# Patient Record
Sex: MALE | Race: WHITE | NOT HISPANIC OR LATINO | Employment: FULL TIME | URBAN - METROPOLITAN AREA
[De-identification: names, ages, dates, MRNs, and addresses within clinical notes are randomized per-mention and may not be internally consistent; named-entity substitution may affect disease eponyms.]

---

## 2018-05-08 ENCOUNTER — TRANSCRIBE ORDERS (OUTPATIENT)
Dept: ADMINISTRATIVE | Facility: HOSPITAL | Age: 63
End: 2018-05-08

## 2018-05-08 DIAGNOSIS — H34.02: Primary | ICD-10-CM

## 2018-05-15 ENCOUNTER — APPOINTMENT (EMERGENCY)
Dept: RADIOLOGY | Facility: HOSPITAL | Age: 63
End: 2018-05-15
Payer: COMMERCIAL

## 2018-05-15 ENCOUNTER — HOSPITAL ENCOUNTER (EMERGENCY)
Facility: HOSPITAL | Age: 63
Discharge: HOME | End: 2018-05-15
Attending: EMERGENCY MEDICINE | Admitting: EMERGENCY MEDICINE
Payer: COMMERCIAL

## 2018-05-15 ENCOUNTER — HOSPITAL ENCOUNTER (OUTPATIENT)
Dept: RADIOLOGY | Facility: HOSPITAL | Age: 63
Discharge: HOME | End: 2018-05-15
Attending: OPHTHALMOLOGY
Payer: COMMERCIAL

## 2018-05-15 VITALS
RESPIRATION RATE: 18 BRPM | BODY MASS INDEX: 27.28 KG/M2 | TEMPERATURE: 98 F | HEART RATE: 77 BPM | SYSTOLIC BLOOD PRESSURE: 168 MMHG | HEIGHT: 68 IN | DIASTOLIC BLOOD PRESSURE: 70 MMHG | WEIGHT: 180 LBS | OXYGEN SATURATION: 96 %

## 2018-05-15 DIAGNOSIS — G45.3 AMAUROSIS FUGAX OF LEFT EYE: Primary | ICD-10-CM

## 2018-05-15 DIAGNOSIS — H34.02: ICD-10-CM

## 2018-05-15 DIAGNOSIS — I65.22 STENOSIS OF LEFT CAROTID ARTERY: ICD-10-CM

## 2018-05-15 LAB
ANION GAP SERPL CALCULATED.3IONS-SCNC: 6 MMOL/L (ref 4–13)
APTT PPP: 33 SECONDS (ref 24–33)
BASOPHILS # BLD AUTO: 0.12 THOUSANDS/ΜL (ref 0–0.1)
BASOPHILS NFR BLD AUTO: 1 % (ref 0–1)
BUN SERPL-MCNC: 18 MG/DL (ref 5–25)
CALCIUM SERPL-MCNC: 9.3 MG/DL (ref 8.3–10.1)
CHLORIDE SERPL-SCNC: 103 MMOL/L (ref 100–108)
CO2 SERPL-SCNC: 30 MMOL/L (ref 21–32)
CREAT SERPL-MCNC: 0.99 MG/DL (ref 0.6–1.3)
EOSINOPHIL # BLD AUTO: 0.35 THOUSAND/ΜL (ref 0–0.61)
EOSINOPHIL NFR BLD AUTO: 3 % (ref 0–6)
ERYTHROCYTE [DISTWIDTH] IN BLOOD BY AUTOMATED COUNT: 12.9 % (ref 11.6–15.1)
GFR SERPL CREATININE-BSD FRML MDRD: 81 ML/MIN/1.73SQ M
GLUCOSE SERPL-MCNC: 115 MG/DL (ref 65–140)
HCT VFR BLD AUTO: 43.3 % (ref 36.5–49.3)
HGB BLD-MCNC: 14.4 G/DL (ref 12–17)
IMM GRANULOCYTES # BLD AUTO: 0.03 THOUSAND/UL (ref 0–0.2)
IMM GRANULOCYTES NFR BLD AUTO: 0 % (ref 0–2)
INR PPP: 1.04 (ref 0.86–1.16)
LYMPHOCYTES # BLD AUTO: 3.82 THOUSANDS/ΜL (ref 0.6–4.47)
LYMPHOCYTES NFR BLD AUTO: 37 % (ref 14–44)
MCH RBC QN AUTO: 29.4 PG (ref 26.8–34.3)
MCHC RBC AUTO-ENTMCNC: 33.3 G/DL (ref 31.4–37.4)
MCV RBC AUTO: 88 FL (ref 82–98)
MONOCYTES # BLD AUTO: 0.81 THOUSAND/ΜL (ref 0.17–1.22)
MONOCYTES NFR BLD AUTO: 8 % (ref 4–12)
NEUTROPHILS # BLD AUTO: 5.09 THOUSANDS/ΜL (ref 1.85–7.62)
NEUTS SEG NFR BLD AUTO: 51 % (ref 43–75)
NRBC BLD AUTO-RTO: 0 /100 WBCS
PLATELET # BLD AUTO: 241 THOUSANDS/UL (ref 149–390)
PMV BLD AUTO: 9.5 FL (ref 8.9–12.7)
POTASSIUM SERPL-SCNC: 3.9 MMOL/L (ref 3.5–5.3)
PROTHROMBIN TIME: 10.9 SECONDS (ref 9.4–11.7)
RBC # BLD AUTO: 4.9 MILLION/UL (ref 3.88–5.62)
SODIUM SERPL-SCNC: 139 MMOL/L (ref 136–145)
TROPONIN I SERPL-MCNC: <0.02 NG/ML
WBC # BLD AUTO: 10.22 THOUSAND/UL (ref 4.31–10.16)

## 2018-05-15 PROCEDURE — 85025 COMPLETE CBC W/AUTO DIFF WBC: CPT | Performed by: PHYSICIAN ASSISTANT

## 2018-05-15 PROCEDURE — 93880 EXTRACRANIAL BILAT STUDY: CPT

## 2018-05-15 PROCEDURE — 70498 CT ANGIOGRAPHY NECK: CPT

## 2018-05-15 PROCEDURE — 85610 PROTHROMBIN TIME: CPT | Performed by: PHYSICIAN ASSISTANT

## 2018-05-15 PROCEDURE — 36415 COLL VENOUS BLD VENIPUNCTURE: CPT | Performed by: PHYSICIAN ASSISTANT

## 2018-05-15 PROCEDURE — 93005 ELECTROCARDIOGRAM TRACING: CPT

## 2018-05-15 PROCEDURE — 93880 EXTRACRANIAL BILAT STUDY: CPT | Performed by: SURGERY

## 2018-05-15 PROCEDURE — 96360 HYDRATION IV INFUSION INIT: CPT

## 2018-05-15 PROCEDURE — 84484 ASSAY OF TROPONIN QUANT: CPT | Performed by: PHYSICIAN ASSISTANT

## 2018-05-15 PROCEDURE — 80048 BASIC METABOLIC PNL TOTAL CA: CPT | Performed by: PHYSICIAN ASSISTANT

## 2018-05-15 PROCEDURE — 70496 CT ANGIOGRAPHY HEAD: CPT

## 2018-05-15 PROCEDURE — 99284 EMERGENCY DEPT VISIT MOD MDM: CPT

## 2018-05-15 PROCEDURE — 96361 HYDRATE IV INFUSION ADD-ON: CPT

## 2018-05-15 PROCEDURE — 85730 THROMBOPLASTIN TIME PARTIAL: CPT | Performed by: PHYSICIAN ASSISTANT

## 2018-05-15 RX ORDER — SIMVASTATIN 20 MG
20 TABLET ORAL
COMMUNITY
End: 2018-05-22 | Stop reason: ALTCHOICE

## 2018-05-15 RX ORDER — LOSARTAN POTASSIUM 50 MG/1
25 TABLET ORAL
COMMUNITY
End: 2018-05-22 | Stop reason: SDUPTHER

## 2018-05-15 RX ORDER — ASPIRIN 81 MG/1
325 TABLET ORAL DAILY
COMMUNITY
End: 2018-07-17

## 2018-05-15 RX ADMIN — IOHEXOL 85 ML: 350 INJECTION, SOLUTION INTRAVENOUS at 17:03

## 2018-05-15 RX ADMIN — SODIUM CHLORIDE 1000 ML: 0.9 INJECTION, SOLUTION INTRAVENOUS at 16:23

## 2018-05-15 NOTE — DISCHARGE INSTRUCTIONS
Ischemic Stroke   WHAT YOU NEED TO KNOW:   An ischemic stroke occurs when blood flow to a part of your brain is blocked  The blockage is usually caused by a blood clot that gets stuck in a narrow blood vessel  When oxygen cannot get to an area of the brain, tissue in that area may get damaged  The damage can cause loss of body functions controlled by that area of the brain  DISCHARGE INSTRUCTIONS:   Call 911 for any of the following:   ·            · You have any of the following signs of a stroke:      ¨ Numbness or drooping on one side of your face     ¨ Weakness in an arm or leg    ¨ Confusion or difficulty speaking    ¨ Dizziness, a severe headache, or vision loss    · You have a seizure  · You feel lightheaded, short of breath, and have chest pain  · You cough up blood  · You have a severe headache, or loss of balance or coordination  Seek care immediately if:   · Your arm or leg feels warm, tender, and painful  It may look swollen and red  · You have double vision or vision loss  · You have unusual or heavy bleeding  Contact your healthcare provider or neurologist if:   · Your blood pressure is higher or lower than you were told it should be  · You have questions or concerns about your condition or care  Warning signs of a stroke: The word F A S T  can help you remember and recognize signs of a stroke:  · F = Face:  One side of the face droops  · A = Arms:  One arm starts to drop when both arms are raised  · S = Speech:  Speech is slurred or sounds different than usual     · T = Time:  A person who is having a stroke needs to be seen immediately  A stroke is a medical emergency that needs immediate treatment  Some medicines and treatments work best if given within a few hours of a stroke  Early treatment can decrease the risk of long-term effects of a stroke  ·        Medicines:   You may  need any of the following:  · Thrombolytics  help break apart clots     · Antiplatelets , such as aspirin, help prevent blood clots  Take your antiplatelet medicine exactly as directed  These medicines make it more likely for you to bleed or bruise  If you are told to take aspirin, do not take acetaminophen or ibuprofen instead  · Blood thinners    help prevent blood clots  Examples of blood thinners include heparin and warfarin  Clots can cause strokes, heart attacks, and death  The following are general safety guidelines to follow while you are taking a blood thinner:    ¨ Watch for bleeding and bruising while you take blood thinners  Watch for bleeding from your gums or nose  Watch for blood in your urine and bowel movements  Use a soft washcloth on your skin, and a soft toothbrush to brush your teeth  This can keep your skin and gums from bleeding  If you shave, use an electric shaver  Do not play contact sports  ¨ Tell your dentist and other healthcare providers that you take anticoagulants  Wear a bracelet or necklace that says you take this medicine  ¨ Do not start or stop any medicines unless your healthcare provider tells you to  Many medicines cannot be used with blood thinners  ¨ Tell your healthcare provider right away if you forget to take the medicine, or if you take too much  ¨ Warfarin  is a blood thinner that you may need to take  The following are things you should be aware of if you take warfarin  § Foods and medicines can affect the amount of warfarin in your blood  Do not make major changes to your diet while you take warfarin  Warfarin works best when you eat about the same amount of vitamin K every day  Vitamin K is found in green leafy vegetables and certain other foods  Ask for more information about what to eat when you are taking warfarin  § You will need to see your healthcare provider for follow-up visits when you are on warfarin  You will need regular blood tests   These tests are used to decide how much medicine you need     · Other medicines  may be given to treat other health conditions such as high cholesterol, high blood pressure, or diabetes  · Take your medicine as directed  Contact your healthcare provider if you think your medicine is not helping or if you have side effects  Tell him or her if you are allergic to any medicine  Keep a list of the medicines, vitamins, and herbs you take  Include the amounts, and when and why you take them  Bring the list or the pill bottles to follow-up visits  Carry your medicine list with you in case of an emergency  Follow up with your healthcare provider or neurologist as directed: You may need to come in for regular tests of your brain function  You may also need regular blood tests  Write down your questions so you remember to ask them during your visits  Self-care:   · Go to rehabilitation (rehab) as directed  Rehab is an important part of treatment  A speech therapist helps you relearn or improve your ability to talk and swallow  You may start slowly and start doing more difficult tasks over time  Physical therapists can help you gain strength and build endurance  Occupational therapists teach you new ways to do daily activities, such as getting dressed  Therapy can help you improve your ability to walk or keep your balance  Your therapy may include tasks or movements you will need to do for everyday activities  An example is being able to raise or lower yourself from a chair  · Make your home safe  Remove anything you might trip over  Tape electrical cords down  Keep paths clear throughout your home  Make sure your home is well lit  Put nonslip materials on surfaces that might be slippery  An example is your bathtub or shower floor  · Use walking devices as directed  A cane or walker may help you keep your balance as you walk  What you need to know about depression:  Depression can happen after a stroke   Talk to your healthcare provider if you have depression that continues or is getting worse  Your provider may be able to help treat your depression  Your provider can also recommend support groups for you to join  A support group is a place to talk with others who have had a stroke  It may also help to talk to friends and family members about how you are feeling  Tell your family and friends that if they see these signs, to let your healthcare provider know  You may show any of the following signs of depression:  · Extreme sadness    · Avoiding social interaction with family or friends    · A lack of interest in things you once enjoyed    · Irritability    · Trouble sleeping    · Low energy levels    · A change in eating habits or sudden weight gain or loss  Decrease your risk for another stroke:   · Manage health conditions  Take your medicine as directed  Check your blood pressure and blood sugar levels as directed  Keep a record and bring it to your follow-up visits  · Eat a variety of healthy foods  Healthy foods include whole-grain breads, low-fat dairy products, beans, fish, and lean meats  Eat at least 5 servings of fruits and vegetables each day  Choose foods that are low in salt, unhealthy fats (saturated and trans fat), salt, and sugar  Eat foods that are high in potassium, such as potatoes and bananas  Ask your dietitian what other nutrition guidelines you should follow  He or she can help you choose foods that are right for you  · Maintain a healthy weight  Ask your healthcare provider how much you should weigh  Ask him or her to help you create a weight loss plan if you are overweight  Ask about the best exercise plan for you  · Do not drink alcohol  Alcohol can increase your risk for a stroke  Alcohol may also increase your blood pressure or thin your blood  Blood thinning can increase your risk for hemorrhagic stroke  · Do not smoke cigarettes or use illegal drugs  Smoking and drugs increase your risk for a stroke   Nicotine and other chemicals in cigarettes and cigars can also cause lung damage  Ask your healthcare provider for information if you currently smoke or use drugs and need help to quit  E-cigarettes or smokeless tobacco still contain nicotine  Talk to your healthcare provider before you use these products  For support and more information:   · National St[employer] Association  [address] , [place] [place] [address]  Phone: 2- 788 - 336-8970  Web Address: [address] [employer]  org  © 2017 [address] [employer] Information is for End User's use only and may not be sold, redistributed or otherwise used for commercial purposes  All illustrations and images included in CareNotes® are the copyrighted property of [employer] [facility]  or Steve Davis  The above information is an  only  It is not intended as medical advice for individual conditions or treatments  Talk to your doctor, nurse or pharmacist before following any medical regimen to see if it is safe and effective for you

## 2018-05-16 ENCOUNTER — OFFICE VISIT (OUTPATIENT)
Dept: VASCULAR SURGERY | Facility: CLINIC | Age: 63
End: 2018-05-16
Payer: COMMERCIAL

## 2018-05-16 VITALS
HEIGHT: 68 IN | WEIGHT: 189 LBS | DIASTOLIC BLOOD PRESSURE: 82 MMHG | BODY MASS INDEX: 28.64 KG/M2 | TEMPERATURE: 97.6 F | SYSTOLIC BLOOD PRESSURE: 170 MMHG

## 2018-05-16 DIAGNOSIS — I65.22 LEFT CAROTID ARTERY STENOSIS: Primary | Chronic | ICD-10-CM

## 2018-05-16 DIAGNOSIS — G45.3 AMAUROSIS FUGAX: Chronic | ICD-10-CM

## 2018-05-16 PROCEDURE — 99245 OFF/OP CONSLTJ NEW/EST HI 55: CPT | Performed by: SURGERY

## 2018-05-16 NOTE — PATIENT INSTRUCTIONS
Left carotid artery stenosis  Symptomatic left carotid artery stenosis  Patient has had 2 episodes of amaurosis fugax over the past month or so  We discussed the findings on duplex evaluation and CT angiogram along with the pathophysiology of carotid occlusive disease, the indications for treatment and the treatment options available and their associated risks and benefits  Will plan on proceeding with left carotid endarterectomy following cardiac clearance  He will continue his aspirin and statin therapy

## 2018-05-16 NOTE — ASSESSMENT & PLAN NOTE
Symptomatic left carotid artery stenosis  Patient has had 2 episodes of amaurosis fugax over the past month or so  We discussed the findings on duplex evaluation and CT angiogram along with the pathophysiology of carotid occlusive disease, the indications for treatment and the treatment options available and their associated risks and benefits  Will plan on proceeding with left carotid endarterectomy following cardiac clearance  He will continue his aspirin and statin therapy

## 2018-05-16 NOTE — LETTER
May 16, 2018     Celeste Salazar, [address] [id number]    Patient: Anders Smith  YOB: 1955   Date of Visit: 5/16/2018       Dear Dr Anuel Thompson:    Thank you for referring Don Richards to me for evaluation  Below are the relevant portions of my assessment and plan of care  Diagnoses and all orders for this visit:    Left carotid artery stenosis  Symptomatic left carotid artery stenosis  Patient has had 2 episodes of amaurosis fugax over the past month or so  We discussed the findings on duplex evaluation and CT angiogram along with the pathophysiology of carotid occlusive disease, the indications for treatment and the treatment options available and their associated risks and benefits  Will plan on proceeding with left carotid endarterectomy following cardiac clearance  He will continue his aspirin and statin therapy  If you have questions, please do not hesitate to call me  I look forward to following Jeffy Forde along with you           Sincerely,        Maged Bianchi MD        CC: MD Yaa Tsang MD

## 2018-05-16 NOTE — H&P
Assessment/Plan:    Left carotid artery stenosis  Symptomatic left carotid artery stenosis  Patient has had 2 episodes of amaurosis fugax over the past month or so  We discussed the findings on duplex evaluation and CT angiogram along with the pathophysiology of carotid occlusive disease, the indications for treatment and the treatment options available and their associated risks and benefits  Will plan on proceeding with left carotid endarterectomy following cardiac clearance  He will continue his aspirin and statin therapy  Diagnoses and all orders for this visit:    Left carotid artery stenosis    Amaurosis fugax               Patient ID: Sanjay Siddiqui  is a 59 y o  male  Chief Complaint: Pt is new to our office here to review CTA of head and neck and CV  Pt denies HX of stroke  Pt states he was having issues with his vision that started a month ago  A week ago he states he went blind to left eye  Pt states he was able to see "down but couldn't see up"  Pt denies issues with speak  Pt denies one-sided weakness  No facial droopiness  Pt quite smoking 5/14/18  Pt states he has " ticks' to right arm that have been ongoing for a couple of year   Pt is taking aspirin daily  59year-old has noticed 2 episodes of temporary blindness affecting his left eye over the past month or so  In 1 of these events he noticed loss of vision only in the upper half of his visual field  These episodes lasted approximately 10 minutes  He denies any peripheral symptoms such as numbness or weakness which would be consistent with TIA or CVA  Interestingly he was at his neurologist for evaluation of his Parkinson's disease when he mentioned his visual loss  At that time he was sent for further workup at which time this critical left carotid stenosis was identified  Of note the patient has been a long-term smoker and quit yesterday     He denies any cardiac or pulmonary symptoms such as chest pain or shortness of breath  Review of carotid duplex from 05/15 shows a critical left carotid stenosis with flow velocity of 467/191 centimeters/second  On the right there is a less than 50% stenosis  CT angiogram 05/15/2018 with critical stenosis of the proximal internal carotid artery with a residual luminal diameter of less than 1 mm  The internal carotid arteries then small distally at approximately 2 5 mm in comparison to the contralateral side at 4 5 mm  The following portions of the patient's history were reviewed and updated as appropriate: allergies, current medications, past family history, past medical history, past social history, past surgical history and problem list     Review of Systems   Constitutional: Positive for activity change, fatigue and unexpected weight change  HENT: Negative  Eyes: Positive for visual disturbance  Sudden vision loss  Respiratory: Positive for shortness of breath  Cardiovascular: Negative  Gastrointestinal: Negative  Endocrine: Negative  Genitourinary: Negative  Musculoskeletal: Positive for neck pain and neck stiffness  Skin: Positive for pallor  Allergic/Immunologic: Negative  Neurological: Positive for dizziness, tremors, light-headedness and headaches  Hematological: Negative  Psychiatric/Behavioral: Negative  Objective:      /82 (BP Location: Right arm, Patient Position: Sitting, Cuff Size: Adult)   Temp 97 6 °F (36 4 °C) (Tympanic)   Ht 5' 8" (1 727 m)   Wt 85 7 kg (189 lb)   BMI 28 74 kg/m²           Physical Exam   Constitutional: He is oriented to person, place, and time  He appears well-developed and well-nourished  HENT:   Head: Normocephalic and atraumatic  Eyes: Conjunctivae and EOM are normal    Neck: Normal range of motion  Neck supple  No JVD present  Carotid bruit is present (Left bruit)  Cardiovascular: Normal rate, regular rhythm, S1 normal, S2 normal and normal heart sounds      No murmur heard   Pulses:       Carotid pulses are 2+ on the right side, and 2+ on the left side  Radial pulses are 2+ on the right side, and 2+ on the left side  Femoral pulses are 2+ on the right side, and 2+ on the left side  Popliteal pulses are 2+ on the right side, and 2+ on the left side  Dorsalis pedis pulses are 2+ on the right side, and 2+ on the left side  Posterior tibial pulses are 2+ on the right side, and 2+ on the left side  Pulmonary/Chest: Effort normal and breath sounds normal    Abdominal: Soft  Normal aorta  There is no tenderness  No hernia  Musculoskeletal: Normal range of motion  He exhibits deformity (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

## 2018-05-16 NOTE — PROGRESS NOTES
Assessment/Plan:    Left carotid artery stenosis  Symptomatic left carotid artery stenosis  Patient has had 2 episodes of amaurosis fugax over the past month or so  We discussed the findings on duplex evaluation and CT angiogram along with the pathophysiology of carotid occlusive disease, the indications for treatment and the treatment options available and their associated risks and benefits  Will plan on proceeding with left carotid endarterectomy following cardiac clearance  He will continue his aspirin and statin therapy  Diagnoses and all orders for this visit:    Left carotid artery stenosis    Amaurosis fugax               Patient ID: Jennifer Shepherd  is a 59 y o  male  Chief Complaint: Pt is new to our office here to review CTA of head and neck and CV  Pt denies HX of stroke  Pt states he was having issues with his vision that started a month ago  A week ago he states he went blind to left eye  Pt states he was able to see "down but couldn't see up"  Pt denies issues with speak  Pt denies one-sided weakness  No facial droopiness  Pt quite smoking 5/14/18  Pt states he has " ticks' to right arm that have been ongoing for a couple of year   Pt is taking aspirin daily  59year-old has noticed 2 episodes of temporary blindness affecting his left eye over the past month or so  In 1 of these events he noticed loss of vision only in the upper half of his visual field  These episodes lasted approximately 10 minutes  He denies any peripheral symptoms such as numbness or weakness which would be consistent with TIA or CVA  Interestingly he was at his neurologist for evaluation of his Parkinson's disease when he mentioned his visual loss  At that time he was sent for further workup at which time this critical left carotid stenosis was identified  Of note the patient has been a long-term smoker and quit yesterday     He denies any cardiac or pulmonary symptoms such as chest pain or shortness of breath  Review of carotid duplex from 05/15 shows a critical left carotid stenosis with flow velocity of 467/191 centimeters/second  On the right there is a less than 50% stenosis  CT angiogram 05/15/2018 with critical stenosis of the proximal internal carotid artery with a residual luminal diameter of less than 1 mm  The internal carotid arteries then small distally at approximately 2 5 mm in comparison to the contralateral side at 4 5 mm  The following portions of the patient's history were reviewed and updated as appropriate: allergies, current medications, past family history, past medical history, past social history, past surgical history and problem list     Review of Systems   Constitutional: Positive for activity change, fatigue and unexpected weight change  HENT: Negative  Eyes: Positive for visual disturbance  Sudden vision loss  Respiratory: Positive for shortness of breath  Cardiovascular: Negative  Gastrointestinal: Negative  Endocrine: Negative  Genitourinary: Negative  Musculoskeletal: Positive for neck pain and neck stiffness  Skin: Positive for pallor  Allergic/Immunologic: Negative  Neurological: Positive for dizziness, tremors, light-headedness and headaches  Hematological: Negative  Psychiatric/Behavioral: Negative  Objective:      /82 (BP Location: Right arm, Patient Position: Sitting, Cuff Size: Adult)   Temp 97 6 °F (36 4 °C) (Tympanic)   Ht 5' 8" (1 727 m)   Wt 85 7 kg (189 lb)   BMI 28 74 kg/m²          Physical Exam   Constitutional: He is oriented to person, place, and time  He appears well-developed and well-nourished  HENT:   Head: Normocephalic and atraumatic  Eyes: Conjunctivae and EOM are normal    Neck: Normal range of motion  Neck supple  No JVD present  Carotid bruit is present (Left bruit)  Cardiovascular: Normal rate, regular rhythm, S1 normal, S2 normal and normal heart sounds      No murmur heard   Pulses:       Carotid pulses are 2+ on the right side, and 2+ on the left side  Radial pulses are 2+ on the right side, and 2+ on the left side  Femoral pulses are 2+ on the right side, and 2+ on the left side  Popliteal pulses are 2+ on the right side, and 2+ on the left side  Dorsalis pedis pulses are 2+ on the right side, and 2+ on the left side  Posterior tibial pulses are 2+ on the right side, and 2+ on the left side  Pulmonary/Chest: Effort normal and breath sounds normal    Abdominal: Soft  Normal aorta  There is no tenderness  No hernia  Musculoskeletal: Normal range of motion  He exhibits deformity (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

## 2018-05-17 ENCOUNTER — TELEPHONE (OUTPATIENT)
Dept: ADMINISTRATIVE | Facility: HOSPITAL | Age: 63
End: 2018-05-17

## 2018-05-17 NOTE — TELEPHONE ENCOUNTER
Spoke with Carolyn Caldwell @ Dr Olga Persaud office in [place]  Patient needs cardiac clearance for Left Carotid Endarterectomy   Appointment scheduled for 5/22/18 @ 4pm  Faxed clearance to [phone]

## 2018-05-18 LAB
ATRIAL RATE: 76 BPM
P AXIS: 27 DEGREES
PR INTERVAL: 132 MS
QRS AXIS: -9 DEGREES
QRSD INTERVAL: 94 MS
QT INTERVAL: 378 MS
QTC INTERVAL: 425 MS
T WAVE AXIS: 21 DEGREES
VENTRICULAR RATE: 76 BPM

## 2018-05-18 PROCEDURE — 93010 ELECTROCARDIOGRAM REPORT: CPT | Performed by: INTERNAL MEDICINE

## 2018-05-22 ENCOUNTER — OFFICE VISIT (OUTPATIENT)
Dept: CARDIOLOGY CLINIC | Facility: CLINIC | Age: 63
End: 2018-05-22
Payer: COMMERCIAL

## 2018-05-22 VITALS
OXYGEN SATURATION: 96 % | DIASTOLIC BLOOD PRESSURE: 70 MMHG | HEIGHT: 68 IN | BODY MASS INDEX: 28.34 KG/M2 | WEIGHT: 187 LBS | SYSTOLIC BLOOD PRESSURE: 160 MMHG | HEART RATE: 76 BPM

## 2018-05-22 DIAGNOSIS — I65.22 LEFT CAROTID ARTERY STENOSIS: Chronic | ICD-10-CM

## 2018-05-22 DIAGNOSIS — I10 ESSENTIAL HYPERTENSION: ICD-10-CM

## 2018-05-22 DIAGNOSIS — E78.5 DYSLIPIDEMIA: ICD-10-CM

## 2018-05-22 DIAGNOSIS — Z82.3 FAMILY HISTORY OF STROKE: ICD-10-CM

## 2018-05-22 DIAGNOSIS — G45.3 AMAUROSIS FUGAX: Chronic | ICD-10-CM

## 2018-05-22 DIAGNOSIS — Z01.818 PRE-OP EXAM: Primary | ICD-10-CM

## 2018-05-22 DIAGNOSIS — I77.9 CAROTID ARTERY DISORDER (HCC): ICD-10-CM

## 2018-05-22 PROCEDURE — 99244 OFF/OP CNSLTJ NEW/EST MOD 40: CPT | Performed by: INTERNAL MEDICINE

## 2018-05-22 RX ORDER — LOSARTAN POTASSIUM 50 MG/1
50 TABLET ORAL
Qty: 30 TABLET | Refills: 2 | Status: ON HOLD | OUTPATIENT
Start: 2018-05-22 | End: 2018-07-25 | Stop reason: SDUPTHER

## 2018-05-22 RX ORDER — HYDROCHLOROTHIAZIDE 25 MG/1
25 TABLET ORAL DAILY
Qty: 30 TABLET | Refills: 2 | Status: ON HOLD | OUTPATIENT
Start: 2018-05-22 | End: 2018-07-25 | Stop reason: SDUPTHER

## 2018-05-22 RX ORDER — ROSUVASTATIN CALCIUM 20 MG/1
20 TABLET, COATED ORAL DAILY
Qty: 30 TABLET | Refills: 2 | Status: ON HOLD | OUTPATIENT
Start: 2018-05-22 | End: 2018-07-25 | Stop reason: SDUPTHER

## 2018-05-22 NOTE — PROGRESS NOTES
Subjective:      Nga Dobbs  is a 59 y o  male who presents to the office today for a preoperative consultation at the request of surgeon Dr Sofia Graham who plans on performing left carotid endarterectomy  This consultation is requested for the specific conditions prompting preoperative evaluation (i e  because of potential affect on operative risk): PVD  Planned anesthesia is general  The patient has no known anesthesia issues  Patient has no significant bleeding risk  He has shortness of breath with exertion  Can walk up to 4 blocks and a flight of stairs without stopping    The following portions of the patient's history were reviewed and updated as appropriate:   He  has a past medical history of Hyperlipidemia; Hypertension; and Parkinson disease ([facility] [address] )  He   Patient Active Problem List    Diagnosis Date Noted    Essential hypertension 05/22/2018    Dyslipidemia 05/22/2018    Pre-op exam 05/22/2018    Carotid artery disorder ([facility] [address] ) 05/22/2018    Left carotid artery stenosis 05/16/2018    Amaurosis fugax 05/16/2018     He  has a past surgical history that includes Appendectomy; Amputation of replicated fingers; Orbital fracture repair (Right); and Mandible fracture surgery  His family history includes Cancer in his mother; Heart attack in his father; Stroke in his father  He  reports that he quit smoking 8 days ago  He smoked 1 00 pack per day  He quit smokeless tobacco use 8 days ago  He reports that he does not drink alcohol or use drugs  Current Outpatient Prescriptions   Medication Sig Dispense Refill    aspirin (ECOTRIN LOW STRENGTH) 81 mg EC tablet Take 81 mg by mouth daily      losartan (COZAAR) 50 mg tablet Take 25 mg by mouth daily at bedtime      simvastatin (ZOCOR) 20 mg tablet Take 20 mg by mouth daily at bedtime       No current facility-administered medications for this visit        Current Outpatient Prescriptions on File Prior to Visit   Medication Sig    aspirin (ECOTRIN LOW STRENGTH) 81 mg EC tablet Take 81 mg by mouth daily    losartan (COZAAR) 50 mg tablet Take 25 mg by mouth daily at bedtime    simvastatin (ZOCOR) 20 mg tablet Take 20 mg by mouth daily at bedtime     No current facility-administered medications on file prior to visit  He has No Known Allergies       Review of Systems  Pertinent items are noted in HPI  Objective:      Physical Exam  /70 (BP Location: Left arm, Patient Position: Sitting, Cuff Size: Standard)   Pulse 76 Comment: left radial  Ht 5' 8" (1 727 m)   Wt 84 8 kg (187 lb)   SpO2 96% Comment: RA  BMI 28 43 kg/m²     Predictors of intubation difficulty:   Morbid obesity? no   Anatomically abnormal facies?  no   Prominent incisors? no   Receding mandible? no   Short, thick neck? no   Neck range of motion: normal   Mallampati score: III (soft and hard palate and base of uvula visible)   Dentition: Chipped teeth present (front) and Missing teeth (multiple)    Cardiographics  ECG: normal sinus rhythm, no blocks or conduction defects, no ischemic changes  Echocardiogram: not done    Lab Review   Admission on 05/15/2018, Discharged on 05/15/2018   Component Date Value    WBC 05/15/2018 10 22*    RBC 05/15/2018 4 90     Hemoglobin 05/15/2018 14 4     Hematocrit 05/15/2018 43 3     MCV 05/15/2018 88     MCH 05/15/2018 29 4     MCHC 05/15/2018 33 3     RDW 05/15/2018 12 9     MPV 05/15/2018 9 5     Platelets 81/12/8970 241     nRBC 05/15/2018 0     Neutrophils Relative 05/15/2018 51     Immat GRANS % 05/15/2018 0     Lymphocytes Relative 05/15/2018 37     Monocytes Relative 05/15/2018 8     Eosinophils Relative 05/15/2018 3     Basophils Relative 05/15/2018 1     Neutrophils Absolute 05/15/2018 5 09     Immature Grans Absolute 05/15/2018 0 03     Lymphocytes Absolute 05/15/2018 3 82     Monocytes Absolute 05/15/2018 0 81     Eosinophils Absolute 05/15/2018 0 35     Basophils Absolute 05/15/2018 0 12*    Sodium 05/15/2018 139  Potassium 05/15/2018 3 9     Chloride 05/15/2018 103     CO2 05/15/2018 30     Anion Gap 05/15/2018 6     BUN 05/15/2018 18     Creatinine 05/15/2018 0 99     Glucose 05/15/2018 115     Calcium 05/15/2018 9 3     eGFR 05/15/2018 81     Protime 05/15/2018 10 9     INR 05/15/2018 1 04     PTT 05/15/2018 33     Troponin I 05/15/2018 <0 02     Ventricular Rate 05/15/2018 76     Atrial Rate 05/15/2018 76     PA Interval 05/15/2018 132     QRSD Interval 05/15/2018 94     QT Interval 05/15/2018 378     QTC Interval 05/15/2018 425     P Axis 05/15/2018 27     QRS Axis 05/15/2018 -9     T Wave Axis 05/15/2018 21           Assessment:      59 y o  male with planned surgery as above  No known risk factors for perioperative complications but will undergo further testing prior to proceeding  Difficulty with intubation is not anticipated  Cardiac Risk Estimation: per the Revised Cardiac Risk Index (Circ  100:1043, 1999), the patient's risk factors for cardiac complications include "high-risk" surgery (intraperitoneal, intrathoracic, or suprainguinal vascular), putting him in: RCI RISK CLASS II (1 risk factor, risk of major cardiac compl  appr  1 3%)        Plan:      1  Preoperative workup as follows cardiac stress testing to exclude undiagnosed coronary disease (he has dyspnea with exertion), echocardiography to exclude impaired ventricular function  2  Change in medication regimen before surgery: discontinue antihypertensives (diovan) to avoid hypotension from anesthesia  3  Prophylaxis for cardiac events with perioperative beta-blockers: not indicated  4  Invasive hemodynamic monitoring perioperatively: not indicated  5  Deep vein thrombosis prophylaxis postoperatively:regimen to be chosen by surgical team   6  Will switch statin to more potent rosuvastatin as he has PVD present  7  Improve BP control with target SBP < 130 mmHg

## 2018-05-22 NOTE — LETTER
May 23, 2018     [address], [address] [address]    Patient: Pastora Hernandez  YOB: 1955   Date of Visit: 5/22/2018       Dear Dr Teresa Castellanos:    Thank you for referring Anahi Weiss to me for evaluation  Below are my notes for this consultation  If you have questions, please do not hesitate to call me  I look forward to following your patient along with you  Sincerely,        Sunday Cooper DO        CC: No Recipients  Pato Byrne DO  5/23/2018  6:08 PM  Sign at close encounter   Subjective:      Pastora Hernandez  is a 59 y o  male who presents to the office today for a preoperative consultation at the request of surgeon Dr Susannah Gomes who plans on performing left carotid endarterectomy  This consultation is requested for the specific conditions prompting preoperative evaluation (i e  because of potential affect on operative risk): PVD  Planned anesthesia is general  The patient has no known anesthesia issues  Patient has no significant bleeding risk  He has shortness of breath with exertion  Can walk up to 4 blocks and a flight of stairs without stopping    The following portions of the patient's history were reviewed and updated as appropriate:   He  has a past medical history of Hyperlipidemia; Hypertension; and Parkinson disease ([facility] [address] )  He   Patient Active Problem List    Diagnosis Date Noted    Essential hypertension 05/22/2018    Dyslipidemia 05/22/2018    Pre-op exam 05/22/2018    Carotid artery disorder ([facility] [address] ) 05/22/2018    Left carotid artery stenosis 05/16/2018    Amaurosis fugax 05/16/2018     He  has a past surgical history that includes Appendectomy; Amputation of replicated fingers; Orbital fracture repair (Right); and Mandible fracture surgery  His family history includes Cancer in his mother; Heart attack in his father; Stroke in his father  He  reports that he quit smoking 8 days ago  He smoked 1 00 pack per day   He quit smokeless tobacco use 8 days ago  He reports that he does not drink alcohol or use drugs  Current Outpatient Prescriptions   Medication Sig Dispense Refill    aspirin (ECOTRIN LOW STRENGTH) 81 mg EC tablet Take 81 mg by mouth daily      losartan (COZAAR) 50 mg tablet Take 25 mg by mouth daily at bedtime      simvastatin (ZOCOR) 20 mg tablet Take 20 mg by mouth daily at bedtime       No current facility-administered medications for this visit  Current Outpatient Prescriptions on File Prior to Visit   Medication Sig    aspirin (ECOTRIN LOW STRENGTH) 81 mg EC tablet Take 81 mg by mouth daily    losartan (COZAAR) 50 mg tablet Take 25 mg by mouth daily at bedtime    simvastatin (ZOCOR) 20 mg tablet Take 20 mg by mouth daily at bedtime     No current facility-administered medications on file prior to visit  He has No Known Allergies       Review of Systems  Pertinent items are noted in HPI  Objective:      Physical Exam  /70 (BP Location: Left arm, Patient Position: Sitting, Cuff Size: Standard)   Pulse 76 Comment: left radial  Ht 5' 8" (1 727 m)   Wt 84 8 kg (187 lb)   SpO2 96% Comment: RA  BMI 28 43 kg/m²      Predictors of intubation difficulty:   Morbid obesity? no   Anatomically abnormal facies?  no   Prominent incisors? no   Receding mandible? no   Short, thick neck? no   Neck range of motion: normal   Mallampati score: III (soft and hard palate and base of uvula visible)   Dentition: Chipped teeth present (front) and Missing teeth (multiple)    Cardiographics  ECG: normal sinus rhythm, no blocks or conduction defects, no ischemic changes  Echocardiogram: not done    Lab Review   Admission on 05/15/2018, Discharged on 05/15/2018   Component Date Value    WBC 05/15/2018 10 22*    RBC 05/15/2018 4 90     Hemoglobin 05/15/2018 14 4     Hematocrit 05/15/2018 43 3     MCV 05/15/2018 88     MCH 05/15/2018 29 4     MCHC 05/15/2018 33 3     RDW 05/15/2018 12 9     MPV 05/15/2018 9 5     Platelets 05/15/2018 241     nRBC 05/15/2018 0     Neutrophils Relative 05/15/2018 51     Immat GRANS % 05/15/2018 0     Lymphocytes Relative 05/15/2018 37     Monocytes Relative 05/15/2018 8     Eosinophils Relative 05/15/2018 3     Basophils Relative 05/15/2018 1     Neutrophils Absolute 05/15/2018 5 09     Immature Grans Absolute 05/15/2018 0 03     Lymphocytes Absolute 05/15/2018 3 82     Monocytes Absolute 05/15/2018 0 81     Eosinophils Absolute 05/15/2018 0 35     Basophils Absolute 05/15/2018 0 12*    Sodium 05/15/2018 139     Potassium 05/15/2018 3 9     Chloride 05/15/2018 103     CO2 05/15/2018 30     Anion Gap 05/15/2018 6     BUN 05/15/2018 18     Creatinine 05/15/2018 0 99     Glucose 05/15/2018 115     Calcium 05/15/2018 9 3     eGFR 05/15/2018 81     Protime 05/15/2018 10 9     INR 05/15/2018 1 04     PTT 05/15/2018 33     Troponin I 05/15/2018 <0 02     Ventricular Rate 05/15/2018 76     Atrial Rate 05/15/2018 76     MS Interval 05/15/2018 132     QRSD Interval 05/15/2018 94     QT Interval 05/15/2018 378     QTC Interval 05/15/2018 425     P Axis 05/15/2018 27     QRS Axis 05/15/2018 -9     T Wave Axis 05/15/2018 21           Assessment:      59 y o  male with planned surgery as above  No known risk factors for perioperative complications but will undergo further testing prior to proceeding  Difficulty with intubation is not anticipated  Cardiac Risk Estimation: per the Revised Cardiac Risk Index (Circ  100:1043, 1999), the patient's risk factors for cardiac complications include "high-risk" surgery (intraperitoneal, intrathoracic, or suprainguinal vascular), putting him in: RCI RISK CLASS II (1 risk factor, risk of major cardiac compl  appr  1 3%)        Plan:      1  Preoperative workup as follows cardiac stress testing to exclude undiagnosed coronary disease (he has dyspnea with exertion), echocardiography to exclude impaired ventricular function    2  Change in medication regimen before surgery: discontinue antihypertensives (diovan) to avoid hypotension from anesthesia  3  Prophylaxis for cardiac events with perioperative beta-blockers: not indicated  4  Invasive hemodynamic monitoring perioperatively: not indicated  5  Deep vein thrombosis prophylaxis postoperatively:regimen to be chosen by surgical team   6  Will switch statin to more potent rosuvastatin as he has PVD present  7  Improve BP control with target SBP < 130 mmHg

## 2018-06-13 ENCOUNTER — HOSPITAL ENCOUNTER (OUTPATIENT)
Dept: RADIOLOGY | Facility: HOSPITAL | Age: 63
Discharge: HOME | End: 2018-06-13
Attending: INTERNAL MEDICINE
Payer: COMMERCIAL

## 2018-06-13 ENCOUNTER — TRANSCRIBE ORDERS (OUTPATIENT)
Dept: ADMINISTRATIVE | Facility: HOSPITAL | Age: 63
End: 2018-06-13

## 2018-06-13 ENCOUNTER — TELEPHONE (OUTPATIENT)
Dept: CARDIOLOGY CLINIC | Facility: CLINIC | Age: 63
End: 2018-06-13

## 2018-06-13 ENCOUNTER — HOSPITAL ENCOUNTER (OUTPATIENT)
Dept: NON INVASIVE DIAGNOSTICS | Facility: HOSPITAL | Age: 63
Discharge: HOME | End: 2018-06-13
Attending: INTERNAL MEDICINE
Payer: COMMERCIAL

## 2018-06-13 DIAGNOSIS — Z01.818 PRE-OP EXAM: ICD-10-CM

## 2018-06-13 LAB
CHEST PAIN STATEMENT: NORMAL
MAX DIASTOLIC BP: 80 MMHG
MAX HEART RATE: 150 BPM
MAX PREDICTED HEART RATE: 158 BPM
MAX. SYSTOLIC BP: 162 MMHG
PROTOCOL NAME: NORMAL
REASON FOR TERMINATION: NORMAL
TARGET HR FORMULA: NORMAL
TIME IN EXERCISE PHASE: NORMAL

## 2018-06-13 PROCEDURE — 93306 TTE W/DOPPLER COMPLETE: CPT

## 2018-06-13 PROCEDURE — 93017 CV STRESS TEST TRACING ONLY: CPT

## 2018-06-13 PROCEDURE — 78452 HT MUSCLE IMAGE SPECT MULT: CPT

## 2018-06-13 PROCEDURE — A9502 TC99M TETROFOSMIN: HCPCS

## 2018-06-14 DIAGNOSIS — R94.39 ABNORMAL STRESS TEST: Primary | ICD-10-CM

## 2018-06-14 PROCEDURE — 93016 CV STRESS TEST SUPVJ ONLY: CPT | Performed by: INTERNAL MEDICINE

## 2018-06-14 PROCEDURE — 78452 HT MUSCLE IMAGE SPECT MULT: CPT | Performed by: INTERNAL MEDICINE

## 2018-06-14 PROCEDURE — 93018 CV STRESS TEST I&R ONLY: CPT | Performed by: INTERNAL MEDICINE

## 2018-06-14 NOTE — TELEPHONE ENCOUNTER
Pt's wife called  Pt had abnormal stress test yesterday  Dr Miri Talavera said pt will need diagnostic cath, which is not scheduled yet  Pt's wife said she will call us to update us when they have a date scheduled for the cath

## 2018-06-28 ENCOUNTER — HOSPITAL ENCOUNTER (OUTPATIENT)
Dept: NON INVASIVE DIAGNOSTICS | Facility: HOSPITAL | Age: 63
Discharge: HOME | End: 2018-06-28
Attending: INTERNAL MEDICINE | Admitting: INTERNAL MEDICINE
Payer: COMMERCIAL

## 2018-06-28 VITALS
BODY MASS INDEX: 27.4 KG/M2 | HEIGHT: 69 IN | SYSTOLIC BLOOD PRESSURE: 149 MMHG | HEART RATE: 63 BPM | TEMPERATURE: 96.6 F | OXYGEN SATURATION: 99 % | WEIGHT: 185 LBS | RESPIRATION RATE: 20 BRPM | DIASTOLIC BLOOD PRESSURE: 76 MMHG

## 2018-06-28 DIAGNOSIS — I65.29 STENOSIS OF CAROTID ARTERY, UNSPECIFIED LATERALITY: ICD-10-CM

## 2018-06-28 DIAGNOSIS — R94.39 ABNORMAL STRESS TEST: ICD-10-CM

## 2018-06-28 DIAGNOSIS — I65.22 LEFT CAROTID STENOSIS: Primary | ICD-10-CM

## 2018-06-28 PROCEDURE — 93454 CORONARY ARTERY ANGIO S&I: CPT | Performed by: INTERNAL MEDICINE

## 2018-06-28 PROCEDURE — 99152 MOD SED SAME PHYS/QHP 5/>YRS: CPT | Performed by: INTERNAL MEDICINE

## 2018-06-28 PROCEDURE — 93458 L HRT ARTERY/VENTRICLE ANGIO: CPT

## 2018-06-28 PROCEDURE — C1894 INTRO/SHEATH, NON-LASER: HCPCS

## 2018-06-28 PROCEDURE — C1769 GUIDE WIRE: HCPCS

## 2018-06-28 RX ORDER — CLOPIDOGREL BISULFATE 75 MG/1
75 TABLET ORAL DAILY
Qty: 30 TABLET | Refills: 3 | Status: ON HOLD | OUTPATIENT
Start: 2018-06-28 | End: 2018-07-25 | Stop reason: SDUPTHER

## 2018-06-28 RX ORDER — FENTANYL CITRATE 50 UG/ML
INJECTION, SOLUTION INTRAMUSCULAR; INTRAVENOUS CODE/TRAUMA/SEDATION MEDICATION
Status: COMPLETED | OUTPATIENT
Start: 2018-06-28 | End: 2018-06-28

## 2018-06-28 RX ORDER — ONDANSETRON 2 MG/ML
4 INJECTION INTRAMUSCULAR; INTRAVENOUS EVERY 6 HOURS PRN
Status: DISCONTINUED | OUTPATIENT
Start: 2018-06-28 | End: 2018-06-29 | Stop reason: HOSPADM

## 2018-06-28 RX ORDER — VERAPAMIL HYDROCHLORIDE 2.5 MG/ML
INJECTION, SOLUTION INTRAVENOUS CODE/TRAUMA/SEDATION MEDICATION
Status: COMPLETED | OUTPATIENT
Start: 2018-06-28 | End: 2018-06-28

## 2018-06-28 RX ORDER — NITROGLYCERIN 20 MG/100ML
INJECTION INTRAVENOUS CODE/TRAUMA/SEDATION MEDICATION
Status: COMPLETED | OUTPATIENT
Start: 2018-06-28 | End: 2018-06-28

## 2018-06-28 RX ORDER — ATORVASTATIN CALCIUM 40 MG/1
20 TABLET, FILM COATED ORAL DAILY
Qty: 30 TABLET | Refills: 3 | Status: ON HOLD | OUTPATIENT
Start: 2018-06-28 | End: 2018-07-25 | Stop reason: SDUPTHER

## 2018-06-28 RX ORDER — MIDAZOLAM HYDROCHLORIDE 1 MG/ML
INJECTION INTRAMUSCULAR; INTRAVENOUS CODE/TRAUMA/SEDATION MEDICATION
Status: COMPLETED | OUTPATIENT
Start: 2018-06-28 | End: 2018-06-28

## 2018-06-28 RX ORDER — HEPARIN SODIUM 1000 [USP'U]/ML
INJECTION, SOLUTION INTRAVENOUS; SUBCUTANEOUS CODE/TRAUMA/SEDATION MEDICATION
Status: COMPLETED | OUTPATIENT
Start: 2018-06-28 | End: 2018-06-28

## 2018-06-28 RX ORDER — SODIUM CHLORIDE 9 MG/ML
100 INJECTION, SOLUTION INTRAVENOUS CONTINUOUS
Status: DISCONTINUED | OUTPATIENT
Start: 2018-06-28 | End: 2018-06-29 | Stop reason: HOSPADM

## 2018-06-28 RX ORDER — LIDOCAINE HYDROCHLORIDE 10 MG/ML
INJECTION, SOLUTION INFILTRATION; PERINEURAL CODE/TRAUMA/SEDATION MEDICATION
Status: COMPLETED | OUTPATIENT
Start: 2018-06-28 | End: 2018-06-28

## 2018-06-28 RX ORDER — VERAPAMIL HCL 2.5 MG/ML
AMPUL (ML) INTRAVENOUS CODE/TRAUMA/SEDATION MEDICATION
Status: COMPLETED | OUTPATIENT
Start: 2018-06-28 | End: 2018-06-28

## 2018-06-28 RX ORDER — ACETAMINOPHEN 325 MG/1
650 TABLET ORAL ONCE
Status: CANCELLED | OUTPATIENT
Start: 2018-06-28

## 2018-06-28 RX ORDER — ASPIRIN 81 MG/1
81 TABLET, CHEWABLE ORAL DAILY
Status: DISCONTINUED | OUTPATIENT
Start: 2018-06-28 | End: 2018-06-29 | Stop reason: HOSPADM

## 2018-06-28 RX ORDER — SODIUM CHLORIDE 9 MG/ML
INJECTION, SOLUTION INTRAVENOUS
Status: COMPLETED | OUTPATIENT
Start: 2018-06-28 | End: 2018-06-28

## 2018-06-28 RX ADMIN — MIDAZOLAM HYDROCHLORIDE 1 MG: 1 INJECTION, SOLUTION INTRAMUSCULAR; INTRAVENOUS at 08:52

## 2018-06-28 RX ADMIN — IOHEXOL 100 ML: 350 INJECTION, SOLUTION INTRAVENOUS at 14:30

## 2018-06-28 RX ADMIN — MIDAZOLAM HYDROCHLORIDE 0.5 MG: 1 INJECTION, SOLUTION INTRAMUSCULAR; INTRAVENOUS at 08:56

## 2018-06-28 RX ADMIN — ASPIRIN 81 MG CHEWABLE TABLET 81 MG: 81 TABLET CHEWABLE at 11:17

## 2018-06-28 RX ADMIN — FENTANYL CITRATE 25 MCG: 50 INJECTION, SOLUTION INTRAMUSCULAR; INTRAVENOUS at 08:55

## 2018-06-28 RX ADMIN — LIDOCAINE HYDROCHLORIDE 3 ML: 10 INJECTION, SOLUTION INFILTRATION; PERINEURAL at 08:54

## 2018-06-28 RX ADMIN — VERAPAMIL HYDROCHLORIDE 2.5 MG: 2.5 INJECTION, SOLUTION INTRAVENOUS at 09:01

## 2018-06-28 RX ADMIN — SODIUM CHLORIDE 100 ML/HR: 0.9 INJECTION, SOLUTION INTRAVENOUS at 10:35

## 2018-06-28 RX ADMIN — NITROGLYCERIN 100 MCG: 20 INJECTION INTRAVENOUS at 09:02

## 2018-06-28 RX ADMIN — HEPARIN SODIUM 4000 UNITS: 1000 INJECTION INTRAVENOUS; SUBCUTANEOUS at 09:00

## 2018-06-28 RX ADMIN — SODIUM CHLORIDE 500 ML/HR: 9 INJECTION, SOLUTION INTRAVENOUS at 08:57

## 2018-06-28 RX ADMIN — FENTANYL CITRATE 25 MCG: 50 INJECTION, SOLUTION INTRAMUSCULAR; INTRAVENOUS at 08:53

## 2018-06-28 RX ADMIN — LIDOCAINE HYDROCHLORIDE 5 ML: 10 INJECTION, SOLUTION INFILTRATION; PERINEURAL at 09:09

## 2018-06-28 NOTE — DISCHARGE INSTRUCTIONS
Safe Use of Antiplatelet Medication   WHAT YOU NEED TO KNOW:   What do I need to know about antiplatelets? Antiplatelets are medicines that help prevent or treat conditions caused by blood clots  These conditions include stroke, transient ischemic attack (TIA), heart attack, heart vessel disease, or peripheral artery disease  Platelets help your blood to clot  Antiplatelets keep your platelets from sticking together in the areas of your blood vessels that have plaque  You may need to take more than one antiplatelet medicine at the same time  How do I safely take antiplatelets? · Always  take your medicine exactly as prescribed by your healthcare provider  Take your medicine at the same time every day  This will help you remember to take your medicine and keep a steady level of medicine in your body  · Take 1 dose of medicine at a time  If you forget to take a dose, do not  take 2 doses  Too much antiplatelet medicine in your body may increase your risk of bleeding and change medicine levels in your blood  · Do not stop taking your medicine  Follow your healthcare provider's directions  · Do not take NSAIDs  Many over-the-counter medicines contain NSAIDs or aspirin  Medicines, such as ibuprofen, can increase your risk of bleeding  · Do not start or stop taking any other medicines or supplements  Ask your healthcare provider before changing any medicine or supplement  Certain medicines and supplements can cause bleeding or cause your antiplatelets to not work properly  What are the risks of taking antiplatelets? You may have bleeding in your brain or gastrointestinal tract  Your risk for infection may increase  If you skip doses or do not take your medicine, you increase your risk for heart attack, stroke, or TIA  What safety measures do I need to follow? Tell all of your healthcare providers that you are taking antiplatelets   Keep a current list of medicines and their dosages with you at all times  · Wear a bracelet or necklace that says you take this medicine  · Use a soft washcloth and a soft toothbrush  If you shave, use an electric razor  Avoid activities that can cause bruising or bleeding  · You may need regular blood tests so your healthcare provider can decide how much medicine you need  The dose may need to be changed because of your test results  Write down changes to your dose of medicine  · Avoid drinking alcohol while taking antiplatelets  Alcohol can increase your risk of bleeding  When should I seek immediate care? · You have a nose bleed that lasts longer than 10 minutes  · You have bleeding that will not stop  · You cough up or vomit blood  · You have a sudden, severe headache or cannot move one side of your body  · You are dizzy and cannot keep your balance  · You are confused or have trouble speaking  · You have difficulty breathing or chest pain  When should I contact my healthcare provider? · You forget to take your medicine or you take too much  · You have nausea, and you are vomiting  · You bleed when you brush your teeth or blow your nose  · Your urine or bowel movements are dark in color or have blood in them  · You have excessive bruising or your legs have reddish-purple spots that look like a rash  · You have a sore throat, fever, and chills  · You become pregnant  · You have questions or concerns about your condition or care  CARE AGREEMENT:   You have the right to help plan your care  Learn about your health condition and how it may be treated  Discuss treatment options with your caregivers to decide what care you want to receive  You always have the right to refuse treatment  The above information is an  only  It is not intended as medical advice for individual conditions or treatments   Talk to your doctor, nurse or pharmacist before following any medical regimen to see if it is safe and effective for you  © 2017 [employer]  Information is for End User's use only and may not be sold, redistributed or otherwise used for commercial purposes  All illustrations and images included in CareNotes® are the copyrighted property of A D A M , Inc  or [employer]  Aspirin (By mouth)   Aspirin (AS-pir-in)  Treats pain, fever, and inflammation  May lower risk of heart attack and stroke  Brand Name(s): Ascriptin Regular Strength, Aspergum, Aspir Low, Aspirin Adult Low Dose, Aspirin Low Dose, Ashok Aspirin Children's, Ashok Aspirin Regimen, Ashok Extra Strength, Ashok Genuine Aspirin, Bufferin, Bufferin Low Dose, Durlaza, Ecotrin, Ecpirin, Enteric Aspirin   There may be other brand names for this medicine  When This Medicine Should Not Be Used: This medicine is not right for everyone  Do not use it if you had an allergic reaction to aspirin or other NSAIDs, or if you have a history of asthma with nasal polyps and rhinitis  How to Use This Medicine:   Delayed Release Capsule, Long Acting Capsule, Gum, Tablet, Chewable Tablet, Fizzy Tablet, Coated Tablet, Long Acting Tablet, 24 Hour Capsule  · Your doctor will tell you how much medicine to use  Do not use more than directed  · It is best to take this medicine with food or milk  · Capsule, tablet, or coated tablet: Swallow whole  Do not crush, break, or chew it  · Chewable tablet: You may chew it completely or swallow it whole  · Gum: Chew completely to make sure you get as much medicine as possible  Drink a full glass (8 ounces) of water after chewing the gum  · Swallow the extended-release capsule whole  Do not crush, break, or chew it  Take the capsule with a full glass of water at the same time each day  · Follow the instructions on the medicine label if you are using this medicine without a prescription  · Missed dose: If you miss a dose of Durlaza, skip the missed dose and go back to your regular dosing schedule  Do not take extra medicine to make up for a missed dose  · Store the medicine in a closed container at room temperature, away from heat, moisture, and direct light  Drugs and Foods to Avoid:   Ask your doctor or pharmacist before using any other medicine, including over-the-counter medicines, vitamins, and herbal products  · Some foods and medicines can affect how aspirin works  Tell your doctor if you are using any of the following:  ¨ Dipyridamole, methotrexate, probenecid, sulfinpyrazone, ticlopidine  ¨ Blood thinner (including clopidogrel, prasugrel, ticagrelor, warfarin)  ¨ Blood pressure medicine  ¨ Medicine to treat seizures (including phenytoin, valproic acid)  ¨ NSAID pain or arthritis medicine (including celecoxib, diclofenac, ibuprofen, naproxen)  ¨ Steroid medicine (including dexamethasone, hydrocortisone, methylprednisolone, prednisolone, prednisone)  · Do not take Durlaza 2 hours before or 1 hour after you drink alcohol or take medicines that contain alcohol  Warnings While Using This Medicine:   · Tell your doctor if you are pregnant or breastfeeding  Do not use this medicine during the later part of a pregnancy unless your doctor tells you to  · Tell your doctor if you have kidney disease, liver disease, high blood pressure, heart disease, or a history of stomach bleeding or ulcers  · This medicine may increase your risk for bleeding, including stomach ulcers  · Do not give aspirin to a child or teenager who has chickenpox or flu symptoms, unless the doctor says it is okay  Aspirin can cause a life-threatening reaction called Reye syndrome  · Tell any doctor or dentist who treats you that you are using this medicine  This medicine may affect certain medical test results  · Keep all medicine out of the reach of children  Never share your medicine with anyone    Possible Side Effects While Using This Medicine:   Call your doctor right away if you notice any of these side effects:  · Allergic reaction: Itching or hives, swelling in your face or hands, swelling or tingling in your mouth or throat, chest tightness, trouble breathing  · Bloody or black stools, bloody vomit or vomit that looks like coffee grounds  · Chest tightness, wheezing  · Ringing in the ears  · Severe stomach pain  · Unusual bleeding, bruising, or weakness  If you notice other side effects that you think are caused by this medicine, tell your doctor  Call your doctor for medical advice about side effects  You may report side effects to FDA at 3-704-FDA-5943  © 2017 [employer] Information is for End User's use only and may not be sold, redistributed or otherwise used for commercial purposes  The above information is an  only  It is not intended as medical advice for individual conditions or treatments  Talk to your doctor, nurse or pharmacist before following any medical regimen to see if it is safe and effective for you

## 2018-06-28 NOTE — NURSING NOTE
See [facility] documentation for frequent vital signs,  LOC, right radial frequent neurovavscular assessment, right groin frequent neurovascular  Assessment   LOC, pain assessment

## 2018-06-28 NOTE — TELEPHONE ENCOUNTER
Called in Plavix and statin in prep for TCAR on 7/9/18  Pt's wife instructed that he needs to take this daily starting today

## 2018-06-28 NOTE — PROCEDURES
Cardiac Catheterization Operative Report    Helen Leigh  [id number]  6/28/2018  Carmela Cervantes MD    Right coronary angiography  Left coronary angiography  LV gram  Fluoroscopy    Cardiologist: Dr Jason Wagner MD [facility] [place]    Brief history: Patient is    Procedure Details: The risks, benefits, complications, including risk of stroke, heart attack, bleeding and even death but not limited to it, along with alternative  treatment options, and expected outcomes were discussed with the patient in detail  The patient and/or family concurred with the proposed plan, giving informed consent  Patient was brought to the cath lab after IV hydration was begun and oral premedication was given  He was further sedated with midazolam and fentanyl  He was prepped and draped in the usual manner  Using the modified Seldinger access technique, a 5 Croatian sheath was placed in the right common femoral artery without any complications    A left heart catheterization was done  Right and left coronary angiograms were performed  End of the procedure patient was transferred to [facility] area in stable condition without any complications  He tolerated the procedure well  After the procedure was completed, sedation was stopped and the sheaths and catheters were all removed  Hemostasis was achieved with manual pressure and Femstop was applied  Initially we had access in the right radial artery  We were able to cannulate the right coronary artery but however there was lot of spasm noted hence procedure was switched to right common femoral artery  No complication  Patient has good radial and distal pulses on the leg at the end of the procedure  Equipment used:   1  JR4 for right coronary angiography  2  JL 4 0 for left coronary angiography of left coronary angiography  3  LV gram with JR4    Findings:    1  Dominance: Right dominant coronary system    2  Left main Coronary artery: Normal size vessels   It bifurcates into large LAD and a nondominant circumflex system  Distal left main has around 50-60% stenosis  3  Left anterior descending artery: LAD is a large-size vessel and it has proximally around 45% stenosis  Mild luminal irregularities causing 25 30% stenosis seen in mid and distal branches  4  Circumflex Coronary artery: Circumflex is a large size vessel  It has proximally around 75% stenosis  Mid about 80% stenosis  Obtuse marginal and other branches have mild luminal irregularities  5  Right coronary artery: RCA is normal size vessel and it is 100% occluded at the mid with collaterals from left-to-right circulations  Grade 2-3 collaterals  6   Small ramus intermedius is 100% occluded  It fills up with collaterals      7 Left ventriculogram: LV gram done in VARELA view shows low-normal LV systolic function EF around 50% with mild inferior wall hypokinesis    Estimated Blood Loss: Minimal    Complications:  None,  patient tolerated the procedure well  Impression: Cardiac catheterization shows three-vessel coronary artery disease with significant distal left main, proximal and mid circumflex, mid RCA and moderate nonobstructive proximal LAD stenosis  Recommendation: Continue medical therapy  Continue aggressive risk factor modification  Patient will benefit from coronary artery bypass surgery  Since he scheduled for carotid endarterectomy surgery case will be discussed with vascular surgery  Disposition: Patient transferred to holding area/ monitoring room in stable condition  Condition: Stable    "This note has been constructed using a voice recognition system  Therefore there may be syntax, spelling, and/or grammatical errors   Please call if you have any questions  "    Dr Carmela Modi MD [facility] [place]

## 2018-06-28 NOTE — TELEPHONE ENCOUNTER
Received call from Dr Susannah Gomes  Pt has triple vessel disease and needs CABG but pt has symptomatic carotid disease which needs to be taken care of first with stent  Dr Susannah Gomes stated that we can discuss this with pt over phone or he can come back for OV, which he did not recommend because there would be a delay  Loni Lacks spoke to pt's after this and discussed plan of care

## 2018-06-28 NOTE — TELEPHONE ENCOUNTER
S/w wife and explained plan and TCAR  They opted not to have the OV just to be set up with sx  I notified Walter Louie in sx scheduling and she was able to give them a date of 7/9/18  The wife is aware of the sx date, the procedure and the medication he needs to be on prior which is the Asa 81mg, Plavix, Statin

## 2018-06-28 NOTE — PERIOPERATIVE NURSING NOTE
Rec'd from Cath Lab-awake-alert-denies pain-Right radial band secure-extremity warm-right groin stop secure-pedal and post-tibial pulses palpable-foot warm-no signs of bleeding or hematoma at either site

## 2018-06-28 NOTE — DISCHARGE INSTR - ACTIVITY
Cardiac Catheterization     WHAT YOU NEED TO KNOW:   A cardiac catheterization is a procedure to look at your heart and its blood vessels  Healthcare providers can measure oxygen levels and pressures in your heart  They can also fix problems with your valves, blood vessels, or the walls of your heart  You may need this procedure if you have chest pain, heart disease, or your heart is not working properly  DISCHARGE INSTRUCTIONS:     No driving for 24 hours, because you were sedated  Sedation: Avoid making any legal/major decisions today, as the sedation may inhibit your decision making  Also avoid alcohol today, as the sedation may heighten the effects of the alcohol  Apply firm, steady pressure directly over the wound if bleeding occurs  A small amount of bleeding from your wound is possible  Apply pressure with a clean gauze or towel for 5 to 10 minutes  Call 911 if bleeding becomes heavy or does not stop  Do not attempt to drive yourself to the hospital     Bathing: You will be able to shower the day after your procedure  Remove your bandage before you shower  Carefully wash the wound with soap and water  Pat the area dry and apply a clean band-aid  Do not take baths or go in hot tubs or pools for about one week  Care for your wound as directed: Keep your dressing clean and dry  Monitor your wound everyday for signs of infection such as redness, swelling, or pus  Mild bruising is normal and expected  Do not put powders, lotions, or creams on your wound for about one week  Do not lift anything heavier than 5 pounds for about 5 days  Heavy lifting can put stress on your wound and cause bleeding  If an artery in your wrist was used, do not push or pull with the arm that was used for the procedure - pretend like your wrist is broken  Do not do vigorous activity for at least 48 hours  Vigorous activity may cause bleeding from your wound  Rest and do quiet activities   Short walks to the bathroom and around the house are okay  Ask your healthcare provider when you can return to your normal activities  Drink liquids to flush the contrast liquid from your body and prevent blood clots  Ask how much liquid to drink each day and which liquids are best for you  Call 911 for any of the following:   · You have any of the following signs of a heart attack:    ? Squeezing, pressure, or pain in your chest that lasts longer than 5 minutes or returns   ? Discomfort or pain in your back, neck, jaw, stomach, or arm    ? Trouble breathing   ? Nausea or vomiting   ? Lightheadedness or a sudden cold sweat, especially with chest pain or trouble breathing     · You have any of the following signs of a stroke:    ? Numbness or drooping on one side of your face    ? Weakness in an arm or leg   ? Confusion or difficulty speaking   ? Dizziness, a severe headache, or vision loss     · You feel lightheaded, short of breath, and have chest pain  · You cough up blood  · You have trouble breathing  · You cannot stop the bleeding from your wound even after you hold firm pressure for 10 minutes  ·   Seek care immediately if:   · Blood soaks through your bandage  · Your stitches come apart  · Your arm or leg feels numb, cool, or looks pale  · Your wound gets swollen quickly  Contact your healthcare provider if:   · You have a fever or chills  · Your wound is red, swollen, or draining pus  · Your wound looks more bruised or there is new bruising on the side of your leg or arm  · You have nausea or are vomiting  · Your skin is itchy, swollen, or you have a rash  · You have questions or concerns about your condition or care

## 2018-06-29 ENCOUNTER — PREP FOR PROCEDURE (OUTPATIENT)
Dept: VASCULAR SURGERY | Facility: CLINIC | Age: 63
End: 2018-06-29

## 2018-07-02 ENCOUNTER — TELEPHONE (OUTPATIENT)
Dept: VASCULAR SURGERY | Facility: CLINIC | Age: 63
End: 2018-07-02

## 2018-07-02 ENCOUNTER — PREP FOR PROCEDURE (OUTPATIENT)
Dept: VASCULAR SURGERY | Facility: CLINIC | Age: 63
End: 2018-07-02

## 2018-07-02 DIAGNOSIS — I65.22 STENOSIS OF LEFT CAROTID ARTERY: Primary | ICD-10-CM

## 2018-07-02 NOTE — TELEPHONE ENCOUNTER
I spoke to pts wife and confirmed date of TCAR surgery for 7/9/18 with Dr Joby Londono at [facility]  Pt will go for his pre admission testing tomorrow  Do not stop Plavix  NPO ater MN  Instructions reviewed and understood

## 2018-07-03 ENCOUNTER — APPOINTMENT (OUTPATIENT)
Dept: LAB | Facility: HOSPITAL | Age: 63
End: 2018-07-03
Attending: SURGERY
Payer: COMMERCIAL

## 2018-07-03 ENCOUNTER — HOSPITAL ENCOUNTER (OUTPATIENT)
Dept: RADIOLOGY | Facility: HOSPITAL | Age: 63
Discharge: HOME | End: 2018-07-03
Payer: COMMERCIAL

## 2018-07-03 ENCOUNTER — DOCUMENTATION (OUTPATIENT)
Dept: VASCULAR SURGERY | Facility: CLINIC | Age: 63
End: 2018-07-03

## 2018-07-03 DIAGNOSIS — G45.3 AMAUROSIS FUGAX: Chronic | ICD-10-CM

## 2018-07-03 DIAGNOSIS — I65.22 STENOSIS OF LEFT CAROTID ARTERY: ICD-10-CM

## 2018-07-03 DIAGNOSIS — I65.22 LEFT CAROTID ARTERY STENOSIS: Chronic | ICD-10-CM

## 2018-07-03 LAB
ABO GROUP BLD: NORMAL
ANION GAP SERPL CALCULATED.3IONS-SCNC: 6 MMOL/L (ref 4–13)
ATRIAL RATE: 71 BPM
BLD GP AB SCN SERPL QL: NEGATIVE
BUN SERPL-MCNC: 25 MG/DL (ref 5–25)
CALCIUM SERPL-MCNC: 9.1 MG/DL (ref 8.3–10.1)
CHLORIDE SERPL-SCNC: 102 MMOL/L (ref 100–108)
CO2 SERPL-SCNC: 26 MMOL/L (ref 21–32)
CREAT SERPL-MCNC: 1.01 MG/DL (ref 0.6–1.3)
ERYTHROCYTE [DISTWIDTH] IN BLOOD BY AUTOMATED COUNT: 12.4 % (ref 11.6–15.1)
EST. AVERAGE GLUCOSE BLD GHB EST-MCNC: 140 MG/DL
GFR SERPL CREATININE-BSD FRML MDRD: 79 ML/MIN/1.73SQ M
GLUCOSE P FAST SERPL-MCNC: 98 MG/DL (ref 65–99)
HBA1C MFR BLD: 6.5 % (ref 4.2–6.3)
HCT VFR BLD AUTO: 47.7 % (ref 36.5–49.3)
HGB BLD-MCNC: 15.9 G/DL (ref 12–17)
INR PPP: 1.04 (ref 0.86–1.17)
MCH RBC QN AUTO: 29.1 PG (ref 26.8–34.3)
MCHC RBC AUTO-ENTMCNC: 33.3 G/DL (ref 31.4–37.4)
MCV RBC AUTO: 87 FL (ref 82–98)
P AXIS: 25 DEGREES
PLATELET # BLD AUTO: 271 THOUSANDS/UL (ref 149–390)
PMV BLD AUTO: 9.4 FL (ref 8.9–12.7)
POTASSIUM SERPL-SCNC: 3.9 MMOL/L (ref 3.5–5.3)
PR INTERVAL: 130 MS
PROTHROMBIN TIME: 13.7 SECONDS (ref 11.8–14.2)
QRS AXIS: -29 DEGREES
QRSD INTERVAL: 92 MS
QT INTERVAL: 388 MS
QTC INTERVAL: 421 MS
RBC # BLD AUTO: 5.47 MILLION/UL (ref 3.88–5.62)
RH BLD: POSITIVE
SODIUM SERPL-SCNC: 134 MMOL/L (ref 136–145)
SPECIMEN EXPIRATION DATE: NORMAL
T WAVE AXIS: 28 DEGREES
VENTRICULAR RATE: 71 BPM
WBC # BLD AUTO: 10.65 THOUSAND/UL (ref 4.31–10.16)

## 2018-07-03 PROCEDURE — 83036 HEMOGLOBIN GLYCOSYLATED A1C: CPT

## 2018-07-03 PROCEDURE — 86901 BLOOD TYPING SEROLOGIC RH(D): CPT

## 2018-07-03 PROCEDURE — 93005 ELECTROCARDIOGRAM TRACING: CPT

## 2018-07-03 PROCEDURE — 36415 COLL VENOUS BLD VENIPUNCTURE: CPT

## 2018-07-03 PROCEDURE — 85610 PROTHROMBIN TIME: CPT

## 2018-07-03 PROCEDURE — 86900 BLOOD TYPING SEROLOGIC ABO: CPT

## 2018-07-03 PROCEDURE — 71046 X-RAY EXAM CHEST 2 VIEWS: CPT

## 2018-07-03 PROCEDURE — 86850 RBC ANTIBODY SCREEN: CPT

## 2018-07-03 PROCEDURE — 85027 COMPLETE CBC AUTOMATED: CPT

## 2018-07-03 PROCEDURE — 80048 BASIC METABOLIC PNL TOTAL CA: CPT

## 2018-07-03 PROCEDURE — 93010 ELECTROCARDIOGRAM REPORT: CPT | Performed by: INTERNAL MEDICINE

## 2018-07-05 ENCOUNTER — OFFICE VISIT (OUTPATIENT)
Dept: CARDIAC SURGERY | Facility: CLINIC | Age: 63
End: 2018-07-05
Payer: COMMERCIAL

## 2018-07-05 ENCOUNTER — TELEPHONE (OUTPATIENT)
Dept: CARDIOLOGY CLINIC | Facility: CLINIC | Age: 63
End: 2018-07-05

## 2018-07-05 VITALS
WEIGHT: 184 LBS | HEIGHT: 69 IN | BODY MASS INDEX: 27.25 KG/M2 | DIASTOLIC BLOOD PRESSURE: 82 MMHG | SYSTOLIC BLOOD PRESSURE: 142 MMHG | RESPIRATION RATE: 14 BRPM | TEMPERATURE: 98.2 F | HEART RATE: 92 BPM | OXYGEN SATURATION: 96 %

## 2018-07-05 DIAGNOSIS — I25.118 CORONARY ARTERY DISEASE OF NATIVE ARTERY OF NATIVE HEART WITH STABLE ANGINA PECTORIS (HCC): Primary | ICD-10-CM

## 2018-07-05 PROCEDURE — 99244 OFF/OP CNSLTJ NEW/EST MOD 40: CPT | Performed by: THORACIC SURGERY (CARDIOTHORACIC VASCULAR SURGERY)

## 2018-07-05 RX ORDER — HYDROCHLOROTHIAZIDE 12.5 MG/1
12.5 CAPSULE, GELATIN COATED ORAL DAILY
COMMUNITY
End: 2018-07-05

## 2018-07-05 NOTE — LETTER
July 5, 2018     Yaakov Verduzco MD  [address] [address] [address] [facility] [address] [address]    Patient: Luis Adams  YOB: 1955   Date of Visit: 7/5/2018       Dear Dr Cara Harper: Thank you for referring Elijah Faustin to me for evaluation  Below are my notes for this consultation  If you have questions, please do not hesitate to call me  I look forward to following your patient along with you  Sincerely,        Micaela Mojica, DO        CC: MD Dean Agarwal MD Deborha Farrow, CRNP  7/5/2018  1:57 PM  Attested  Consultation - Cardiothoracic Surgery   Luis Adams  59 y o  male MRN: [id number]    Physician Requesting Consult: Dr Shawna Harper    Reason for Consult / Principal Problem: Coronary artery disease    History of Present Illness: Luis Adams  is a 59y o  year old male with newly diagnoses carotid disease and coronary artery disease  He had recently experienced 2 episodes of Amaurosis Fugax and was evaluated with a carotid duplex  He was found to have 70-99% stenosis in the L ICA  CTA head & neck revealed critical stenosis of the left ICA origin  He was seen by Dr Gaitan for surgical intervention  He subsequently was referred for cardiac clearance prior to proceeding with CEA  Nuclear myocardial perfusion stress test was positive for ischemia on ECG and perfusion defect of echo portion  Cardiac catheterization on 6/28/18 demonstrated multivessel CAD  He presents to our office today for initial out patient consultation for coronary artery bypass graft surgery  Upon interview, Lori Bailey admits to progressive fatigue at least over the past year  He also experiences exertional chest tightness, SOB and lightheadedness  He, at times has chest tightness at rest  He denies palpitations, edema, PND or orthopnea  He still works full time as a heavy equipment operated   His job is sedentary and 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 to sit in the equipment cab, operating the machinery all day  He was recently evaluated by neurology for balance issues and diagnoses with Parkinson's disease  He experiences intermittent upper extremity tremors  He states he has not started taking the medication prescribed by the neurologist     He is a former smoker, 1 ppd x 25 years, quit in May 2018  He does not drink alcohol  FH is positive for CAD  Father  of a fatal MI at age 71, mother had CAD and underwent PCI with stents and  at 76 of pancreatic cancer  His siblings are alive ad well  Past Medical History:  Past Medical History:   Diagnosis Date    Coronary artery disease     Hyperlipidemia     Hypertension     Parkinson disease ([address] )     Shortness of breath          Past Surgical History:   Past Surgical History:   Procedure Laterality Date    AMPUTATION OF REPLICATED FINGERS       rt 2nd and 3rd fingers traumatically amputated at work    APPENDECTOMY      MANDIBLE FRACTURE SURGERY      upper and lower    ORBITAL FRACTURE SURGERY Right          Family History:  Family History   Problem Relation Age of Onset    Cancer Mother     Heart attack Father     Stroke Father          Social History:    History   Alcohol Use No     History   Drug Use No     History   Smoking Status    Former Smoker    Packs/day: 1 00    Quit date: 2018   Smokeless Tobacco    Former User    Quit date: 5/3/2018       Home Medications:   Prior to Admission medications    Medication Sig Start Date End Date Taking?  Authorizing Provider   aspirin (ECOTRIN LOW STRENGTH) 81 mg EC tablet Take 325 mg by mouth daily     Yes Historical Provider, MD   atorvastatin (LIPITOR) 40 mg tablet Take 0 5 tablets (20 mg total) by mouth daily 18  Yes Maged Bianchi MD   clopidogrel (PLAVIX) 75 mg tablet Take 1 tablet (75 mg total) by mouth daily 18  Yes Maged Bianchi MD   hydrochlorothiazide (HYDRODIURIL) 25 mg tablet Take 1 tablet (25 mg total) by mouth daily 18  Yes Danyelle Moe,  losartan (COZAAR) 50 mg tablet Take 1 tablet (50 mg total) by mouth daily at bedtime 5/22/18  Yes Pato Byrne DO   rosuvastatin (CRESTOR) 20 MG tablet Take 1 tablet (20 mg total) by mouth daily 5/22/18  Yes Pato Byrne DO   hydrochlorothiazide (MICROZIDE) 12 5 mg capsule Take 12 5 mg by mouth daily  7/5/18  Historical Provider, MD       Allergies:  No Known Allergies    Review of Systems:  Review of Systems - History obtained from chart review and the patient  General ROS: positive for  - fatigue  negative for - chills, fever or sleep disturbance  Psychological ROS: negative  Ophthalmic ROS: see HPI, no current visual disturbances  Hematological and Lymphatic ROS: negative for - bleeding problems, blood clots, blood transfusions, bruising or jaundice  Endocrine ROS: denies h/o diabetes or thyroid disorders  Respiratory ROS: positive for - shortness of breath  negative for - cough, hemoptysis or orthopnea  Cardiovascular ROS: positive for - chest pain and dyspnea on exertion  negative for - edema, irregular heartbeat, loss of consciousness, murmur, orthopnea or paroxysmal nocturnal dyspnea  Gastrointestinal ROS: no abdominal pain, change in bowel habits, or black or bloody stools  Genito-Urinary ROS: no dysuria, trouble voiding, or hematuria  Musculoskeletal ROS: negative  Neurological ROS: positive for - tremors    Vital Signs:     Vitals:    07/05/18 1200 07/05/18 1258   BP: 138/82 142/82   BP Location: Left arm Right arm   Cuff Size: Adult    Pulse: 92    Resp: 14    Temp: 98 2 °F (36 8 °C)    TempSrc: Oral    SpO2: 96%    Weight: 83 5 kg (184 lb)    Height: 5' 8 5" (1 74 m)        Physical Exam:    General: well developed, no acute distress  HEENT/NECK:  PERRLA  No jugular venous distention  Cardiac:Regular rate and rhythm, No murmurs, rubs or gallops  Carotid arteries: 1+ pulses, soft bruit on Left  Pulmonary:  Breath sounds clear bilaterally  Abdomen:  Non-tender, Non-distended    Positive bowel sounds  Upper extremities: 2+ radial pulses; brisk capillary refill; right hand dominance; missing 1st & second digit right hand  Lower extremities: Extremities warm/dry  PT/DP pulses 2+ bilaterally  No edema B/L  Neuro: Alert and oriented X 3  Sensation is grossly intact  No focal deficits  Musculoskeletal: MAEE, stable gait  Skin: Warm/Dry, without rashes or lesions  Lab Results:       Results from last 7 days  Lab Units 07/03/18  0921   WBC Thousand/uL 10 65*   HEMOGLOBIN g/dL 15 9   HEMATOCRIT % 47 7   PLATELETS Thousands/uL 271       Results from last 7 days  Lab Units 07/03/18  0921   SODIUM mmol/L 134*   POTASSIUM mmol/L 3 9   CHLORIDE mmol/L 102   CO2 mmol/L 26   BUN mg/dL 25   CREATININE mg/dL 1 01   CALCIUM mg/dL 9 1       Results from last 7 days  Lab Units 07/03/18  0921   INR  1 04     Lab Results   Component Value Date    HGBA1C 6 5 (H) 07/03/2018     Lab Results   Component Value Date    TROPONINI <0 02 05/15/2018       Imaging Studies:     Cardiac Catheterization:   Findings:     1  Dominance: Right dominant coronary system     2  Left main Coronary artery: Normal size vessels  It bifurcates into large LAD and a nondominant circumflex system  Distal left main has around 50-60% stenosis      3  Left anterior descending artery: LAD is a large-size vessel and it has proximally around 45% stenosis  Mild luminal irregularities causing 25 30% stenosis seen in mid and distal branches      4  Circumflex Coronary artery: Circumflex is a large size vessel  It has proximally around 75% stenosis  Mid about 80% stenosis  Obtuse marginal and other branches have mild luminal irregularities        5  Right coronary artery: RCA is normal size vessel and it is 100% occluded at the mid with collaterals from left-to-right circulations  Grade 2-3 collaterals  6   Small ramus intermedius is 100% occluded    It fills up with collaterals       7 Left ventriculogram: LV gram done in VARELA view shows low-normal LV systolic function EF around 50% with mild inferior wall hypokinesis    Echocardiogram:   LEFT VENTRICLE: Size was normal  Systolic function was normal  Ejection fraction was estimated in the range of 60 % to 65 %  There were no regional wall motion abnormalities  Wall thickness was normal  No evidence of apical thrombus  DOPPLER: Left ventricular diastolic function parameters were normal      RIGHT VENTRICLE: The size was normal  Systolic function was normal  Wall thickness was normal      LEFT ATRIUM: Size was normal      RIGHT ATRIUM: Size was normal      MITRAL VALVE: Valve structure was normal  There was normal leaflet separation  DOPPLER: The transmitral velocity was within the normal range  There was no evidence for stenosis  There was no significant regurgitation      AORTIC VALVE: The valve was trileaflet  Leaflets exhibited mildly increased thickness, mild to moderate calcification, normal cuspal separation, and sclerosis  DOPPLER: Transaortic velocity was within the normal range  There was no  evidence for stenosis  There was trace regurgitation      TRICUSPID VALVE: The valve structure was normal  There was normal leaflet separation  DOPPLER: The transtricuspid velocity was within the normal range  There was no evidence for stenosis  There was no significant regurgitation  The  tricuspid jet envelope definition was inadequate for estimation of RV systolic pressure  There are no indirect findings (abnormal RV volume or geometry, altered pulmonary flow velocity profile, or leftward septal displacement) which would  suggest moderate or severe pulmonary hypertension      PULMONIC VALVE: Leaflets exhibited normal thickness, no calcification, and normal cuspal separation  DOPPLER: The transpulmonic velocity was within the normal range  There was no significant regurgitation      PERICARDIUM: There was no pericardial effusion   The pericardium was normal in appearance      AORTA: The root exhibited normal size      SYSTEMIC VEINS: IVC: The inferior vena cava was normal in size      Carotid duplex:   RIGHT:  There is <50% stenosis noted in the internal carotid artery  Plaque is  heterogenous and irregular  Vertebral artery flow is antegrade  There is no significant subclavian artery  disease  LEFT:  There is 70-99% stenosis noted in the internal carotid artery  Plaque is  homogenous and irregular/smooth  Vertebral artery flow is antegrade  There is no significant subclavian artery  disease  CTA head/neck:  1  Critical stenosis left ICA origin by mixed plaque  Distal left ICA diminished flow from the critical stenosis  Limited evaluation of the left ophthalmic artery origin which could be narrowed  2   Moderately narrowed right external carotid artery origin  3   Left maxillary sinusitis  4   5 mm left upper lobe anterior subpleural lung nodule  Based on current Fleischner Society 2017 Guidelines on incidental pulmonary nodule, because the patient is considered high risk for lung cancer, 12 month follow-up non-contrast chest CT is   recommended        I have personally reviewed pertinent reports  Assessment:  Patient Active Problem List    Diagnosis Date Noted    Coronary artery disease of native artery of native heart with stable angina pectoris ([address] ) 07/05/2018    Stenosis of left carotid artery 07/02/2018    Essential hypertension 05/22/2018    Dyslipidemia 05/22/2018    Pre-op exam 05/22/2018    Carotid artery disorder ([address] ) 05/22/2018    Left carotid artery stenosis 05/16/2018    Amaurosis fugax 05/16/2018     Severe coronary artery disease; Ongoing CABG workup    Plan:  Risks and benefits of coronary artery bypass grafting were discussed in detail today with the patient and wife  They understand and wish to proceed with further workup and ultimately surgical intervention  We have ordered routine preoperative laboratory and vascular studies    He is scheduled for right ICA stent on Monday  After completion of that procedure and his pre op testing, he will return to the office to be scheduled for surgery  with Dr Azalia Fields, Marquita Kirit  was comfortable with our recommendations, and their questions were answered to their satisfaction  Thank you for allowing us to participate in the care of this patient  WILLIS Kruger  DATE: July 5, 2018  TIME: 1:36 PM  Attestation signed by Azalia Fields DO at 7/5/2018  2:13 PM:  The patient was seen and examined, and I agree with the midlevel's history, physical exam, assessment and plan with the following additions:    I reviewed the cardiac catheterization with the patient  He demonstrates multivessel coronary artery disease, with a total occlusion of his right coronary artery and ramus artery  I recommended coronary artery bypass grafting surgery  The risks, benefits, and alternatives to CABG been discussed with the patient  He agrees and wishes to proceed  He will have his carotid stent performed next Monday  He will return to our office 1 week after this is completed, and if he has continued to recover well will have his surgery the following week  If it is necessary that we continue Plavix since his stenting we will do so  The patient is consented and agrees for CABG x3 or 4 with LIMA to LAD, SVG to the right coronary artery, SVG to the obtuse marginal artery, SVG to the ramus artery

## 2018-07-05 NOTE — PROGRESS NOTES
Consultation - Cardiothoracic Surgery   Jesus Dejesus  59 y o  male MRN: [id number]    Physician Requesting Consult: Dr Janice Whittaker Pulse    Reason for Consult / Principal Problem: Coronary artery disease    History of Present Illness: Jesus Dejesus  is a 59y o  year old male with newly diagnoses carotid disease and coronary artery disease  He had recently experienced 2 episodes of Amaurosis Fugax and was evaluated with a carotid duplex  He was found to have 70-99% stenosis in the L ICA  CTA head & neck revealed critical stenosis of the left ICA origin  He was seen by Dr Vicki Rosen for surgical intervention  He subsequently was referred for cardiac clearance prior to proceeding with CEA  Nuclear myocardial perfusion stress test was positive for ischemia on ECG and perfusion defect of echo portion  Cardiac catheterization on 18 demonstrated multivessel CAD  He presents to our office today for initial out patient consultation for coronary artery bypass graft surgery  Upon interview, iday admits to progressive fatigue at least over the past year  He also experiences exertional chest tightness, SOB and lightheadedness  He, at times has chest tightness at rest  He denies palpitations, edema, PND or orthopnea  He still works full time as a heavy equipment operated  His job is sedentary and 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 to sit in the equipment cab, operating the machinery all day  He was recently evaluated by neurology for balance issues and diagnoses with Parkinson's disease  He experiences intermittent upper extremity tremors  He states he has not started taking the medication prescribed by the neurologist     He is a former smoker, 1 ppd x 25 years, quit in May 2018  He does not drink alcohol  FH is positive for CAD  Father  of a fatal MI at age 71, mother had CAD and underwent PCI with stents and  at 76 of pancreatic cancer  His siblings are alive ad well       Past Medical History:  Past Medical History: Diagnosis Date    Coronary artery disease     Hyperlipidemia     Hypertension     Parkinson disease ([facility] [address] )     Shortness of breath          Past Surgical History:   Past Surgical History:   Procedure Laterality Date    AMPUTATION OF REPLICATED FINGERS       rt 2nd and 3rd fingers traumatically amputated at work    APPENDECTOMY      MANDIBLE FRACTURE SURGERY      upper and lower    ORBITAL FRACTURE SURGERY Right          Family History:  Family History   Problem Relation Age of Onset    Cancer Mother     Heart attack Father     Stroke Father          Social History:    History   Alcohol Use No     History   Drug Use No     History   Smoking Status    Former Smoker    Packs/day: 1 00    Quit date: 5/14/2018   Smokeless Tobacco    Former User    Quit date: 5/3/2018       Home Medications:   Prior to Admission medications    Medication Sig Start Date End Date Taking?  Authorizing Provider   aspirin (ECOTRIN LOW STRENGTH) 81 mg EC tablet Take 325 mg by mouth daily     Yes Historical Provider, MD   atorvastatin (LIPITOR) 40 mg tablet Take 0 5 tablets (20 mg total) by mouth daily 6/28/18  Yes Sapphire Andrea MD   clopidogrel (PLAVIX) 75 mg tablet Take 1 tablet (75 mg total) by mouth daily 6/28/18  Yes Sapphire Andrea MD   hydrochlorothiazide (HYDRODIURIL) 25 mg tablet Take 1 tablet (25 mg total) by mouth daily 5/22/18  Yes Pato Byrne DO   losartan (COZAAR) 50 mg tablet Take 1 tablet (50 mg total) by mouth daily at bedtime 5/22/18  Yes Pato Byrne DO   rosuvastatin (CRESTOR) 20 MG tablet Take 1 tablet (20 mg total) by mouth daily 5/22/18  Yes Pato Byrne DO   hydrochlorothiazide (MICROZIDE) 12 5 mg capsule Take 12 5 mg by mouth daily  7/5/18  Historical Provider, MD       Allergies:  No Known Allergies    Review of Systems:  Review of Systems - History obtained from chart review and the patient  General ROS: positive for  - fatigue  negative for - chills, fever or sleep disturbance  Psychological ROS: negative  Ophthalmic ROS: see HPI, no current visual disturbances  Hematological and Lymphatic ROS: negative for - bleeding problems, blood clots, blood transfusions, bruising or jaundice  Endocrine ROS: denies h/o diabetes or thyroid disorders  Respiratory ROS: positive for - shortness of breath  negative for - cough, hemoptysis or orthopnea  Cardiovascular ROS: positive for - chest pain and dyspnea on exertion  negative for - edema, irregular heartbeat, loss of consciousness, murmur, orthopnea or paroxysmal nocturnal dyspnea  Gastrointestinal ROS: no abdominal pain, change in bowel habits, or black or bloody stools  Genito-Urinary ROS: no dysuria, trouble voiding, or hematuria  Musculoskeletal ROS: negative  Neurological ROS: positive for - tremors    Vital Signs:     Vitals:    07/05/18 1200 07/05/18 1258   BP: 138/82 142/82   BP Location: Left arm Right arm   Cuff Size: Adult    Pulse: 92    Resp: 14    Temp: 98 2 °F (36 8 °C)    TempSrc: Oral    SpO2: 96%    Weight: 83 5 kg (184 lb)    Height: 5' 8 5" (1 74 m)        Physical Exam:    General: well developed, no acute distress  HEENT/NECK:  PERRLA  No jugular venous distention  Cardiac:Regular rate and rhythm, No murmurs, rubs or gallops  Carotid arteries: 1+ pulses, soft bruit on Left  Pulmonary:  Breath sounds clear bilaterally  Abdomen:  Non-tender, Non-distended  Positive bowel sounds  Upper extremities: 2+ radial pulses; brisk capillary refill; right hand dominance; missing 1st & second digit right hand  Lower extremities: Extremities warm/dry  PT/DP pulses 2+ bilaterally  No edema B/L  Neuro: Alert and oriented X 3  Sensation is grossly intact  No focal deficits  Musculoskeletal: MAEE, stable gait  Skin: Warm/Dry, without rashes or lesions      Lab Results:       Results from last 7 days  Lab Units 07/03/18  0921   WBC Thousand/uL 10 65*   HEMOGLOBIN g/dL 15 9   HEMATOCRIT % 47 7   PLATELETS Thousands/uL 271       Results from last 7 days  Lab Units 07/03/18  0921   SODIUM mmol/L 134*   POTASSIUM mmol/L 3 9   CHLORIDE mmol/L 102   CO2 mmol/L 26   BUN mg/dL 25   CREATININE mg/dL 1 01   CALCIUM mg/dL 9 1       Results from last 7 days  Lab Units 07/03/18  0921   INR  1 04     Lab Results   Component Value Date    HGBA1C 6 5 (H) 07/03/2018     Lab Results   Component Value Date    TROPONINI <0 02 05/15/2018       Imaging Studies:     Cardiac Catheterization:   Findings:     1  Dominance: Right dominant coronary system     2  Left main Coronary artery: Normal size vessels  It bifurcates into large LAD and a nondominant circumflex system  Distal left main has around 50-60% stenosis      3  Left anterior descending artery: LAD is a large-size vessel and it has proximally around 45% stenosis  Mild luminal irregularities causing 25 30% stenosis seen in mid and distal branches      4  Circumflex Coronary artery: Circumflex is a large size vessel  It has proximally around 75% stenosis  Mid about 80% stenosis  Obtuse marginal and other branches have mild luminal irregularities        5  Right coronary artery: RCA is normal size vessel and it is 100% occluded at the mid with collaterals from left-to-right circulations  Grade 2-3 collaterals  6   Small ramus intermedius is 100% occluded  It fills up with collaterals       7 Left ventriculogram: LV gram done in VARELA view shows low-normal LV systolic function EF around 50% with mild inferior wall hypokinesis    Echocardiogram:   LEFT VENTRICLE: Size was normal  Systolic function was normal  Ejection fraction was estimated in the range of 60 % to 65 %  There were no regional wall motion abnormalities  Wall thickness was normal  No evidence of apical thrombus    DOPPLER: Left ventricular diastolic function parameters were normal      RIGHT VENTRICLE: The size was normal  Systolic function was normal  Wall thickness was normal      LEFT ATRIUM: Size was normal      RIGHT ATRIUM: Size was normal      MITRAL VALVE: Valve structure was normal  There was normal leaflet separation  DOPPLER: The transmitral velocity was within the normal range  There was no evidence for stenosis  There was no significant regurgitation      AORTIC VALVE: The valve was trileaflet  Leaflets exhibited mildly increased thickness, mild to moderate calcification, normal cuspal separation, and sclerosis  DOPPLER: Transaortic velocity was within the normal range  There was no  evidence for stenosis  There was trace regurgitation      TRICUSPID VALVE: The valve structure was normal  There was normal leaflet separation  DOPPLER: The transtricuspid velocity was within the normal range  There was no evidence for stenosis  There was no significant regurgitation  The  tricuspid jet envelope definition was inadequate for estimation of RV systolic pressure  There are no indirect findings (abnormal RV volume or geometry, altered pulmonary flow velocity profile, or leftward septal displacement) which would  suggest moderate or severe pulmonary hypertension      PULMONIC VALVE: Leaflets exhibited normal thickness, no calcification, and normal cuspal separation  DOPPLER: The transpulmonic velocity was within the normal range  There was no significant regurgitation      PERICARDIUM: There was no pericardial effusion  The pericardium was normal in appearance      AORTA: The root exhibited normal size      SYSTEMIC VEINS: IVC: The inferior vena cava was normal in size      Carotid duplex:   RIGHT:  There is <50% stenosis noted in the internal carotid artery  Plaque is  heterogenous and irregular  Vertebral artery flow is antegrade  There is no significant subclavian artery  disease  LEFT:  There is 70-99% stenosis noted in the internal carotid artery  Plaque is  homogenous and irregular/smooth  Vertebral artery flow is antegrade  There is no significant subclavian artery  disease  CTA head/neck:  1  Critical stenosis left ICA origin by mixed plaque  Distal left ICA diminished flow from the critical stenosis  Limited evaluation of the left ophthalmic artery origin which could be narrowed  2   Moderately narrowed right external carotid artery origin  3   Left maxillary sinusitis  4   5 mm left upper lobe anterior subpleural lung nodule  Based on current Fleischner Society 2017 Guidelines on incidental pulmonary nodule, because the patient is considered high risk for lung cancer, 12 month follow-up non-contrast chest CT is   recommended        I have personally reviewed pertinent reports  Assessment:  Patient Active Problem List    Diagnosis Date Noted    Coronary artery disease of native artery of native heart with stable angina pectoris ([facility] [address] ) 07/05/2018    Stenosis of left carotid artery 07/02/2018    Essential hypertension 05/22/2018    Dyslipidemia 05/22/2018    Pre-op exam 05/22/2018    Carotid artery disorder ([facility] [address] ) 05/22/2018    Left carotid artery stenosis 05/16/2018    Amaurosis fugax 05/16/2018     Severe coronary artery disease; Ongoing CABG workup    Plan:  Risks and benefits of coronary artery bypass grafting were discussed in detail today with the patient and wife  They understand and wish to proceed with further workup and ultimately surgical intervention  We have ordered routine preoperative laboratory and vascular studies  He is scheduled for right ICA stent on Monday  After completion of that procedure and his pre op testing, he will return to the office to be scheduled for surgery  with Dr Maciel Galvan, Sammy Jones  was comfortable with our recommendations, and their questions were answered to their satisfaction  Thank you for allowing us to participate in the care of this patient       SIGNATURE: WILLIS Callahan  DATE: July 5, 2018  TIME: 1:36 PM

## 2018-07-05 NOTE — PRE-PROCEDURE INSTRUCTIONS
Pre-Surgery Instructions:   Medication Instructions    aspirin (ECOTRIN LOW STRENGTH) 81 mg EC tablet Instructed patient per Anesthesia Guidelines   atorvastatin (LIPITOR) 40 mg tablet Instructed patient per Anesthesia Guidelines   clopidogrel (PLAVIX) 75 mg tablet Instructed patient per Anesthesia Guidelines   hydrochlorothiazide (MICROZIDE) 12 5 mg capsule Instructed patient per Anesthesia Guidelines   losartan (COZAAR) 50 mg tablet Instructed patient per Anesthesia Guidelines   rosuvastatin (CRESTOR) 20 MG tablet Instructed patient per Anesthesia Guidelines  Diuretic Med Class     Continue this medication up to the evening before surgery/procedure, but do not take the morning of the day of surgery  Pre op instructions reviewed with wife Chucho Araujo on 7/5  Meds, bathing and hospital instructions reviewed with understanding    HOLDING LOSARTAN ON 7/8 AND 7/9  AND HOLDING MICROZIDE DOS  Continue ASA and PLAVIX

## 2018-07-05 NOTE — TELEPHONE ENCOUNTER
S/w Azeb Moya  and she said that a clearance note is not needed as it was addressed in other notes in Epic

## 2018-07-05 NOTE — TELEPHONE ENCOUNTER
PATIENT NEEDS CARDIAC CLEARANCE PATIENT OF DR Thuy Berry HAVING SURGERY BY DR Milton Key ON Monday July 9 ANY QUESTIONS PLEASE CALL [facility] AT [facility] [phone]

## 2018-07-05 NOTE — LETTER
Cardiology Pre Operative Clearance      PRE OPERATIVE CARDIAC RISK ASSESSMENT    07/05/18    Angelina Escalante   1955  [id number]    Date of Surgery: 7/9/2018    Type of Surgery: TRANSCAROTID ARTERY REVASCULARIZATION (Left Neck)    Surgeon: Liliana Solo MD    Patient's chart was reviewed for preop clearance  Patient was seen in our office on 5/22/2018  Patient has past medical history significant for ***  Anticoagulation: Plavix - can hold for 5 days and can hold  Aspirin for  5-7 days as required for surgery  Patient can hold Eliquis/Xarelto/Pradaxa for 3 days before the procedure  Please restart after the procedure when okay from surgical point of view and advise patient to contact our office  If you have any question please do not hesitate to call us at our office of [facility]  Phone # [phone]        Electronically Signed:

## 2018-07-05 NOTE — TELEPHONE ENCOUNTER
Pre admission testing called stating they need cardiac clearance prior to his TCAR  I called Dr Maggie Holloway office, s/w Shahriar Jones and requested the clearance from Dr Aditi Alexander since he and Dr Case Campos spoke about the plan  She will check with the Dr and get back to us

## 2018-07-05 NOTE — TELEPHONE ENCOUNTER
S/w Payal Early and she said the clearance needs to be address by Dr Miri Early notified Dr Stacey Chadwick office for us

## 2018-07-09 ENCOUNTER — ANESTHESIA (OUTPATIENT)
Dept: PERIOP | Facility: HOSPITAL | Age: 63
DRG: 036 | End: 2018-07-09
Payer: COMMERCIAL

## 2018-07-09 ENCOUNTER — HOSPITAL ENCOUNTER (INPATIENT)
Facility: HOSPITAL | Age: 63
LOS: 1 days | Discharge: HOME | DRG: 036 | End: 2018-07-10
Attending: SURGERY | Admitting: SURGERY
Payer: COMMERCIAL

## 2018-07-09 ENCOUNTER — ANESTHESIA EVENT (OUTPATIENT)
Dept: PERIOP | Facility: HOSPITAL | Age: 63
DRG: 036 | End: 2018-07-09
Payer: COMMERCIAL

## 2018-07-09 ENCOUNTER — APPOINTMENT (OUTPATIENT)
Dept: RADIOLOGY | Facility: HOSPITAL | Age: 63
DRG: 036 | End: 2018-07-09
Attending: SURGERY
Payer: COMMERCIAL

## 2018-07-09 DIAGNOSIS — I65.22 STENOSIS OF LEFT CAROTID ARTERY: ICD-10-CM

## 2018-07-09 DIAGNOSIS — I65.22 LEFT CAROTID STENOSIS: ICD-10-CM

## 2018-07-09 LAB
KCT BLD-ACNC: 329 SEC (ref 89–137)
SPECIMEN SOURCE: ABNORMAL

## 2018-07-09 PROCEDURE — C1894 INTRO/SHEATH, NON-LASER: HCPCS | Performed by: SURGERY

## 2018-07-09 PROCEDURE — B317YZZ FLUOROSCOPY OF LEFT INTERNAL CAROTID ARTERY USING OTHER CONTRAST: ICD-10-PCS | Performed by: SURGERY

## 2018-07-09 PROCEDURE — C1757 CATH, THROMBECTOMY/EMBOLECT: HCPCS | Performed by: SURGERY

## 2018-07-09 PROCEDURE — C1876 STENT, NON-COA/NON-COV W/DEL: HCPCS

## 2018-07-09 PROCEDURE — B31BYZZ FLUOROSCOPY OF LEFT EXTERNAL CAROTID ARTERY USING OTHER CONTRAST: ICD-10-PCS | Performed by: SURGERY

## 2018-07-09 PROCEDURE — B310YZZ FLUOROSCOPY OF THORACIC AORTA USING OTHER CONTRAST: ICD-10-PCS | Performed by: SURGERY

## 2018-07-09 PROCEDURE — 37215 TRANSCATH STENT CCA W/EPS: CPT | Performed by: RADIOLOGY

## 2018-07-09 PROCEDURE — C1884 EMBOLIZATION PROTECT SYST: HCPCS

## 2018-07-09 PROCEDURE — C1760 CLOSURE DEV, VASC: HCPCS | Performed by: SURGERY

## 2018-07-09 PROCEDURE — C1725 CATH, TRANSLUMIN NON-LASER: HCPCS | Performed by: SURGERY

## 2018-07-09 PROCEDURE — C1769 GUIDE WIRE: HCPCS | Performed by: SURGERY

## 2018-07-09 PROCEDURE — 85347 COAGULATION TIME ACTIVATED: CPT

## 2018-07-09 PROCEDURE — B314YZZ FLUOROSCOPY OF LEFT COMMON CAROTID ARTERY USING OTHER CONTRAST: ICD-10-PCS | Performed by: SURGERY

## 2018-07-09 PROCEDURE — 37215 TRANSCATH STENT CCA W/EPS: CPT | Performed by: SURGERY

## 2018-07-09 PROCEDURE — 76937 US GUIDE VASCULAR ACCESS: CPT

## 2018-07-09 PROCEDURE — 037L3DZ DILATION OF LEFT INTERNAL CAROTID ARTERY WITH INTRALUMINAL DEVICE, PERCUTANEOUS APPROACH: ICD-10-PCS | Performed by: SURGERY

## 2018-07-09 PROCEDURE — 86920 COMPATIBILITY TEST SPIN: CPT

## 2018-07-09 DEVICE — PERCLOSE PROGLIDE™ SUTURE-MEDIATED CLOSURE SYSTEM
Type: IMPLANTABLE DEVICE | Site: ARTERIAL | Status: FUNCTIONAL
Brand: PERCLOSE PROGLIDE™

## 2018-07-09 RX ORDER — HEPARIN SODIUM 1000 [USP'U]/ML
INJECTION, SOLUTION INTRAVENOUS; SUBCUTANEOUS AS NEEDED
Status: DISCONTINUED | OUTPATIENT
Start: 2018-07-09 | End: 2018-07-09 | Stop reason: SURG

## 2018-07-09 RX ORDER — HEPARIN SODIUM 5000 [USP'U]/ML
5000 INJECTION, SOLUTION INTRAVENOUS; SUBCUTANEOUS EVERY 8 HOURS SCHEDULED
Status: DISCONTINUED | OUTPATIENT
Start: 2018-07-09 | End: 2018-07-10 | Stop reason: HOSPADM

## 2018-07-09 RX ORDER — ASPIRIN 81 MG/1
325 TABLET ORAL DAILY
Status: DISCONTINUED | OUTPATIENT
Start: 2018-07-09 | End: 2018-07-10 | Stop reason: HOSPADM

## 2018-07-09 RX ORDER — HYDRALAZINE HYDROCHLORIDE 20 MG/ML
15 INJECTION INTRAMUSCULAR; INTRAVENOUS
Status: DISCONTINUED | OUTPATIENT
Start: 2018-07-09 | End: 2018-07-10 | Stop reason: HOSPADM

## 2018-07-09 RX ORDER — ALBUMIN, HUMAN INJ 5% 5 %
12.5 SOLUTION INTRAVENOUS ONCE
Status: COMPLETED | OUTPATIENT
Start: 2018-07-09 | End: 2018-07-09

## 2018-07-09 RX ORDER — HYDROCHLOROTHIAZIDE 25 MG/1
25 TABLET ORAL DAILY
Status: DISCONTINUED | OUTPATIENT
Start: 2018-07-10 | End: 2018-07-09

## 2018-07-09 RX ORDER — CLOPIDOGREL BISULFATE 75 MG/1
75 TABLET ORAL DAILY
Status: DISCONTINUED | OUTPATIENT
Start: 2018-07-09 | End: 2018-07-10 | Stop reason: HOSPADM

## 2018-07-09 RX ORDER — FENTANYL CITRATE/PF 50 MCG/ML
50 SYRINGE (ML) INJECTION
Status: DISCONTINUED | OUTPATIENT
Start: 2018-07-09 | End: 2018-07-09 | Stop reason: HOSPADM

## 2018-07-09 RX ORDER — HYDROCODONE BITARTRATE AND ACETAMINOPHEN 5; 325 MG/1; MG/1
1 TABLET ORAL EVERY 4 HOURS PRN
Status: DISCONTINUED | OUTPATIENT
Start: 2018-07-09 | End: 2018-07-10 | Stop reason: HOSPADM

## 2018-07-09 RX ORDER — EPHEDRINE SULFATE 50 MG/ML
INJECTION, SOLUTION INTRAVENOUS AS NEEDED
Status: DISCONTINUED | OUTPATIENT
Start: 2018-07-09 | End: 2018-07-09 | Stop reason: SURG

## 2018-07-09 RX ORDER — LIDOCAINE HYDROCHLORIDE 10 MG/ML
INJECTION, SOLUTION INFILTRATION; PERINEURAL AS NEEDED
Status: DISCONTINUED | OUTPATIENT
Start: 2018-07-09 | End: 2018-07-09 | Stop reason: HOSPADM

## 2018-07-09 RX ORDER — SODIUM CHLORIDE, SODIUM LACTATE, POTASSIUM CHLORIDE, CALCIUM CHLORIDE 600; 310; 30; 20 MG/100ML; MG/100ML; MG/100ML; MG/100ML
20 INJECTION, SOLUTION INTRAVENOUS CONTINUOUS
Status: DISCONTINUED | OUTPATIENT
Start: 2018-07-09 | End: 2018-07-10

## 2018-07-09 RX ORDER — VALSARTAN 40 MG/1
40 TABLET ORAL DAILY
Status: DISCONTINUED | OUTPATIENT
Start: 2018-07-10 | End: 2018-07-09

## 2018-07-09 RX ORDER — MIDAZOLAM HYDROCHLORIDE 1 MG/ML
INJECTION INTRAMUSCULAR; INTRAVENOUS AS NEEDED
Status: DISCONTINUED | OUTPATIENT
Start: 2018-07-09 | End: 2018-07-09 | Stop reason: SURG

## 2018-07-09 RX ORDER — LABETALOL HYDROCHLORIDE 5 MG/ML
5 INJECTION, SOLUTION INTRAVENOUS
Status: DISCONTINUED | OUTPATIENT
Start: 2018-07-09 | End: 2018-07-10 | Stop reason: HOSPADM

## 2018-07-09 RX ORDER — ATORVASTATIN CALCIUM 20 MG/1
20 TABLET, FILM COATED ORAL DAILY
Status: DISCONTINUED | OUTPATIENT
Start: 2018-07-10 | End: 2018-07-10 | Stop reason: HOSPADM

## 2018-07-09 RX ORDER — CHLORHEXIDINE GLUCONATE 0.12 MG/ML
15 RINSE ORAL EVERY 12 HOURS SCHEDULED
Status: DISCONTINUED | OUTPATIENT
Start: 2018-07-09 | End: 2018-07-09 | Stop reason: HOSPADM

## 2018-07-09 RX ORDER — CEFAZOLIN SODIUM 1 G/3ML
INJECTION, POWDER, FOR SOLUTION INTRAMUSCULAR; INTRAVENOUS AS NEEDED
Status: DISCONTINUED | OUTPATIENT
Start: 2018-07-09 | End: 2018-07-09 | Stop reason: SURG

## 2018-07-09 RX ORDER — FENTANYL CITRATE 50 UG/ML
INJECTION, SOLUTION INTRAMUSCULAR; INTRAVENOUS AS NEEDED
Status: DISCONTINUED | OUTPATIENT
Start: 2018-07-09 | End: 2018-07-09 | Stop reason: SURG

## 2018-07-09 RX ADMIN — MIDAZOLAM 1 MG: 1 INJECTION INTRAMUSCULAR; INTRAVENOUS at 12:30

## 2018-07-09 RX ADMIN — FENTANYL CITRATE 50 MCG: 50 INJECTION, SOLUTION INTRAMUSCULAR; INTRAVENOUS at 12:30

## 2018-07-09 RX ADMIN — MIDAZOLAM 1 MG: 1 INJECTION INTRAMUSCULAR; INTRAVENOUS at 12:13

## 2018-07-09 RX ADMIN — ALBUMIN HUMAN 12.5 G: 0.05 INJECTION, SOLUTION INTRAVENOUS at 14:45

## 2018-07-09 RX ADMIN — CEFAZOLIN 2000 MG: 1 INJECTION, POWDER, FOR SOLUTION INTRAVENOUS at 12:35

## 2018-07-09 RX ADMIN — ALBUMIN HUMAN 12.5 G: 0.05 INJECTION, SOLUTION INTRAVENOUS at 15:14

## 2018-07-09 RX ADMIN — FENTANYL CITRATE 50 MCG: 50 INJECTION, SOLUTION INTRAMUSCULAR; INTRAVENOUS at 12:32

## 2018-07-09 RX ADMIN — HEPARIN SODIUM 8000 UNITS: 1000 INJECTION INTRAVENOUS; SUBCUTANEOUS at 12:50

## 2018-07-09 RX ADMIN — PHENYLEPHRINE HYDROCHLORIDE 10 MCG/MIN: 10 INJECTION INTRAVENOUS at 12:32

## 2018-07-09 RX ADMIN — EPHEDRINE SULFATE 10 MG: 50 INJECTION, SOLUTION INTRAMUSCULAR; INTRAVENOUS; SUBCUTANEOUS at 13:36

## 2018-07-09 RX ADMIN — CLOPIDOGREL BISULFATE 75 MG: 75 TABLET, FILM COATED ORAL at 15:19

## 2018-07-09 RX ADMIN — CHLORHEXIDINE GLUCONATE 15 ML: 1.2 RINSE ORAL at 09:42

## 2018-07-09 RX ADMIN — HEPARIN SODIUM 5000 UNITS: 5000 INJECTION, SOLUTION INTRAVENOUS; SUBCUTANEOUS at 18:08

## 2018-07-09 RX ADMIN — HEPARIN SODIUM 5000 UNITS: 5000 INJECTION, SOLUTION INTRAVENOUS; SUBCUTANEOUS at 21:29

## 2018-07-09 RX ADMIN — SODIUM CHLORIDE, SODIUM LACTATE, POTASSIUM CHLORIDE, AND CALCIUM CHLORIDE 20 ML/HR: .6; .31; .03; .02 INJECTION, SOLUTION INTRAVENOUS at 10:22

## 2018-07-09 RX ADMIN — SODIUM CHLORIDE 500 ML: 0.9 INJECTION, SOLUTION INTRAVENOUS at 15:16

## 2018-07-09 RX ADMIN — Medication 0.3 MCG/KG/HR: at 12:32

## 2018-07-09 RX ADMIN — SODIUM CHLORIDE 500 ML: 0.9 INJECTION, SOLUTION INTRAVENOUS at 17:33

## 2018-07-09 NOTE — H&P (VIEW-ONLY)
Consultation - Cardiothoracic Surgery   Sonia Arguello  59 y o  male MRN: [id number]    Physician Requesting Consult: Dr Luana Gosselin Chryl Senior    Reason for Consult / Principal Problem: Coronary artery disease    History of Present Illness: Sonia Arguello  is a 59y o  year old male with newly diagnoses carotid disease and coronary artery disease  He had recently experienced 2 episodes of Amaurosis Fugax and was evaluated with a carotid duplex  He was found to have 70-99% stenosis in the L ICA  CTA head & neck revealed critical stenosis of the left ICA origin  He was seen by Dr Deirdre Harris for surgical intervention  He subsequently was referred for cardiac clearance prior to proceeding with CEA  Nuclear myocardial perfusion stress test was positive for ischemia on ECG and perfusion defect of echo portion  Cardiac catheterization on 18 demonstrated multivessel CAD  He presents to our office today for initial out patient consultation for coronary artery bypass graft surgery  Upon interview, Sandee Gallagher admits to progressive fatigue at least over the past year  He also experiences exertional chest tightness, SOB and lightheadedness  He, at times has chest tightness at rest  He denies palpitations, edema, PND or orthopnea  He still works full time as a heavy equipment operated  His job is sedentary and 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 to sit in the equipment cab, operating the machinery all day  He was recently evaluated by neurology for balance issues and diagnoses with Parkinson's disease  He experiences intermittent upper extremity tremors  He states he has not started taking the medication prescribed by the neurologist     He is a former smoker, 1 ppd x 25 years, quit in May 2018  He does not drink alcohol  FH is positive for CAD  Father  of a fatal MI at age 71, mother had CAD and underwent PCI with stents and  at 76 of pancreatic cancer  His siblings are alive ad well       Past Medical History:  Past Medical History: Diagnosis Date    Coronary artery disease     Hyperlipidemia     Hypertension     Parkinson disease ([facility] [address]     Shortness of breath          Past Surgical History:   Past Surgical History:   Procedure Laterality Date    AMPUTATION OF REPLICATED FINGERS       rt 2nd and 3rd fingers traumatically amputated at work    APPENDECTOMY      MANDIBLE FRACTURE SURGERY      upper and lower    ORBITAL FRACTURE SURGERY Right          Family History:  Family History   Problem Relation Age of Onset    Cancer Mother     Heart attack Father     Stroke Father          Social History:    History   Alcohol Use No     History   Drug Use No     History   Smoking Status    Former Smoker    Packs/day: 1 00    Quit date: 5/14/2018   Smokeless Tobacco    Former User    Quit date: 5/3/2018       Home Medications:   Prior to Admission medications    Medication Sig Start Date End Date Taking?  Authorizing Provider   aspirin (ECOTRIN LOW STRENGTH) 81 mg EC tablet Take 325 mg by mouth daily     Yes Historical Provider, MD   atorvastatin (LIPITOR) 40 mg tablet Take 0 5 tablets (20 mg total) by mouth daily 6/28/18  Yes Liliana Solo MD   clopidogrel (PLAVIX) 75 mg tablet Take 1 tablet (75 mg total) by mouth daily 6/28/18  Yes Liliana Solo MD   hydrochlorothiazide (HYDRODIURIL) 25 mg tablet Take 1 tablet (25 mg total) by mouth daily 5/22/18  Yes Pato Byrne DO   losartan (COZAAR) 50 mg tablet Take 1 tablet (50 mg total) by mouth daily at bedtime 5/22/18  Yes Pato Byrne DO   rosuvastatin (CRESTOR) 20 MG tablet Take 1 tablet (20 mg total) by mouth daily 5/22/18  Yes Pato Byrne DO   hydrochlorothiazide (MICROZIDE) 12 5 mg capsule Take 12 5 mg by mouth daily  7/5/18  Historical Provider, MD       Allergies:  No Known Allergies    Review of Systems:  Review of Systems - History obtained from chart review and the patient  General ROS: positive for  - fatigue  negative for - chills, fever or sleep disturbance  Psychological ROS: negative  Ophthalmic ROS: see HPI, no current visual disturbances  Hematological and Lymphatic ROS: negative for - bleeding problems, blood clots, blood transfusions, bruising or jaundice  Endocrine ROS: denies h/o diabetes or thyroid disorders  Respiratory ROS: positive for - shortness of breath  negative for - cough, hemoptysis or orthopnea  Cardiovascular ROS: positive for - chest pain and dyspnea on exertion  negative for - edema, irregular heartbeat, loss of consciousness, murmur, orthopnea or paroxysmal nocturnal dyspnea  Gastrointestinal ROS: no abdominal pain, change in bowel habits, or black or bloody stools  Genito-Urinary ROS: no dysuria, trouble voiding, or hematuria  Musculoskeletal ROS: negative  Neurological ROS: positive for - tremors    Vital Signs:     Vitals:    07/05/18 1200 07/05/18 1258   BP: 138/82 142/82   BP Location: Left arm Right arm   Cuff Size: Adult    Pulse: 92    Resp: 14    Temp: 98 2 °F (36 8 °C)    TempSrc: Oral    SpO2: 96%    Weight: 83 5 kg (184 lb)    Height: 5' 8 5" (1 74 m)        Physical Exam:    General: well developed, no acute distress  HEENT/NECK:  PERRLA  No jugular venous distention  Cardiac:Regular rate and rhythm, No murmurs, rubs or gallops  Carotid arteries: 1+ pulses, soft bruit on Left  Pulmonary:  Breath sounds clear bilaterally  Abdomen:  Non-tender, Non-distended  Positive bowel sounds  Upper extremities: 2+ radial pulses; brisk capillary refill; right hand dominance; missing 1st & second digit right hand  Lower extremities: Extremities warm/dry  PT/DP pulses 2+ bilaterally  No edema B/L  Neuro: Alert and oriented X 3  Sensation is grossly intact  No focal deficits  Musculoskeletal: MAEE, stable gait  Skin: Warm/Dry, without rashes or lesions      Lab Results:       Results from last 7 days  Lab Units 07/03/18  0921   WBC Thousand/uL 10 65*   HEMOGLOBIN g/dL 15 9   HEMATOCRIT % 47 7   PLATELETS Thousands/uL 271       Results from last 7 days  Lab Units 07/03/18  0921   SODIUM mmol/L 134*   POTASSIUM mmol/L 3 9   CHLORIDE mmol/L 102   CO2 mmol/L 26   BUN mg/dL 25   CREATININE mg/dL 1 01   CALCIUM mg/dL 9 1       Results from last 7 days  Lab Units 07/03/18  0921   INR  1 04     Lab Results   Component Value Date    HGBA1C 6 5 (H) 07/03/2018     Lab Results   Component Value Date    TROPONINI <0 02 05/15/2018       Imaging Studies:     Cardiac Catheterization:   Findings:     1  Dominance: Right dominant coronary system     2  Left main Coronary artery: Normal size vessels  It bifurcates into large LAD and a nondominant circumflex system  Distal left main has around 50-60% stenosis      3  Left anterior descending artery: LAD is a large-size vessel and it has proximally around 45% stenosis  Mild luminal irregularities causing 25 30% stenosis seen in mid and distal branches      4  Circumflex Coronary artery: Circumflex is a large size vessel  It has proximally around 75% stenosis  Mid about 80% stenosis  Obtuse marginal and other branches have mild luminal irregularities        5  Right coronary artery: RCA is normal size vessel and it is 100% occluded at the mid with collaterals from left-to-right circulations  Grade 2-3 collaterals  6   Small ramus intermedius is 100% occluded  It fills up with collaterals       7 Left ventriculogram: LV gram done in VARELA view shows low-normal LV systolic function EF around 50% with mild inferior wall hypokinesis    Echocardiogram:   LEFT VENTRICLE: Size was normal  Systolic function was normal  Ejection fraction was estimated in the range of 60 % to 65 %  There were no regional wall motion abnormalities  Wall thickness was normal  No evidence of apical thrombus    DOPPLER: Left ventricular diastolic function parameters were normal      RIGHT VENTRICLE: The size was normal  Systolic function was normal  Wall thickness was normal      LEFT ATRIUM: Size was normal      RIGHT ATRIUM: Size was normal      MITRAL VALVE: Valve structure was normal  There was normal leaflet separation  DOPPLER: The transmitral velocity was within the normal range  There was no evidence for stenosis  There was no significant regurgitation      AORTIC VALVE: The valve was trileaflet  Leaflets exhibited mildly increased thickness, mild to moderate calcification, normal cuspal separation, and sclerosis  DOPPLER: Transaortic velocity was within the normal range  There was no  evidence for stenosis  There was trace regurgitation      TRICUSPID VALVE: The valve structure was normal  There was normal leaflet separation  DOPPLER: The transtricuspid velocity was within the normal range  There was no evidence for stenosis  There was no significant regurgitation  The  tricuspid jet envelope definition was inadequate for estimation of RV systolic pressure  There are no indirect findings (abnormal RV volume or geometry, altered pulmonary flow velocity profile, or leftward septal displacement) which would  suggest moderate or severe pulmonary hypertension      PULMONIC VALVE: Leaflets exhibited normal thickness, no calcification, and normal cuspal separation  DOPPLER: The transpulmonic velocity was within the normal range  There was no significant regurgitation      PERICARDIUM: There was no pericardial effusion  The pericardium was normal in appearance      AORTA: The root exhibited normal size      SYSTEMIC VEINS: IVC: The inferior vena cava was normal in size      Carotid duplex:   RIGHT:  There is <50% stenosis noted in the internal carotid artery  Plaque is  heterogenous and irregular  Vertebral artery flow is antegrade  There is no significant subclavian artery  disease  LEFT:  There is 70-99% stenosis noted in the internal carotid artery  Plaque is  homogenous and irregular/smooth  Vertebral artery flow is antegrade  There is no significant subclavian artery  disease  CTA head/neck:  1  Critical stenosis left ICA origin by mixed plaque  Distal left ICA diminished flow from the critical stenosis  Limited evaluation of the left ophthalmic artery origin which could be narrowed  2   Moderately narrowed right external carotid artery origin  3   Left maxillary sinusitis  4   5 mm left upper lobe anterior subpleural lung nodule  Based on current Fleischner Society 2017 Guidelines on incidental pulmonary nodule, because the patient is considered high risk for lung cancer, 12 month follow-up non-contrast chest CT is   recommended        I have personally reviewed pertinent reports  Assessment:  Patient Active Problem List    Diagnosis Date Noted    Coronary artery disease of native artery of native heart with stable angina pectoris ([facility] [address] ) 07/05/2018    Stenosis of left carotid artery 07/02/2018    Essential hypertension 05/22/2018    Dyslipidemia 05/22/2018    Pre-op exam 05/22/2018    Carotid artery disorder ([facility] [address] ) 05/22/2018    Left carotid artery stenosis 05/16/2018    Amaurosis fugax 05/16/2018     Severe coronary artery disease; Ongoing CABG workup    Plan:  Risks and benefits of coronary artery bypass grafting were discussed in detail today with the patient and wife  They understand and wish to proceed with further workup and ultimately surgical intervention  We have ordered routine preoperative laboratory and vascular studies  He is scheduled for right ICA stent on Monday  After completion of that procedure and his pre op testing, he will return to the office to be scheduled for surgery  with Dr Maciel Galvan, Sammy Jones  was comfortable with our recommendations, and their questions were answered to their satisfaction  Thank you for allowing us to participate in the care of this patient       SIGNATURE: WILLIS Callahan  DATE: July 5, 2018  TIME: 1:36 PM

## 2018-07-09 NOTE — INTERVAL H&P NOTE
H&P reviewed  After examining the patient I find no changes in the patients condition since the H&P had been written  Plan left ICA stenting via femoral approach

## 2018-07-09 NOTE — ANESTHESIA POSTPROCEDURE EVALUATION
Post-Op Assessment Note      CV Status:  Stable    Mental Status:  Alert and awake    Hydration Status:  Euvolemic    PONV Controlled:  Controlled    Airway Patency:  Patent    Post Op Vitals Reviewed: Yes          Staff: CRNA           BP   124/54   Temp  97 7   Pulse  70   Resp   18   SpO2   96%

## 2018-07-09 NOTE — ANESTHESIA PREPROCEDURE EVALUATION
Review of Systems/Medical History  Patient summary reviewed  Chart reviewed  No history of anesthetic complications     Cardiovascular  EKG reviewed, Exercise tolerance (METS): <4,  Hyperlipidemia, Hypertension controlled, CAD , CAD status: 3VD and obstructive, Angina at rest, No orthopnea, VALENTE,    Pulmonary  Shortness of breath,        GI/Hepatic            Endo/Other     GYN       Hematology   Musculoskeletal       Neurology    Neuromuscular disease , TIA,    Psychology           Physical Exam    Airway    Mallampati score: III  TM Distance: >3 FB  Neck ROM: full     Dental       Cardiovascular      Pulmonary      Other Findings  Multiple missing teeth, none loose      Anesthesia Plan  ASA Score- 4     Anesthesia Type- IV sedation with anesthesia with ASA Monitors  Additional Monitors: arterial line  Airway Plan:         Plan Factors-    Induction- intravenous  Postoperative Plan-     Informed Consent- Anesthetic plan and risks discussed with patient and spouse  I personally reviewed this patient with the CRNA  Discussed and agreed on the Anesthesia Plan with the CRNA  Theresa Payton

## 2018-07-09 NOTE — OP NOTE
Pt Location: [facility] OR ROOM 02    SURGERY DATE: 7/9/2018    Surgeon(s) and Role:     * Sharita Luna MD - Primary     * Kunal Gonzalez MD - Co-surgeon    Stenosis of left carotid artery [I65 22]    Post-Op Diagnosis Codes:     * Stenosis of left carotid artery [I65 22]     * Amaurosis fugax of left eye [G45 3]      Procedure(s):  TRANSCAROTID ARTERY REVASCULARIZATION    Specimen(s):  * No specimens in log *    Estimated Blood Loss:   Minimal    Drains:       Anesthesia Type:   Conscious Sedation     Operative Indications:  Stenosis of left carotid artery [I65 22] with multiple left high amaurosis fugax events  During workup for carotid endarterectomy the patient was found to have severe multivessel coronary artery disease requiring open cardiac revascularization  He now presents for carotid stenting for symptomatic disease prior to open heart surgery  Operative Findings:  Critical near occlusive lesion of the proximal left internal carotid artery successfully treated with placement of an 8 x 30 mm precise stent pre and post dilated with a 4 mm balloon  Mild residual stenosis and Italian view of less than 40%  The patient did have a brief event in which he had weakness of the right hand which was reversed upon removal of the filter  Postprocedural neurologic exam was at baseline and normal     Complications:   None    Procedure and Technique:  A qualified surgical resident was not available for this case  Both a vascular surgeon and interventional radiologists were present through the entire procedure due to the complexity of the procedure and their individual skill sets  After informed consent was obtained, the patient was brought to the hybrid OR and placed in the supine position  IV sedation was administered and an arterial line monitor was placed  The right femoral head was imaged and marked    Duplex ultrasound was then used to evaluate the common femoral artery which was noted to be compressible with no significant atherosclerotic disease  An area void of any significant calcium was then imaged and under direct ultrasound guidance a micropuncture needle was inserted into the artery and exchanged for an appropriate dilator and Bentson wire  A 5 Samoan sheath was placed  IV heparin was administered  An ACT level was obtained  Further heparin was administered to maintain a therapeutic level  A Bentson wire and pigtail catheter was then positioned in the aortic arch and an appropriate obliquity was obtained  An arch aortogram was then obtained  The left common carotid artery was then cannulated with a Nunn 1 catheter and Bentson wire  Images were obtained of the carotid bifurcation in multiple obliquities  These showed patency of the external carotid artery and a critical stenosis of greater than 90% in the internal carotid artery over a 1 5 cm segment  An angled wire was then used to cannulate the external carotid artery under roadmap imaging and the catheter was advanced into the internal carotid artery and exchanged for a stiff wire  A 6 Samoan shuttle sheath was then passed over this wire into the distal common carotid artery        A 5 mm AngioGuard distal protection device was then used to cross the internal carotid artery under roadmap imaging  The filter was then positioned appropriately in the distal internal carotid artery and deployed  The delivery catheter was removed  A 4 x 30 mm balloon was then positioned across the stenosis and inflated  The patient had a brief episode of bradycardia and decreased blood pressure which immediately responded to balloon deflation  Completion imaging showed significant improvement but residual stenosis in the internal carotid artery  A 8 x 30 mm self-expanding stent was then positioned across the stenosis and deployed  The stent appeared constrained at the area of high-grade stenosis   Postdilatation was performed with a 4 x 30 mm balloon again with a brief episode of bradycardia and hypotension which immediately responded to balloon deflation  Completion imaging showed less than 40% residual stenosis in the CHRISTINE and no residual stenosis in a true lateral       The AngioGuard distal protection device was then removed in the standard fashion  Completion images were unchanged  Intracranial images were obtained  showing normal filling of the middle cerebral artery  The anterior cerebral artery was filled with evidence of competitive flow via a dominant right   The shuttle sheath was then removed and the right femoral puncture site was closed with a ProGlide  Manual compression was held  No bleeding points were encountered  A Histacryl dressing was placed       The patient remained neurologically intact and awake during this entire procedure  I was present for the entire procedure    Patient Disposition:  PACU         Vascular Quality Initiative - Carotid Artery Stent      Anatomic High Risk Factors: none    Pre-op Imaging is CT/CTA: Yes      Lesion Calcification: <=25% circumference    Arch Atherosclerosis: Mild    Urgency: Elective      ASA: IV  Anesthesia:  IV Sedation with Anesthesia    Indication: Symptomatic Stenosis    Arch Type: Type I    Bovine Arch: yes     Approach: Femoral     Medication Loading: Both    Prophylactic Anti-bradyarrhythmic no    Anticoagulant: Heparin    Antiplatelet IIb/IIIa: yes    Bradyarrhythmia Requiring Tx: Yes, medication    Distinct Lesions Treated: 1      If Distinct Lesions Treated is 1, Second Stenosis (Not Treated): No    Lesion Type:  Atherosclerosis  Lesion Side: left    Lesion Location: Bifurcation     ICA Distal Tortuosity: None/Mild    Lesion Length: 15mm    Protection Device Used: Yes, successful      Protection Device Type: Distal embolic protection device     EPD Type: Angioguard    Pre-dilate Before Protection Device: no      Technical Failure: No       Number of Stents: 1 Neurologic Change: No    Intra-Cranial Completion Angiogram: Yes, without new occlusions      SIGNATURE: Humaira Bernal MD  DATE: July 9, 2018  TIME: 2:18 PM

## 2018-07-09 NOTE — PROGRESS NOTES
Pt's oozing stopped at groin site  BP continue to run 46-08 systolic  Per physician order, 500 IVF's bolus hanging    Will start Earle gtt if BP's do not improve

## 2018-07-09 NOTE — ANESTHESIA POSTPROCEDURE EVALUATION
Post-Op Assessment Note      CV Status:  Stable    Mental Status:  Alert and awake    Hydration Status:  Euvolemic    PONV Controlled:  Controlled    Airway Patency:  Patent    Post Op Vitals Reviewed: Yes          Staff: CRNA           BP   124/74   Temp  97 7   Pulse  78   Resp   18   SpO2   98%

## 2018-07-09 NOTE — ANESTHESIA PROCEDURE NOTES
Arterial Line Insertion  Date/Time: 7/9/2018 12:25 PM  Performed by: Zana Allen by: John Senior   Consent: Verbal consent obtained  Written consent obtained  Risks and benefits: risks, benefits and alternatives were discussed  Consent given by: patient  Patient identity confirmed: verbally with patient  Time out: Immediately prior to procedure a "time out" was called to verify the correct patient, procedure, equipment, support staff and site/side marked as required  Preparation: Patient was prepped and draped in the usual sterile fashion    Indications: hemodynamic monitoring  Orientation:  Right  Location: radial artery  Sedation:  Patient sedated: yes    Procedure Details:  Joni's test normal: yes  Needle gauge: 20  Number of attempts: 1    Post-procedure:  Post-procedure: dressing applied  Waveform: good waveform  Post-procedure CNS: normal  Patient tolerance: Patient tolerated the procedure well with no immediate complications

## 2018-07-10 VITALS
HEIGHT: 68 IN | BODY MASS INDEX: 28.04 KG/M2 | SYSTOLIC BLOOD PRESSURE: 110 MMHG | TEMPERATURE: 98.6 F | OXYGEN SATURATION: 98 % | HEART RATE: 63 BPM | DIASTOLIC BLOOD PRESSURE: 63 MMHG | WEIGHT: 185 LBS | RESPIRATION RATE: 20 BRPM

## 2018-07-10 PROBLEM — I25.10 DISEASE OF CARDIOVASCULAR SYSTEM: Status: ACTIVE | Noted: 2018-07-10

## 2018-07-10 LAB
ANION GAP SERPL CALCULATED.3IONS-SCNC: 6 MMOL/L (ref 4–13)
APTT PPP: 35 SECONDS (ref 24–36)
BUN SERPL-MCNC: 17 MG/DL (ref 5–25)
CALCIUM SERPL-MCNC: 8.3 MG/DL (ref 8.3–10.1)
CHLORIDE SERPL-SCNC: 108 MMOL/L (ref 100–108)
CO2 SERPL-SCNC: 25 MMOL/L (ref 21–32)
CREAT SERPL-MCNC: 0.86 MG/DL (ref 0.6–1.3)
ERYTHROCYTE [DISTWIDTH] IN BLOOD BY AUTOMATED COUNT: 12.6 % (ref 11.6–15.1)
GFR SERPL CREATININE-BSD FRML MDRD: 93 ML/MIN/1.73SQ M
GLUCOSE SERPL-MCNC: 104 MG/DL (ref 65–140)
HCT VFR BLD AUTO: 32.9 % (ref 36.5–49.3)
HGB BLD-MCNC: 10.7 G/DL (ref 12–17)
INR PPP: 1.09 (ref 0.86–1.17)
MCH RBC QN AUTO: 29.1 PG (ref 26.8–34.3)
MCHC RBC AUTO-ENTMCNC: 32.5 G/DL (ref 31.4–37.4)
MCV RBC AUTO: 89 FL (ref 82–98)
PLATELET # BLD AUTO: 171 THOUSANDS/UL (ref 149–390)
PMV BLD AUTO: 9.8 FL (ref 8.9–12.7)
POTASSIUM SERPL-SCNC: 3.7 MMOL/L (ref 3.5–5.3)
PROTHROMBIN TIME: 14.2 SECONDS (ref 11.8–14.2)
RBC # BLD AUTO: 3.68 MILLION/UL (ref 3.88–5.62)
SODIUM SERPL-SCNC: 139 MMOL/L (ref 136–145)
WBC # BLD AUTO: 8.63 THOUSAND/UL (ref 4.31–10.16)

## 2018-07-10 PROCEDURE — 80048 BASIC METABOLIC PNL TOTAL CA: CPT | Performed by: SURGERY

## 2018-07-10 PROCEDURE — 99024 POSTOP FOLLOW-UP VISIT: CPT | Performed by: SURGERY

## 2018-07-10 PROCEDURE — 85027 COMPLETE CBC AUTOMATED: CPT | Performed by: SURGERY

## 2018-07-10 PROCEDURE — 85730 THROMBOPLASTIN TIME PARTIAL: CPT | Performed by: SURGERY

## 2018-07-10 PROCEDURE — 85610 PROTHROMBIN TIME: CPT | Performed by: SURGERY

## 2018-07-10 RX ADMIN — HEPARIN SODIUM 5000 UNITS: 5000 INJECTION, SOLUTION INTRAVENOUS; SUBCUTANEOUS at 05:24

## 2018-07-10 RX ADMIN — ASPIRIN 325 MG: 81 TABLET, COATED ORAL at 08:06

## 2018-07-10 RX ADMIN — CLOPIDOGREL BISULFATE 75 MG: 75 TABLET, FILM COATED ORAL at 08:06

## 2018-07-10 RX ADMIN — ATORVASTATIN CALCIUM 20 MG: 20 TABLET, FILM COATED ORAL at 08:06

## 2018-07-10 RX ADMIN — HEPARIN SODIUM 5000 UNITS: 5000 INJECTION, SOLUTION INTRAVENOUS; SUBCUTANEOUS at 13:01

## 2018-07-10 NOTE — PROGRESS NOTES
S/w vascular about cont fluid note, orders regarding fluids are unclear    Per vascular, keep LR on hold

## 2018-07-10 NOTE — CASE MANAGEMENT
Thank you,  Artis Aqq  [facility] Utilization Review Department  Phone: [phone]; Fax [phone]  ATTENTION: The Network Utilization Review Department is now centralized for our 9 Facilities  Make a note that we have a new phone and fax numbers for our Department  Please call with any questions or concerns to [phone] and carefully follow the prompts so that you are directed to the right person  All voicemails are confidential  Fax any determinations, approvals, denials, and requests for initial or continue stay review clinical to [phone]  Due to HIGH CALL volume, it would be easier if you could please send faxed requests to expedite your requests and in part, help us provide discharge notifications faster  Initial Clinical Review  SCHEDULED INPATIENT PROCEDURE 7/9/18 / Kate Acuna [id number] / Fanny Kwon APPROVED AUTH# [id number]    Age/Sex: 59 y o  male    SURGERY DATE: 7/9/2018     Stenosis of left carotid artery [I65 22]     Post-Op Diagnosis Codes:     * Stenosis of left carotid artery [I65 22]     * Amaurosis fugax of left eye [G45 3]      Procedure(s):  TRANSCAROTID ARTERY REVASCULARIZATION      Anesthesia Type:  Conscious Sedation      Operative Indications:  Stenosis of left carotid artery [I65 22] with multiple left high amaurosis fugax events  During workup for carotid endarterectomy the patient was found to have severe multivessel coronary artery disease requiring open cardiac revascularization  He now presents for carotid stenting for symptomatic disease prior to open heart surgery         Operative Findings:  Critical near occlusive lesion of the proximal left internal carotid artery successfully treated with placement of an 8 x 30 mm precise stent pre and post dilated with a 4 mm balloon  Mild residual stenosis and CHRISTINE view of less than 40%  The patient did have a brief event in which he had weakness of the right hand which was reversed upon removal of the filter  Postprocedural neurologic exam was at baseline and normal      Complications: None          Admission Orders: Date/Time/Statement: 7/9/18 AT 1434 ADMIT TO LEVEL 1 STEPDOWN POST OP     Orders Placed This Encounter   Procedures    Inpatient Admission     Standing Status:   Standing     Number of Occurrences:   1     Order Specific Question:   Admitting Physician     Answer:   Abdullahi Siddiqui     Order Specific Question:   Level of Care     Answer:   Level 1 Stepdown [13]     Order Specific Question:   Estimated length of stay     Answer:   Inpatient Only Surgery       Vital Signs: /63   Pulse 63   Temp 98 6 °F (37 °C) (Oral)   Resp 20   Ht 5' 8" (1 727 m)   Wt 83 9 kg (185 lb)   SpO2 98%   BMI 28 13 kg/m²     Diet:        Diet Orders            Start     Ordered    07/09/18 1805  Diet Cardiac; Cardiac Step 1; Cardiac Step 1  Diet effective now     Question Answer Comment   Diet Type Cardiac    Cardiac Cardiac Step 1    Other Restriction(s): Cardiac Step 1    RD to adjust diet per protocol? No        07/09/18 1805          Mobility:   Post Procedure Restrictions    DVT Prophylaxis:   ASA;   PLAVIX:   HEPARIN SQ;   SCD    Scheduled Meds:  Current Facility-Administered Medications:  aspirin 325 mg Oral Daily Derrick Hi MD   atorvastatin 20 mg Oral Daily Derrick Hi MD   clopidogrel 75 mg Oral Daily Derrick Hi MD   heparin (porcine) 5,000 Units Subcutaneous [facility] Derrick Hi MD   labetalol 5 mg Intravenous Q15 Min PRN Derrick Hi MD   And       hydrALAZINE 15 mg Intravenous Q15 Min PRN Derrick Hi MD   And       niCARdipine 1-15 mg/hr Intravenous Continuous PRN Derrick Hi MD   HYDROcodone-acetaminophen 1 tablet Oral Q4H PRN Derrick Hi MD   phenylephine  mcg/min Intravenous Titrated Damien Arteaga PA-C     Continuous Infusions:  niCARdipine 1-15 mg/hr   phenylephine  mcg/min     PRN Meds:       labetalol **AND** hydrALAZINE **AND** niCARdipine   HYDROcodone-acetaminophen         Vascular Surgery  Progress Notes  POD # 1 Date of Service: 7/10/2018  5:08 AM        Assessment:  79-year-old male with symptomatic left carotid stenosis, now status post left TCAR with stent (DOS) 7/9/18)     Plan:  - c/w ASA 81mg  - c/w lactated ringers @ 20mL/hr  - close postoperative follow-up  - planned readmission for CABG in approximately 2 weeks     Subjective:  No acute events overnight  He denies any recent nausea, vomiting, fever, chills, shortness of breath, chest pains  He states that his blood pressure has been low, but has been stable overnight  Has flatulence, but no BM      Vitals:  BP 97/54 (BP Location: Left arm)   Pulse 57   Temp 98 9 °F (37 2 °C)   Resp (!) 24   Ht 5' 8" (1 727 m)   Wt 83 9 kg (185 lb)   SpO2 98%   BMI 28 13 kg/m²      I/Os:  I/O last 3 completed shifts: In: 2600 [I V :1100; IV Piggyback:1500]  Out: -   I/O this shift:   In: 5 [P O :420]  Out: 500 [Urine:500]     Lab Results and Cultures:   CBC with diff:         Lab Results   Component Value Date     WBC 10 65 (H) 07/03/2018     HGB 15 9 07/03/2018     HCT 47 7 07/03/2018     MCV 87 07/03/2018      07/03/2018     MCH 29 1 07/03/2018     MCHC 33 3 07/03/2018     RDW 12 4 07/03/2018     MPV 9 4 07/03/2018     NRBC 0 05/15/2018   ,   BMP/CMP:        Lab Results   Component Value Date      (L) 07/03/2018     K 3 9 07/03/2018      07/03/2018     CO2 26 07/03/2018     ANIONGAP 6 07/03/2018     BUN 25 07/03/2018     CREATININE 1 01 07/03/2018     GLUCOSE 115 05/15/2018     CALCIUM 9 1 07/03/2018     EGFR 79 07/03/2018   ,   Lipid Panel: No results found for: CHOL,   Coags:         Lab Results   Component Value Date     PTT 33 05/15/2018     INR 1 04 07/03/2018   ,       Medications:  Current Medications          Current Facility-Administered Medications   Medication Dose Route Frequency    aspirin (ECOTRIN LOW STRENGTH) EC tablet 325 mg  325 mg Oral Daily    atorvastatin (LIPITOR) tablet 20 mg  20 mg Oral Daily    clopidogrel (PLAVIX) tablet 75 mg  75 mg Oral Daily    heparin (porcine) subcutaneous injection 5,000 Units  5,000 Units Subcutaneous Q8H [facility]    labetalol (NORMODYNE) injection 5 mg  5 mg Intravenous Q15 Min PRN     And    hydrALAZINE (APRESOLINE) injection 15 mg  15 mg Intravenous Q15 Min PRN     And    niCARdipine (CARDENE) 25 mg (STANDARD CONCENTRATION) in sodium chloride 0 9% 250 mL  1-15 mg/hr Intravenous Continuous PRN    HYDROcodone-acetaminophen (NORCO) 5-325 mg per tablet 1 tablet  1 tablet Oral Q4H PRN    lactated ringers infusion  20 mL/hr Intravenous Continuous    phenylephrine (IRINA-SYNEPHRINE) 50 mg (STANDARD CONCENTRATION) in sodium chloride 0 9% 250 mL   mcg/min Intravenous Titrated            Imaging:  IR carotid Stent:   - 99% left internal carotid origin stenosis by Nascet criteria successfully treated with 4 mm angioplasty and an 8 x 40 Precise Rx stent   A 30-40% residual stenosis by NASCET criteria was present after post dilatation       Physical Exam:     General:  Alert and oriented, no acute acute distress  CV: RRR, no murmurs or clicks  Respiratory:  CTA bilaterally  Abdominal:  Soft, nontender, bowel sounds normal  Extremities:  No wounds noted  Skin of the lower extremity is within normal limits  Neurologic:  Normal strength, and tone    Normal coordination     Wound/Incision:  Groin incision stable     Pulse exam:  Radial: Right: Palpable; Left: Palpable  Ulnar: Right:  Nonpalpable, dopplerable; Left:  Nonpalpable, dopplerable  Femoral: Right:  Palpable; Left:  Palpable  Popliteal: Right:  Palpable; Left:  Palpable  DP: Right:  Palpable; Left:  Palpable  PT: Right:  Dopplerable; Left:  Dopplerable

## 2018-07-10 NOTE — DISCHARGE SUMMARY
Discharge Summary - Vascular Surgery   Ligia Sheth  59 y o  male MRN: [id number]  Unit/Bed#: [facility] 505-01 Encounter: [id number]    Admission Date:   Admission Orders     Ordered        07/09/18 1434  Inpatient Admission  Once              Discharge Date: 7/10/18    Admitting Diagnosis: Stenosis of left carotid artery [I65 22]    Discharge Diagnosis: Same    Attending: Dr Megan Guzmán Physician(s): None    Procedures Performed: Left [facility] Course: The patient is a 59year old male who was found to have a near occlusive lesion of the proximal L ICA  He previously had 2 episodes of left eye amaurosis fugax for which he underwent workup including CTA head leading to diagnosis  He was found to have severe multivessel carotid disease requiring revascularization  He was brought in and underwent TCAR on 7/9 with Dr Karen Raymond  He did well postoperatively  There were a few episodes of SBP in 80s but no pressors were required  He had episodes of asymptomatic bradycardia down to high 40s  On POD1 his BP was well controlled and he was not bradycardic  He was discharged home  Condition at Discharge: good     Discharge instructions/Information to patient and family:   See after visit summary for information provided to patient and family  Provisions for Follow-Up Care:  See after visit summary for information related to follow-up care and any pertinent home health orders  Disposition: Home    Planned Readmission: Yes - patient will be returning for CABG within 30 days    Discharge Statement   I spent 25 minutes discharging the patient  This time was spent on the day of discharge  I had direct contact with the patient on the day of discharge  Additional documentation is required if more than 30 minutes were spent on discharge  Discharge Medications:  See after visit summary for reconciled discharge medications provided to patient and family

## 2018-07-10 NOTE — DISCHARGE INSTRUCTIONS
Carotid Artery Disease   WHAT YOU NEED TO KNOW:   Carotid artery disease is a condition that causes narrow or blocked carotid arteries  Your carotid arteries are the blood vessels that supply your brain with most of the blood it needs to work  You have 2 carotid arteries, one on each side of your neck  DISCHARGE INSTRUCTIONS:   Call 911 for any of the following:   · You have any of the following signs of a stroke:      ¨ Numbness or drooping on one side of your face     ¨ Weakness in an arm or leg    ¨ Confusion or difficulty speaking    ¨ Dizziness, a severe headache, or vision loss    · You have any of the following signs of a heart attack:      ¨ Squeezing, pressure, or pain in your chest that lasts longer than 5 minutes or returns    ¨ Discomfort or pain in your back, neck, jaw, stomach, or arm     ¨ Trouble breathing    ¨ Nausea or vomiting    ¨ Lightheadedness or a sudden cold sweat, especially with chest pain or trouble breathing  Contact your healthcare provider if:   · You have questions or concerns about your condition or care  Medicines:   · Take aspirin if directed  Your healthcare provider may suggest that you take an aspirin a day to prevent blood clots from forming in the carotid arteries  If your healthcare provider wants you to take aspirin daily, do not take acetaminophen or ibuprofen instead  · Take your medicine as directed  Contact your healthcare provider if you think your medicine is not helping or if you have side effects  Tell him or her if you are allergic to any medicine  Keep a list of the medicines, vitamins, and herbs you take  Include the amounts, and when and why you take them  Bring the list or the pill bottles to follow-up visits  Carry your medicine list with you in case of an emergency  Warning signs of a stroke: The word F A S T  can help you remember and recognize warning signs of a stroke  · F = Face:  One side of the face droops      · A = Arms:  One arm starts to drop when both arms are raised  · S = Speech:  Speech is slurred or sounds different than usual     · T = Time:  A person who is having a stroke needs to be seen immediately  A stroke is a medical emergency that needs immediate treatment  Some medicines and treatments work best if given within a few hours of a stroke  Manage carotid artery disease:   · Eat a variety of healthy foods  Healthy foods include fruit, vegetables, whole-grain breads, low-fat dairy products, lean meat, and fish  Choose fish that are high in omega-3 fatty acids, such as salmon and fresh tuna  Ask your healthcare provider for more information on a heart healthy diet and the DASH eating plan  · Limit sodium (salt)  Sodium may increase your blood pressure  Add less table salt to your foods  Read food labels and choose foods that are low in sodium  Your healthcare provider may suggest you follow a low sodium diet  · Reach or maintain a healthy weight  Extra weight makes your heart work harder  Ask your healthcare provider how much you should weight  He can help you create a safe weight loss plan  Even a weight loss of 10% of your body weight can help your heart function better  · Exercise as directed  Exercise helps improve heart function and can help you manage your weight  Exercise can also help lower your cholesterol and blood sugar levels  Try to get at least 30 minutes of exercise at least 5 times each week  Try to be active every day  Your healthcare provider can help you create an exercise plan that works best for you  · Limit alcohol  Alcohol can increase your blood pressure and triglyceride levels  Men should limit alcohol to 2 drinks per day  Women should limit alcohol to 1 drink per day  A drink of alcohol is 12 ounces of beer, 5 ounces of wine, or 1½ ounces of liquor  · Do not smoke  Nicotine and other chemicals in cigarettes and cigars can cause heart and lung damage   Ask your healthcare provider for information if you currently smoke and need help to quit  E-cigarettes or smokeless tobacco still contain nicotine  Talk to your healthcare provider before you use these products  Follow up with your healthcare provider as directed:  Write down your questions so you remember to ask them during your visits  © 2017 [employer] Information is for End User's use only and may not be sold, redistributed or otherwise used for commercial purposes  All illustrations and images included in CareNotes® are the copyrighted property of [employer] , [employer]  or Steve Davis  The above information is an  only  It is not intended as medical advice for individual conditions or treatments  Talk to your doctor, nurse or pharmacist before following any medical regimen to see if it is safe and effective for you

## 2018-07-10 NOTE — SOCIAL WORK
Met with the patient to complete assessment and explain role of CM  Patient and his wife live at home  There are 3 MANJIT and patient has first floor set up  He is independent, employed and drives  Patient has no DME, no HHC and denies MH and D&A treatment  He has no Advance Directive and wife is his contact  PCP is Dr Chay Mo  Patient has prescription coverage and uses [facility], [address] [place] [address]  Wife is not employed and will provide assist at home  Patient is aware of plan to return for CABG in the future  CM reviewed d/c planning process including the following: identifying help at home, patient preference for d/c planning needs, Discharge Lounge, [facility] Meds to Bed program, availability of treatment team to discuss questions or concerns patient and/or family may have regarding understanding medications and recognizing signs and symptoms once discharged  CM also encouraged patient to follow up with all recommended appointments after discharge  Patient advised of importance for patient and family to participate in managing patients medical well being  Patient/caregiver received discharge checklist  Content reviewed  Patient/caregiver encouraged to participate in discharge plan of care prior to discharge home

## 2018-07-10 NOTE — RESTORATIVE TECHNICIAN NOTE
Restorative Specialist Mobility Note       Activity: Bedrest, Dangle, Stand at bedside, Turn, Chair     Assistive Device: None     Ambulation Response:  Tolerated well  Repositioned: Sitting, Up in chair

## 2018-07-10 NOTE — PROGRESS NOTES
Progress Note - Vascular Surgery   Karen Quinn  59 y o  male MRN: [id number]  Unit/Bed#: [facility] 505-01 Encounter: [id number]    Assessment:  76-year-old male with symptomatic left carotid stenosis, now status post left TCAR with stent (DOS) 7/9/18)    Plan:  - c/w ASA 81mg  - c/w lactated ringers @ 20mL/hr  - close postoperative follow-up  - planned readmission for CABG in approximately 2 weeks    Subjective:  No acute events overnight  He denies any recent nausea, vomiting, fever, chills, shortness of breath, chest pains  He states that his blood pressure has been low, but has been stable overnight  Has flatulence, but no BM  Vitals:  BP 97/54 (BP Location: Left arm)   Pulse 57   Temp 98 9 °F (37 2 °C)   Resp (!) 24   Ht 5' 8" (1 727 m)   Wt 83 9 kg (185 lb)   SpO2 98%   BMI 28 13 kg/m²     I/Os:  I/O last 3 completed shifts: In: 2600 [I V :1100; IV Piggyback:1500]  Out: -   I/O this shift:   In: 420 [P O :420]  Out: 500 [Urine:500]    Lab Results and Cultures:   CBC with diff:   Lab Results   Component Value Date    WBC 10 65 (H) 07/03/2018    HGB 15 9 07/03/2018    HCT 47 7 07/03/2018    MCV 87 07/03/2018     07/03/2018    MCH 29 1 07/03/2018    MCHC 33 3 07/03/2018    RDW 12 4 07/03/2018    MPV 9 4 07/03/2018    NRBC 0 05/15/2018   ,   BMP/CMP:  Lab Results   Component Value Date     (L) 07/03/2018    K 3 9 07/03/2018     07/03/2018    CO2 26 07/03/2018    ANIONGAP 6 07/03/2018    BUN 25 07/03/2018    CREATININE 1 01 07/03/2018    GLUCOSE 115 05/15/2018    CALCIUM 9 1 07/03/2018    EGFR 79 07/03/2018   ,   Lipid Panel: No results found for: CHOL,   Coags:   Lab Results   Component Value Date    PTT 33 05/15/2018    INR 1 04 07/03/2018   ,     Blood Culture: No results found for: BLOODCX,   Urinalysis: No results found for: COLORU, CLARITYU, SPECGRAV, PHUR, LEUKOCYTESUR, NITRITE, PROTEINUA, GLUCOSEU, KETONESU, BILIRUBINUR, BLOODU,   Urine Culture: No results found for: URINECX,   Wound Culure: No results found for: WOUNDCULT    Medications:  Current Facility-Administered Medications   Medication Dose Route Frequency    aspirin (ECOTRIN LOW STRENGTH) EC tablet 325 mg  325 mg Oral Daily    atorvastatin (LIPITOR) tablet 20 mg  20 mg Oral Daily    clopidogrel (PLAVIX) tablet 75 mg  75 mg Oral Daily    heparin (porcine) subcutaneous injection 5,000 Units  5,000 Units Subcutaneous Q8H [address]    labetalol (NORMODYNE) injection 5 mg  5 mg Intravenous Q15 Min PRN    And    hydrALAZINE (APRESOLINE) injection 15 mg  15 mg Intravenous Q15 Min PRN    And    niCARdipine (CARDENE) 25 mg (STANDARD CONCENTRATION) in sodium chloride 0 9% 250 mL  1-15 mg/hr Intravenous Continuous PRN    HYDROcodone-acetaminophen (NORCO) 5-325 mg per tablet 1 tablet  1 tablet Oral Q4H PRN    lactated ringers infusion  20 mL/hr Intravenous Continuous    phenylephrine (IRINA-SYNEPHRINE) 50 mg (STANDARD CONCENTRATION) in sodium chloride 0 9% 250 mL   mcg/min Intravenous Titrated       Imaging:  IR carotid Stent:   - 99% left internal carotid origin stenosis by Nascet criteria successfully treated with 4 mm angioplasty and an 8 x 40 Precise Rx stent  A 30-40% residual stenosis by NASCET criteria was present after post dilatation  Physical Exam:    General:  Alert and oriented, no acute acute distress  CV: RRR, no murmurs or clicks  Respiratory:  CTA bilaterally  Abdominal:  Soft, nontender, bowel sounds normal  Extremities:  No wounds noted  Skin of the lower extremity is within normal limits  Neurologic:  Normal strength, and tone    Normal coordination    Wound/Incision:  Groin incision stable    Pulse exam:  Radial: Right: Palpable; Left: Palpable  Ulnar: Right:  Nonpalpable, dopplerable; Left:  Nonpalpable, dopplerable  Femoral: Right:  Palpable; Left:  Palpable  Popliteal: Right:  Palpable; Left:  Palpable  DP: Right:  Palpable; Left:  Palpable  PT: Right:  Dopplerable; Left:  Brand Hoose Joshua bellamy DPM  7/10/2018

## 2018-07-11 LAB
ABO GROUP BLD BPU: NORMAL
ABO GROUP BLD BPU: NORMAL
BPU ID: NORMAL
BPU ID: NORMAL
CROSSMATCH: NORMAL
CROSSMATCH: NORMAL
UNIT DISPENSE STATUS: NORMAL
UNIT DISPENSE STATUS: NORMAL
UNIT PRODUCT CODE: NORMAL
UNIT PRODUCT CODE: NORMAL
UNIT RH: NORMAL
UNIT RH: NORMAL

## 2018-07-12 ENCOUNTER — TRANSCRIBE ORDERS (OUTPATIENT)
Dept: ADMINISTRATIVE | Facility: HOSPITAL | Age: 63
End: 2018-07-12

## 2018-07-12 ENCOUNTER — TELEPHONE (OUTPATIENT)
Dept: VASCULAR SURGERY | Facility: CLINIC | Age: 63
End: 2018-07-12

## 2018-07-12 ENCOUNTER — HOSPITAL ENCOUNTER (OUTPATIENT)
Dept: RADIOLOGY | Facility: HOSPITAL | Age: 63
Discharge: HOME | End: 2018-07-12
Payer: COMMERCIAL

## 2018-07-12 ENCOUNTER — HOSPITAL ENCOUNTER (OUTPATIENT)
Dept: PULMONOLOGY | Facility: HOSPITAL | Age: 63
Discharge: HOME | End: 2018-07-12
Payer: COMMERCIAL

## 2018-07-12 ENCOUNTER — TELEPHONE (OUTPATIENT)
Dept: CARDIOLOGY CLINIC | Facility: CLINIC | Age: 63
End: 2018-07-12

## 2018-07-12 DIAGNOSIS — I25.118 CORONARY ARTERY DISEASE OF NATIVE ARTERY OF NATIVE HEART WITH STABLE ANGINA PECTORIS (HCC): ICD-10-CM

## 2018-07-12 DIAGNOSIS — Z01.818 PREOP TESTING: ICD-10-CM

## 2018-07-12 DIAGNOSIS — I25.118 ATHSCL HEART DISEASE OF NATIVE COR ART W OTH ANG PCTRS (HCC): Primary | ICD-10-CM

## 2018-07-12 PROCEDURE — 94729 DIFFUSING CAPACITY: CPT | Performed by: INTERNAL MEDICINE

## 2018-07-12 PROCEDURE — 93971 EXTREMITY STUDY: CPT

## 2018-07-12 PROCEDURE — 94760 N-INVAS EAR/PLS OXIMETRY 1: CPT

## 2018-07-12 PROCEDURE — 94060 EVALUATION OF WHEEZING: CPT | Performed by: INTERNAL MEDICINE

## 2018-07-12 PROCEDURE — 94729 DIFFUSING CAPACITY: CPT

## 2018-07-12 PROCEDURE — 94726 PLETHYSMOGRAPHY LUNG VOLUMES: CPT | Performed by: INTERNAL MEDICINE

## 2018-07-12 PROCEDURE — 94060 EVALUATION OF WHEEZING: CPT

## 2018-07-12 PROCEDURE — 94726 PLETHYSMOGRAPHY LUNG VOLUMES: CPT

## 2018-07-12 PROCEDURE — 71250 CT THORAX DX C-: CPT

## 2018-07-12 RX ORDER — ALBUTEROL SULFATE 2.5 MG/3ML
2.5 SOLUTION RESPIRATORY (INHALATION) ONCE
Status: COMPLETED | OUTPATIENT
Start: 2018-07-12 | End: 2018-07-12

## 2018-07-12 RX ADMIN — ALBUTEROL SULFATE 2.5 MG: 2.5 SOLUTION RESPIRATORY (INHALATION) at 10:00

## 2018-07-12 NOTE — TELEPHONE ENCOUNTER
pts wife called stating that pt's wt was 187 lbs 7/7 and today is 193lb  He is very bloated and his face looks puffy since sx last week  He is not eating much, didn't eat all day yesterday until dinner - he ate corned beef and cabbage  Last week he ate general [place] chicken  ? If any sob, he has had a few "mild episodes" relieved w/ rest   He has had two bm's since d/c, last one was yesterday  She states he is taking meds as directed  Denies any swelling ble  Yesterday they noticed his R foot was warm and red, painful - he was dragging it while he walked  He has hx of gout, they called pcp and he was prescribed colchicine  This am foot is much improved, no longer red or painful  Explained to wife he should not be eating foods that are high in sodium, he needs too watch his salt intake  The corned beef and cabbage is very high in sodium  Discussed w/ S Melvina PEDRO - pt should be eating three small healthy meals per day, needs to watch sodium intake  Pt to f/u pcp or cardiologist re: sob and bloating/puffiness  She does not feel this is related to surgery  Wife informed of same  She is going to call both  She will call if any further ?/concerns

## 2018-07-13 PROCEDURE — 93971 EXTREMITY STUDY: CPT | Performed by: SURGERY

## 2018-07-17 ENCOUNTER — OFFICE VISIT (OUTPATIENT)
Dept: CARDIAC SURGERY | Facility: CLINIC | Age: 63
End: 2018-07-17
Payer: COMMERCIAL

## 2018-07-17 VITALS
TEMPERATURE: 97.4 F | RESPIRATION RATE: 14 BRPM | HEIGHT: 68 IN | HEART RATE: 104 BPM | OXYGEN SATURATION: 96 % | DIASTOLIC BLOOD PRESSURE: 64 MMHG | WEIGHT: 185 LBS | SYSTOLIC BLOOD PRESSURE: 102 MMHG | BODY MASS INDEX: 28.04 KG/M2

## 2018-07-17 DIAGNOSIS — I25.118 CORONARY ARTERY DISEASE OF NATIVE ARTERY OF NATIVE HEART WITH STABLE ANGINA PECTORIS (HCC): Primary | ICD-10-CM

## 2018-07-17 PROCEDURE — 99215 OFFICE O/P EST HI 40 MIN: CPT | Performed by: NURSE PRACTITIONER

## 2018-07-17 RX ORDER — CHLORHEXIDINE GLUCONATE 0.12 MG/ML
15 RINSE ORAL ONCE
Status: CANCELLED | OUTPATIENT
Start: 2018-07-17 | End: 2018-07-17

## 2018-07-17 RX ORDER — ASPIRIN 325 MG
325 TABLET ORAL DAILY
COMMUNITY
End: 2018-07-30 | Stop reason: HOSPADM

## 2018-07-17 NOTE — PATIENT INSTRUCTIONS
1  You will receive a phone call from the hospital between 2:00 PM and 8:00 PM the day prior to surgery to confirm arrival time and location  For surgery on Mondays, you will receive a call on Friday  2  Do not drink or eat anything after midnight the night before surgery  That includes no water, candy, gum, lozenges, Lifesavers, etc  We recommend you not eat any salty or fatty snack foods, consume alcohol or smoke the night before surgery  3  Continue taking aspirin but only 81 mg daily  4  If you take Coumadin and/or Plavix, discontinue it 5 days before surgery  5  If you are diabetic, do not take any of your diabetic pills the morning of surgery  If you take Lantus insulin, you may take it at your regularly scheduled time the day before surgery  Do not take any other insulins the morning of surgery  6  The 2 nights before surgery, take a shower each night using the special antiseptic soap or soap sponges you received from the office or hospital  Deboraha Colder your hair with regular shampoo and rinse completely before using the antiseptic sponges  Use the sponge to wash from your neck down, with special attention to the armpits and groin area  Do not use any other soap or cleanser on your skin  Do not use lotions, powder, deodorant or perfume of any kind on your skin after you shower  Use clean bed linens and wear clean pajamas after your shower  7  You will be prescribed Mupirocin nasal ointment  Apply to both nostrils twice a day for 5 days prior to surgery  8  Do not take a shower the morning of surgery; you'll be given a special" bath" at the hospital   9  Notify the CT surgery office if you develop a cold, sore throat, cough, fever or other health issues before your surgery  10  Other medication changes included the following:Stop Losartan 3 days before surgery  Remain on Plavix until appt with Vascular surgery to determine if this medication may be stopped before CABG surgery

## 2018-07-17 NOTE — PROGRESS NOTES
History & Physical - Cardiothoracic Surgery   Martell Davis  59 y o  male MRN: [id number]    Physician Requesting Consult: Dr Lissa Lee Fitting    Reason for Consult / Principal Problem: Coronary artery disease    History of Present Illness:   Martell Davis  is a 59y o  year old male with newly diagnosed carotid disease and coronary artery disease  He had recently experienced 2 episodes of Amaurosis Fugax and was evaluated with a carotid duplex  He was found to have 70-99% stenosis in the L ICA  CTA head & neck revealed critical stenosis of the left ICA origin  He was seen by Dr Viridiana Christianson for surgical intervention  He subsequently was referred for cardiac clearance prior to proceeding with CEA  Nuclear myocardial perfusion stress test was positive for ischemia on ECG and perfusion defect on echo portion  Cardiac catheterization on 18 demonstrated multivessel CAD  He presented to our office on 18 for initial out patient consultation for coronary artery bypass graft surgery       Juan José Ralph admits to progressive fatigue at least over the past year  He also experiences exertional chest tightness, SOB and lightheadedness  He states he has experienced chest tightness at rest  He denies palpitations, edema, PND or orthopnea  He still works full time as a heavy equipment operated  His job is sedentary and 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 to sit in the equipment cab, operating the machinery all day       He was recently evaluated by neurology for balance issues and diagnosed with Parkinson's disease  He experiences intermittent upper extremity tremors  He states he has not started taking the medication prescribed by the neurologist      He is a former smoker, 1 ppd x 25 years, quit in May 2018  He does not drink alcohol  FH is positive for CAD  Father  of a fatal MI at age 71, mother had CAD and underwent PCI with stents and  at 76 of pancreatic cancer  His siblings are alive ad well       His Left CEA was cancelled and he subsequently underwent carotid artery revascularization with stent placement on 7/9/18 of a critical to near occlusive proximal left internal carotid artery stenosis  He tolerated the procedure well  He was started on Plavix for his carotid stent  Today Jeffy Forde returns to the office for follow up since his underwent his carotid stenting in preparation to proceed with CABG surgery  He reports no change in is symptoms of fatigue and SOB  He denies any TIA or CVA symptoms  He experienced a 10 lb weight gain with generalized edema after his carotid stent procedure but has since lost the weight and the edema has resolved  Past Medical History:  Past Medical History:   Diagnosis Date    Coronary artery disease     Hyperlipidemia     Hypertension     Parkinson disease ([address] )     Shortness of breath          Past Surgical History:   Past Surgical History:   Procedure Laterality Date    AMPUTATION OF REPLICATED FINGERS       rt 2nd and 3rd fingers traumatically amputated at work    APPENDECTOMY      MANDIBLE FRACTURE SURGERY      upper and lower    ORBITAL FRACTURE SURGERY Right     MT TCAT IV STENT CRV CRTD ART EMBOLIC PROTECJ Left 0/4/8779    Procedure: TRANSCAROTID ARTERY REVASCULARIZATION;  Surgeon: Maged Bianchi MD;  Location: [facility];  Service: Vascular         Family History:  Family History   Problem Relation Age of Onset    Cancer Mother     Heart attack Father     Stroke Father          Social History:    History   Alcohol Use No     History   Drug Use No     History   Smoking Status    Former Smoker    Packs/day: 1 00    Quit date: 5/14/2018   Smokeless Tobacco    Former User    Quit date: 5/3/2018       Home Medications:   Prior to Admission medications    Medication Sig Start Date End Date Taking?  Authorizing Provider   aspirin (ECOTRIN LOW STRENGTH) 81 mg EC tablet Take 325 mg by mouth daily      Historical Provider, MD   atorvastatin (LIPITOR) 40 mg tablet Take 0 5 tablets (20 mg total) by mouth daily 6/28/18   Liliana Solo MD   clopidogrel (PLAVIX) 75 mg tablet Take 1 tablet (75 mg total) by mouth daily 6/28/18   Liliana Solo MD   hydrochlorothiazide (HYDRODIURIL) 25 mg tablet Take 1 tablet (25 mg total) by mouth daily 5/22/18   Pato Gipsonr, DO   losartan (COZAAR) 50 mg tablet Take 1 tablet (50 mg total) by mouth daily at bedtime 5/22/18   Pato Gipsonr, DO   rosuvastatin (CRESTOR) 20 MG tablet Take 1 tablet (20 mg total) by mouth daily 5/22/18   Ludwigkendrick Gipsonr, DO       Allergies: Allergies   Allergen Reactions    Percocet [Oxycodone-Acetaminophen] Itching     Trouble sleeping        Review of Systems:  Review of Systems - History obtained from chart review and the patient  General ROS: positive for  - fatigue  negative for - chills or fever  Psychological ROS: negative  Ophthalmic ROS: No visual disturbances  Hematological and Lymphatic ROS: negative for - bleeding problems, blood clots or bruising  Respiratory ROS: positive for - shortness of breath  negative for - cough or hemoptysis  Cardiovascular ROS: positive for - chest pain and dyspnea on exertion  negative for - irregular heartbeat, orthopnea, palpitations or paroxysmal nocturnal dyspnea  Gastrointestinal ROS: no abdominal pain, change in bowel habits, or black or bloody stools  Genito-Urinary ROS: no dysuria, trouble voiding, or hematuria  Musculoskeletal ROS: negative  Neurological ROS: positive for - tremors    Vital Signs:     Vitals:    07/17/18 1000 07/17/18 1059   BP: 108/70 102/64   BP Location: Left arm Right arm   Cuff Size: Adult    Pulse: 104    Resp: 14    Temp: (!) 97 4 °F (36 3 °C)    TempSrc: Oral    SpO2: 96%    Weight: 83 9 kg (185 lb)    Height: 5' 8" (1 727 m)        Physical Exam:    General:  Well developed, no acute distress  HEENT/NECK:  PERRLA  No jugular venous distention  Cardiac:Regular rate and rhythm, No murmurs, rubs or gallops    Carotid arteries:   Pulmonary:  Breath sounds clear bilaterally  Abdomen:  Non-tender, Non-distended  Positive bowel sounds  Upper extremities: 2+ radial pulses; brisk capillary refill  Lower extremities: Extremities warm/dry  PT/DP pulses 2+ bilaterally  No edema B/L  Neuro: Alert and oriented X 3  Sensation is grossly intact  No focal deficits  Skin: Warm/Dry, without rashes or lesions  Lab Results:   Lab Results   Component Value Date    WBC 8 63 07/10/2018    HGB 10 7 (L) 07/10/2018    HCT 32 9 (L) 07/10/2018    MCV 89 07/10/2018     07/10/2018     Lab Results   Component Value Date    GLUCOSE 104 07/10/2018    CALCIUM 8 3 07/10/2018     07/10/2018    K 3 7 07/10/2018    CO2 25 07/10/2018     07/10/2018    BUN 17 07/10/2018    CREATININE 0 86 07/10/2018     Lab Results   Component Value Date    INR 1 09 07/10/2018    INR 1 04 07/03/2018    INR 1 04 05/15/2018    PROTIME 14 2 07/10/2018    PROTIME 13 7 07/03/2018    PROTIME 10 9 05/15/2018     Lab Results   Component Value Date    PTT 35 07/10/2018         Lab Results   Component Value Date    HGBA1C 6 5 (H) 07/03/2018     Lab Results   Component Value Date    TROPONINI <0 02 05/15/2018       Imaging Studies:     Cardiac Catheterization:   Findings:     1  Dominance: Right dominant coronary system     2  Left main Coronary artery: Normal size vessels  It bifurcates into large LAD and a nondominant circumflex system  Distal left main has around 50-60% stenosis      3  Left anterior descending artery: LAD is a large-size vessel and it has proximally around 45% stenosis  Mild luminal irregularities causing 25 30% stenosis seen in mid and distal branches      4  Circumflex Coronary artery: Circumflex is a large size vessel  It has proximally around 75% stenosis  Mid about 80% stenosis    Obtuse marginal and other branches have mild luminal irregularities        5  Right coronary artery: RCA is normal size vessel and it is 100% occluded at the mid with collaterals from left-to-right circulations  Grade 2-3 collaterals  6   Small ramus intermedius is 100% occluded  It fills up with collaterals       7  Left ventriculogram: LV gram done in VARELA view shows low-normal LV systolic function EF around 50% with mild inferior wall hypokinesis    Echocardiogram:   LEFT VENTRICLE: Size was normal  Systolic function was normal  Ejection fraction was estimated in the range of 60 % to 65 %  There were no regional wall motion abnormalities  Wall thickness was normal  No evidence of apical thrombus  DOPPLER: Left ventricular diastolic function parameters were normal      RIGHT VENTRICLE: The size was normal  Systolic function was normal  Wall thickness was normal      LEFT ATRIUM: Size was normal      RIGHT ATRIUM: Size was normal      MITRAL VALVE: Valve structure was normal  There was normal leaflet separation  DOPPLER: The transmitral velocity was within the normal range  There was no evidence for stenosis  There was no significant regurgitation      AORTIC VALVE: The valve was trileaflet  Leaflets exhibited mildly increased thickness, mild to moderate calcification, normal cuspal separation, and sclerosis  DOPPLER: Transaortic velocity was within the normal range  There was no  evidence for stenosis  There was trace regurgitation      TRICUSPID VALVE: The valve structure was normal  There was normal leaflet separation  DOPPLER: The transtricuspid velocity was within the normal range  There was no evidence for stenosis  There was no significant regurgitation  The  tricuspid jet envelope definition was inadequate for estimation of RV systolic pressure  There are no indirect findings (abnormal RV volume or geometry, altered pulmonary flow velocity profile, or leftward septal displacement) which would  suggest moderate or severe pulmonary hypertension      PULMONIC VALVE: Leaflets exhibited normal thickness, no calcification, and normal cuspal separation   DOPPLER: The transpulmonic velocity was within the normal range  There was no significant regurgitation      PERICARDIUM: There was no pericardial effusion  The pericardium was normal in appearance      AORTA: The root exhibited normal size  CT Scan:     FINDINGS:     LUNGS:  Some mild linear scarring/atelectasis is seen dependently and in the bilateral lung bases  There is a 5 mm nodule in the anterior left upper lobe (axial image 21), and a 4 mm nodule in the left lingula laterally (axial image 32)  Lungs   otherwise appear grossly clear      PLEURA:  Unremarkable      HEART/GREAT VESSELS:  The heart is not enlarged  There is moderate coronary atherosclerosis  Some mild atherosclerosis of the aorta and great vessels noted  There is a common origin of the bilateral carotid arteries incidentally noted  Otherwise   unremarkable      MEDIASTINUM AND SUMAN:  Unremarkable      CHEST WALL AND LOWER NECK:   Unremarkable      VISUALIZED STRUCTURES IN THE UPPER ABDOMEN:  Unremarkable      OSSEOUS STRUCTURES:  There are age appropriate degenerative changes  No acute fracture or destructive osseous lesion      IMPRESSION:     No acute pulmonary process is seen      Coronary atherosclerosis, as described      Several subcentimeter pulmonary nodules as described  PFT's:   Results:  FEV1/FVC Ratio:  76  Forced Vital Capacity:  2 96 L or 75% predicted  FEV1:  2 24 L or 72% predicted  After administration of bronchodilator FEV1:  2 42 L and 8% change  Lung volumes by body plethysmography: Total Lung Capacity 102% predicted   Residual volume 148% predicted   RV/TLC ratio:  146  DLCO:  67 ml/min/mmHg  DLCO corrected for patients hemoglobin level:  70 ml/min/mmHg at hemoglobin 13 4 gm/dL  DLCO corrected for alveolar ventilation:  94 ml/min/mmHg  Interpretation:  No obstructive or restrictive defect  No significant bronchodilator response  There is evidence of air trapping    Diffusion capacity is diminished however this is normal when corrected for alveolar ventilation  Carotid Duplex:  CONCLUSION:     Impression  RIGHT:  There is <50% stenosis noted in the internal carotid artery  Plaque is  heterogenous and irregular  Vertebral artery flow is antegrade  There is no significant subclavian artery  disease  LEFT:  There is 70-99% stenosis noted in the internal carotid artery  Plaque is  homogenous and irregular/smooth  Vertebral artery flow is antegrade  There is no significant subclavian artery  disease  IR carotid stent procedure:     IMPRESSION:  Impression:   1   99% left internal carotid origin stenosis by Nascet criteria successfully treated with 4 mm angioplasty and an 8 x 40 Precise Rx stent  A 30-40% residual stenosis by NASCET criteria was present after post dilatation  Vein Mapping:   Impression:  RIGHT LOWER LIMB:  The vein is of adequate caliber from proximal thigh to the knee  Though it  narrows to 2 2mm mid thigh after a branch  The great saphenous vein is patent and good quality from the groin to the  ankle  LEFT LOWER LIMB:  The vein is of adequate caliber from proximal thigh to the proximal calf  Though it narrows to 2 5mm mid thigh after a branch  The great saphenous vein is patent and good quality from the groin to       I have personally reviewed pertinent reports  Assessment:  Patient Active Problem List    Diagnosis Date Noted    Disease of cardiovascular system 07/10/2018    Coronary artery disease of native artery of native heart with stable angina pectoris ([facility] [address] ) 07/05/2018    Stenosis of left carotid artery 07/02/2018    Essential hypertension 05/22/2018    Dyslipidemia 05/22/2018    Pre-op exam 05/22/2018    Carotid artery disorder ([facility] [address] ) 05/22/2018    Left carotid artery stenosis 05/16/2018    Amaurosis fugax 05/16/2018     Severe coronary artery disease; CABG surgery    Plan:  Rachana Wilhelm has done well with his left carotid stent procedure   He now returns to the office to finalize plans for elective coronary artery bypass graft surgery  He understands the risks and benefits of surgery and agrees to proceed  We have reviewed results all preoperative radiographic,  laboratory and vascular studies  He has a follow up appointment with Vascular surgery on 7/20/18 and if cleared, he wishes to proceed with surgery on 7/24/18  with Dr Kandace Prader, D O  He will remain on his Plavix until his follow up with Vascular surgery later this week to determine if it can be discontinued prior to CABG surgery  Oval Cora  and his wife were comfortable with our recommendations, and their questions were answered to their satisfaction  Thank you for allowing us to participate in the care of this patient       SIGNATURE: WILLIS Marvin  DATE: July 17, 2018  TIME: 11:46 AM

## 2018-07-17 NOTE — H&P
History & Physical - Cardiothoracic Surgery   Michelle Balderrama  59 y o  male MRN: [id number]    Physician Requesting Consult: Dr Daphney De Santiago    Reason for Consult / Principal Problem: Coronary artery disease    History of Present Illness:   Michelle Balderrama  is a 59y o  year old male with newly diagnosed carotid disease and coronary artery disease  He had recently experienced 2 episodes of Amaurosis Fugax and was evaluated with a carotid duplex  He was found to have 70-99% stenosis in the L ICA  CTA head & neck revealed critical stenosis of the left ICA origin  He was seen by Dr Javier Saenz for surgical intervention  He subsequently was referred for cardiac clearance prior to proceeding with CEA  Nuclear myocardial perfusion stress test was positive for ischemia on ECG and perfusion defect on echo portion  Cardiac catheterization on 18 demonstrated multivessel CAD  He presented to our office on 18 for initial out patient consultation for coronary artery bypass graft surgery       Ariaskaci Yeboah admits to progressive fatigue at least over the past year  He also experiences exertional chest tightness, SOB and lightheadedness  He states he has experienced chest tightness at rest  He denies palpitations, edema, PND or orthopnea  He still works full time as a heavy equipment operated  His job is sedentary and 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 to sit in the equipment cab, operating the machinery all day       He was recently evaluated by neurology for balance issues and diagnosed with Parkinson's disease  He experiences intermittent upper extremity tremors  He states he has not started taking the medication prescribed by the neurologist      He is a former smoker, 1 ppd x 25 years, quit in May 2018  He does not drink alcohol  FH is positive for CAD  Father  of a fatal MI at age 71, mother had CAD and underwent PCI with stents and  at 76 of pancreatic cancer  His siblings are alive ad well       His Left CEA was cancelled and he subsequently underwent carotid artery revascularization with stent placement on 7/9/18 of a critical to near occlusive proximal left internal carotid artery stenosis  He tolerated the procedure well  He was started on Plavix for his carotid stent  Today Teresa Cardenas returns to the office for follow up since his underwent his carotid stenting in preparation to proceed with CABG surgery  He reports no change in is symptoms of fatigue and SOB  He denies any TIA or CVA symptoms  He experienced a 10 lb weight gain with generalized edema after his carotid stent procedure but has since lost the weight and the edema has resolved  Past Medical History:  Past Medical History:   Diagnosis Date    Coronary artery disease     Hyperlipidemia     Hypertension     Parkinson disease ([address] )     Shortness of breath          Past Surgical History:   Past Surgical History:   Procedure Laterality Date    AMPUTATION OF REPLICATED FINGERS       rt 2nd and 3rd fingers traumatically amputated at work    APPENDECTOMY      MANDIBLE FRACTURE SURGERY      upper and lower    ORBITAL FRACTURE SURGERY Right     CT TCAT IV STENT CRV CRTD ART EMBOLIC PROTECJ Left 5/3/6614    Procedure: TRANSCAROTID ARTERY REVASCULARIZATION;  Surgeon: Clifford Mills MD;  [facility];  Service: Vascular         Family History:  Family History   Problem Relation Age of Onset    Cancer Mother     Heart attack Father     Stroke Father          Social History:    History   Alcohol Use No     History   Drug Use No     History   Smoking Status    Former Smoker    Packs/day: 1 00    Quit date: 5/14/2018   Smokeless Tobacco    Former User    Quit date: 5/3/2018       Home Medications:   Prior to Admission medications    Medication Sig Start Date End Date Taking?  Authorizing Provider   aspirin (ECOTRIN LOW STRENGTH) 81 mg EC tablet Take 325 mg by mouth daily      Historical Provider, MD   atorvastatin (LIPITOR) 40 mg tablet Take 0 5 tablets (20 mg total) by mouth daily 6/28/18   Leta Barba MD   clopidogrel (PLAVIX) 75 mg tablet Take 1 tablet (75 mg total) by mouth daily 6/28/18   Leta Barba MD   hydrochlorothiazide (HYDRODIURIL) 25 mg tablet Take 1 tablet (25 mg total) by mouth daily 5/22/18   Pato Gipsonr, DO   losartan (COZAAR) 50 mg tablet Take 1 tablet (50 mg total) by mouth daily at bedtime 5/22/18   Pato Gipsonr, DO   rosuvastatin (CRESTOR) 20 MG tablet Take 1 tablet (20 mg total) by mouth daily 5/22/18   Pato Gipsonr, DO       Allergies: Allergies   Allergen Reactions    Percocet [Oxycodone-Acetaminophen] Itching     Trouble sleeping        Review of Systems:  Review of Systems - History obtained from chart review and the patient  General ROS: positive for  - fatigue  negative for - chills or fever  Psychological ROS: negative  Ophthalmic ROS: No visual disturbances  Hematological and Lymphatic ROS: negative for - bleeding problems, blood clots or bruising  Respiratory ROS: positive for - shortness of breath  negative for - cough or hemoptysis  Cardiovascular ROS: positive for - chest pain and dyspnea on exertion  negative for - irregular heartbeat, orthopnea, palpitations or paroxysmal nocturnal dyspnea  Gastrointestinal ROS: no abdominal pain, change in bowel habits, or black or bloody stools  Genito-Urinary ROS: no dysuria, trouble voiding, or hematuria  Musculoskeletal ROS: negative  Neurological ROS: positive for - tremors    Vital Signs:     Vitals:    07/17/18 1000 07/17/18 1059   BP: 108/70 102/64   BP Location: Left arm Right arm   Cuff Size: Adult    Pulse: 104    Resp: 14    Temp: (!) 97 4 °F (36 3 °C)    TempSrc: Oral    SpO2: 96%    Weight: 83 9 kg (185 lb)    Height: 5' 8" (1 727 m)        Physical Exam:    General:  Well developed, no acute distress  HEENT/NECK:  PERRLA  No jugular venous distention  Cardiac:Regular rate and rhythm, No murmurs, rubs or gallops    Carotid arteries:   Pulmonary:  Breath sounds clear bilaterally  Abdomen:  Non-tender, Non-distended  Positive bowel sounds  Upper extremities: 2+ radial pulses; brisk capillary refill  Lower extremities: Extremities warm/dry  PT/DP pulses 2+ bilaterally  No edema B/L  Neuro: Alert and oriented X 3  Sensation is grossly intact  No focal deficits  Skin: Warm/Dry, without rashes or lesions  Lab Results:   Lab Results   Component Value Date    WBC 8 63 07/10/2018    HGB 10 7 (L) 07/10/2018    HCT 32 9 (L) 07/10/2018    MCV 89 07/10/2018     07/10/2018     Lab Results   Component Value Date    GLUCOSE 104 07/10/2018    CALCIUM 8 3 07/10/2018     07/10/2018    K 3 7 07/10/2018    CO2 25 07/10/2018     07/10/2018    BUN 17 07/10/2018    CREATININE 0 86 07/10/2018     Lab Results   Component Value Date    INR 1 09 07/10/2018    INR 1 04 07/03/2018    INR 1 04 05/15/2018    PROTIME 14 2 07/10/2018    PROTIME 13 7 07/03/2018    PROTIME 10 9 05/15/2018     Lab Results   Component Value Date    PTT 35 07/10/2018         Lab Results   Component Value Date    HGBA1C 6 5 (H) 07/03/2018     Lab Results   Component Value Date    TROPONINI <0 02 05/15/2018       Imaging Studies:     Cardiac Catheterization:   Findings:     1  Dominance: Right dominant coronary system     2  Left main Coronary artery: Normal size vessels  It bifurcates into large LAD and a nondominant circumflex system  Distal left main has around 50-60% stenosis      3  Left anterior descending artery: LAD is a large-size vessel and it has proximally around 45% stenosis  Mild luminal irregularities causing 25 30% stenosis seen in mid and distal branches      4  Circumflex Coronary artery: Circumflex is a large size vessel  It has proximally around 75% stenosis  Mid about 80% stenosis    Obtuse marginal and other branches have mild luminal irregularities        5  Right coronary artery: RCA is normal size vessel and it is 100% occluded at the mid with collaterals from left-to-right circulations  Grade 2-3 collaterals  6   Small ramus intermedius is 100% occluded  It fills up with collaterals       7  Left ventriculogram: LV gram done in VARELA view shows low-normal LV systolic function EF around 50% with mild inferior wall hypokinesis    Echocardiogram:   LEFT VENTRICLE: Size was normal  Systolic function was normal  Ejection fraction was estimated in the range of 60 % to 65 %  There were no regional wall motion abnormalities  Wall thickness was normal  No evidence of apical thrombus  DOPPLER: Left ventricular diastolic function parameters were normal      RIGHT VENTRICLE: The size was normal  Systolic function was normal  Wall thickness was normal      LEFT ATRIUM: Size was normal      RIGHT ATRIUM: Size was normal      MITRAL VALVE: Valve structure was normal  There was normal leaflet separation  DOPPLER: The transmitral velocity was within the normal range  There was no evidence for stenosis  There was no significant regurgitation      AORTIC VALVE: The valve was trileaflet  Leaflets exhibited mildly increased thickness, mild to moderate calcification, normal cuspal separation, and sclerosis  DOPPLER: Transaortic velocity was within the normal range  There was no  evidence for stenosis  There was trace regurgitation      TRICUSPID VALVE: The valve structure was normal  There was normal leaflet separation  DOPPLER: The transtricuspid velocity was within the normal range  There was no evidence for stenosis  There was no significant regurgitation  The  tricuspid jet envelope definition was inadequate for estimation of RV systolic pressure  There are no indirect findings (abnormal RV volume or geometry, altered pulmonary flow velocity profile, or leftward septal displacement) which would  suggest moderate or severe pulmonary hypertension      PULMONIC VALVE: Leaflets exhibited normal thickness, no calcification, and normal cuspal separation   DOPPLER: The transpulmonic velocity was within the normal range  There was no significant regurgitation      PERICARDIUM: There was no pericardial effusion  The pericardium was normal in appearance      AORTA: The root exhibited normal size  CT Scan:     FINDINGS:     LUNGS:  Some mild linear scarring/atelectasis is seen dependently and in the bilateral lung bases  There is a 5 mm nodule in the anterior left upper lobe (axial image 21), and a 4 mm nodule in the left lingula laterally (axial image 32)  Lungs   otherwise appear grossly clear      PLEURA:  Unremarkable      HEART/GREAT VESSELS:  The heart is not enlarged  There is moderate coronary atherosclerosis  Some mild atherosclerosis of the aorta and great vessels noted  There is a common origin of the bilateral carotid arteries incidentally noted  Otherwise   unremarkable      MEDIASTINUM AND SMUAN:  Unremarkable      CHEST WALL AND LOWER NECK:   Unremarkable      VISUALIZED STRUCTURES IN THE UPPER ABDOMEN:  Unremarkable      OSSEOUS STRUCTURES:  There are age appropriate degenerative changes  No acute fracture or destructive osseous lesion      IMPRESSION:     No acute pulmonary process is seen      Coronary atherosclerosis, as described      Several subcentimeter pulmonary nodules as described  PFT's:   Results:  FEV1/FVC Ratio:  76  Forced Vital Capacity:  2 96 L or 75% predicted  FEV1:  2 24 L or 72% predicted  After administration of bronchodilator FEV1:  2 42 L and 8% change  Lung volumes by body plethysmography: Total Lung Capacity 102% predicted   Residual volume 148% predicted   RV/TLC ratio:  146  DLCO:  67 ml/min/mmHg  DLCO corrected for patients hemoglobin level:  70 ml/min/mmHg at hemoglobin 13 4 gm/dL  DLCO corrected for alveolar ventilation:  94 ml/min/mmHg  Interpretation:  No obstructive or restrictive defect  No significant bronchodilator response  There is evidence of air trapping    Diffusion capacity is diminished however this is normal when corrected for alveolar ventilation  Carotid Duplex:  CONCLUSION:     Impression  RIGHT:  There is <50% stenosis noted in the internal carotid artery  Plaque is  heterogenous and irregular  Vertebral artery flow is antegrade  There is no significant subclavian artery  disease  LEFT:  There is 70-99% stenosis noted in the internal carotid artery  Plaque is  homogenous and irregular/smooth  Vertebral artery flow is antegrade  There is no significant subclavian artery  disease  IR carotid stent procedure:     IMPRESSION:  Impression:   1   99% left internal carotid origin stenosis by Nascet criteria successfully treated with 4 mm angioplasty and an 8 x 40 Precise Rx stent  A 30-40% residual stenosis by NASCET criteria was present after post dilatation  Vein Mapping:   Impression:  RIGHT LOWER LIMB:  The vein is of adequate caliber from proximal thigh to the knee  Though it  narrows to 2 2mm mid thigh after a branch  The great saphenous vein is patent and good quality from the groin to the  ankle  LEFT LOWER LIMB:  The vein is of adequate caliber from proximal thigh to the proximal calf  Though it narrows to 2 5mm mid thigh after a branch  The great saphenous vein is patent and good quality from the groin to       I have personally reviewed pertinent reports  Assessment:  Patient Active Problem List    Diagnosis Date Noted    Disease of cardiovascular system 07/10/2018    Coronary artery disease of native artery of native heart with stable angina pectoris ([facility] [address] ) 07/05/2018    Stenosis of left carotid artery 07/02/2018    Essential hypertension 05/22/2018    Dyslipidemia 05/22/2018    Pre-op exam 05/22/2018    Carotid artery disorder ([address] ) 05/22/2018    Left carotid artery stenosis 05/16/2018    Amaurosis fugax 05/16/2018     Severe coronary artery disease; CABG surgery    Plan:  Claudia Kan has done well with his left carotid stent procedure   He now returns to the office to finalize plans for elective coronary artery bypass graft surgery  He understands the risks and benefits of surgery and agrees to proceed  We have reviewed results all preoperative radiographic,  laboratory and vascular studies  He has a follow up appointment with Vascular surgery on 7/20/18 and if cleared, he wishes to proceed with surgery on 7/24/18  with CHRISSY Sanchez  He will remain on his Plavix until his follow up with Vascular surgery later this week to determine if it can be discontinued prior to CABG surgery  Mari Lindsey  and his wife were comfortable with our recommendations, and their questions were answered to their satisfaction  Thank you for allowing us to participate in the care of this patient       SIGNATURE: WILLIS Garcia  DATE: July 17, 2018  TIME: 11:46 AM

## 2018-07-20 ENCOUNTER — OFFICE VISIT (OUTPATIENT)
Dept: VASCULAR SURGERY | Facility: CLINIC | Age: 63
End: 2018-07-20

## 2018-07-20 VITALS
SYSTOLIC BLOOD PRESSURE: 142 MMHG | RESPIRATION RATE: 20 BRPM | TEMPERATURE: 97.5 F | HEART RATE: 88 BPM | BODY MASS INDEX: 27.74 KG/M2 | DIASTOLIC BLOOD PRESSURE: 68 MMHG | HEIGHT: 68 IN | WEIGHT: 183 LBS

## 2018-07-20 DIAGNOSIS — I65.22 LEFT CAROTID ARTERY STENOSIS: Primary | Chronic | ICD-10-CM

## 2018-07-20 DIAGNOSIS — G45.3 AMAUROSIS FUGAX: Chronic | ICD-10-CM

## 2018-07-20 PROCEDURE — 99024 POSTOP FOLLOW-UP VISIT: CPT | Performed by: NURSE PRACTITIONER

## 2018-07-20 NOTE — PATIENT INSTRUCTIONS
Left carotid artery stenosis, status post left carotid artery stent  -We will monitor your carotid artery stent with an ultrasound at 6 weeks and then every 3 months for the first year  -Continue aspirin, plavix and atorvastatin  -Followup with Dr Cristina Barone after your 6 week ultrasound

## 2018-07-20 NOTE — PROGRESS NOTES
Assessment/Plan:    Left carotid stenosis s/p left carotid artery stent  Symptomatic left carotid artery stenosis with two episodes of amaurosis fugax, now s/p left carotid artery stent placement with reversal of flow on 7/9/18 by Dr Deirdre Harris  Doing well, neurologically intact  On aspirin, Plavix and atorvastatin  Discussed postop surveillance with CV duplex at 6 week and then every 3 months x1 year  Followup with Dr Deirdre Harris after initial duplex  Scheduled for CABG 7/24/18  Diagnoses and all orders for this visit:    Left carotid artery stenosis  -     VAS carotid complete study; Future  -     VAS carotid complete study; Future    Amaurosis fugax        Subjective:      Patient ID: Sonia Arguello  is a 59 y o  male  Symptomatic left carotid artery stenosis, s/p left carotid artery stent with TCAR 7/9/18 by Dr Deirdre Harris  Isolated limited episode of right hand weakness during TCAR  Neurologically intact, no deficits  No further visual disturbances  He is on daily aspirin and Plavix  He is scheduled for CABG 7/24/18  The following portions of the patient's history were reviewed and updated as appropriate: allergies, current medications, past family history, past medical history, past social history, past surgical history and problem list     Review of Systems   Constitutional: Negative  HENT: Negative  Eyes:        Sudden loss of vision   Respiratory: Positive for shortness of breath and wheezing  Cardiovascular: Negative  Gastrointestinal: Negative  Endocrine: Negative  Genitourinary: Negative  Musculoskeletal: Negative  Skin: Negative  Allergic/Immunologic: Negative  Neurological: Positive for headaches  Hematological: Negative  Psychiatric/Behavioral: Positive for decreased concentration and sleep disturbance  Depression         Objective:     Physical Exam   Constitutional: He is oriented to person, place, and time  No distress     HENT:   Head: Normocephalic and atraumatic  Eyes: EOM are normal    Neck: Neck supple  No JVD present  Cardiovascular: Normal rate, regular rhythm and normal heart sounds  No murmur heard  Pulses:       Radial pulses are 2+ on the right side, and 2+ on the left side  Left carotid bruit   Pulmonary/Chest: Effort normal  No respiratory distress  Musculoskeletal: Normal range of motion  He exhibits no edema  Absent right hand 2nd and 3rd digits    Neurological: He is alert and oriented to person, place, and time  Skin: Skin is warm and dry  Psychiatric: He has a normal mood and affect  Vitals:    07/20/18 1110   BP: 142/68   BP Location: Left arm   Patient Position: Sitting   Pulse: 88   Resp: 20   Temp: 97 5 °F (36 4 °C)   Weight: 83 kg (183 lb)   Height: 5' 8" (1 727 m)       Patient Active Problem List   Diagnosis    Left carotid artery stenosis    Amaurosis fugax    Essential hypertension    Dyslipidemia    Pre-op exam    Carotid artery disorder (HCC)    Stenosis of left carotid artery    Coronary artery disease of native artery of native heart with stable angina pectoris ([address] )    Disease of cardiovascular system       Past Surgical History:   Procedure Laterality Date    AMPUTATION OF REPLICATED FINGERS       rt 2nd and 3rd fingers traumatically amputated at work    APPENDECTOMY      MANDIBLE FRACTURE SURGERY      upper and lower    ORBITAL FRACTURE SURGERY Right     MT TCAT IV STENT CRV CRTD ART EMBOLIC PROTECJ Left 1/4/2833    Procedure: TRANSCAROTID ARTERY REVASCULARIZATION;  Surgeon: Dean Isidro MD;  [facility];  Service: Vascular       Family History   Problem Relation Age of Onset    Cancer Mother     Heart attack Father     Stroke Father        Social History     Social History    Marital status: /Civil Union     Spouse name: N/A    Number of children: N/A    Years of education: N/A     Occupational History    Not on file       Social History Main Topics  Smoking status: Former Smoker     Packs/day: 1 00     Quit date: 5/14/2018    Smokeless tobacco: Former User     Quit date: 5/3/2018    Alcohol use No    Drug use: No    Sexual activity: Not Currently     Other Topics Concern    Not on file     Social History Narrative    No narrative on file       Allergies   Allergen Reactions    Percocet [Oxycodone-Acetaminophen] Itching     Trouble sleeping          Current Outpatient Prescriptions:     Acetaminophen 325 MG CAPS, Take 1 capsule by mouth as needed, Disp: , Rfl:     aspirin 325 mg tablet, Take 325 mg by mouth daily, Disp: , Rfl:     atorvastatin (LIPITOR) 40 mg tablet, Take 0 5 tablets (20 mg total) by mouth daily, Disp: 30 tablet, Rfl: 3    clopidogrel (PLAVIX) 75 mg tablet, Take 1 tablet (75 mg total) by mouth daily, Disp: 30 tablet, Rfl: 3    hydrochlorothiazide (HYDRODIURIL) 25 mg tablet, Take 1 tablet (25 mg total) by mouth daily, Disp: 30 tablet, Rfl: 2    losartan (COZAAR) 50 mg tablet, Take 1 tablet (50 mg total) by mouth daily at bedtime, Disp: 30 tablet, Rfl: 2    mupirocin (BACTROBAN) 2 % ointment, Apply 1 application topically 2 (two) times a day for 5 days Apply to each nostril twice daily for five days before your operation  , Disp: 22 g, Rfl: 0    rosuvastatin (CRESTOR) 20 MG tablet, Take 1 tablet (20 mg total) by mouth daily, Disp: 30 tablet, Rfl: 2

## 2018-07-23 ENCOUNTER — APPOINTMENT (OUTPATIENT)
Dept: LAB | Facility: HOSPITAL | Age: 63
End: 2018-07-23
Payer: COMMERCIAL

## 2018-07-23 ENCOUNTER — ANESTHESIA EVENT (OUTPATIENT)
Dept: PERIOP | Facility: HOSPITAL | Age: 63
DRG: 235 | End: 2018-07-23
Payer: COMMERCIAL

## 2018-07-23 ENCOUNTER — LAB REQUISITION (OUTPATIENT)
Dept: LAB | Facility: HOSPITAL | Age: 63
End: 2018-07-23
Payer: COMMERCIAL

## 2018-07-23 DIAGNOSIS — Z01.818 PREOP TESTING: ICD-10-CM

## 2018-07-23 DIAGNOSIS — I25.118 ATHSCL HEART DISEASE OF NATIVE COR ART W OTH ANG PCTRS (HCC): ICD-10-CM

## 2018-07-23 DIAGNOSIS — I25.10 ATHEROSCLEROTIC HEART DISEASE OF NATIVE CORONARY ARTERY WITHOUT ANGINA PECTORIS: ICD-10-CM

## 2018-07-23 DIAGNOSIS — I25.118 CORONARY ARTERY DISEASE OF NATIVE ARTERY OF NATIVE HEART WITH STABLE ANGINA PECTORIS (HCC): ICD-10-CM

## 2018-07-23 LAB
ABO GROUP BLD: NORMAL
BILIRUB UR QL STRIP: NEGATIVE
BLD GP AB SCN SERPL QL: NEGATIVE
CHOLEST SERPL-MCNC: 135 MG/DL (ref 50–200)
CLARITY UR: CLEAR
COLOR UR: YELLOW
GLUCOSE UR STRIP-MCNC: NEGATIVE MG/DL
HDLC SERPL-MCNC: 40 MG/DL (ref 40–60)
HGB UR QL STRIP.AUTO: NEGATIVE
KETONES UR STRIP-MCNC: NEGATIVE MG/DL
LDLC SERPL CALC-MCNC: 63 MG/DL (ref 0–100)
LEUKOCYTE ESTERASE UR QL STRIP: NEGATIVE
NITRITE UR QL STRIP: NEGATIVE
NONHDLC SERPL-MCNC: 95 MG/DL
PH UR STRIP.AUTO: 5.5 [PH] (ref 5–9)
PROT UR STRIP-MCNC: NEGATIVE MG/DL
RH BLD: POSITIVE
SP GR UR STRIP.AUTO: 1.02 (ref 1–1.03)
SPECIMEN EXPIRATION DATE: NORMAL
TRIGL SERPL-MCNC: 161 MG/DL
UROBILINOGEN UR QL STRIP.AUTO: 0.2 E.U./DL

## 2018-07-23 PROCEDURE — 81003 URINALYSIS AUTO W/O SCOPE: CPT | Performed by: NURSE PRACTITIONER

## 2018-07-23 PROCEDURE — 36415 COLL VENOUS BLD VENIPUNCTURE: CPT

## 2018-07-23 PROCEDURE — 87081 CULTURE SCREEN ONLY: CPT

## 2018-07-23 PROCEDURE — 86900 BLOOD TYPING SEROLOGIC ABO: CPT | Performed by: THORACIC SURGERY (CARDIOTHORACIC VASCULAR SURGERY)

## 2018-07-23 PROCEDURE — 80061 LIPID PANEL: CPT

## 2018-07-23 PROCEDURE — 86901 BLOOD TYPING SEROLOGIC RH(D): CPT | Performed by: THORACIC SURGERY (CARDIOTHORACIC VASCULAR SURGERY)

## 2018-07-23 PROCEDURE — 86850 RBC ANTIBODY SCREEN: CPT | Performed by: THORACIC SURGERY (CARDIOTHORACIC VASCULAR SURGERY)

## 2018-07-24 ENCOUNTER — HOSPITAL ENCOUNTER (INPATIENT)
Facility: HOSPITAL | Age: 63
LOS: 6 days | Discharge: HOME HEALTH | DRG: 235 | End: 2018-07-30
Attending: THORACIC SURGERY (CARDIOTHORACIC VASCULAR SURGERY) | Admitting: THORACIC SURGERY (CARDIOTHORACIC VASCULAR SURGERY)
Payer: COMMERCIAL

## 2018-07-24 ENCOUNTER — ANESTHESIA (OUTPATIENT)
Dept: PERIOP | Facility: HOSPITAL | Age: 63
DRG: 235 | End: 2018-07-24
Payer: COMMERCIAL

## 2018-07-24 ENCOUNTER — TELEPHONE (OUTPATIENT)
Dept: CARDIOLOGY CLINIC | Facility: CLINIC | Age: 63
End: 2018-07-24

## 2018-07-24 ENCOUNTER — APPOINTMENT (INPATIENT)
Dept: RADIOLOGY | Facility: HOSPITAL | Age: 63
DRG: 235 | End: 2018-07-24
Payer: COMMERCIAL

## 2018-07-24 ENCOUNTER — APPOINTMENT (OUTPATIENT)
Dept: NON INVASIVE DIAGNOSTICS | Facility: HOSPITAL | Age: 63
DRG: 235 | End: 2018-07-24
Attending: THORACIC SURGERY (CARDIOTHORACIC VASCULAR SURGERY)
Payer: COMMERCIAL

## 2018-07-24 DIAGNOSIS — I25.118 CORONARY ARTERY DISEASE OF NATIVE ARTERY OF NATIVE HEART WITH STABLE ANGINA PECTORIS (HCC): ICD-10-CM

## 2018-07-24 DIAGNOSIS — Z95.1 S/P CABG X 3: Primary | ICD-10-CM

## 2018-07-24 DIAGNOSIS — I25.10 CORONARY ARTERIOSCLEROSIS: ICD-10-CM

## 2018-07-24 LAB
ABO GROUP BLD: NORMAL
ANCILLARY VALUES: ABNORMAL
ANION GAP SERPL CALCULATED.3IONS-SCNC: 8 MMOL/L (ref 4–13)
APTT PPP: 30 SECONDS (ref 24–36)
ARTERIAL PATENCY WRIST A: ABNORMAL
BASE EXCESS BLDA CALC-SCNC: 10 MMOL/L (ref -2–3)
BASE EXCESS BLDA CALC-SCNC: 14 MMOL/L (ref -2–3)
BASE EXCESS BLDA CALC-SCNC: 2 MMOL/L (ref -2–3)
BASE EXCESS BLDA CALC-SCNC: 5 MMOL/L (ref -2–3)
BASE EXCESS BLDA CALC-SCNC: 6 MMOL/L (ref -2–3)
BASE EXCESS BLDA CALC-SCNC: 6 MMOL/L (ref -2–3)
BLD GP AB SCN SERPL QL: NEGATIVE
BUN SERPL-MCNC: 19 MG/DL (ref 5–25)
CA-I BLD-SCNC: 0.97 MMOL/L (ref 1.12–1.32)
CA-I BLD-SCNC: 1 MMOL/L (ref 1.12–1.32)
CA-I BLD-SCNC: 1.09 MMOL/L (ref 1.12–1.32)
CA-I BLD-SCNC: 1.14 MMOL/L (ref 1.12–1.32)
CA-I BLD-SCNC: 1.17 MMOL/L (ref 1.12–1.32)
CA-I BLD-SCNC: 1.26 MMOL/L (ref 1.12–1.32)
CALCIUM SERPL-MCNC: 7.6 MG/DL (ref 8.3–10.1)
CHLORIDE SERPL-SCNC: 106 MMOL/L (ref 100–108)
CO2 SERPL-SCNC: 28 MMOL/L (ref 21–32)
CREAT SERPL-MCNC: 0.91 MG/DL (ref 0.6–1.3)
DS:DELIVERY SYSTEM: AC
FIBRINOGEN PPP-MCNC: 304 MG/DL (ref 227–495)
FIO2 GAS DIL.REBREATH: 50 L
GFR SERPL CREATININE-BSD FRML MDRD: 90 ML/MIN/1.73SQ M
GLUCOSE SERPL-MCNC: 109 MG/DL (ref 65–140)
GLUCOSE SERPL-MCNC: 110 MG/DL (ref 65–140)
GLUCOSE SERPL-MCNC: 112 MG/DL (ref 65–140)
GLUCOSE SERPL-MCNC: 112 MG/DL (ref 65–140)
GLUCOSE SERPL-MCNC: 113 MG/DL (ref 65–140)
GLUCOSE SERPL-MCNC: 121 MG/DL (ref 65–140)
GLUCOSE SERPL-MCNC: 121 MG/DL (ref 65–140)
GLUCOSE SERPL-MCNC: 143 MG/DL (ref 65–140)
GLUCOSE SERPL-MCNC: 154 MG/DL (ref 65–140)
GLUCOSE SERPL-MCNC: 156 MG/DL (ref 65–140)
GLUCOSE SERPL-MCNC: 185 MG/DL (ref 65–140)
GLUCOSE SERPL-MCNC: 99 MG/DL (ref 65–140)
HCO3 BLDA-SCNC: 28.4 MMOL/L (ref 22–28)
HCO3 BLDA-SCNC: 29.8 MMOL/L (ref 22–28)
HCO3 BLDA-SCNC: 30.1 MMOL/L (ref 22–28)
HCO3 BLDA-SCNC: 30.5 MMOL/L (ref 22–28)
HCO3 BLDA-SCNC: 35 MMOL/L (ref 24–30)
HCO3 BLDA-SCNC: 38.3 MMOL/L (ref 22–28)
HCT VFR BLD AUTO: 33.4 % (ref 36.5–49.3)
HCT VFR BLD AUTO: 39 % (ref 36.5–49.3)
HCT VFR BLD CALC: 24 % (ref 36.5–49.3)
HCT VFR BLD CALC: 26 % (ref 36.5–49.3)
HCT VFR BLD CALC: 26 % (ref 36.5–49.3)
HCT VFR BLD CALC: 29 % (ref 36.5–49.3)
HCT VFR BLD CALC: 34 % (ref 36.5–49.3)
HCT VFR BLD CALC: 36 % (ref 36.5–49.3)
HGB BLD-MCNC: 11.3 G/DL (ref 12–17)
HGB BLD-MCNC: 12.8 G/DL (ref 12–17)
HGB BLDA-MCNC: 11.6 G/DL (ref 12–17)
HGB BLDA-MCNC: 12.2 G/DL (ref 12–17)
HGB BLDA-MCNC: 8.2 G/DL (ref 12–17)
HGB BLDA-MCNC: 8.8 G/DL (ref 12–17)
HGB BLDA-MCNC: 8.8 G/DL (ref 12–17)
HGB BLDA-MCNC: 9.9 G/DL (ref 12–17)
INR PPP: 1.35 (ref 0.86–1.17)
KCT BLD-ACNC: 126 SEC (ref 89–137)
KCT BLD-ACNC: 137 SEC (ref 89–137)
KCT BLD-ACNC: 473 SEC (ref 89–137)
KCT BLD-ACNC: 497 SEC (ref 89–137)
KCT BLD-ACNC: 582 SEC (ref 89–137)
KCT BLD-ACNC: 721 SEC (ref 89–137)
MRSA NOSE QL CULT: NORMAL
PCO2 BLD: 30 MMOL/L (ref 21–32)
PCO2 BLD: 31 MMOL/L (ref 21–32)
PCO2 BLD: 31 MMOL/L (ref 21–32)
PCO2 BLD: 32 MMOL/L (ref 21–32)
PCO2 BLD: 37 MMOL/L (ref 21–32)
PCO2 BLD: 39.5 MM HG (ref 36–44)
PCO2 BLD: 40 MMOL/L (ref 21–32)
PCO2 BLD: 45.2 MM HG (ref 36–44)
PCO2 BLD: 46.9 MM HG (ref 36–44)
PCO2 BLD: 49.8 MM HG (ref 36–44)
PCO2 BLD: 52.4 MM HG (ref 42–50)
PCO2 BLD: 54.9 MM HG (ref 36–44)
PH BLD: 7.32 [PH] (ref 7.35–7.45)
PH BLD: 7.42 [PH] (ref 7.35–7.45)
PH BLD: 7.43 [PH] (ref 7.3–7.4)
PH BLD: 7.44 [PH] (ref 7.35–7.45)
PH BLD: 7.49 [PH] (ref 7.35–7.45)
PH BLD: 7.5 [PH] (ref 7.35–7.45)
PLATELET # BLD AUTO: 197 THOUSANDS/UL (ref 149–390)
PLATELET # BLD AUTO: 207 THOUSANDS/UL (ref 149–390)
PMV BLD AUTO: 9.6 FL (ref 8.9–12.7)
PMV BLD AUTO: 9.8 FL (ref 8.9–12.7)
PO2 BLD: 120 MM HG (ref 75–129)
PO2 BLD: 218 MM HG (ref 75–129)
PO2 BLD: 229 MM HG (ref 75–129)
PO2 BLD: 308 MM HG (ref 75–129)
PO2 BLD: 46 MM HG (ref 35–45)
PO2 BLD: >400 MM HG (ref 75–129)
POTASSIUM BLD-SCNC: 3.1 MMOL/L (ref 3.5–5.3)
POTASSIUM BLD-SCNC: 3.3 MMOL/L (ref 3.5–5.3)
POTASSIUM BLD-SCNC: 3.5 MMOL/L (ref 3.5–5.3)
POTASSIUM BLD-SCNC: 3.8 MMOL/L (ref 3.5–5.3)
POTASSIUM BLD-SCNC: 3.9 MMOL/L (ref 3.5–5.3)
POTASSIUM BLD-SCNC: 4.2 MMOL/L (ref 3.5–5.3)
POTASSIUM SERPL-SCNC: 3.3 MMOL/L (ref 3.5–5.3)
POTASSIUM SERPL-SCNC: 4.3 MMOL/L (ref 3.5–5.3)
PRESSURE SETTING: 5
PROTHROMBIN TIME: 16.8 SECONDS (ref 11.8–14.2)
RESPIRATORY RATE: 121
RH BLD: POSITIVE
SAMPLE SITE: ABNORMAL
SAO2 % BLD FROM PO2: 100 % (ref 95–98)
SAO2 % BLD FROM PO2: 82 % (ref 95–98)
SAO2 % BLD FROM PO2: 98 % (ref 95–98)
SODIUM BLD-SCNC: 138 MMOL/L (ref 136–145)
SODIUM BLD-SCNC: 138 MMOL/L (ref 136–145)
SODIUM BLD-SCNC: 139 MMOL/L (ref 136–145)
SODIUM BLD-SCNC: 139 MMOL/L (ref 136–145)
SODIUM BLD-SCNC: 140 MMOL/L (ref 136–145)
SODIUM BLD-SCNC: 140 MMOL/L (ref 136–145)
SODIUM SERPL-SCNC: 142 MMOL/L (ref 136–145)
SPECIMEN EXPIRATION DATE: NORMAL
SPECIMEN SOURCE: ABNORMAL
SPECIMEN SOURCE: NORMAL
SPECIMEN SOURCE: NORMAL
VENTILATION VALUE: 500

## 2018-07-24 PROCEDURE — 33508 ENDOSCOPIC VEIN HARVEST: CPT | Performed by: PHYSICIAN ASSISTANT

## 2018-07-24 PROCEDURE — 02HV33Z INSERTION OF INFUSION DEVICE INTO SUPERIOR VENA CAVA, PERCUTANEOUS APPROACH: ICD-10-PCS | Performed by: THORACIC SURGERY (CARDIOTHORACIC VASCULAR SURGERY)

## 2018-07-24 PROCEDURE — 86900 BLOOD TYPING SEROLOGIC ABO: CPT | Performed by: THORACIC SURGERY (CARDIOTHORACIC VASCULAR SURGERY)

## 2018-07-24 PROCEDURE — 86850 RBC ANTIBODY SCREEN: CPT | Performed by: THORACIC SURGERY (CARDIOTHORACIC VASCULAR SURGERY)

## 2018-07-24 PROCEDURE — 85049 AUTOMATED PLATELET COUNT: CPT | Performed by: PHYSICIAN ASSISTANT

## 2018-07-24 PROCEDURE — 85347 COAGULATION TIME ACTIVATED: CPT

## 2018-07-24 PROCEDURE — 85384 FIBRINOGEN ACTIVITY: CPT | Performed by: PHYSICIAN ASSISTANT

## 2018-07-24 PROCEDURE — 86923 COMPATIBILITY TEST ELECTRIC: CPT

## 2018-07-24 PROCEDURE — 33533 CABG ARTERIAL SINGLE: CPT | Performed by: PHYSICIAN ASSISTANT

## 2018-07-24 PROCEDURE — 5A1223Z PERFORMANCE OF CARDIAC PACING, CONTINUOUS: ICD-10-PCS | Performed by: THORACIC SURGERY (CARDIOTHORACIC VASCULAR SURGERY)

## 2018-07-24 PROCEDURE — 99232 SBSQ HOSP IP/OBS MODERATE 35: CPT | Performed by: EMERGENCY MEDICINE

## 2018-07-24 PROCEDURE — 30233N1 TRANSFUSION OF NONAUTOLOGOUS RED BLOOD CELLS INTO PERIPHERAL VEIN, PERCUTANEOUS APPROACH: ICD-10-PCS | Performed by: THORACIC SURGERY (CARDIOTHORACIC VASCULAR SURGERY)

## 2018-07-24 PROCEDURE — 93005 ELECTROCARDIOGRAM TRACING: CPT

## 2018-07-24 PROCEDURE — 85610 PROTHROMBIN TIME: CPT | Performed by: PHYSICIAN ASSISTANT

## 2018-07-24 PROCEDURE — 85014 HEMATOCRIT: CPT

## 2018-07-24 PROCEDURE — 33533 CABG ARTERIAL SINGLE: CPT | Performed by: THORACIC SURGERY (CARDIOTHORACIC VASCULAR SURGERY)

## 2018-07-24 PROCEDURE — 02100Z9 BYPASS CORONARY ARTERY, ONE ARTERY FROM LEFT INTERNAL MAMMARY, OPEN APPROACH: ICD-10-PCS | Performed by: THORACIC SURGERY (CARDIOTHORACIC VASCULAR SURGERY)

## 2018-07-24 PROCEDURE — 5A1221Z PERFORMANCE OF CARDIAC OUTPUT, CONTINUOUS: ICD-10-PCS | Performed by: THORACIC SURGERY (CARDIOTHORACIC VASCULAR SURGERY)

## 2018-07-24 PROCEDURE — 82947 ASSAY GLUCOSE BLOOD QUANT: CPT

## 2018-07-24 PROCEDURE — 85730 THROMBOPLASTIN TIME PARTIAL: CPT | Performed by: PHYSICIAN ASSISTANT

## 2018-07-24 PROCEDURE — 33518 CABG ARTERY-VEIN TWO: CPT | Performed by: PHYSICIAN ASSISTANT

## 2018-07-24 PROCEDURE — 82330 ASSAY OF CALCIUM: CPT

## 2018-07-24 PROCEDURE — 30233N0 TRANSFUSION OF AUTOLOGOUS RED BLOOD CELLS INTO PERIPHERAL VEIN, PERCUTANEOUS APPROACH: ICD-10-PCS | Performed by: THORACIC SURGERY (CARDIOTHORACIC VASCULAR SURGERY)

## 2018-07-24 PROCEDURE — 80048 BASIC METABOLIC PNL TOTAL CA: CPT | Performed by: PHYSICIAN ASSISTANT

## 2018-07-24 PROCEDURE — 94760 N-INVAS EAR/PLS OXIMETRY 1: CPT

## 2018-07-24 PROCEDURE — 93312 ECHO TRANSESOPHAGEAL: CPT

## 2018-07-24 PROCEDURE — 33518 CABG ARTERY-VEIN TWO: CPT | Performed by: THORACIC SURGERY (CARDIOTHORACIC VASCULAR SURGERY)

## 2018-07-24 PROCEDURE — 71045 X-RAY EXAM CHEST 1 VIEW: CPT

## 2018-07-24 PROCEDURE — 85018 HEMOGLOBIN: CPT | Performed by: PHYSICIAN ASSISTANT

## 2018-07-24 PROCEDURE — 82803 BLOOD GASES ANY COMBINATION: CPT

## 2018-07-24 PROCEDURE — 82948 REAGENT STRIP/BLOOD GLUCOSE: CPT

## 2018-07-24 PROCEDURE — 33508 ENDOSCOPIC VEIN HARVEST: CPT | Performed by: THORACIC SURGERY (CARDIOTHORACIC VASCULAR SURGERY)

## 2018-07-24 PROCEDURE — 84295 ASSAY OF SERUM SODIUM: CPT

## 2018-07-24 PROCEDURE — 06BQ4ZZ EXCISION OF LEFT SAPHENOUS VEIN, PERCUTANEOUS ENDOSCOPIC APPROACH: ICD-10-PCS | Performed by: THORACIC SURGERY (CARDIOTHORACIC VASCULAR SURGERY)

## 2018-07-24 PROCEDURE — 94002 VENT MGMT INPAT INIT DAY: CPT

## 2018-07-24 PROCEDURE — 021109W BYPASS CORONARY ARTERY, TWO ARTERIES FROM AORTA WITH AUTOLOGOUS VENOUS TISSUE, OPEN APPROACH: ICD-10-PCS | Performed by: THORACIC SURGERY (CARDIOTHORACIC VASCULAR SURGERY)

## 2018-07-24 PROCEDURE — 85049 AUTOMATED PLATELET COUNT: CPT | Performed by: THORACIC SURGERY (CARDIOTHORACIC VASCULAR SURGERY)

## 2018-07-24 PROCEDURE — 86901 BLOOD TYPING SEROLOGIC RH(D): CPT | Performed by: THORACIC SURGERY (CARDIOTHORACIC VASCULAR SURGERY)

## 2018-07-24 PROCEDURE — 84132 ASSAY OF SERUM POTASSIUM: CPT

## 2018-07-24 PROCEDURE — 84132 ASSAY OF SERUM POTASSIUM: CPT | Performed by: PHYSICIAN ASSISTANT

## 2018-07-24 PROCEDURE — 85014 HEMATOCRIT: CPT | Performed by: PHYSICIAN ASSISTANT

## 2018-07-24 DEVICE — MARKER CORONARY BYPASS VOSS GRAFT: Type: IMPLANTABLE DEVICE | Site: AORTA | Status: FUNCTIONAL

## 2018-07-24 RX ORDER — AMIODARONE HYDROCHLORIDE 200 MG/1
200 TABLET ORAL EVERY 8 HOURS SCHEDULED
Status: DISCONTINUED | OUTPATIENT
Start: 2018-07-24 | End: 2018-07-27

## 2018-07-24 RX ORDER — MIDAZOLAM HYDROCHLORIDE 1 MG/ML
INJECTION INTRAMUSCULAR; INTRAVENOUS AS NEEDED
Status: DISCONTINUED | OUTPATIENT
Start: 2018-07-24 | End: 2018-07-24 | Stop reason: SURG

## 2018-07-24 RX ORDER — FENTANYL CITRATE 50 UG/ML
50 INJECTION, SOLUTION INTRAMUSCULAR; INTRAVENOUS ONCE
Status: COMPLETED | OUTPATIENT
Start: 2018-07-24 | End: 2018-07-24

## 2018-07-24 RX ORDER — POTASSIUM CHLORIDE 14.9 MG/ML
20 INJECTION INTRAVENOUS
Status: DISCONTINUED | OUTPATIENT
Start: 2018-07-24 | End: 2018-07-25

## 2018-07-24 RX ORDER — PANTOPRAZOLE SODIUM 40 MG/1
40 TABLET, DELAYED RELEASE ORAL DAILY
Status: DISCONTINUED | OUTPATIENT
Start: 2018-07-24 | End: 2018-07-25

## 2018-07-24 RX ORDER — ROCURONIUM BROMIDE 10 MG/ML
INJECTION, SOLUTION INTRAVENOUS AS NEEDED
Status: DISCONTINUED | OUTPATIENT
Start: 2018-07-24 | End: 2018-07-24 | Stop reason: SURG

## 2018-07-24 RX ORDER — CLOPIDOGREL BISULFATE 75 MG/1
75 TABLET ORAL DAILY
Status: DISCONTINUED | OUTPATIENT
Start: 2018-07-25 | End: 2018-07-30 | Stop reason: HOSPADM

## 2018-07-24 RX ORDER — ACETAMINOPHEN 325 MG/1
650 TABLET ORAL EVERY 4 HOURS PRN
Status: DISCONTINUED | OUTPATIENT
Start: 2018-07-24 | End: 2018-07-30 | Stop reason: HOSPADM

## 2018-07-24 RX ORDER — BISACODYL 10 MG
10 SUPPOSITORY, RECTAL RECTAL DAILY PRN
Status: DISCONTINUED | OUTPATIENT
Start: 2018-07-24 | End: 2018-07-30 | Stop reason: HOSPADM

## 2018-07-24 RX ORDER — CHLORHEXIDINE GLUCONATE 0.12 MG/ML
15 RINSE ORAL 2 TIMES DAILY
Status: DISCONTINUED | OUTPATIENT
Start: 2018-07-24 | End: 2018-07-25

## 2018-07-24 RX ORDER — LIDOCAINE HYDROCHLORIDE 10 MG/ML
INJECTION, SOLUTION INFILTRATION; PERINEURAL AS NEEDED
Status: DISCONTINUED | OUTPATIENT
Start: 2018-07-24 | End: 2018-07-24 | Stop reason: SURG

## 2018-07-24 RX ORDER — HEPARIN SODIUM 1000 [USP'U]/ML
INJECTION, SOLUTION INTRAVENOUS; SUBCUTANEOUS AS NEEDED
Status: DISCONTINUED | OUTPATIENT
Start: 2018-07-24 | End: 2018-07-24 | Stop reason: SURG

## 2018-07-24 RX ORDER — POTASSIUM CHLORIDE 14.9 MG/ML
20 INJECTION INTRAVENOUS ONCE AS NEEDED
Status: COMPLETED | OUTPATIENT
Start: 2018-07-24 | End: 2018-07-24

## 2018-07-24 RX ORDER — ONDANSETRON 2 MG/ML
4 INJECTION INTRAMUSCULAR; INTRAVENOUS EVERY 6 HOURS PRN
Status: DISCONTINUED | OUTPATIENT
Start: 2018-07-24 | End: 2018-07-30 | Stop reason: HOSPADM

## 2018-07-24 RX ORDER — FENTANYL CITRATE 50 UG/ML
INJECTION, SOLUTION INTRAMUSCULAR; INTRAVENOUS AS NEEDED
Status: DISCONTINUED | OUTPATIENT
Start: 2018-07-24 | End: 2018-07-24 | Stop reason: SURG

## 2018-07-24 RX ORDER — CHLORHEXIDINE GLUCONATE 0.12 MG/ML
15 RINSE ORAL ONCE
Status: COMPLETED | OUTPATIENT
Start: 2018-07-24 | End: 2018-07-24

## 2018-07-24 RX ORDER — ATORVASTATIN CALCIUM 80 MG/1
80 TABLET, FILM COATED ORAL
Status: DISCONTINUED | OUTPATIENT
Start: 2018-07-24 | End: 2018-07-30 | Stop reason: HOSPADM

## 2018-07-24 RX ORDER — POLYETHYLENE GLYCOL 3350 17 G/17G
17 POWDER, FOR SOLUTION ORAL DAILY
Status: DISCONTINUED | OUTPATIENT
Start: 2018-07-25 | End: 2018-07-27

## 2018-07-24 RX ORDER — SODIUM CHLORIDE 450 MG/100ML
20 INJECTION, SOLUTION INTRAVENOUS CONTINUOUS
Status: DISCONTINUED | OUTPATIENT
Start: 2018-07-24 | End: 2018-07-25

## 2018-07-24 RX ORDER — OXYCODONE HYDROCHLORIDE AND ACETAMINOPHEN 5; 325 MG/1; MG/1
1 TABLET ORAL EVERY 4 HOURS PRN
Status: DISCONTINUED | OUTPATIENT
Start: 2018-07-24 | End: 2018-07-25

## 2018-07-24 RX ORDER — MAGNESIUM HYDROXIDE 1200 MG/15ML
LIQUID ORAL AS NEEDED
Status: DISCONTINUED | OUTPATIENT
Start: 2018-07-24 | End: 2018-07-24 | Stop reason: HOSPADM

## 2018-07-24 RX ORDER — CALCIUM CHLORIDE 100 MG/ML
1 INJECTION INTRAVENOUS; INTRAVENTRICULAR ONCE
Status: DISCONTINUED | OUTPATIENT
Start: 2018-07-24 | End: 2018-07-25

## 2018-07-24 RX ORDER — HYDROXYZINE HYDROCHLORIDE 25 MG/1
25 TABLET, FILM COATED ORAL EVERY 6 HOURS PRN
Status: DISCONTINUED | OUTPATIENT
Start: 2018-07-24 | End: 2018-07-30 | Stop reason: HOSPADM

## 2018-07-24 RX ORDER — SODIUM CHLORIDE, SODIUM LACTATE, POTASSIUM CHLORIDE, CALCIUM CHLORIDE 600; 310; 30; 20 MG/100ML; MG/100ML; MG/100ML; MG/100ML
INJECTION, SOLUTION INTRAVENOUS CONTINUOUS PRN
Status: DISCONTINUED | OUTPATIENT
Start: 2018-07-24 | End: 2018-07-24 | Stop reason: SURG

## 2018-07-24 RX ORDER — PROTAMINE SULFATE 10 MG/ML
INJECTION, SOLUTION INTRAVENOUS AS NEEDED
Status: DISCONTINUED | OUTPATIENT
Start: 2018-07-24 | End: 2018-07-24 | Stop reason: SURG

## 2018-07-24 RX ORDER — FONDAPARINUX SODIUM 2.5 MG/.5ML
2.5 INJECTION SUBCUTANEOUS DAILY
Status: DISCONTINUED | OUTPATIENT
Start: 2018-07-25 | End: 2018-07-30 | Stop reason: HOSPADM

## 2018-07-24 RX ORDER — MAGNESIUM SULFATE HEPTAHYDRATE 40 MG/ML
2 INJECTION, SOLUTION INTRAVENOUS ONCE
Status: COMPLETED | OUTPATIENT
Start: 2018-07-24 | End: 2018-07-24

## 2018-07-24 RX ORDER — OXYCODONE HYDROCHLORIDE AND ACETAMINOPHEN 5; 325 MG/1; MG/1
2 TABLET ORAL EVERY 6 HOURS PRN
Status: DISCONTINUED | OUTPATIENT
Start: 2018-07-24 | End: 2018-07-25

## 2018-07-24 RX ORDER — ETOMIDATE 2 MG/ML
INJECTION INTRAVENOUS AS NEEDED
Status: DISCONTINUED | OUTPATIENT
Start: 2018-07-24 | End: 2018-07-24 | Stop reason: SURG

## 2018-07-24 RX ORDER — ASPIRIN 325 MG
325 TABLET ORAL DAILY
Status: DISCONTINUED | OUTPATIENT
Start: 2018-07-24 | End: 2018-07-24

## 2018-07-24 RX ORDER — ASPIRIN 81 MG/1
81 TABLET ORAL DAILY
Status: DISCONTINUED | OUTPATIENT
Start: 2018-07-24 | End: 2018-07-27

## 2018-07-24 RX ORDER — SODIUM CHLORIDE 9 MG/ML
INJECTION, SOLUTION INTRAVENOUS CONTINUOUS PRN
Status: DISCONTINUED | OUTPATIENT
Start: 2018-07-24 | End: 2018-07-24 | Stop reason: SURG

## 2018-07-24 RX ORDER — FENTANYL CITRATE 50 UG/ML
50 INJECTION, SOLUTION INTRAMUSCULAR; INTRAVENOUS
Status: DISCONTINUED | OUTPATIENT
Start: 2018-07-24 | End: 2018-07-25

## 2018-07-24 RX ORDER — FUROSEMIDE 10 MG/ML
40 INJECTION INTRAMUSCULAR; INTRAVENOUS EVERY 6 HOURS PRN
Status: DISCONTINUED | OUTPATIENT
Start: 2018-07-24 | End: 2018-07-24

## 2018-07-24 RX ADMIN — CHLORHEXIDINE GLUCONATE 15 ML: 1.2 RINSE ORAL at 18:31

## 2018-07-24 RX ADMIN — CEFAZOLIN SODIUM 2000 MG: 2 SOLUTION INTRAVENOUS at 11:17

## 2018-07-24 RX ADMIN — AMIODARONE HYDROCHLORIDE 200 MG: 200 TABLET ORAL at 21:09

## 2018-07-24 RX ADMIN — METOPROLOL TARTRATE 12.5 MG: 25 TABLET ORAL at 07:30

## 2018-07-24 RX ADMIN — FENTANYL CITRATE 50 MCG: 50 INJECTION, SOLUTION INTRAMUSCULAR; INTRAVENOUS at 18:31

## 2018-07-24 RX ADMIN — SODIUM CHLORIDE: 0.9 INJECTION, SOLUTION INTRAVENOUS at 10:47

## 2018-07-24 RX ADMIN — FENTANYL CITRATE 150 MCG: 50 INJECTION, SOLUTION INTRAMUSCULAR; INTRAVENOUS at 10:19

## 2018-07-24 RX ADMIN — HEPARIN SODIUM 5000 UNITS: 1000 INJECTION INTRAVENOUS; SUBCUTANEOUS at 12:10

## 2018-07-24 RX ADMIN — CEFAZOLIN SODIUM 1000 MG: 1 SOLUTION INTRAVENOUS at 18:31

## 2018-07-24 RX ADMIN — PHENYLEPHRINE HYDROCHLORIDE 60 MCG/MIN: 10 INJECTION INTRAVENOUS at 15:10

## 2018-07-24 RX ADMIN — POTASSIUM CHLORIDE 20 MEQ: 200 INJECTION, SOLUTION INTRAVENOUS at 18:32

## 2018-07-24 RX ADMIN — LIDOCAINE HYDROCHLORIDE 5 ML: 10 INJECTION, SOLUTION INFILTRATION; PERINEURAL at 10:19

## 2018-07-24 RX ADMIN — FENTANYL CITRATE 200 MCG: 50 INJECTION, SOLUTION INTRAMUSCULAR; INTRAVENOUS at 10:57

## 2018-07-24 RX ADMIN — Medication 10 G: at 13:58

## 2018-07-24 RX ADMIN — CHLORHEXIDINE GLUCONATE 15 ML: 1.2 RINSE ORAL at 07:30

## 2018-07-24 RX ADMIN — HYDROMORPHONE HYDROCHLORIDE 0.5 MG: 1 INJECTION, SOLUTION INTRAMUSCULAR; INTRAVENOUS; SUBCUTANEOUS at 21:09

## 2018-07-24 RX ADMIN — MUPIROCIN 1 APPLICATION: 20 OINTMENT TOPICAL at 21:09

## 2018-07-24 RX ADMIN — PROTAMINE SULFATE 330 MG: 10 INJECTION, SOLUTION INTRAVENOUS at 13:47

## 2018-07-24 RX ADMIN — MAGNESIUM SULFATE HEPTAHYDRATE 2 G: 40 INJECTION, SOLUTION INTRAVENOUS at 16:00

## 2018-07-24 RX ADMIN — SODIUM CHLORIDE, SODIUM LACTATE, POTASSIUM CHLORIDE, AND CALCIUM CHLORIDE: .6; .31; .03; .02 INJECTION, SOLUTION INTRAVENOUS at 10:13

## 2018-07-24 RX ADMIN — HEPARIN SODIUM 28000 UNITS: 1000 INJECTION INTRAVENOUS; SUBCUTANEOUS at 12:23

## 2018-07-24 RX ADMIN — SODIUM CHLORIDE, SODIUM LACTATE, POTASSIUM CHLORIDE, AND CALCIUM CHLORIDE: .6; .31; .03; .02 INJECTION, SOLUTION INTRAVENOUS at 13:57

## 2018-07-24 RX ADMIN — MUPIROCIN 1 APPLICATION: 20 OINTMENT TOPICAL at 07:30

## 2018-07-24 RX ADMIN — FENTANYL CITRATE 250 MCG: 50 INJECTION, SOLUTION INTRAMUSCULAR; INTRAVENOUS at 13:48

## 2018-07-24 RX ADMIN — EPINEPHRINE 1 MCG/MIN: 1 INJECTION, SOLUTION INTRAMUSCULAR; SUBCUTANEOUS at 15:10

## 2018-07-24 RX ADMIN — MIDAZOLAM 2 MG: 1 INJECTION INTRAMUSCULAR; INTRAVENOUS at 10:00

## 2018-07-24 RX ADMIN — ROCURONIUM BROMIDE 50 MG: 10 INJECTION INTRAVENOUS at 10:19

## 2018-07-24 RX ADMIN — SODIUM CHLORIDE 3 MG/HR: 0.9 INJECTION, SOLUTION INTRAVENOUS at 11:30

## 2018-07-24 RX ADMIN — SODIUM CHLORIDE 20 ML/HR: 0.45 INJECTION, SOLUTION INTRAVENOUS at 15:00

## 2018-07-24 RX ADMIN — PHENYLEPHRINE HYDROCHLORIDE 10 MCG/MIN: 10 INJECTION INTRAVENOUS at 13:53

## 2018-07-24 RX ADMIN — MIDAZOLAM 2 MG: 1 INJECTION INTRAMUSCULAR; INTRAVENOUS at 13:40

## 2018-07-24 RX ADMIN — SODIUM CHLORIDE, SODIUM LACTATE, POTASSIUM CHLORIDE, AND CALCIUM CHLORIDE 500 ML: .6; .31; .03; .02 INJECTION, SOLUTION INTRAVENOUS at 18:30

## 2018-07-24 RX ADMIN — FENTANYL CITRATE 150 MCG: 50 INJECTION, SOLUTION INTRAMUSCULAR; INTRAVENOUS at 11:35

## 2018-07-24 RX ADMIN — ATORVASTATIN CALCIUM 80 MG: 80 TABLET, FILM COATED ORAL at 16:34

## 2018-07-24 RX ADMIN — SODIUM CHLORIDE 2 UNITS/HR: 9 INJECTION, SOLUTION INTRAVENOUS at 14:16

## 2018-07-24 RX ADMIN — ROCURONIUM BROMIDE 50 MG: 10 INJECTION INTRAVENOUS at 12:25

## 2018-07-24 RX ADMIN — ASPIRIN 81 MG: 81 TABLET, COATED ORAL at 16:34

## 2018-07-24 RX ADMIN — AMIODARONE HYDROCHLORIDE 200 MG: 200 TABLET ORAL at 16:34

## 2018-07-24 RX ADMIN — ETOMIDATE 20 MG: 2 INJECTION INTRAVENOUS at 10:19

## 2018-07-24 RX ADMIN — EPINEPHRINE 2 MCG/MIN: 1 INJECTION, SOLUTION INTRAMUSCULAR; SUBCUTANEOUS at 13:37

## 2018-07-24 RX ADMIN — FENTANYL CITRATE 250 MCG: 50 INJECTION, SOLUTION INTRAMUSCULAR; INTRAVENOUS at 11:33

## 2018-07-24 NOTE — RESPIRATORY THERAPY NOTE
RT Ventilator Management Note  Jesus Dejesus  59 y o  male MRN: [id number]  Unit/Bed#: [facility] 417-01 Encounter: [id number]      Daily Screen       7/24/2018 1503             Patient safety screen outcome[de-identified] Failed    Not Ready for Weaning due to[de-identified] -            Physical Exam:   Assessment Type: (P) Assess only  General Appearance: (P) Awake  Respiratory Pattern: (P) Symmetrical, Assisted  Chest Assessment: (P) Chest expansion symmetrical, Trachea midline  Bilateral Breath Sounds: (P) Diminished, Coarse      Resp Comments: (P) Pt currently on psv and is tolerating well  VS are stable, will continue to monitor through out the shift

## 2018-07-24 NOTE — OP NOTE
OPERATIVE REPORT  PATIENT NAME: Rowan Weston  :  1955  MRN: [id number]  Pt Location: BE OR ROOM 16    SURGERY DATE: 2018    SURGEON: Deya Smith DO    ASSISTANT: Laurel Rice PA-C    ADDITIONAL ASSISTANT: n/a    PREOPERATIVE DIAGNOSIS:  Multivessel coronary artery disease    POSTOPERATIVE DIAGNOSIS:  Multivessel coronary artery disease    NYHA: 2  CCS: 2    PROCEDURE:   Coronary artery bypass grafting x 3 with left internal mammary artery to left anterior descending artery, saphenous vein graft to obtuse marginal 1 and saphenous vein graft to right coronary artery    CARDIOPULMONARY BYPASS TIME: 63 minutes  CROSSCLAMP TIME: 54 minutes  ANESTHESIA: General endotracheal anesthesia with transesophageal echocardiogram  guidance, Dr Jeancarlos Mooney    INDICATIONS:  The patient is a 59y o  year-old male diagnosed with Multivessel coronary artery disease  FINDINGS:  1  Intraoperative transesophageal echocardiogram revealed normal LVEF  2  Epiaortic ultrasound showed less than 5 mm non-mobile plaque  3  Coronary anatomy as described in coronary angiography  4  Final transesophageal echocardiogram demonstrated normal LVEF  OPERATIVE TECHNIQUE:    The patient was taken to the operating room, properly identified and placed supine on the operating table  Following the satisfactory induction of general anesthesia and placement of monitoring lines, the patient was prepped and draped in the usual sterile fashion  A time-out procedure was performed  The patient underwent median sternotomy, pericardiotomy, left internal mammary artery harvest with the standard technique and endoscopic left greater saphenous vein harvest, systemic heparinization and conduit preparation  Epiaortic ultrasound showed less than 5 mm non-mobile plaque   Cannulation for cardiopulmonary bypass was performed with an arterial dispersion cannula, double stage venous cannula and a vented antegrade cardioplegia cannula  Once the ACT was greater than 480 seconds we placed the patient on cardiopulmonary bypass, the ascending aorta was crossclamped and cardiac arrest was obtained without complications with [place] cardioplegia  The following grafts were completed, left internal mamamry artery to left anterior descending artery with excellent flow, saphenous vein graft to obtuse marginal 1 with flow of 110 ml/min and saphenous vein graft to right coronary artery with flow of 120 ml/min  All the distal anastomoses were performed in end-to-side fashion 2h 7-0 Prolene  A total of 2 proximal anastomoses were completed on the ascending aorta in end-to-side fashion using running 6-0 Prolene suture  The patient was placed in the Trendelenburg position, the heart was de-aired, a hotshot was given and the crossclamp was removed  Atrial and ventricular pacing wires were placed  Following a period of reperfusion, the patient was weaned from cardiopulmonary bypass and decannulated  Protamine was administered with normalization of the ACT  Hemostasis was confirmed in all fields  Thoracostomy tubes were placed  The sternum was closed with stainless steel wires  The fascia, subdermis and skin were closed with multiple layers of running absorbable suture  COMPLICATIONS: None  PACKS/TUBES/DRAINS: Chest tubes x 4  EBL: 350mL  TRANSFUSION: None  SPECIMENS: None  As the attending surgeon, I was present and scrubbed for all critical portions of this procedure  There was no qualified surgical resident available  Sponge, needle and instrument counts were reported as correct by the nursing staff  The assistance of a PA was required to complete this case, specifically for assistance with endoscopic saphenous vein harvesting, cannulation, decannulation, and construction of the bypass grafts             SIGNATURE: Brittanie Farnsworth DO  DATE: July 24, 2018  TIME: 2:25 PM

## 2018-07-24 NOTE — ANESTHESIA PROCEDURE NOTES
Arterial Line Insertion  Date/Time: 7/24/2018 10:08 AM  Performed by: NICKIE Alfonso  Authorized by: NICKIE Alfonso   Consent: Verbal consent not obtained  Written consent obtained  Risks and benefits: risks, benefits and alternatives were discussed  Consent given by: patient  Patient understanding: patient states understanding of the procedure being performed  Patient consent: the patient's understanding of the procedure matches consent given  Relevant documents: relevant documents present and verified  Test results: test results available and properly labeled  Site marked: the operative site was not marked  Radiology Images: Radiology Images displayed and confirmed  If images not available, report reviewed  Required items: required blood products, implants, devices, and special equipment available  Patient identity confirmed: arm band  Time out called: intraop  Preparation: Patient was prepped and draped in the usual sterile fashion  Indications: multiple ABGs and hemodynamic monitoring  Orientation:  Right  Location: radial artery  Anesthesia: local infiltration  Local Anesthetic: lidocaine 1% without epinephrine  Anesthetic total: 1 mL    Sedation:  Patient sedated: yes  Sedatives: fentanyl and midazolam  Sedation start date/time: 7/24/2018 10:00 AM  Sedation end date/time: 7/24/2018 10:00 AM  Vitals: Vital signs were monitored during sedation      Procedure Details:  Needle gauge: 20  Seldinger technique: Seldinger technique used  Number of attempts: 1    Post-procedure:  Post-procedure: dressing applied and line sutured  Waveform: good waveform and waveform confirmed  Post-procedure CNS: normal  Patient tolerance: Patient tolerated the procedure well with no immediate complications  Comments: Performed preinduction to anesthesia [id number]

## 2018-07-24 NOTE — ANESTHESIA PROCEDURE NOTES
Procedure Performed: HECTOR Anesthesia  Start Time:  7/24/2018 11:27 AM  End Time:   7/24/2018 11:58 AM      Preanesthesia Checklist    Patient identified, IV assessed, risks and benefits discussed, monitors and equipment assessed, procedure being performed at surgeon's request and anesthesia consent obtained  Procedure    Diagnostic Indications for HECTOR:  assessment of ascending aorta, assessment of surgical repair and hemodynamic monitoring  Type of HECTOR: interventional HECTOR with real time guidance of intracardiac procedure  Images Saved: ultrasound permanent image saved  Physician Requesting Echo: López Harrington  Location performed: OR  Intubated  Bite block not placed  Heart visualized  Insertion of HECTOR Probe:  Atraumatic  Probe Type:  Multiplane  Modalities:  3D, continuous wave Doppler and pulse wave Doppler  Echocardiographic and Doppler Measurements    PREPROCEDURE    LEFT VENTRICLE:  Systolic Function: normal  Ejection Fraction: 60%  Cavity size: normal  Regional Wall Motion Abnormalities: none  RIGHT VENTRICLE:  Systolic Function: normal   Cavity size normal  No hypertrophy              AORTIC VALVE:  Leaflets: normal and trileaflet  Leaflet motions normal and normal  Stenosis: none  Regurgitation: mild  Pressure Half-time: 570 ms  MITRAL VALVE:  Leaflets: normal  Leaflet Motions: normal  Regurgitation: trace  Stenosis: none  TRICUSPID VALVE:  Leaflets: normal  Leaflet Motions: normal  Stenosis: none  Regurgitation: trace  PULMONIC VALVE:  Leaflets: unable to visualize heart  ASCENDING AORTA:  Size:  normal  Dissection not present  AORTIC ARCH:  Size:  normal  dissection not present  Grade 3: atheroma protruding < 0 5 cm into lumen  DESCENDING AORTA:  Size: normal  Dissection not present  Grade 4: atheroma protruding => 0 5 cm into lumen          RIGHT ATRIUM:  Size:  normal  No spontaneous echo contrast     LEFT ATRIUM:  Size: normal  No spontaneous echo contrast     LEFT ATRIAL APPENDAGE:  Size: normal  No spontaneous echo contrast         ATRIAL SEPTUM:  Intra-atrial septal morphology: normal          VENTRICULAR SEPTUM:  Intra-ventricular septum morphology: normal          EPIAORTIC:  Plaque Thickness: 0-5 mm  OTHER FINDINGS:  Pericardium:  thickened   Pleural Effusion:  none  POSTPROCEDURE    LEFT VENTRICLE: Unchanged   Systolic Function: normal  Ejection Fraction: 60 %  Cavity Size: normal      RIGHT VENTRICLE: Unchanged   Systolic Function: normal  Cavity Size: normal         AORTIC VALVE: Unchanged   Leaflets: native  Stenosis: none  Regurgitation: mild  MITRAL VALVE: Unchanged   Leaflets: native  Regurgitation: trace  Stenosis: none  TRICUSPID VALVE: Unchanged   Leaflets: native  Regurgitation: trace  Stenosis: none  PULMONIC VALVE: Unchanged   Leaflets: native  Regurgitation: trace  Stenosis: none  ATRIA: Unchanged   Left Atrial Appendage Ligate: No  Residual Flow in Left Atrial Appendage by Color Flow Doppler: No       AORTA: Unchanged   Dissection: Dissection not present  REMOVAL:  Probe Removal: atraumatic

## 2018-07-24 NOTE — ANESTHESIA PROCEDURE NOTES
Central Line Insertion  Performed by: AGNIESZKA Adan  Authorized by: NICKIE Andrew   Date/Time: 7/24/2018 10:45 AM  Catheter Type:  triple lumen  Consent: Verbal consent not obtained  Written consent obtained  Risks and benefits: risks, benefits and alternatives were discussed  Consent given by: patient  Patient understanding: patient states understanding of the procedure being performed  Patient consent: the patient's understanding of the procedure matches consent given  Procedure consent: procedure consent matches procedure scheduled  Relevant documents: relevant documents present and verified  Test results: test results available and properly labeled  Site marked: the operative site was not marked  Radiology Images: Radiology Images displayed and confirmed  If images not available, report reviewed  Required items: required blood products, implants, devices, and special equipment available  Patient identity confirmed: arm band  Time out called: intraop  Indications: vascular access and central pressure monitoring  Location details: right internal jugular  Catheter size: 7 Fr  Patient position: Trendelenburg  Anesthesia method: under GA  Sedation:  Patient sedated: under GA  Assessment: blood return through all ports and free fluid flow  Preparation: skin prepped with ChloraPrep  Skin prep agent dried: skin prep agent completely dried prior to procedure  Sterile barriers: all five maximum sterile barriers used - cap, mask, sterile gown, sterile gloves, and large sterile sheet  Hand hygiene: hand hygiene performed prior to central venous catheter insertion  Ultrasound guidance: yes  sterile gel and probe cover used in ultrasound-guided central venous catheter insertionultrasound permanent image saved  Reason All Sterile Barriers Not Used:   All elements of maximal sterile barrier technique not followed for medical reasons  Site selection rationale: intraop  Pre-procedure: landmarks identified  Vessel of Catheter Tip End: RA  Number of attempts: 1  Successful placement: yes  Post-procedure: dressing applied and line sutured  Patient tolerance: Patient tolerated the procedure well with no immediate complications  Comments: Ultrasound guidance  Ultrasound pic in paper chart  Performed postinduction to anesthesia [id number]

## 2018-07-24 NOTE — ANESTHESIA POSTPROCEDURE EVALUATION
Post-Op Assessment Note      CV Status:  Stable    Post-procedure mental status: intubated, sedated  Hydration Status:  Stable    PONV status: intubated, sedated  Airway patency: intubated  Intubated: intubated      Post Op Vitals Reviewed: Yes          Staff: Anesthesiologist       Comments: transferred to CCU with epi gtt, epifanio gtt, propofol gtt, insulin gtt          /54 (07/24/18 1509)    Temp (!) 97 3 °F (36 3 °C) (07/24/18 1509)    Pulse 80 (AV pacing) (07/24/18 1509)   Resp 15 (07/24/18 1509)    SpO2 100 % (07/24/18 1509)

## 2018-07-24 NOTE — ANESTHESIA PREPROCEDURE EVALUATION
Review of Systems/Medical History  Patient summary reviewed  Chart reviewed  No history of anesthetic complications     Cardiovascular  EKG reviewed, Exercise tolerance (METS): <4,  Hyperlipidemia, Hypertension controlled, CAD , CAD status: 3VD and obstructive, Angina at rest, No orthopnea, VALENTE,   Comment: s/p left carotid artery stent placement with reversal of flow on 7/9/18 by Dr Israel Chowdhury, on ASA and plavix  Right carotid < 50%    Cardiac catheterization shows three-vessel coronary artery disease with significant distal left main, proximal and mid circumflex, mid RCA and moderate nonobstructive proximal LAD stenosis      Stress results:   - The stress ECG was consistent with myocardial ischemia  -  Perfusion imaging: There was a small to moderate, partially reversible myocardial perfusion defect of the inferior wall  Small area of perfusion defect which correlated with ecg changes  -  Gated SPECT: The calculated left ventricular ejection fraction was 54 %    ECHO: EF 60 % to 65 %  No RWMAs  Trace AI,      TRICUSPID VALVE:  The tricuspid jet envelope definition was inadequate for estimation of RV systolic pressure   There are no indirect findings (abnormal RV volume or geometry, altered pulmonary flow velocity profile, or leftward septal displacement) which  would suggest moderate or severe pulmonary hypertension,  Pulmonary  Smoker ex-smoker  , Shortness of breath,        GI/Hepatic  Negative GI/hepatic ROS          Negative  ROS        Endo/Other  Negative endo/other ROS      GYN       Hematology  Negative hematology ROS      Musculoskeletal  Negative musculoskeletal ROS        Neurology    Neuromuscular disease (newly diagnosed parkinson's dz, upper extremity tremors, not on medications yet) , TIA,    Psychology   Negative psychology ROS              Physical Exam    Airway    Mallampati score: III  TM Distance: >3 FB  Neck ROM: full     Dental   No notable dental hx Cardiovascular      Pulmonary      Other Findings        Anesthesia Plan  ASA Score- 4     Anesthesia Type- general with ASA Monitors  Additional Monitors: arterial line, central venous line and pulmonary artery catheter  Airway Plan: ETT  Comment: GA with ETT, IV, A line, TLC, PAC, HECTOR, blood products, postop mechanical ventilation  Plan Factors-    Induction- intravenous  Postoperative Plan-     Informed Consent- Anesthetic plan and risks discussed with patient  I personally reviewed this patient with the CRNA  Discussed and agreed on the Anesthesia Plan with the CRNA  Kyler Flores

## 2018-07-24 NOTE — TELEPHONE ENCOUNTER
Call to patient; patient's spouse confirmed that patient will be utilizing [facility] going forward  However, patient is currently still in the hospital  Received a fax for refill request for the following: Rosuvastatin 20mg, Losartan 50mg and HCTZ 25mg   Awaiting discharge summary to confirm medications/changes before sending refill request

## 2018-07-24 NOTE — ANESTHESIA PROCEDURE NOTES
Clint/Rey-S[facility]  Performed by: Giovanni Welch  Authorized by: Edyth Seip, ANNA   Date/Time: 7/24/2018 10:45 AM  Consent: Verbal consent not obtained  Written consent obtained  Risks and benefits: risks, benefits and alternatives were discussed  Consent given by: patient  Patient understanding: patient states understanding of the procedure being performed  Patient consent: the patient's understanding of the procedure matches consent given  Procedure consent: procedure consent matches procedure scheduled  Relevant documents: relevant documents present and verified  Test results: test results available and properly labeled  Site marked: the operative site was not marked  Radiology Images: Radiology Images displayed and confirmed  If images not available, report reviewed  Required items: required blood products, implants, devices, and special equipment available  Patient identity confirmed: arm band  Time out called: intraop  Indications: vascular access and central pressure monitoring  Location details: right internal jugular  Catheter size: 7 Fr  Patient position: Trendelenburg  Anesthesia method: under GA  Sedation:  Patient sedated: under GA  Assessment: blood return through all ports and free fluid flow  Preparation: skin prepped with ChloraPrep  Skin prep agent dried: skin prep agent completely dried prior to procedure  Sterile barriers: all five maximum sterile barriers used - cap, mask, sterile gown, sterile gloves, and large sterile sheet  Hand hygiene: hand hygiene performed prior to central venous catheter insertion  Ultrasound guidance: yes  ultrasound permanent image saved  Reason All Sterile Barriers Not Used:   All elements of maximal sterile barrier technique not followed for medical reasons  Site selection rationale: intraop  Pre-procedure: landmarks identified  Number of attempts: 1  Successful placement: yes  Post-procedure: line sutured and dressing applied  Patient tolerance: Patient tolerated the procedure well with no immediate complications  Comments: Ultrasound guidance  Ultrasound pic in paper chart  Performed postinduction to anesthesia [id number]

## 2018-07-24 NOTE — CONSULTS
[address]  59 y o  male MRN: [id number]  Unit/Bed#: [facility] 997-68 Encounter: [id number]      Physician Requesting Consult: Yared Dias DO    Reason for Consult / Principal Problem: Disease of cardiovascular system    HPI: Anders Smith  is a 59 y o  male who presents to [facility] with PMHx Carotid stenosis, CAD, parkinson's, HLD who presents to ccu s/p CABGx3  Patient initially presented to the ED with 2 episodes of amaurosis Fugax and was found to have a 70-99% stenosis of L ICA  During cardiac clearance for vascular surgery intervention the patient had a positive NM stress test and cardiac multivessel CAD  Patient admits to fatigue, SOB, and VALENTE  Of note, the patient underwent L ICA stenting in early July  LIMA to LAD,SVG to OM1, and SVG to RCA  History obtained from chart review due to patient being intubated and sedated       PMH:   Past Medical History:   Diagnosis Date    Carotid stenosis, left     s/p IR stent    Coronary artery disease     Former tobacco use     Hard of hearing     Hyperlipidemia     Hypertension     Parkinson disease ([facility] [address] )        PSH:   Past Surgical History:   Procedure Laterality Date    AMPUTATION OF REPLICATED FINGERS       rt 2nd and 3rd fingers traumatically amputated at work    APPENDECTOMY      MANDIBLE FRACTURE SURGERY      upper and lower    ORBITAL FRACTURE SURGERY Right     RI TCAT IV STENT CRV CRTD ART EMBOLIC PROTECJ Left 8/4/8453    Procedure: TRANSCAROTID ARTERY REVASCULARIZATION;  Surgeon: Maged Bianchi MD;  Location: [facility];  Service: Vascular       Family History:   Family History   Problem Relation Age of Onset    Cancer Mother     Heart attack Father     Stroke Father        Social History:   History   Smoking Status    Former Smoker    Packs/day: 1 00    Quit date: 5/14/2018   Smokeless Tobacco    Former User    Quit date: 5/3/2018    History   Alcohol Use No      Marital Status: /Civil Union    ROS: ROS unable to be obtained secondary to intubation and sedation  Allergies: Allergies   Allergen Reactions    Percocet [Oxycodone-Acetaminophen] Itching     Trouble sleeping        Home Medications:   Prior to Admission medications    Medication Sig Start Date End Date Taking? Authorizing Provider   aspirin 325 mg tablet Take 325 mg by mouth daily   Yes Historical Provider, MD   atorvastatin (LIPITOR) 40 mg tablet Take 0 5 tablets (20 mg total) by mouth daily 6/28/18  Yes Juan Prado MD   clopidogrel (PLAVIX) 75 mg tablet Take 1 tablet (75 mg total) by mouth daily 6/28/18  Yes Juan Prado MD   hydrochlorothiazide (HYDRODIURIL) 25 mg tablet Take 1 tablet (25 mg total) by mouth daily 5/22/18  Yes Pato Byrne DO   losartan (COZAAR) 50 mg tablet Take 1 tablet (50 mg total) by mouth daily at bedtime 5/22/18  Yes Pato Byrne DO   rosuvastatin (CRESTOR) 20 MG tablet Take 1 tablet (20 mg total) by mouth daily 5/22/18  Yes Pato Byrne DO   mupirocin (BACTROBAN) 2 % ointment Apply 1 application topically 2 (two) times a day for 5 days Apply to each nostril twice daily for five days before your operation   7/17/18 7/24/18 Yes WILLIS Salinas   Acetaminophen 325 MG CAPS Take 1 capsule by mouth as needed    Historical Provider, MD       Inpatient Medications:  Scheduled Meds:  Current Facility-Administered Medications:  acetaminophen 650 mg Rectal Q4H PRN Ayah Rhodes PA-C    acetaminophen 650 mg Oral Q4H PRN Ayah Rhodes PA-C    amiodarone 200 mg Oral [facility] Ayah Rhodes PA-C    aspirin 81 mg Oral Daily Ethelenbrigid Argueta PA-C    atorvastatin 80 mg Oral Daily With [employer] CorrinaBRANDON    bisacodyl 10 mg Rectal Daily PRN Ayah Rhodes PA-C    calcium chloride 1 g Intravenous Once Ayah Rhodes PA-C    cefazolin 1,000 mg Intravenous Q8H Ayah Rhodes PA-C    chlorhexidine 15 mL Swish [employer] Spit BID Ayah Rhodes PA-C    [START ON 7/25/2018] clopidogrel 75 mg Oral Daily Ayah Rhodes PA-C epinephrine 1-20 mcg/min Intravenous Titrated Ethelene BRANDON Argueta    fentanyl citrate (PF) 50 mcg Intravenous Once Viktor Ulloa PA-C    fentanyl citrate (PF) 50 mcg Intravenous Q1H PRN Viktor Ulloa PA-C    [START ON 7/25/2018] fondaparinux 2 5 mg Subcutaneous Daily Viktor Ulloa PA-C    HYDROmorphone 0 5 mg Intravenous Q1H PRN ROSITA Lott-TAMMY    HYDROmorphone 1 mg Intravenous Q1H PRN Viktor Ulloa PA-C    hydrOXYzine HCL 25 mg Oral Q6H PRN ROSITA Lott-TAMMY    insulin regular (HumuLIN R,NovoLIN R) infusion 0 3-21 Units/hr Intravenous Titrated WILLIS Salinas Last Rate: 2 Units/hr (07/24/18 1416)   lactated ringers 500 mL Intravenous Q30 Min PRN Viktor Ulloa PA-C    lidocaine (cardiac) 100 mg Intravenous Q30 Min PRN Viktor Ulloa PA-C    magnesium sulfate 2 g Intravenous Once Viktor Ulloa PA-C    mupirocin 1 application Nasal [id number] 202 [facility]    niCARdipine 2 5-15 mg/hr Intravenous Titrated Viktor Ulloa PA-C    ondansetron 4 mg Intravenous Q6H PRN Viktor Ulloa PA-C    oxyCODONE-acetaminophen 1 tablet Oral Q4H PRN Viktor Ulloa PA-C    oxyCODONE-acetaminophen 2 tablet Oral Q6H PRN Viktor Ulloa PA-C    pantoprazole 40 mg Oral Daily Viktor Ulloa PA-C    phenylephine  mcg/min Intravenous Titrated Kristin Argueta PA-C    [START ON 7/25/2018] polyethylene glycol 17 g Oral Daily Viktor Ulloa PA-C    potassium chloride 20 mEq Intravenous Once PRN ROSITA Lott-TAMMY    potassium chloride 20 mEq Intravenous Q1H PRN ROSITA Lott-TAMMY    potassium chloride 20 mEq Intravenous Q30 Min PRN Viktor Ulloa PA-C    sodium chloride 20 mL/hr Intravenous Continuous Viktor Ulloa PA-C      Continuous Infusions:  epinephrine 1-20 mcg/min    insulin regular (HumuLIN R,NovoLIN R) infusion 0 3-21 Units/hr Last Rate: 2 Units/hr (07/24/18 1416)   niCARdipine 2 5-15 mg/hr    phenylephine  mcg/min    sodium chloride 20 mL/hr      PRN Meds:    acetaminophen 650 mg Q4H PRN   acetaminophen 650 mg Q4H PRN bisacodyl 10 mg Daily PRN   fentanyl citrate (PF) 50 mcg Q1H PRN   HYDROmorphone 0 5 mg Q1H PRN   HYDROmorphone 1 mg Q1H PRN   hydrOXYzine HCL 25 mg Q6H PRN   lactated ringers 500 mL Q30 Min PRN   lidocaine (cardiac) 100 mg Q30 Min PRN   ondansetron 4 mg Q6H PRN   oxyCODONE-acetaminophen 1 tablet Q4H PRN   oxyCODONE-acetaminophen 2 tablet Q6H PRN   potassium chloride 20 mEq Once PRN   potassium chloride 20 mEq Q1H PRN   potassium chloride 20 mEq Q30 Min PRN       VTE Pharmacologic Prophylaxis: Fondaparinux (Arixtra)  VTE Mechanical Prophylaxis: sequential compression device    Invasive lines and devices: Invasive Devices     Central Venous Catheter Line            Introducer 18 less than 1 day          Arterial Line            Arterial Line 18 Right Radial less than 1 day          Line            Pacer Wires less than 1 day    Pacer Wires less than 1 day          Drain            Chest Tube 1 Left Pleural 32 Fr  less than 1 day    Chest Tube 2 Posterior Mediastinal 32 Fr  less than 1 day    Chest Tube 3 Anterior Mediastinal 32 Fr  less than 1 day    Chest Tube 4 Left Pleural 32 Fr  less than 1 day    Urethral Catheter Non-latex; Temperature probe 16 Fr  less than 1 day          Airway            ETT  less than 1 day                Vitals:   Vitals:    18 0654 18 0755 18 1503 18 1509   BP: 135/84   109/54   Pulse: 84  81 80   Resp: 17   15   Temp: 98 2 °F (36 8 °C)   (!) 97 3 °F (36 3 °C)   TempSrc: Tympanic Core      SpO2: 97%  99% 100%   Weight:  82 1 kg (181 lb)     Height:  5' 6" (1 676 m)               Temperature: Temp (24hrs), Av 8 °F (36 6 °C), Min:97 3 °F (36 3 °C), Max:98 2 °F (36 8 °C)  Current: Temperature: (!) 97 3 °F (36 3 °C)    Weights: IBW: 63 8 kg  Body mass index is 29 21 kg/m²      Hemodynamic Monitoring:  PAP: 50/24    , CO:5 7  , CI:2 9  , SVR:      Ventilator Settings:   Vent Mode: AC/VC  Resp Rate (BPM): 12 BPM  Vt (mL): 500 mL  FIO2 (%): 50 %  PEEP (cmH2O): 5 cmH2O  SpO2: 100 %    Physical Exam:    Constitutional: wnl, sedated  HENT: Atraumatic  Intubated  Neck: Neck supple  +CVC  Cardiovascular: reg rate and reg rhythm  no murmur heard  Exam reveals  rub  Pulmonary/Chest: Breath sounds clear bilaterally  Abdominal: Soft  No distension  Musculoskeletal: Trace edema  Neurological: Patient is sedated  Skin: Skin is warm and dry  Psychiatric: Unable to assess because patient is sedated and intubated  Labs/Imaging:     Results from last 7 days  Lab Units 18  1526 18  1523 18  1355  18  1318   HEMOGLOBIN g/dL 11 3*  --   --   --   --    I STAT HEMOGLOBIN g/dl  --  9 9* 8 2*  < >  --    HEMATOCRIT % 33 4*  --   --   --   --    PLATELETS Thousands/uL  --   --   --   --  197   < > = values in this interval not displayed  Results from last 7 days  Lab Units 18  1523 18  1355 18  1332   GLUCOSE, ISTAT mg/dl 109 185* 156*               Post-op AB 9/120/28/98%  Post-op CXR: New  postoperative  changes     Bilateral  diffuse  background  interstitial  thickening  suggesting     Linear  atelectasis  in  the  left  upper  lobe      Lines  and  tubes  in  satisfactory  position  I have personally reviewed pertinent films in PACS  Post-op EKG: nsr  Prolonged qt  This was personally reviewed by myself  Impression:  Principal Problem:    Disease of cardiovascular system  Active Problems:    Left carotid artery stenosis    Dyslipidemia    Carotid stenosis, left    S/P CABG x 3      Plan:    Neuro: D/C continuous sedation  PRN dilaudid and percocet for pain  Trend neuro exam  Regulate sleep/wake cycle  Delirium precautions  CV: MAP goal >65  SBP goal <130  CI>2 2  Post-op medications: Epinephrine, 2 mcg/min, Neosynephrine, 60 mcg/min  Wean as able  Volume resuscitation as needed  Monitor rhythm on telemetry  Epicardial pacing wires in place   Will pace as needed for bradycardia or heart block  Intra-op HECTOR LVEF normal      Lung: Check STAT post-op ABG and CXR  Wean vent with spontaneous breathing trial with goal to extubate today  GI: GI prophylaxis with PPI  Bowel regimen  Zofran PRN for nausea  FEN: NPO  Replete K >4 0, Mag >2 0 and calcium >7 0  : Check STAT post-op BMP  Romo in place  Monitor UOP with goal >0 5cc/kg/hour  Lasix prn  Volume resuscitate as needed  ID: Prophylactic post-op abx  Maintain normothermia  Tylenol PRN for fevers  Heme: Check STAT post-op H/H and platelets  Monitor incision site, invasive lines, and chest tube outputs for bleeding  Send coag panel if needed  Endo: Insulin gtt for blood sugar <180  Disposition: ICU Care    Counseling / Coordination of Care  Total Critical Care time spent 25 minutes excluding procedures, teaching and family updates        SIGNATURE: Janice Craig PA-C  DATE: July 24, 2018  TIME: 3:42 PM

## 2018-07-24 NOTE — H&P (VIEW-ONLY)
History & Physical - Cardiothoracic Surgery   Anitra Camacho  59 y o  male MRN: [id number]    Physician Requesting Consult: Dr Xiomara Carr    Reason for Consult / Principal Problem: Coronary artery disease    History of Present Illness:   Anitra Camacho  is a 59y o  year old male with newly diagnosed carotid disease and coronary artery disease  He had recently experienced 2 episodes of Amaurosis Fugax and was evaluated with a carotid duplex  He was found to have 70-99% stenosis in the L ICA  CTA head & neck revealed critical stenosis of the left ICA origin  He was seen by Dr Tavia Stover for surgical intervention  He subsequently was referred for cardiac clearance prior to proceeding with CEA  Nuclear myocardial perfusion stress test was positive for ischemia on ECG and perfusion defect on echo portion  Cardiac catheterization on 18 demonstrated multivessel CAD  He presented to our office on 18 for initial out patient consultation for coronary artery bypass graft surgery       Beto Janis admits to progressive fatigue at least over the past year  He also experiences exertional chest tightness, SOB and lightheadedness  He states he has experienced chest tightness at rest  He denies palpitations, edema, PND or orthopnea  He still works full time as a heavy equipment operated  His job is sedentary and 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 to sit in the equipment cab, operating the machinery all day       He was recently evaluated by neurology for balance issues and diagnosed with Parkinson's disease  He experiences intermittent upper extremity tremors  He states he has not started taking the medication prescribed by the neurologist      He is a former smoker, 1 ppd x 25 years, quit in May 2018  He does not drink alcohol  FH is positive for CAD  Father  of a fatal MI at age 71, mother had CAD and underwent PCI with stents and  at 76 of pancreatic cancer  His siblings are alive ad well       His Left CEA was cancelled and he subsequently underwent carotid artery revascularization with stent placement on 7/9/18 of a critical to near occlusive proximal left internal carotid artery stenosis  He tolerated the procedure well  He was started on Plavix for his carotid stent  Today Juan José Ralph returns to the office for follow up since his underwent his carotid stenting in preparation to proceed with CABG surgery  He reports no change in is symptoms of fatigue and SOB  He denies any TIA or CVA symptoms  He experienced a 10 lb weight gain with generalized edema after his carotid stent procedure but has since lost the weight and the edema has resolved  Past Medical History:  Past Medical History:   Diagnosis Date    Coronary artery disease     Hyperlipidemia     Hypertension     Parkinson disease ([address] )     Shortness of breath          Past Surgical History:   Past Surgical History:   Procedure Laterality Date    AMPUTATION OF REPLICATED FINGERS       rt 2nd and 3rd fingers traumatically amputated at work    APPENDECTOMY      MANDIBLE FRACTURE SURGERY      upper and lower    ORBITAL FRACTURE SURGERY Right     NV TCAT IV STENT CRV CRTD ART EMBOLIC PROTECJ Left 5/2/4339    Procedure: TRANSCAROTID ARTERY REVASCULARIZATION;  Surgeon: Cira Flower MD;  Location: [facility];  Service: Vascular         Family History:  Family History   Problem Relation Age of Onset    Cancer Mother     Heart attack Father     Stroke Father          Social History:    History   Alcohol Use No     History   Drug Use No     History   Smoking Status    Former Smoker    Packs/day: 1 00    Quit date: 5/14/2018   Smokeless Tobacco    Former User    Quit date: 5/3/2018       Home Medications:   Prior to Admission medications    Medication Sig Start Date End Date Taking?  Authorizing Provider   aspirin (ECOTRIN LOW STRENGTH) 81 mg EC tablet Take 325 mg by mouth daily      Historical Provider, MD   atorvastatin (LIPITOR) 40 mg tablet Take 0 5 tablets (20 mg total) by mouth daily 6/28/18   Derrick Hi MD   clopidogrel (PLAVIX) 75 mg tablet Take 1 tablet (75 mg total) by mouth daily 6/28/18   Derrick Hi MD   hydrochlorothiazide (HYDRODIURIL) 25 mg tablet Take 1 tablet (25 mg total) by mouth daily 5/22/18   Pato Gipsonr,    losartan (COZAAR) 50 mg tablet Take 1 tablet (50 mg total) by mouth daily at bedtime 5/22/18   Pato Gipsonr, DO   rosuvastatin (CRESTOR) 20 MG tablet Take 1 tablet (20 mg total) by mouth daily 5/22/18   Pato Gipsonr, DO       Allergies: Allergies   Allergen Reactions    Percocet [Oxycodone-Acetaminophen] Itching     Trouble sleeping        Review of Systems:  Review of Systems - History obtained from chart review and the patient  General ROS: positive for  - fatigue  negative for - chills or fever  Psychological ROS: negative  Ophthalmic ROS: No visual disturbances  Hematological and Lymphatic ROS: negative for - bleeding problems, blood clots or bruising  Respiratory ROS: positive for - shortness of breath  negative for - cough or hemoptysis  Cardiovascular ROS: positive for - chest pain and dyspnea on exertion  negative for - irregular heartbeat, orthopnea, palpitations or paroxysmal nocturnal dyspnea  Gastrointestinal ROS: no abdominal pain, change in bowel habits, or black or bloody stools  Genito-Urinary ROS: no dysuria, trouble voiding, or hematuria  Musculoskeletal ROS: negative  Neurological ROS: positive for - tremors    Vital Signs:     Vitals:    07/17/18 1000 07/17/18 1059   BP: 108/70 102/64   BP Location: Left arm Right arm   Cuff Size: Adult    Pulse: 104    Resp: 14    Temp: (!) 97 4 °F (36 3 °C)    TempSrc: Oral    SpO2: 96%    Weight: 83 9 kg (185 lb)    Height: 5' 8" (1 727 m)        Physical Exam:    General:  Well developed, no acute distress  HEENT/NECK:  PERRLA  No jugular venous distention  Cardiac:Regular rate and rhythm, No murmurs, rubs or gallops    Carotid arteries:   Pulmonary:  Breath sounds clear bilaterally  Abdomen:  Non-tender, Non-distended  Positive bowel sounds  Upper extremities: 2+ radial pulses; brisk capillary refill  Lower extremities: Extremities warm/dry  PT/DP pulses 2+ bilaterally  No edema B/L  Neuro: Alert and oriented X 3  Sensation is grossly intact  No focal deficits  Skin: Warm/Dry, without rashes or lesions  Lab Results:   Lab Results   Component Value Date    WBC 8 63 07/10/2018    HGB 10 7 (L) 07/10/2018    HCT 32 9 (L) 07/10/2018    MCV 89 07/10/2018     07/10/2018     Lab Results   Component Value Date    GLUCOSE 104 07/10/2018    CALCIUM 8 3 07/10/2018     07/10/2018    K 3 7 07/10/2018    CO2 25 07/10/2018     07/10/2018    BUN 17 07/10/2018    CREATININE 0 86 07/10/2018     Lab Results   Component Value Date    INR 1 09 07/10/2018    INR 1 04 07/03/2018    INR 1 04 05/15/2018    PROTIME 14 2 07/10/2018    PROTIME 13 7 07/03/2018    PROTIME 10 9 05/15/2018     Lab Results   Component Value Date    PTT 35 07/10/2018         Lab Results   Component Value Date    HGBA1C 6 5 (H) 07/03/2018     Lab Results   Component Value Date    TROPONINI <0 02 05/15/2018       Imaging Studies:     Cardiac Catheterization:   Findings:     1  Dominance: Right dominant coronary system     2  Left main Coronary artery: Normal size vessels  It bifurcates into large LAD and a nondominant circumflex system  Distal left main has around 50-60% stenosis      3  Left anterior descending artery: LAD is a large-size vessel and it has proximally around 45% stenosis  Mild luminal irregularities causing 25 30% stenosis seen in mid and distal branches      4  Circumflex Coronary artery: Circumflex is a large size vessel  It has proximally around 75% stenosis  Mid about 80% stenosis    Obtuse marginal and other branches have mild luminal irregularities        5  Right coronary artery: RCA is normal size vessel and it is 100% occluded at the mid with collaterals from left-to-right circulations  Grade 2-3 collaterals  6   Small ramus intermedius is 100% occluded  It fills up with collaterals       7  Left ventriculogram: LV gram done in VARELA view shows low-normal LV systolic function EF around 50% with mild inferior wall hypokinesis    Echocardiogram:   LEFT VENTRICLE: Size was normal  Systolic function was normal  Ejection fraction was estimated in the range of 60 % to 65 %  There were no regional wall motion abnormalities  Wall thickness was normal  No evidence of apical thrombus  DOPPLER: Left ventricular diastolic function parameters were normal      RIGHT VENTRICLE: The size was normal  Systolic function was normal  Wall thickness was normal      LEFT ATRIUM: Size was normal      RIGHT ATRIUM: Size was normal      MITRAL VALVE: Valve structure was normal  There was normal leaflet separation  DOPPLER: The transmitral velocity was within the normal range  There was no evidence for stenosis  There was no significant regurgitation      AORTIC VALVE: The valve was trileaflet  Leaflets exhibited mildly increased thickness, mild to moderate calcification, normal cuspal separation, and sclerosis  DOPPLER: Transaortic velocity was within the normal range  There was no  evidence for stenosis  There was trace regurgitation      TRICUSPID VALVE: The valve structure was normal  There was normal leaflet separation  DOPPLER: The transtricuspid velocity was within the normal range  There was no evidence for stenosis  There was no significant regurgitation  The  tricuspid jet envelope definition was inadequate for estimation of RV systolic pressure  There are no indirect findings (abnormal RV volume or geometry, altered pulmonary flow velocity profile, or leftward septal displacement) which would  suggest moderate or severe pulmonary hypertension      PULMONIC VALVE: Leaflets exhibited normal thickness, no calcification, and normal cuspal separation   DOPPLER: The transpulmonic velocity was within the normal range  There was no significant regurgitation      PERICARDIUM: There was no pericardial effusion  The pericardium was normal in appearance      AORTA: The root exhibited normal size  CT Scan:     FINDINGS:     LUNGS:  Some mild linear scarring/atelectasis is seen dependently and in the bilateral lung bases  There is a 5 mm nodule in the anterior left upper lobe (axial image 21), and a 4 mm nodule in the left lingula laterally (axial image 32)  Lungs   otherwise appear grossly clear      PLEURA:  Unremarkable      HEART/GREAT VESSELS:  The heart is not enlarged  There is moderate coronary atherosclerosis  Some mild atherosclerosis of the aorta and great vessels noted  There is a common origin of the bilateral carotid arteries incidentally noted  Otherwise   unremarkable      MEDIASTINUM AND SUMAN:  Unremarkable      CHEST WALL AND LOWER NECK:   Unremarkable      VISUALIZED STRUCTURES IN THE UPPER ABDOMEN:  Unremarkable      OSSEOUS STRUCTURES:  There are age appropriate degenerative changes  No acute fracture or destructive osseous lesion      IMPRESSION:     No acute pulmonary process is seen      Coronary atherosclerosis, as described      Several subcentimeter pulmonary nodules as described  PFT's:   Results:  FEV1/FVC Ratio:  76  Forced Vital Capacity:  2 96 L or 75% predicted  FEV1:  2 24 L or 72% predicted  After administration of bronchodilator FEV1:  2 42 L and 8% change  Lung volumes by body plethysmography: Total Lung Capacity 102% predicted   Residual volume 148% predicted   RV/TLC ratio:  146  DLCO:  67 ml/min/mmHg  DLCO corrected for patients hemoglobin level:  70 ml/min/mmHg at hemoglobin 13 4 gm/dL  DLCO corrected for alveolar ventilation:  94 ml/min/mmHg  Interpretation:  No obstructive or restrictive defect  No significant bronchodilator response  There is evidence of air trapping    Diffusion capacity is diminished however this is normal when corrected for alveolar ventilation  Carotid Duplex:  CONCLUSION:     Impression  RIGHT:  There is <50% stenosis noted in the internal carotid artery  Plaque is  heterogenous and irregular  Vertebral artery flow is antegrade  There is no significant subclavian artery  disease  LEFT:  There is 70-99% stenosis noted in the internal carotid artery  Plaque is  homogenous and irregular/smooth  Vertebral artery flow is antegrade  There is no significant subclavian artery  disease  IR carotid stent procedure:     IMPRESSION:  Impression:   1   99% left internal carotid origin stenosis by Nascet criteria successfully treated with 4 mm angioplasty and an 8 x 40 Precise Rx stent  A 30-40% residual stenosis by NASCET criteria was present after post dilatation  Vein Mapping:   Impression:  RIGHT LOWER LIMB:  The vein is of adequate caliber from proximal thigh to the knee  Though it  narrows to 2 2mm mid thigh after a branch  The great saphenous vein is patent and good quality from the groin to the  ankle  LEFT LOWER LIMB:  The vein is of adequate caliber from proximal thigh to the proximal calf  Though it narrows to 2 5mm mid thigh after a branch  The great saphenous vein is patent and good quality from the groin to       I have personally reviewed pertinent reports  Assessment:  Patient Active Problem List    Diagnosis Date Noted    Disease of cardiovascular system 07/10/2018    Coronary artery disease of native artery of native heart with stable angina pectoris ([facility] [address] ) 07/05/2018    Stenosis of left carotid artery 07/02/2018    Essential hypertension 05/22/2018    Dyslipidemia 05/22/2018    Pre-op exam 05/22/2018    Carotid artery disorder ([address] ) 05/22/2018    Left carotid artery stenosis 05/16/2018    Amaurosis fugax 05/16/2018     Severe coronary artery disease; CABG surgery    Plan:  Asad Enoc has done well with his left carotid stent procedure   He now returns to the office to finalize plans for elective coronary artery bypass graft surgery  He understands the risks and benefits of surgery and agrees to proceed  We have reviewed results all preoperative radiographic,  laboratory and vascular studies  He has a follow up appointment with Vascular surgery on 7/20/18 and if cleared, he wishes to proceed with surgery on 7/24/18  with CHRISSY Chan  He will remain on his Plavix until his follow up with Vascular surgery later this week to determine if it can be discontinued prior to CABG surgery  Joce King  and his wife were comfortable with our recommendations, and their questions were answered to their satisfaction  Thank you for allowing us to participate in the care of this patient       SIGNATURE: WILLIS Ramos  DATE: July 17, 2018  TIME: 11:46 AM

## 2018-07-25 ENCOUNTER — APPOINTMENT (INPATIENT)
Dept: RADIOLOGY | Facility: HOSPITAL | Age: 63
DRG: 235 | End: 2018-07-25
Payer: COMMERCIAL

## 2018-07-25 DIAGNOSIS — I65.22 LEFT CAROTID STENOSIS: ICD-10-CM

## 2018-07-25 DIAGNOSIS — I10 ESSENTIAL HYPERTENSION: ICD-10-CM

## 2018-07-25 DIAGNOSIS — E78.5 DYSLIPIDEMIA: ICD-10-CM

## 2018-07-25 LAB
ANION GAP SERPL CALCULATED.3IONS-SCNC: 7 MMOL/L (ref 4–13)
ATRIAL RATE: 79 BPM
ATRIAL RATE: 90 BPM
BUN SERPL-MCNC: 22 MG/DL (ref 5–25)
CALCIUM SERPL-MCNC: 7.8 MG/DL (ref 8.3–10.1)
CHLORIDE SERPL-SCNC: 106 MMOL/L (ref 100–108)
CO2 SERPL-SCNC: 27 MMOL/L (ref 21–32)
CREAT SERPL-MCNC: 1.23 MG/DL (ref 0.6–1.3)
ERYTHROCYTE [DISTWIDTH] IN BLOOD BY AUTOMATED COUNT: 12.7 % (ref 11.6–15.1)
GFR SERPL CREATININE-BSD FRML MDRD: 63 ML/MIN/1.73SQ M
GLUCOSE SERPL-MCNC: 136 MG/DL (ref 65–140)
GLUCOSE SERPL-MCNC: 149 MG/DL (ref 65–140)
GLUCOSE SERPL-MCNC: 150 MG/DL (ref 65–140)
GLUCOSE SERPL-MCNC: 155 MG/DL (ref 65–140)
GLUCOSE SERPL-MCNC: 164 MG/DL (ref 65–140)
GLUCOSE SERPL-MCNC: 164 MG/DL (ref 65–140)
GLUCOSE SERPL-MCNC: 167 MG/DL (ref 65–140)
GLUCOSE SERPL-MCNC: 172 MG/DL (ref 65–140)
GLUCOSE SERPL-MCNC: 172 MG/DL (ref 65–140)
GLUCOSE SERPL-MCNC: 183 MG/DL (ref 65–140)
HCT VFR BLD AUTO: 37.9 % (ref 36.5–49.3)
HGB BLD-MCNC: 12.4 G/DL (ref 12–17)
MAGNESIUM SERPL-MCNC: 2.4 MG/DL (ref 1.6–2.6)
MCH RBC QN AUTO: 29.5 PG (ref 26.8–34.3)
MCHC RBC AUTO-ENTMCNC: 32.7 G/DL (ref 31.4–37.4)
MCV RBC AUTO: 90 FL (ref 82–98)
P AXIS: 31 DEGREES
P AXIS: 47 DEGREES
PLATELET # BLD AUTO: 168 THOUSANDS/UL (ref 149–390)
PMV BLD AUTO: 10.1 FL (ref 8.9–12.7)
POTASSIUM SERPL-SCNC: 4.1 MMOL/L (ref 3.5–5.3)
PR INTERVAL: 129 MS
PR INTERVAL: 138 MS
QRS AXIS: -9 DEGREES
QRS AXIS: -9 DEGREES
QRSD INTERVAL: 104 MS
QRSD INTERVAL: 88 MS
QT INTERVAL: 371 MS
QT INTERVAL: 425 MS
QTC INTERVAL: 454 MS
QTC INTERVAL: 488 MS
RBC # BLD AUTO: 4.2 MILLION/UL (ref 3.88–5.62)
SODIUM SERPL-SCNC: 140 MMOL/L (ref 136–145)
T WAVE AXIS: 11 DEGREES
T WAVE AXIS: 31 DEGREES
VENTRICULAR RATE: 79 BPM
VENTRICULAR RATE: 90 BPM
WBC # BLD AUTO: 16.6 THOUSAND/UL (ref 4.31–10.16)

## 2018-07-25 PROCEDURE — 94760 N-INVAS EAR/PLS OXIMETRY 1: CPT

## 2018-07-25 PROCEDURE — C9113 INJ PANTOPRAZOLE SODIUM, VIA: HCPCS | Performed by: NURSE PRACTITIONER

## 2018-07-25 PROCEDURE — G8988 SELF CARE GOAL STATUS: HCPCS

## 2018-07-25 PROCEDURE — 80048 BASIC METABOLIC PNL TOTAL CA: CPT | Performed by: PHYSICIAN ASSISTANT

## 2018-07-25 PROCEDURE — 71045 X-RAY EXAM CHEST 1 VIEW: CPT

## 2018-07-25 PROCEDURE — 99254 IP/OBS CNSLTJ NEW/EST MOD 60: CPT | Performed by: INTERNAL MEDICINE

## 2018-07-25 PROCEDURE — 85027 COMPLETE CBC AUTOMATED: CPT | Performed by: PHYSICIAN ASSISTANT

## 2018-07-25 PROCEDURE — 93005 ELECTROCARDIOGRAM TRACING: CPT

## 2018-07-25 PROCEDURE — 82948 REAGENT STRIP/BLOOD GLUCOSE: CPT

## 2018-07-25 PROCEDURE — 97166 OT EVAL MOD COMPLEX 45 MIN: CPT

## 2018-07-25 PROCEDURE — 94668 MNPJ CHEST WALL SBSQ: CPT

## 2018-07-25 PROCEDURE — 83735 ASSAY OF MAGNESIUM: CPT | Performed by: PHYSICIAN ASSISTANT

## 2018-07-25 PROCEDURE — 99233 SBSQ HOSP IP/OBS HIGH 50: CPT | Performed by: EMERGENCY MEDICINE

## 2018-07-25 PROCEDURE — 94762 N-INVAS EAR/PLS OXIMTRY CONT: CPT

## 2018-07-25 PROCEDURE — 93010 ELECTROCARDIOGRAM REPORT: CPT | Performed by: INTERNAL MEDICINE

## 2018-07-25 PROCEDURE — 74018 RADEX ABDOMEN 1 VIEW: CPT

## 2018-07-25 PROCEDURE — G8987 SELF CARE CURRENT STATUS: HCPCS

## 2018-07-25 PROCEDURE — 97535 SELF CARE MNGMENT TRAINING: CPT

## 2018-07-25 RX ORDER — LOSARTAN POTASSIUM 50 MG/1
50 TABLET ORAL
Qty: 90 TABLET | Refills: 3 | OUTPATIENT
Start: 2018-07-25 | End: 2018-07-30 | Stop reason: HOSPADM

## 2018-07-25 RX ORDER — POTASSIUM CHLORIDE 20 MEQ/1
20 TABLET, EXTENDED RELEASE ORAL DAILY
Status: DISCONTINUED | OUTPATIENT
Start: 2018-07-25 | End: 2018-07-25

## 2018-07-25 RX ORDER — ATORVASTATIN CALCIUM 40 MG/1
20 TABLET, FILM COATED ORAL DAILY
Qty: 30 TABLET | Refills: 1 | OUTPATIENT
Start: 2018-07-25 | End: 2018-07-30 | Stop reason: HOSPADM

## 2018-07-25 RX ORDER — DOCUSATE SODIUM 100 MG/1
100 CAPSULE, LIQUID FILLED ORAL 2 TIMES DAILY
Status: DISCONTINUED | OUTPATIENT
Start: 2018-07-25 | End: 2018-07-27

## 2018-07-25 RX ORDER — TEMAZEPAM 15 MG/1
15 CAPSULE ORAL
Status: DISCONTINUED | OUTPATIENT
Start: 2018-07-25 | End: 2018-07-30 | Stop reason: HOSPADM

## 2018-07-25 RX ORDER — POTASSIUM CHLORIDE 14.9 MG/ML
20 INJECTION INTRAVENOUS ONCE
Status: COMPLETED | OUTPATIENT
Start: 2018-07-25 | End: 2018-07-25

## 2018-07-25 RX ORDER — ROSUVASTATIN CALCIUM 20 MG/1
20 TABLET, COATED ORAL DAILY
Qty: 90 TABLET | Refills: 3 | OUTPATIENT
Start: 2018-07-25 | End: 2018-07-30 | Stop reason: HOSPADM

## 2018-07-25 RX ORDER — HYDROCHLOROTHIAZIDE 25 MG/1
25 TABLET ORAL DAILY
Qty: 90 TABLET | Refills: 3 | OUTPATIENT
Start: 2018-07-25 | End: 2018-07-30 | Stop reason: HOSPADM

## 2018-07-25 RX ORDER — PANTOPRAZOLE SODIUM 40 MG/1
40 INJECTION, POWDER, FOR SOLUTION INTRAVENOUS
Status: DISCONTINUED | OUTPATIENT
Start: 2018-07-25 | End: 2018-07-28

## 2018-07-25 RX ORDER — CLOPIDOGREL BISULFATE 75 MG/1
75 TABLET ORAL DAILY
Qty: 30 TABLET | Refills: 1 | Status: SHIPPED | OUTPATIENT
Start: 2018-07-25 | End: 2018-11-06 | Stop reason: SDUPTHER

## 2018-07-25 RX ORDER — FUROSEMIDE 10 MG/ML
40 INJECTION INTRAMUSCULAR; INTRAVENOUS ONCE
Status: COMPLETED | OUTPATIENT
Start: 2018-07-25 | End: 2018-07-25

## 2018-07-25 RX ORDER — METOPROLOL TARTRATE 5 MG/5ML
2.5 INJECTION INTRAVENOUS EVERY 6 HOURS
Status: DISCONTINUED | OUTPATIENT
Start: 2018-07-25 | End: 2018-07-27

## 2018-07-25 RX ORDER — FUROSEMIDE 10 MG/ML
40 INJECTION INTRAMUSCULAR; INTRAVENOUS DAILY
Status: DISCONTINUED | OUTPATIENT
Start: 2018-07-25 | End: 2018-07-26

## 2018-07-25 RX ADMIN — METOPROLOL TARTRATE 2.5 MG: 5 INJECTION, SOLUTION INTRAVENOUS at 20:22

## 2018-07-25 RX ADMIN — METOPROLOL TARTRATE 2.5 MG: 5 INJECTION, SOLUTION INTRAVENOUS at 09:30

## 2018-07-25 RX ADMIN — HYDROMORPHONE HYDROCHLORIDE 0.5 MG: 1 INJECTION, SOLUTION INTRAMUSCULAR; INTRAVENOUS; SUBCUTANEOUS at 10:41

## 2018-07-25 RX ADMIN — PANTOPRAZOLE SODIUM 40 MG: 40 INJECTION, POWDER, FOR SOLUTION INTRAVENOUS at 09:29

## 2018-07-25 RX ADMIN — MUPIROCIN 1 APPLICATION: 20 OINTMENT TOPICAL at 09:30

## 2018-07-25 RX ADMIN — FONDAPARINUX SODIUM 2.5 MG: 2.5 INJECTION, SOLUTION SUBCUTANEOUS at 09:30

## 2018-07-25 RX ADMIN — MUPIROCIN 1 APPLICATION: 20 OINTMENT TOPICAL at 20:21

## 2018-07-25 RX ADMIN — POTASSIUM CHLORIDE 20 MEQ: 200 INJECTION, SOLUTION INTRAVENOUS at 09:30

## 2018-07-25 RX ADMIN — FUROSEMIDE 40 MG: 10 INJECTION, SOLUTION INTRAMUSCULAR; INTRAVENOUS at 00:24

## 2018-07-25 RX ADMIN — HYDROMORPHONE HYDROCHLORIDE 0.5 MG: 1 INJECTION, SOLUTION INTRAMUSCULAR; INTRAVENOUS; SUBCUTANEOUS at 02:25

## 2018-07-25 RX ADMIN — HYDROMORPHONE HYDROCHLORIDE 0.5 MG: 1 INJECTION, SOLUTION INTRAMUSCULAR; INTRAVENOUS; SUBCUTANEOUS at 05:29

## 2018-07-25 RX ADMIN — CEFAZOLIN SODIUM 1000 MG: 1 SOLUTION INTRAVENOUS at 04:11

## 2018-07-25 RX ADMIN — HYDROMORPHONE HYDROCHLORIDE 0.5 MG: 1 INJECTION, SOLUTION INTRAMUSCULAR; INTRAVENOUS; SUBCUTANEOUS at 20:22

## 2018-07-25 RX ADMIN — CEFAZOLIN SODIUM 1000 MG: 1 SOLUTION INTRAVENOUS at 10:41

## 2018-07-25 RX ADMIN — METOPROLOL TARTRATE 2.5 MG: 5 INJECTION, SOLUTION INTRAVENOUS at 15:56

## 2018-07-25 RX ADMIN — FUROSEMIDE 40 MG: 10 INJECTION, SOLUTION INTRAMUSCULAR; INTRAVENOUS at 09:29

## 2018-07-25 RX ADMIN — INSULIN LISPRO 1 UNITS: 100 INJECTION, SOLUTION INTRAVENOUS; SUBCUTANEOUS at 15:56

## 2018-07-25 NOTE — CASE MANAGEMENT
Thank you,  Artis Aqq  [facility] Utilization Review Department  Phone: [phone]; Fax [phone]  ATTENTION: The Network Utilization Review Department is now centralized for our 9 Facilities  Make a note that we have a new phone and fax numbers for our Department  Please call with any questions or concerns to [phone] and carefully follow the prompts so that you are directed to the right person  All voicemails are confidential  Fax any determinations, approvals, denials, and requests for initial or continue stay review clinical to [phone]  Due to HIGH CALL volume, it would be easier if you could please send faxed requests to expedite your requests and in part, help us provide discharge notifications faster     ==================================================================    Initial Clinical Review    Age/Sex: 59 y o  male    Surgery Date: 07/24/2018    Procedure: Coronary artery bypass grafting x 3 with left internal mammary artery to left anterior descending artery, saphenous vein graft to obtuse marginal 1 and saphenous vein graft to right coronary artery    Anesthesia:General endotracheal anesthesia with transesophageal echocardiogram  guidance, Dr Caio Patterson     Admission Orders: Date/Time/Statement: 7/24/18 @ 18   Orders Placed This Encounter   Procedures    Inpatient Admission     Standing Status:   Standing     Number of Occurrences:   1     Order Specific Question:   Admitting Physician     Answer:   Elizabeth Puga [[id number]]     Order Specific Question:   Level of Care     Answer:   Critical Care [15]     Order Specific Question:   Estimated length of stay     Answer:   More than 2 Midnights     Order Specific Question:   Certification     Answer:   I certify that inpatient services are medically necessary for this patient for a duration of greater than two midnights   See H&P and MD Progress Notes for additional information about the patient's course of treatment  Vital Signs: /70 (BP Location: Left arm) Comment: Map86  Pulse 97   Temp 98 1 °F (36 7 °C) (Oral)   Resp 19   Ht 5' 6" (1 676 m)   Wt 88 6 kg (195 lb 5 2 oz)   SpO2 90%   BMI 31 53 kg/m²     Diet:        Diet Orders            Start     Ordered    07/25/18 0914  Diet NPO  Diet effective now     Question Answer Comment   Diet Type NPO    RD to adjust diet per protocol? No        07/25/18 0915      Consult PT/OT  A  Line right radial / Triple CVC Line / Introducer  Chest Tubes #1,2,3  (-20cm)  Epicardial pacing wires A/V    Mobility: Up with assistance / Up as tolerated / POD#1 OOB to chair and for all meals     DVT Prophylaxis: right leg SCD    Pain Control:   Pain Medications             aspirin 325 mg tablet Take 325 mg by mouth daily    Acetaminophen 325 MG CAPS Take 1 capsule by mouth as needed          Scheduled Meds:  Current Facility-Administered Medications:  acetaminophen 650 mg Oral Q4H PRN   amiodarone 200 mg Oral Q8H [facility]   aspirin 81 mg Oral Daily   atorvastatin 80 mg Oral Daily With Dinner   bisacodyl 10 mg Rectal Daily PRN   clopidogrel 75 mg Oral Daily   docusate sodium 100 mg Oral BID   fondaparinux 2 5 mg Subcutaneous Daily   furosemide 40 mg Intravenous Daily   HYDROmorphone 0 5 mg Intravenous Q3H PRN   hydrOXYzine HCL 25 mg Oral Q6H PRN   insulin lispro 1-5 Units Subcutaneous TID AC   insulin lispro 1-5 Units Subcutaneous HS   metoprolol 2 5 mg Intravenous Q6H   mupirocin 1 application Nasal I85N JACK   ondansetron 4 mg Intravenous Q6H PRN   pantoprazole 40 mg Intravenous Q24H JACK   polyethylene glycol 17 g Oral Daily   temazepam 15 mg Oral HS PRN

## 2018-07-25 NOTE — RESPIRATORY THERAPY NOTE
RT Protocol Note  Jennifer Serrato  59 y o  male MRN: [id number]  Unit/Bed#: [facility] 417-01 Encounter: [id number]    Assessment    Principal Problem:    Disease of cardiovascular system  Active Problems:    Left carotid artery stenosis    Dyslipidemia    Carotid stenosis, left    S/P CABG x 3      Home Pulmonary Medications:  None         Past Medical History:   Diagnosis Date    Carotid stenosis, left     s/p IR stent    Coronary artery disease     Former tobacco use     Hard of hearing     Hyperlipidemia     Hypertension     Parkinson disease ([address] )      Social History     Social History    Marital status: /Civil Union     Spouse name: N/A    Number of children: N/A    Years of education: N/A     Social History Main Topics    Smoking status: Former Smoker     Packs/day: 1 00     Quit date: 5/14/2018    Smokeless tobacco: Former User     Quit date: 5/3/2018    Alcohol use No    Drug use: No    Sexual activity: Not Currently     Other Topics Concern    None     Social History Narrative    None       Subjective         Objective    Physical Exam:   Assessment Type: Assess only  General Appearance: Alert, Awake  Respiratory Pattern: Symmetrical  Chest Assessment: Chest expansion symmetrical  Bilateral Breath Sounds: Coarse  Cough: Non-productive, Congested    Vitals:  Blood pressure (!) 88/58, pulse 94, temperature 98 2 °F (36 8 °C), temperature source Probe, resp  rate 21, height 5' 6" (1 676 m), weight 88 6 kg (195 lb 5 2 oz), SpO2 91 %  Results from last 7 days  Lab Units 07/24/18  1523   TROY TEST  Postive Troy Test       Imaging and other studies: I have personally reviewed pertinent reports  Plan       Airway Clearance Plan: (P) Incentive Spirometer, Flutter     Resp Comments: Pt started on flutter at this time to help mobilize secretions  Pt uses flutter with good effort  No resp distress noted  Will continue to monitor throutghout shift

## 2018-07-25 NOTE — OCCUPATIONAL THERAPY NOTE
[address] Evaluation     Patient Name: Kenji Smart  Today's Date: 7/25/2018  Problem List  Patient Active Problem List   Diagnosis    Left carotid artery stenosis    Amaurosis fugax    Essential hypertension    Dyslipidemia    Pre-op exam    Carotid artery disorder (HCC)    Carotid stenosis, left    Coronary artery disease of native artery of native heart with stable angina pectoris ([facility] [address] )    Disease of cardiovascular system    S/P CABG x 3     Past Medical History  Past Medical History:   Diagnosis Date    Carotid stenosis, left     s/p IR stent    Coronary artery disease     Former tobacco use     Hard of hearing     Hyperlipidemia     Hypertension     Parkinson disease ([facility] [address] )      Past Surgical History  Past Surgical History:   Procedure Laterality Date    AMPUTATION OF REPLICATED FINGERS       rt 2nd and 3rd fingers traumatically amputated at work    APPENDECTOMY      MANDIBLE FRACTURE SURGERY      upper and lower    ORBITAL FRACTURE SURGERY Right     KS CABG, VEIN, FOUR N/A 7/24/2018    Procedure: CORONARY ARTERY BYPASS GRAFT (CABG) 3 VESSELS ; NATE to LAD, SVG--> OM and Distal right;  Surgeon: Evette Rodriguez DO;  [facility];  Service: Cardiac Surgery    KS ECHO TRANSESOPHAG MONTR CARDIAC PUMP FUNCTJ N/A 7/24/2018    Procedure: TRANSESOPHAGEAL ECHOCARDIOGRAM (HECTOR);   Surgeon: Evette Rodriguez DO;  [facility];  Service: Cardiac Surgery    KS TCAT IV STENT CRV CRTD ART EMBOLIC PROTECJ Left 4/8/5396    Procedure: TRANSCAROTID ARTERY REVASCULARIZATION;  Surgeon: Linda Rosales MD;  [facility];  Service: Vascular         07/25/18 1700   Note Type   Note type Eval/Treat   Restrictions/Precautions   Weight Bearing Precautions Per Order No   Other Precautions Cardiac/sternal;Fall Risk;O2;Multiple lines;Telemetry  (NG tube, CT)   Pain Assessment   Pain Assessment No/denies pain   Pain Score No Pain  (just received pain meds)   Home Living Type of [address] Two level;Bed/bath upstairs;1/2 bath on main level; Able to live on main level with bedroom/bathroom   Bathroom Shower/Tub Tub/shower unit   [address] [place] [address] Dr hameed   Prior Function   Level of [place] Independent with ADLs and functional mobility   Lives With [place] Help From Family   ADL Assistance Independent   IADLs Independent   Falls in the last 6 months 0   Vocational Full time employment   Lifestyle   Autonomy Pt is I w/ ADLs, IADLs, driving and working at baseline  No DME use  Reciprocal Relationships Pt lives w/ spouse who is able to assist as needed      Service to Others Works full time   Intrinsic Gratification 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    Psychosocial   Psychosocial (WDL) WDL   ADL   Where Assessed Chair   Eating Assistance 5  Supervision/Setup   Eating Deficit Setup;NPO   Grooming Assistance 5  [address] [place] [address] Dr sEcobedo  Supervision/Setup   LB [phone]  Minimal Assistance   Bed Mobility   Additional Comments Pt seated OOB in recliner at start & end of session   Transfers   Sit to Stand 4  Minimal assistance   Additional items Assist x 1   Stand to Sit 4  Minimal assistance   Additional items Assist x 1   Functional Mobility   Additional items Rolling walker   Balance   Static Sitting Fair   Dynamic Sitting Fair   Static Standing [address] -   Activity Tolerance   Activity Tolerance Patient limited by fatigue   Nurse Made Aware Okay to see per [place] JOSEPH Assessment   RUE Assessment WFL   LUE Assessment   LUE Assessment WFL   Hand Function   Gross Motor Coordination Functional   Fine Motor Coordination Functional   Cognition   Overall Cognitive Status [facility]   Arousal/Participation Alert; Responsive; Cooperative   Attention Within functional limits   Orientation Level Oriented X4  (Simultaneous filing  User may not have seen previous data )   Memory Within functional limits   Following Commands Follows all commands and directions without difficulty   Comments Pt pleasant and cooperative   Assessment   Limitation Decreased ADL status; Decreased endurance;Decreased self-care trans;Decreased high-level ADLs   Prognosis Good   Assessment Pt is a 59 y o  male who was admitted to [facility] on 7/24/2018 with Disease of cardiovascular system  Pt is s/p CABG x3 on 7/24/18  Pt's comorbidities include HTN, HLD, CAD, Parkinson disease, and SOB  At baseline pt was I w/ ADLs, IADLs, driving and working  Pt lives w/ spouse 2 SH but is able to have 1st floor s/u  Currently pt requires S for UB ADLs, min A for LB ADLs, and min A for functional mobility/transfers w/ RW  Pt currently presents with impairments in the following categories -difficulty performing ADLS, difficulty performing IADLS, activity tolerance, endurance, standing balance/tolerance and sitting balance/tolerance  These impairments, as well as pt's fatigue, pain, cardiac/sternal precautions and risk for falls  limit pt's ability to safely engage in all baseline areas of occupation, including grooming, bathing, dressing, toileting, functional mobility/transfers, work/volunteer work  and leisure activities  From OT standpoint, recommend home w/ increased family support and potential for [facility] pending progress upon D/C  OT will continue to follow to address the below stated goals  Goals   Patient Goals to rest   LTG Time Frame 7-10   Long Term Goal see below goals    Plan   Treatment Interventions ADL retraining;Functional transfer training; Endurance training;Patient/family training;Equipment evaluation/education; Compensatory technique education; Energy conservation;Cardiac education   Goal Expiration Date 08/04/18   OT Frequency 3-5x/wk Additional Treatment Session   Start Time 1650   End Time 1700   Treatment Assessment Pt participated in additional OT session focusing on cardiac education  Provided pt with Recovering After Cardiac Surgery packet and educated pt regarding; sternal precautions, cardiac precautions, lifiting restrictions, safe activity engagement, energy conservation, lifestyle modifications, stress management and cardiac rehabilitation programs  Pt's questions were addressed after discussion of the packet  Pt fatigued t/o education, therefore would benefit from reinforcement for G carry over of education  Provided pt with contact information for OT department if questions arrise  Pt continues to benefit from inpatient skilled OT services  Will continue to follow and address previously stated goals     Additional Treatment Day 1   Recommendation   OT Discharge Recommendation Home with family support   Equipment Recommended Bedside commode  (pending progress)   OT - OK to Discharge Yes  (when medically stable)   Barthel Index   Feeding 10   Bathing 0   Grooming Score 5   Dressing Score 5   Bladder Score 10   Bowels Score 10   Toilet Use Score 5   Transfers (Bed/Chair) Score 10   Mobility (Level Surface) Score 10   Stairs Score 0   Barthel Index Score 65   Modified [place] Scale   Modified Demian Scale 3     LTG (7-10 DAYS)  Pt will improve activity tolerance to G for min 30 min txment sessions to increase participation in ADLs     Pt will complete ADLs/self care w/ S using adaptive device and DME as needed    Pt will complete toileting w/ S w/ G hygiene/thoroughness using DME as needed    Pt will improve functional transfers on/off all surfaces using DME as needed w/ G balance/safety including w/ S    Pt will improve functional mobility during ADL/IADL/leisure tasks using DME as needed w/ G balance/safety w/ S    Pt will demonstrate G carryover of pt/caregiver education and training as appropriate w/ mod I w/o cues w/ good tolerance    Pt will demonstrate 100% carryover of energy conservation techniques w/ mod I t/o functional I/ADL/leisure tasks w/o cues s/p skilled education    Pt will engage in cardiac education using the Recovering After Cardiac Rehabilitation packet w/ G participation and G carryover      Pt will demonstrate 100% carryover of precautions s/p review w/o cues w/ mod I w/ G tolerance/participation t/o functional ADL/IADL/leisure tasks      Marquita Bardales, OTR/L

## 2018-07-25 NOTE — TELEPHONE ENCOUNTER
Pt requesting refill on lipitor and plavix  Per last OV note on 7/20 from [place], [address] continue both

## 2018-07-25 NOTE — PLAN OF CARE
Problem: OCCUPATIONAL THERAPY ADULT  Goal: Performs self-care activities at highest level of function for planned discharge setting  See evaluation for individualized goals  Treatment Interventions: ADL retraining, Functional transfer training, Endurance training, Patient/family training, Equipment evaluation/education, Compensatory technique education, Energy conservation, Cardiac education  Equipment Recommended: Bedside commode (pending progress)       See flowsheet documentation for full assessment, interventions and recommendations  Limitation: Decreased ADL status, Decreased endurance, Decreased self-care trans, Decreased high-level ADLs  Prognosis: Good  Assessment: Pt is a 59 y o  male who was [facility] to [facility] on 7/24/2018 with Disease of cardiovascular system  Pt is s/p CABG x3 on 7/24/18  Pt's comorbidities include HTN, HLD, CAD, Parkinson disease, and SOB  At baseline pt was I w/ ADLs, IADLs, driving and working  Pt lives w/ spouse 2 SH but is able to have 1st floor s/u  Currently pt requires S for UB ADLs, min A for LB ADLs, and min A for functional mobility/transfers w/ RW  Pt currently presents with impairments in the following categories -difficulty performing ADLS, difficulty performing IADLS, activity tolerance, endurance, standing balance/tolerance and sitting balance/tolerance  These impairments, as well as pt's fatigue, pain, cardiac/sternal precautions and risk for falls  limit pt's ability to safely engage in all baseline areas of occupation, including grooming, bathing, dressing, toileting, functional mobility/transfers, work/volunteer work  and leisure activities  From OT standpoint, recommend home w/ increased family support and potential for [facility] pending progress upon D/C  OT will continue to follow to address the below stated goals        OT Discharge Recommendation: Home with family support  OT - OK to Discharge: Yes (when medically stable)

## 2018-07-25 NOTE — CONSULTS
Consultation - Cardiology   Anders Smith  59 y o  male MRN: [id number]  Unit/Bed#: [facility] 417-01 Encounter: [id number]    Assessment/Plan     Principal Problem:    Disease of cardiovascular system  Active Problems:    Left carotid artery stenosis    Dyslipidemia    Carotid stenosis, left    S/P CABG x 3      Assessment/Plan    1  Multivessel CAD status post CABG x3-postop day 1  Mild hypotension this morning  Rhythm stable  On prophylactic amiodarone, monitor QTC  On aspirin, statin, beta-blocker (currently on IV while NPO)  Careful with beta-blocker  Patient with history of asymptomatic bradycardia  2   Normal LV function    3  Recent left ICA stent-Plavix was added (not Given this morning because he is NPO)    4  HTN-mild hypotension this morning  Takes Cozaar 50 mg /HCTZ at home which is on hold    5  HLD-resume statin once taking p o     6   Postop abdominal discomfort   KUB-nonobstructive bowel gas pattern  NGT was placed  F/U with Dr Alona Damon on discharge  History of Present Illness   Physician Requesting Consult: Yared Dias DO  Reason for Consult / Principal Problem: post op OHS    HPI: Anders Smith  is a 59y o  year old male with carotid stenosis s/p ICA stent , HLD, HTN, Parkinson's disease who underwent CABG X3 with LIMA to the LAD, SVG to OM1, SVG to RCA  Intraop HECTOR demonstrated normal LVEF  He arrived to ICU on epifanio and epi which have been weaned off  Overnight he had increased abdominal distention and tenderness  KUB showed nonobstructive bowel-gas pattern  NGT was placed  Abdominal discomfort has improved  Pt remains in ICU  He was seen by Dr Alona Damon back in May for preoperative consultation prior to his left carotid surgery  He was found to have significant  left ICA stenosis after 2 episodes of amaurosis fugax  Due to his exertional dyspnea at low work loads and risk factors for CAD he underwent stress testing which was positive for inferior ischemia  Ultimately underwent cardiac catheterization which demonstrated multivessel CAD  Echo revealed normal LV function  Rather than CEA as planned, he underwent carotid artery stent 7/9/2018 and was started on plavix at that time  It is noted that he had asymptomatic bradycardia with heart rate in the 40s during that hospitalization  Quit tobacco May 2018  Inpatient consult to Cardiology  Consult performed by: Lissa Damico ordered by: Sara Brady          Review of Systems   Constitutional: Positive for activity change  HENT: Negative  Eyes: Negative  Respiratory: Positive for shortness of breath  Cardiovascular: Negative for chest pain, palpitations and leg swelling  Gastrointestinal: Positive for abdominal distention and abdominal pain  Genitourinary: Negative  Musculoskeletal: Negative  Neurological: Negative  Hematological: Negative  Psychiatric/Behavioral: Negative          Historical Information   Past Medical History:   Diagnosis Date    Carotid stenosis, left     s/p IR stent    Coronary artery disease     Former tobacco use     Hard of hearing     Hyperlipidemia     Hypertension     Parkinson disease ([address] )      Past Surgical History:   Procedure Laterality Date    AMPUTATION OF REPLICATED FINGERS       rt 2nd and 3rd fingers traumatically amputated at work    APPENDECTOMY      MANDIBLE FRACTURE SURGERY      upper and lower    ORBITAL FRACTURE SURGERY Right     TN TCAT IV STENT CRV CRTD ART EMBOLIC PROTECJ Left 3/7/2925    Procedure: TRANSCAROTID ARTERY REVASCULARIZATION;  Surgeon: Grace Ganser, MD;  Location: [facility];  Service: Vascular     History   Alcohol Use No     History   Drug Use No     History   Smoking Status    Former Smoker    Packs/day: 1 00    Quit date: 5/14/2018   Smokeless Tobacco    Former User    Quit date: 5/3/2018     Family History:   Family History   Problem Relation Age of Onset    Cancer Mother     Heart attack Father     Stroke Father        Meds/Allergies   current meds:   Current Facility-Administered Medications   Medication Dose Route Frequency    acetaminophen (TYLENOL) tablet 650 mg  650 mg Oral Q4H PRN    amiodarone tablet 200 mg  200 mg Oral Q8H [address]    aspirin (ECOTRIN LOW STRENGTH) EC tablet 81 mg  81 mg Oral Daily    atorvastatin (LIPITOR) tablet 80 mg  80 mg Oral Daily With Dinner    bisacodyl (DULCOLAX) rectal suppository 10 mg  10 mg Rectal Daily PRN    clopidogrel (PLAVIX) tablet 75 mg  75 mg Oral Daily    docusate sodium (COLACE) capsule 100 mg  100 mg Oral BID    fondaparinux (ARIXTRA) subcutaneous injection 2 5 mg  2 5 mg Subcutaneous Daily    furosemide (LASIX) injection 40 mg  40 mg Intravenous Daily    HYDROmorphone (DILAUDID) injection 0 5 mg  0 5 mg Intravenous Q3H PRN    hydrOXYzine HCL (ATARAX) tablet 25 mg  25 mg Oral Q6H PRN    insulin lispro (HumaLOG) 100 units/mL subcutaneous injection 1-5 Units  1-5 Units Subcutaneous TID AC    insulin lispro (HumaLOG) 100 units/mL subcutaneous injection 1-5 Units  1-5 Units Subcutaneous HS    metoprolol (LOPRESSOR) injection 2 5 mg  2 5 mg Intravenous Q6H    mupirocin (BACTROBAN) 2 % nasal ointment 1 application  1 application Nasal P91K JACK    ondansetron (ZOFRAN) injection 4 mg  4 mg Intravenous Q6H PRN    pantoprazole (PROTONIX) injection 40 mg  40 mg Intravenous Q24H JACK    polyethylene glycol (MIRALAX) packet 17 g  17 g Oral Daily    temazepam (RESTORIL) capsule 15 mg  15 mg Oral HS PRN    and PTA meds:  Prescriptions Prior to Admission   Medication    aspirin 325 mg tablet    atorvastatin (LIPITOR) 40 mg tablet    clopidogrel (PLAVIX) 75 mg tablet    hydrochlorothiazide (HYDRODIURIL) 25 mg tablet    losartan (COZAAR) 50 mg tablet    rosuvastatin (CRESTOR) 20 MG tablet    Acetaminophen 325 MG CAPS     Allergies   Allergen Reactions    Percocet [Oxycodone-Acetaminophen] Itching     Trouble sleeping        Objective Vitals: Blood pressure (!) 88/58, pulse 94, temperature 98 2 °F (36 8 °C), temperature source Probe, resp  rate 21, height 5' 6" (1 676 m), weight 88 6 kg (195 lb 5 2 oz), SpO2 91 %    Orthostatic Blood Pressures      Most Recent Value   Blood Pressure   88/58 filed at 07/25/2018 1000   Patient Position - Orthostatic VS  Sitting filed at 07/25/2018 0800            Intake/Output Summary (Last 24 hours) at 07/25/18 1111  Last data filed at 07/25/18 1000   Gross per 24 hour   Intake          5127 09 ml   Output             3695 ml   Net          1432 09 ml       Invasive Devices     Central Venous Catheter Line            CVC Central Lines 07/24/18 Triple Right Internal jugular 1 day          Peripheral Intravenous Line            Peripheral IV 07/24/18 Left Hand 1 day    Peripheral IV 07/24/18 Right Hand 1 day          Line            Pacer Wires less than 1 day    Pacer Wires less than 1 day          Drain            Chest Tube 1 Left Pleural 32 Fr  less than 1 day    Chest Tube 2 Posterior Mediastinal 32 Fr  less than 1 day    Chest Tube 3 Anterior Mediastinal 32 Fr  less than 1 day    Chest Tube 4 Left Pleural 32 Fr  less than 1 day    NG/OG/Enteral Tube Nasogastric 18 Fr Left nares less than 1 day                Physical Exam: BP (!) 88/58   Pulse 94   Temp 98 2 °F (36 8 °C) (Probe)   Resp 21   Ht 5' 6" (1 676 m)   Wt 88 6 kg (195 lb 5 2 oz)   SpO2 91%   BMI 31 53 kg/m²   General Appearance:    Alert, cooperative, no distress, appears stated age   Head:    Normocephalic, no scleral icterus   Eyes:    PERRL   Nose:   Nares normal, septum midline, mucosa normal, no drainage    Throat:   Lips, mucosa, and tongue normal   Neck:   Supple, symmetrical, trachea midline            Lungs:     Clear to auscultation bilaterally, respirations unlabored at rest on n/c   Chest Wall:    Covered with DSD    Heart:    Regular rate and rhythm, S1 and S2 normal, no murmur, rub   or gallop   Abdomen:     NGT in place Extremities:   Extremities normal, atraumatic, no cyanosis, trace edema       Skin:   Skin warm   Neurologic:   Alert and oriented to person place and time   No focal deficits       Lab Results:   Recent Results (from the past 72 hour(s))   MRSA culture    Collection Time: 07/23/18 10:33 AM   Result Value Ref Range    MRSA Culture Only       No Methicillin Resistant Staphlyococcus aureus (MRSA) isolated   Urinalysis with reflex to microscopic    Collection Time: 07/23/18 10:34 AM   Result Value Ref Range    Color, UA Yellow     Clarity, UA Clear     Specific Gravity, UA 1 025 1 000 - 1 030    pH, UA 5 5 5 0 - 9 0    Leukocytes, UA Negative Negative    Nitrite, UA Negative Negative    Protein, UA Negative Negative mg/dl    Glucose, UA Negative Negative mg/dl    Ketones, UA Negative Negative mg/dl    Urobilinogen, UA 0 2 0 2, 1 0 E U /dl E U /dl    Bilirubin, UA Negative Negative    Blood, UA Negative Negative   Lipid panel    Collection Time: 07/23/18 10:34 AM   Result Value Ref Range    Cholesterol 135 50 - 200 mg/dL    Triglycerides 161 (H) <=150 mg/dL    HDL, Direct 40 40 - 60 mg/dL    LDL Calculated 63 0 - 100 mg/dL    Non-HDL-Chol (CHOL-HDL) 95 mg/dl   Type and screen    Collection Time: 07/23/18 10:34 AM   Result Value Ref Range    ABO Grouping O     Rh Factor Positive     Antibody Screen Negative     Specimen Expiration Date [id number]    Type and screen    Collection Time: 07/24/18  7:23 AM   Result Value Ref Range    ABO Grouping O     Rh Factor Positive     Antibody Screen Negative     Specimen Expiration Date [id number]    POCT activated clotting time    Collection Time: 07/24/18 10:57 AM   Result Value Ref Range    Activated Clotting Time, i-STAT 137 89 - 137 sec    Specimen Type ARTERIAL    POCT Blood Gas (CG8+)    Collection Time: 07/24/18 10:58 AM   Result Value Ref Range    pH, Art i-STAT 7 485 (H) 7 350 - 7 450    pCO2, Art i-STAT 39 5 36 0 - 44 0 mm HG    pO2, ART i-STAT 218 0 (H) 75 0 - 129 0 mm HG BE, i-STAT 6 (H) -2 - 3 mmol/L    HCO3, Art i-STAT 29 8 (H) 22 0 - 28 0 mmol/L    CO2, i-STAT 31 21 - 32 mmol/L    O2 Sat, i-STAT 100 (H) 95 - 98 %    SODIUM, I-STAT 139 136 - 145 mmol/l    Potassium, i-STAT 3 5 3 5 - 5 3 mmol/L    Calcium, Ionized i-STAT 1 09 (L) 1 12 - 1 32 mmol/L    Hct, i-STAT 34 (L) 36 5 - 49 3 %    Hgb, i-STAT 11 6 (L) 12 0 - 17 0 g/dl    Glucose, i-STAT 99 65 - 140 mg/dl    Specimen Type ARTERIAL    POCT activated clotting time    Collection Time: 07/24/18 12:31 PM   Result Value Ref Range    Activated Clotting Time, i-STAT 721 (H) 89 - 137 sec    Specimen Type ARTERIAL    POCT Blood Gas (CG8+)    Collection Time: 07/24/18 12:32 PM   Result Value Ref Range    pH, Art i-STAT 7 415 7 350 - 7 450    pCO2, Art i-STAT 46 9 (H) 36 0 - 44 0 mm HG    pO2, ART i-STAT 229 0 (H) 75 0 - 129 0 mm HG    BE, i-STAT 5 (H) -2 - 3 mmol/L    HCO3, Art i-STAT 30 1 (H) 22 0 - 28 0 mmol/L    CO2, i-STAT 31 21 - 32 mmol/L    O2 Sat, i-STAT 100 (H) 95 - 98 %    SODIUM, I-STAT 138 136 - 145 mmol/l    Potassium, i-STAT 3 9 3 5 - 5 3 mmol/L    Calcium, Ionized i-STAT 1 14 1 12 - 1 32 mmol/L    Hct, i-STAT 36 (L) 36 5 - 49 3 %    Hgb, i-STAT 12 2 12 0 - 17 0 g/dl    Glucose, i-STAT 112 65 - 140 mg/dl    Specimen Type ARTERIAL    POCT activated clotting time    Collection Time: 07/24/18 12:52 PM   Result Value Ref Range    Activated Clotting Time, i-STAT 582 (H) 89 - 137 sec    Specimen Type VENOUS    POCT Blood Gas (CG8+)    Collection Time: 07/24/18 12:53 PM   Result Value Ref Range    ph, Cory ISTAT 7 433 (H) 7 300 - 7 400    pCO2, Cory i-STAT 52 4 (H) 42 0 - 50 0 mm HG    pO2, Cory i-STAT 46 0 (H) 35 0 - 45 0 mm HG    BE, i-STAT 10 (H) -2 - 3 mmol/L    HCO3, Cory i-STAT 35 0 (H) 24 0 - 30 0 mmol/L    CO2, i-STAT 37 (H) 21 - 32 mmol/L    O2 Sat, i-STAT 82 (L) 95 - 98 %    SODIUM, I-STAT 140 136 - 145 mmol/l    Potassium, i-STAT 3 8 3 5 - 5 3 mmol/L    Calcium, Ionized i-STAT 0 97 (L) 1 12 - 1 32 mmol/L    Hct, i-STAT 26 (L) 36 5 - 49 3 %    Hgb, i-STAT 8 8 (L) 12 0 - 17 0 g/dl    Glucose, i-STAT 112 65 - 140 mg/dl    Specimen Type VENOUS    Platelet count    Collection Time: 07/24/18  1:18 PM   Result Value Ref Range    Platelets 285 725 - 548 Thousands/uL    MPV 9 6 8 9 - 12 7 fL   POCT activated clotting time    Collection Time: 07/24/18  1:24 PM   Result Value Ref Range    Activated Clotting Time, i-STAT 473 (H) 89 - 137 sec    Specimen Type VENOUS    POCT Blood Gas (CG8+)    Collection Time: 07/24/18  1:32 PM   Result Value Ref Range    pH, Art i-STAT 7 495 (H) 7 350 - 7 450    pCO2, Art i-STAT 49 8 (H) 36 0 - 44 0 mm HG    pO2, ART i-STAT >400 0 (H) 75 0 - 129 0 mm HG    BE, i-STAT 14 (H) -2 - 3 mmol/L    HCO3, Art i-STAT 38 3 (HH) 22 0 - 28 0 mmol/L    CO2, i-STAT 40 (H) 21 - 32 mmol/L    O2 Sat, i-STAT  95 - 98 %    SODIUM, I-STAT 138 136 - 145 mmol/l    Potassium, i-STAT 4 2 3 5 - 5 3 mmol/L    Calcium, Ionized i-STAT 1 00 (L) 1 12 - 1 32 mmol/L    Hct, i-STAT 26 (L) 36 5 - 49 3 %    Hgb, i-STAT 8 8 (L) 12 0 - 17 0 g/dl    Glucose, i-STAT 156 (H) 65 - 140 mg/dl    Specimen Type ARTERIAL    POCT activated clotting time    Collection Time: 07/24/18  1:38 PM   Result Value Ref Range    Activated Clotting Time, i-STAT 497 (H) 89 - 137 sec    Specimen Type ARTERIAL    POCT Blood Gas (CG8+)    Collection Time: 07/24/18  1:55 PM   Result Value Ref Range    pH, Art i-STAT 7 438 7 350 - 7 450    pCO2, Art i-STAT 45 2 (H) 36 0 - 44 0 mm HG    pO2, ART i-STAT 308 0 (H) 75 0 - 129 0 mm HG    BE, i-STAT 6 (H) -2 - 3 mmol/L    HCO3, Art i-STAT 30 5 (H) 22 0 - 28 0 mmol/L    CO2, i-STAT 32 21 - 32 mmol/L    O2 Sat, i-STAT 100 (H) 95 - 98 %    SODIUM, I-STAT 139 136 - 145 mmol/l    Potassium, i-STAT 3 3 (L) 3 5 - 5 3 mmol/L    Calcium, Ionized i-STAT 1 26 1 12 - 1 32 mmol/L    Hct, i-STAT 24 (L) 36 5 - 49 3 %    Hgb, i-STAT 8 2 (L) 12 0 - 17 0 g/dl    Glucose, i-STAT 185 (H) 65 - 140 mg/dl    Specimen Type ARTERIAL    POCT activated clotting time    Collection Time: 07/24/18  1:55 PM   Result Value Ref Range    Activated Clotting Time, i-STAT 126 89 - 137 sec    Specimen Type ARTERIAL    Fingerstick Glucose (POCT)    Collection Time: 07/24/18  3:17 PM   Result Value Ref Range    POC Glucose 110 65 - 140 mg/dl   POCT Blood Gas (CG8+)    Collection Time: 07/24/18  3:23 PM   Result Value Ref Range    pH, Art i-STAT 7 321 (L) 7 350 - 7 450    pCO2, Art i-STAT 54 9 (H) 36 0 - 44 0 mm HG    pO2, ART i-STAT 120 0 75 0 - 129 0 mm HG    BE, i-STAT 2 -2 - 3 mmol/L    HCO3, Art i-STAT 28 4 (H) 22 0 - 28 0 mmol/L    CO2, i-STAT 30 21 - 32 mmol/L    O2 Sat, i-STAT 98 95 - 98 %    SODIUM, I-STAT 140 136 - 145 mmol/l    Potassium, i-STAT 3 1 (L) 3 5 - 5 3 mmol/L    Calcium, Ionized i-STAT 1 17 1 12 - 1 32 mmol/L    Hct, i-STAT 29 (L) 36 5 - 49 3 %    Hgb, i-STAT 9 9 (L) 12 0 - 17 0 g/dl    Glucose, i-STAT 109 65 - 140 mg/dl    POC FIO2 50 L    Specimen Type ARTERIAL     Delivery System [facility]     Respiratory Rate 121     Vent Value 500     Ancillary Values PEEPS     Pressure Setting 5     SITE Right Radial     TROY TEST Postive Troy Test    Basic metabolic panel    Collection Time: 07/24/18  3:26 PM   Result Value Ref Range    Sodium 142 136 - 145 mmol/L    Potassium 3 3 (L) 3 5 - 5 3 mmol/L    Chloride 106 100 - 108 mmol/L    CO2 28 21 - 32 mmol/L    Anion Gap 8 4 - 13 mmol/L    BUN 19 5 - 25 mg/dL    Creatinine 0 91 0 60 - 1 30 mg/dL    Glucose 113 65 - 140 mg/dL    Calcium 7 6 (L) 8 3 - 10 1 mg/dL    eGFR 90 ml/min/1 73sq m   Hemoglobin and hematocrit, blood    Collection Time: 07/24/18  3:26 PM   Result Value Ref Range    Hemoglobin 11 3 (L) 12 0 - 17 0 g/dL    Hematocrit 33 4 (L) 36 5 - 49 3 %   Fingerstick Glucose (POCT)    Collection Time: 07/24/18  4:05 PM   Result Value Ref Range    POC Glucose 121 65 - 140 mg/dl   Platelets Count - If Chest Tube Losses > 200 mL/hr in First Hour Postop    Collection Time: 07/24/18  5:55 PM   Result Value Ref Range Platelets 178 833 - 359 Thousands/uL    MPV 9 8 8 9 - 12 7 fL   Fibrinogen  - If Chest Tube Losses > 200 mL/hr in First Hour Postop    Collection Time: 07/24/18  5:55 PM   Result Value Ref Range    Fibrinogen 304 227 - 495 mg/dL   aPTT - If Chest Tube Losses > 200 mL/hr in First Hour    Collection Time: 07/24/18  5:55 PM   Result Value Ref Range    PTT 30 24 - 36 seconds   Protime - INR - If Chest Tube Losses > 200 mL/hr in First Hour    Collection Time: 07/24/18  5:55 PM   Result Value Ref Range    Protime 16 8 (H) 11 8 - 14 2 seconds    INR 1 35 (H) 0 86 - 1 17   Fingerstick Glucose (POCT)    Collection Time: 07/24/18  6:11 PM   Result Value Ref Range    POC Glucose 154 (H) 65 - 140 mg/dl   Prepare RBC:Special Requirements: Leukoreduced; Has consent been obtained? Yes; Date of Surgery: 7/24/2018; Where is the Surgery Scheduled?  [facility],  Units    Collection Time: 07/24/18  6:49 PM   Result Value Ref Range    Unit Product Code X4882T00     Unit Number [id number]     Unit ABO O     Unit [facility] POS     Unit Dispense Status Crossmatched     Unit Product Code G5686E39     Unit Number [id number]     Unit ABO O     Unit [facility] POS     Unit Dispense Status Crossmatched     Unit Product Code I3833A09     Unit Number [id number]     Unit ABO O     Unit [facility] POS     Unit Dispense Status Crossmatched     Unit Product Code H9469T36     Unit Number [id number]     Unit ABO O     Unit [facility] POS     Unit Dispense Status Crossmatched    Fingerstick Glucose (POCT)    Collection Time: 07/24/18  8:00 PM   Result Value Ref Range    POC Glucose 121 65 - 140 mg/dl   Potassium    Collection Time: 07/24/18  9:52 PM   Result Value Ref Range    Potassium 4 3 3 5 - 5 3 mmol/L   Hemoglobin and hematocrit, blood    Collection Time: 07/24/18  9:52 PM   Result Value Ref Range    Hemoglobin 12 8 12 0 - 17 0 g/dL    Hematocrit 39 0 36 5 - 49 3 %   Fingerstick Glucose (POCT)    Collection Time: 07/24/18 10:14 PM   Result Value Ref Range    POC Glucose 143 (H) 65 - 140 mg/dl   Fingerstick Glucose (POCT)    Collection Time: 07/25/18 12:10 AM   Result Value Ref Range    POC Glucose 149 (H) 65 - 140 mg/dl   Fingerstick Glucose (POCT)    Collection Time: 07/25/18  1:57 AM   Result Value Ref Range    POC Glucose 155 (H) 65 - 140 mg/dl   Fingerstick Glucose (POCT)    Collection Time: 07/25/18  4:02 AM   Result Value Ref Range    POC Glucose 164 (H) 65 - 140 mg/dl   Basic Metabolic Panel -AM POD #1    Collection Time: 07/25/18  4:12 AM   Result Value Ref Range    Sodium 140 136 - 145 mmol/L    Potassium 4 1 3 5 - 5 3 mmol/L    Chloride 106 100 - 108 mmol/L    CO2 27 21 - 32 mmol/L    Anion Gap 7 4 - 13 mmol/L    BUN 22 5 - 25 mg/dL    Creatinine 1 23 0 60 - 1 30 mg/dL    Glucose 164 (H) 65 - 140 mg/dL    Calcium 7 8 (L) 8 3 - 10 1 mg/dL    eGFR 63 ml/min/1 73sq m   Magnesium -AM POD #1    Collection Time: 07/25/18  4:12 AM   Result Value Ref Range    Magnesium 2 4 1 6 - 2 6 mg/dL   CBC-AM POD #1    Collection Time: 07/25/18  4:12 AM   Result Value Ref Range    WBC 16 60 (H) 4 31 - 10 16 Thousand/uL    RBC 4 20 3 88 - 5 62 Million/uL    Hemoglobin 12 4 12 0 - 17 0 g/dL    Hematocrit 37 9 36 5 - 49 3 %    MCV 90 82 - 98 fL    MCH 29 5 26 8 - 34 3 pg    MCHC 32 7 31 4 - 37 4 g/dL    RDW 12 7 11 6 - 15 1 %    Platelets 499 204 - 136 Thousands/uL    MPV 10 1 8 9 - 12 7 fL   Fingerstick Glucose (POCT)    Collection Time: 07/25/18  6:00 AM   Result Value Ref Range    POC Glucose 183 (H) 65 - 140 mg/dl   Fingerstick Glucose (POCT)    Collection Time: 07/25/18  7:48 AM   Result Value Ref Range    POC Glucose 172 (H) 65 - 140 mg/dl   Fingerstick Glucose (POCT)    Collection Time: 07/25/18 10:07 AM   Result Value Ref Range    POC Glucose 167 (H) 65 - 140 mg/dl       Cardiac Catheterization Operative Report     Karel Acosta  [id number]  6/28/2018  Case Shah MD     Right coronary angiography  Left coronary angiography  LV gram  Fluoroscopy     Cardiologist: Dr Rubin Arechiga MD [facility]     Brief history: Patient is     Procedure Details: The risks, benefits, complications, including risk of stroke, heart attack, bleeding and even death but not limited to it, along with alternative  treatment options, and expected outcomes were discussed with the patient in detail  The patient and/or family concurred with the proposed plan, giving informed consent  Patient was brought to the cath lab after IV hydration was begun and oral premedication was given  He was further sedated with midazolam and fentanyl  He was prepped and draped in the usual manner  Using the modified Seldinger access technique, a 5 Tajik sheath was placed in the right common femoral artery without any complications    A left heart catheterization was done  Right and left coronary angiograms were performed  End of the procedure patient was transferred to holding area in stable condition without any complications  He tolerated the procedure well  After the procedure was completed, sedation was stopped and the sheaths and catheters were all removed  Hemostasis was achieved with manual pressure and Femstop was applied  Initially we had access in the right radial artery  We were able to cannulate the right coronary artery but however there was lot of spasm noted hence procedure was switched to right common femoral artery  No complication  Patient has good radial and distal pulses on the leg at the end of the procedure      Equipment used:   1  JR4 for right coronary angiography  2  JL 4 0 for left coronary angiography of left coronary angiography  3  LV gram with JR4     Findings:     1  Dominance: Right dominant coronary system     2  Left main Coronary artery: Normal size vessels  It bifurcates into large LAD and a nondominant circumflex system    Distal left main has around 50-60% stenosis      3  Left anterior descending artery: LAD is a large-size vessel and it has proximally around 45% stenosis  Mild luminal irregularities causing 25 30% stenosis seen in mid and distal branches      4  Circumflex Coronary artery: Circumflex is a large size vessel  It has proximally around 75% stenosis  Mid about 80% stenosis  Obtuse marginal and other branches have mild luminal irregularities        5  Right coronary artery: RCA is normal size vessel and it is 100% occluded at the mid with collaterals from left-to-right circulations  Grade 2-3 collaterals  6   Small ramus intermedius is 100% occluded  It fills up with collaterals       7 Left ventriculogram: LV gram done in VARELA view shows low-normal LV systolic function EF around 50% with mild inferior wall hypokinesis     Estimated Blood Loss: Minimal     Complications:  None,  patient tolerated the procedure well       Impression: Cardiac catheterization shows three-vessel coronary artery disease with significant distal left main, proximal and mid circumflex, mid RCA and moderate nonobstructive proximal LAD stenosis      Recommendation: Continue medical therapy  Continue aggressive risk factor modification  Patient will benefit from coronary artery bypass surgery  Since he scheduled for carotid endarterectomy surgery case will be discussed with vascular surgery      [place] [address]  [phone]     Transthoracic Echocardiogram     Study date:  2018     Patient: Sherry Swanson  MR number: [id number]  Account number: [de-identified]  : 1955  Age: 59 years  Gender: Male  Status: Routine  Location: Echo lab  Height: 68 in  Weight: 186 6 lb  BP: 134/ 82 mmHg     Indications: Dyspnea     Diagnoses: R06 00 - Dyspnea, unspecified     Sonographer:  ARUNA Rubio  Primary Physician:  Torrey Martínez MD  Referring Physician:  Sarah Rojas DO  Group:  [facility]  Interpreting Physician:  Jacque Montes MD     SUMMARY     LEFT VENTRICLE:  Systolic function was normal  Ejection fraction was estimated in the range of 60 % to 65 %  There were no regional wall motion abnormalities      AORTIC VALVE:  There was trace regurgitation      TRICUSPID VALVE:  The tricuspid jet envelope definition was inadequate for estimation of RV systolic pressure  There are no indirect findings (abnormal RV volume or geometry, altered pulmonary flow velocity profile, or leftward septal displacement) which  would suggest moderate or severe pulmonary hypertension      HISTORY: PRIOR HISTORY: HTN, Hyperlipidemia, Parkinson Disease     PROCEDURE: The procedure was performed in the echo lab  This was a routine study  The transthoracic approach was used  The heart rate was 70 bpm, at the start of the study  Image quality was adequate      LEFT VENTRICLE: Size was normal  Systolic function was normal  Ejection fraction was estimated in the range of 60 % to 65 %  There were no regional wall motion abnormalities  Wall thickness was normal  No evidence of apical thrombus  DOPPLER: Left ventricular diastolic function parameters were normal      RIGHT VENTRICLE: The size was normal  Systolic function was normal  Wall thickness was normal      LEFT ATRIUM: Size was normal      RIGHT ATRIUM: Size was normal      MITRAL VALVE: Valve structure was normal  There was normal leaflet separation  DOPPLER: The transmitral velocity was within the normal range  There was no evidence for stenosis  There was no significant regurgitation      AORTIC VALVE: The valve was trileaflet  Leaflets exhibited mildly increased thickness, mild to moderate calcification, normal cuspal separation, and sclerosis  DOPPLER: Transaortic velocity was within the normal range  There was no  evidence for stenosis  There was trace regurgitation      TRICUSPID VALVE: The valve structure was normal  There was normal leaflet separation  DOPPLER: The transtricuspid velocity was within the normal range   There was no evidence for stenosis  There was no significant regurgitation  The  tricuspid jet envelope definition was inadequate for estimation of RV systolic pressure  There are no indirect findings (abnormal RV volume or geometry, altered pulmonary flow velocity profile, or leftward septal displacement) which would  suggest moderate or severe pulmonary hypertension      PULMONIC VALVE: Leaflets exhibited normal thickness, no calcification, and normal cuspal separation  DOPPLER: The transpulmonic velocity was within the normal range  There was no significant regurgitation      PERICARDIUM: There was no pericardial effusion  The pericardium was normal in appearance      AORTA: The root exhibited normal size      SYSTEMIC VEINS: IVC: The inferior vena cava was normal in size      SYSTEM MEASUREMENT TABLES     2D mode  AoR Diam 2D: 3 5 cm  LA Diam (2D): 3 3 cm  LA/Ao (2D): 0 94  FS (2D Teich): 32 %  IVSd (2D): 1 cm  LVDEV: 120 cm³  LVESV: 48 1 cm³  LVIDd(2D): 5 03 cm  LVISd (2D): 3 42 cm  LVOT Area 2D: 3 14 cm squared  LVPWd (2D): 1 03 cm  SV (Teich): 71 9 cm³     Apical four chamber  LVEF A4C: 62 %     Unspecified Scan Mode  LVOT Diam : 2 cm  MV Peak A Patrick: 491 mm/s  MV Peak E Patrick  Mean: 626 mm/s  MVA (PHT): 3 93 cm squared  PHT: 56 ms  Max P mm[Hg]  V Max: 2220 mm/s  Vmax: 2220 mm/s  RA Area: 13 8 cm squared  RA Volume: 31 1 cm³  TAPSE: 1 8 cm     [facility]     Prepared and electronically signed by  Barbara Galeano MD  Signed 2018 19:24:11     Imaging: I have personally reviewed pertinent reports  EKG: NSR  VTE Prophylaxis: Fondaparinux (Arixtra)    Code Status: Level 1 - Full Code  Advance Directive and Living Will:      Power of :    POLST:      Counseling / Coordination of Care  Total floor / unit time spent today 40 minutes  Greater than 50% of total time was spent with the patient and / or family counseling and / or coordination of care

## 2018-07-25 NOTE — PROGRESS NOTES
Progress Note - Critical Care   Angelina Escalante  59 y o  male MRN: [id number]  Unit/Bed#: [facility] 417-01 Encounter: [id number]      Attending Physician: Tyrese Hart DO    24 Hour Events: POD # 1 s/p CABG x 3 LIMA to LAD, SVG to OM1, and SVG to RCA  Patient arrived on [place] and Epi gtt which were weaned to off overnight  He received 500mL of LR post-op  Overnight was noted to have increasing abdominal distention and tenderness, and KUB was obtained which showed non-obstructive bowel gas pattern  NGT was placed and 300mL of output was immediately drained  Patient received 40mg IV Lasix for low urine output and responded well  [place]/cordis/a-line d/c this morning  Allergies:    Allergies   Allergen Reactions    Percocet [Oxycodone-Acetaminophen] Itching     Trouble sleeping        Medications:    Scheduled Meds:  Current Facility-Administered Medications:  acetaminophen 650 mg Rectal Q4H PRN Sparkle Bowl, PA-C    acetaminophen 650 mg Oral Q4H PRN Sparkle Rios, PA-C    amiodarone 200 mg Oral [facility] Sparkle Rios, PA-C    aspirin 81 mg Oral Daily Martha Thompson PA-C    atorvastatin 80 mg Oral Daily With [employer] Corrina, PA-C    bisacodyl 10 mg Rectal Daily PRN Sparkle Rios, PA-C    calcium chloride 1 g Intravenous Once Sparkle Rios PA-C    cefazolin 1,000 mg Intravenous Q8H Sparkle Rios, PA-C Last Rate: 1,000 mg (07/25/18 0411)   chlorhexidine 15 mL Swish & Spit BID ROSITA Huerta-C    clopidogrel 75 mg Oral Daily Sparkle Rios, PA-C    epinephrine 1-20 mcg/min Intravenous Titrated ROSITA Beckman-TAMMY Last Rate: Stopped (07/24/18 0125)   fentanyl citrate (PF) 50 mcg Intravenous Q1H PRN Sparkle Bowkaitlin, PA-C    fondaparinux 2 5 mg Subcutaneous Daily Sparkle Bowl, PA-C    HYDROmorphone 0 5 mg Intravenous Q1H PRN Sparkle Bowl, PA-C    HYDROmorphone 1 mg Intravenous Q1H PRN Sparkle Bowl, PA-C    hydrOXYzine HCL 25 mg Oral Q6H PRN Sparkle Bowl, PA-C    insulin regular (HumuLIN R,NovoLIN R) infusion 0 3-21 Units/hr Intravenous Titrated Layvonne Och, CRNP Last Rate: 2 Units/hr (07/24/18 1416)   lactated ringers 500 mL Intravenous Q30 Min PRN Svitlana Seal, PA-C Last Rate: Stopped (07/24/18 1900)   lidocaine (cardiac) 100 mg Intravenous Q30 Min PRN Svitlana Seal, PA-C    mupirocin 1 application Nasal [id number] [facility], PA-C    niCARdipine 2 5-15 mg/hr Intravenous Titrated Svitlana Seal, PA-C Last Rate: Stopped (07/24/18 2215)   ondansetron 4 mg Intravenous Q6H PRN Svitlana Seal, PA-C    pantoprazole 40 mg Oral Daily Svitlana Seal, PA-C    phenylephine  mcg/min Intravenous Titrated Lurlene Ards, PA-C Last Rate: 20 mcg/min (07/25/18 0000)   polyethylene glycol 17 g Oral Daily Svitlana Seal, PA-C    potassium chloride 20 mEq Intravenous Q1H PRN Svitlana Seal, PA-C    potassium chloride 20 mEq Intravenous Q30 Min PRN Svitlana Seal, PA-C    sodium chloride 20 mL/hr Intravenous Continuous Svitlana Seal, PA-C Last Rate: 20 mL/hr (07/24/18 2200)       VTE Pharmacologic Prophylaxis: Fondaparinux (Arixtra)  VTE Mechanical Prophylaxis: sequential compression device    Continuous Infusions:  epinephrine 1-20 mcg/min Last Rate: Stopped (07/24/18 1545)   insulin regular (HumuLIN R,NovoLIN R) infusion 0 3-21 Units/hr Last Rate: 2 Units/hr (07/24/18 1416)   niCARdipine 2 5-15 mg/hr Last Rate: Stopped (07/24/18 2215)   phenylephine  mcg/min Last Rate: 20 mcg/min (07/25/18 0000)   sodium chloride 20 mL/hr Last Rate: 20 mL/hr (07/24/18 2200)     PRN Meds:    acetaminophen 650 mg Q4H PRN   acetaminophen 650 mg Q4H PRN   bisacodyl 10 mg Daily PRN   fentanyl citrate (PF) 50 mcg Q1H PRN   HYDROmorphone 0 5 mg Q1H PRN   HYDROmorphone 1 mg Q1H PRN   hydrOXYzine HCL 25 mg Q6H PRN   lactated ringers 500 mL Q30 Min PRN   lidocaine (cardiac) 100 mg Q30 Min PRN   ondansetron 4 mg Q6H PRN   potassium chloride 20 mEq Q1H PRN   potassium chloride 20 mEq Q30 Min PRN       Home Medications:   Prior to Admission medications    Medication Sig Start Date End Date Taking? Authorizing Provider   aspirin 325 mg tablet Take 325 mg by mouth daily   Yes Historical Provider, MD   atorvastatin (LIPITOR) 40 mg tablet Take 0 5 tablets (20 mg total) by mouth daily 18  Yes Chucho Covington MD   clopidogrel (PLAVIX) 75 mg tablet Take 1 tablet (75 mg total) by mouth daily 18  Yes Chucho Covington MD   hydrochlorothiazide (HYDRODIURIL) 25 mg tablet Take 1 tablet (25 mg total) by mouth daily 18  Yes Pato Byrne DO   losartan (COZAAR) 50 mg tablet Take 1 tablet (50 mg total) by mouth daily at bedtime 18  Yes Pato Byrne DO   rosuvastatin (CRESTOR) 20 MG tablet Take 1 tablet (20 mg total) by mouth daily 18  Yes Pato Byrne DO   Acetaminophen 325 MG CAPS Take 1 capsule by mouth as needed    Historical Provider, MD       Vitals:   Vitals:    18 0200 18 0400 18 0558 18 0600   BP:    98/59   Pulse: 99 88  94   Resp: (!)    Temp: 99 °F (37 2 °C) 99 °F (37 2 °C)     TempSrc: Probe Probe     SpO2: 97% 96%  96%   Weight:   88 6 kg (195 lb 5 2 oz)    Height:         Arterial Line BP: 98/54  Arterial Line MAP (mmHg): 68 mmHg    Tele Rhythm: NSR This was personally reviewed by myself  Respiratory:  SpO2: SpO2: 96 %, SpO2 Activity: SpO2 Activity: At Rest, SpO2 Device: O2 Device: Nasal cannula  O2 Flow Rate (L/min): 2 L/min    Temperature: Temp (24hrs), Av 7 °F (37 1 °C), Min:97 3 °F (36 3 °C), Max:99 9 °F (37 7 °C)  Current: Temperature: 99 °F (37 2 °C)    Weights:   Weight (last 2 days)     Date/Time   Weight    18 0558  88 6 (195 33)    18 0755  82 1 (181)            IBW: 63 8 kg  Body mass index is 31 53 kg/m²      Hemodynamic Monitoring:  PAP: PAP: 35/21, CVP: CVP (mean): 12 mmHg, CO: CO (L/min): 4 4 L/min, CI: CI (L/min/m2): 2 2 L/min/m2, SVR: SVR (dyne*sec)/cm5: 1017 (dyne*sec)/cm5  PAP: (35-55)/(17-33) 35/21  CO:  [4 3 L/min-5 7 L/min] 4 4 L/min  CI:  [2 2 L/min/m2-2 9 L/min/m2] 2 2 L/min/m2    Intake and Outputs:  Intake/Output Summary (Last 24 hours) at 07/25/18 0695  Last data filed at 07/25/18 0600   Gross per 24 hour   Intake          4871 62 ml   Output             3370 ml   Net          1501 62 ml     I/O last 24 hours: In: 4871 6 [I V :3411 6; NG/GT:60; IV Piggyback:650]  Out: 4598 [Urine:1830; Emesis/NG output:250; Blood:750; Chest Tube:540]    UOP: 100mL/hour     Chest tube Output:  Mediastinal tubes: 120 mL/8 hours  190 mL/24 hours   Pleural tubes: 60 mL/8 hours  135 mL/24 hours     Labs:    Results from last 7 days  Lab Units 07/25/18 0412 07/24/18 2152 07/24/18  1755 07/24/18  1526  07/24/18  1318   WBC Thousand/uL 16 60*  --   --   --   --   --    HEMOGLOBIN g/dL 12 4 12 8  --  11 3*  --   --    I STAT HEMOGLOBIN   --   --   --   --   < >  --    HEMATOCRIT % 37 9 39 0  --  33 4*  --   --    PLATELETS Thousands/uL 168  --  207  --   --  197   < > = values in this interval not displayed  Results from last 7 days  Lab Units 07/25/18 0412 07/24/18 2152 07/24/18  1526 07/24/18  1523   SODIUM mmol/L 140  --  142  --    POTASSIUM mmol/L 4 1 4 3 3 3*  --    CHLORIDE mmol/L 106  --  106  --    CO2 mmol/L 27  --  28  --    BUN mg/dL 22  --  19  --    CREATININE mg/dL 1 23  --  0 91  --    CALCIUM mg/dL 7 8*  --  7 6*  --    GLUCOSE RANDOM mg/dL 164*  --  113  --    GLUCOSE, ISTAT mg/dl  --   --   --  109       Baseline creat 0 8-1 0      Results from last 7 days  Lab Units 07/25/18 0412   MAGNESIUM mg/dL 2 4       Results from last 7 days  Lab Units 07/24/18  1755   INR  1 35*   PTT seconds 30           Imaging:  AM CXR: lines and tubes stable  Moderate pulmonary vascular congestion  Bibasilar atelectasis  This was personally reviewed by myself in PACS  AM EKG: NSR  This was personally reviewed by myself  Physical Exam:    General Appearance:  Well developed  HENT: Atraumatic without obvious abnormality  Neck: Supple  +CVC in place  Eyes: No icterus      Cardiac: regular rate and rhythm, no murmur, positive rub  Pulmonary: course to auscultation bilaterally  Gastrointestinal: distended, non-tender  NGT in place  : Romo present: yes   Musculoskeletal: Trace edema bilaterally  Neuro:  Screening neurologic exam reveals no obvious deficits  Follows commands  Psych: Mood and affect normal     Skin: warm, dry, and intact  Incisions: Sternum is stable  Incision dressed with Acticoat  No erythema or drainage      Invasive lines and devices: Invasive Devices     Central Venous Catheter Line            CVC Central Lines 07/24/18 Triple Right Internal jugular less than 1 day    Introducer 07/24/18 less than 1 day          Peripheral Intravenous Line            Peripheral IV 07/24/18 Left Hand less than 1 day    Peripheral IV 07/24/18 Right Hand less than 1 day          Arterial Line            Arterial Line 07/24/18 Right Radial less than 1 day          Line            Pacer Wires less than 1 day    Pacer Wires less than 1 day          Drain            Chest Tube 1 Left Pleural 32 Fr  less than 1 day    Chest Tube 2 Posterior Mediastinal 32 Fr  less than 1 day    Chest Tube 3 Anterior Mediastinal 32 Fr  less than 1 day    Chest Tube 4 Left Pleural 32 Fr  less than 1 day    NG/OG/Enteral Tube Nasogastric 18 Fr Left nares less than 1 day    Urethral Catheter Non-latex; Temperature probe 16 Fr  less than 1 day                Assessment:  Principal Problem:    Disease of cardiovascular system  Active Problems:    Left carotid artery stenosis    Dyslipidemia    Carotid stenosis, left    S/P CABG x 3      Plan:    Neuro:   · Pain controlled with: percocet and tylenol PRN  · Regulate sleep/wake cycle  · Restoril QHS PRN  · Trend neuro exam  CV:   · Cardiac infusions: None  · MAP goal > 65   · D/C [place] catheter and cordis  · D/C Arterial line  · Rhythm: NSR  · Follow rhythm on telemetry  · BP soft this morning, hold lopressor today  · Maintain epicardial pacing wires for post-op day #1  · Continue PO amio, statin, and ASA therapy  · Consult cardiology for post-op management  · Continue DVT prophylaxis    Lung:   · Extubated  · Continue IS, deep breathing and coughing exercises  · Wean nasal cannula as tolerated  · SpO2 goal >92%  · Chest tube output remains persistently high; Continue chest tubes to suction today  · Acute post-op pulmonary insufficiency; Requiring 2 Liters via nasal cannula, secondary to atelectasis  Continue incentive spirometry/coughing/deep breathing exercises  Wean supplemental oxygen as tolerated for saturation > 90%    GI:   · Continue PPI for stress ulcer prophylaxis  · Continue bowel regimen  · zofran PRN for nausea    FEN:   · Diuretic plan: Lasix 40 mg IV q daily  · K-dur 20 mEQ PO q daily  · Goal 24 hour fluid balance: negative  · Nutrition/diet plan: Initiate TLC 2 3 gm sodium diet with 1800 mL fluid restriction   · Replete electrolytes with goals: K >4 0, Mag >2 0, and Phos >3 0    :   · Indwelling Romo present: yes   · discontinue Romo  · Trend UOP and BUN/creat  · Strict I and O    ID:   · Trend temps and WBC count  · Maintain normothermia  · Tylenol PRN for fevers  Heme:   · Trend hgb and plts  · Transfuse as needed for goal hgb >8    Endo:   · Glycemic control plan: No history of diabetes: Glucose well-controlled  Discontinue continuous insulin infusion and add Insulin sliding scale coverage    MSK/Skin:  · Mobility goal: OOB and ambulating as able  · PT consult: yes  · OT consult: yes  · Frequent turning and pressure off-loading  · Local wound care as needed    Disposition:  Transfer from ICU to telemetry today    Collaborative bedside rounds performed with cardiac surgery attending and bedside RN      SIGNATURE: WILLIS Moore  DATE: July 25, 2018  TIME: 6:32 AM

## 2018-07-25 NOTE — DISCHARGE INSTRUCTIONS
Please test blood sugars three times daily before meals & once daily before bedtime  Record in a log & bring to follow-up appointment with endocrinologist     Sternal Precautions   WHAT YOU NEED TO KNOW:   What are sternal precautions? Sternal precautions are used to help protect your sternum (breastbone) after open chest surgery  Wires are placed during surgery to hold the sternum together as it heals  Sternal precautions help prevent the wires from cutting through the sternum  The precautions also help prevent the sternum from coming apart from an injury, and prevent pain and bleeding  You may need to use the precautions for up to 12 weeks after surgery  Your surgeon will give you specific instructions based on the type of surgery you had  It is important to follow the instructions carefully  An injury to the healing sternum can be life-threatening  What are some general sternal precautions? Start slowly and do more as you get stronger  Pain medicine might make it harder for you to know when to slow down or be careful  Stop immediately if you hear a crunch or pop in your sternum  · Protect your sternum  Hug a pillow to your chest or cross your arms over your chest when you laugh, sneeze, or cough  · Be careful when you get into or out of a chair or bed  Hug a pillow or cross your arms when you stand or sit  Do not twist as you move  Use only your legs to sit and stand  You may need to use a raised toilet seat if you have trouble standing up without using your arms  Your healthcare provider may teach you to use your elbow for support as you move from lying to sitting  · Ask when you may take a bath or shower  You may need to use a bath chair if you have trouble getting into or out of the tub  Do not use a grab bar  · Do not lift or carry anything heavier than 10 pounds  For example, a gallon of milk weighs 8 pounds  · Keep your arms down as much as possible    Do not put your arms out to the side, behind you, or over your head  Do not let anyone pull your arms to help you move or dress  Do not reach for items  · Do not push or pull anything  Examples include a car door or a vacuum   · Do not drive while you are healing  Your surgeon will tell you when it is safe for you to start driving again  How do I care for my surgery wound? Always wash your hands before you care for your wound  Wash your wound as directed  Do not rub the wound as you wash or dry the area  Pat the area dry with a clean towel  Change the bandages as directed and when they get wet or dirty  Do not smoke:  Nicotine can damage blood vessels and make it more difficult to heal  Do not use e-cigarettes or smokeless tobacco in place of cigarettes or to help you quit  They still contain nicotine  Ask your healthcare provider for information if you currently smoke and need help quitting  Call 911 for any of the following:   · You have sudden pain in your sternum and hear a crunch or pop  · You have bleeding that does not stop even after you apply pressure for 5 minutes  When should I seek immediate care? · You hear crunching or grinding in your sternum  · You have signs of an infection, such as a fever, red or warm skin, or pus in the surgery wound  When should I contact my healthcare provider? · You continue to feel pain, even after you take your pain medicine  · You have new or worsening pain, or any pain with movement  · You have questions or concerns about your condition or care  CARE AGREEMENT:   You have the right to help plan your care  Learn about your health condition and how it may be treated  Discuss treatment options with your caregivers to decide what care you want to receive  You always have the right to refuse treatment  The above information is an  only  It is not intended as medical advice for individual conditions or treatments   Talk to your doctor, nurse or pharmacist before following any medical regimen to see if it is safe and effective for you  © 2017 [employer] Information is for End User's use only and may not be sold, redistributed or otherwise used for commercial purposes  All illustrations and images included in CareNotes® are the copyrighted property of [employer]  or Steve Davis  Coronary Artery Bypass Graft   WHAT YOU NEED TO KNOW:   A coronary artery bypass graft (CABG) is open heart surgery to clear blocked arteries in your heart  CABG surgery improves blood flow to your heart by bypassing (sending blood around) the blocked part of an artery  This restores blood flow to your heart and helps prevent a heart attack  DISCHARGE INSTRUCTIONS:   Call 911 for any of the following:   · You feel lightheaded, short of breath, and have chest pain  · You cough up blood  · You have a fast heartbeat that flutters  · You feel like you are going to faint  Seek care immediately if:   · Your arm or leg feels warm, tender, and painful  It may look swollen and red  · You have numbness or tingling in your arms or legs  · You have a severe headache  Contact your healthcare provider if:   · You have a fever higher than 101°F (38 4°C)  · You have gained 2 pounds in 24 hours  · Your wound is red, swollen, or draining pus  · Your signs and symptoms return  · You feel depressed  · You have questions or concerns about your condition or care  Medicines: You may need any of the following:  · Prescription pain medicine  may be given  Ask how to take this medicine safely  · Antiplatelets , such as aspirin, help prevent blood clots  Take your antiplatelet medicine exactly as directed  These medicines make it more likely for you to bleed or bruise  If you are told to take aspirin, do not take acetaminophen or ibuprofen instead       · Cholesterol medicine  helps lower cholesterol and lipid levels in your blood     · Antibiotics  help prevent a bacterial infection  · Heart medicine  helps strengthen and regulate your heartbeat  · Blood pressure medicine  helps lower or control your blood pressure  · Take your medicine as directed  Contact your healthcare provider if you think your medicine is not helping or if you have side effects  Tell him or her if you are allergic to any medicine  Keep a list of the medicines, vitamins, and herbs you take  Include the amounts, and when and why you take them  Bring the list or the pill bottles to follow-up visits  Carry your medicine list with you in case of an emergency  Go to cardiac rehabilitation:  Cardiac rehabilitation (rehab) is a program run by specialists who will help you safely strengthen your heart and prevent more heart disease  This plan includes exercise, relaxation, stress management, and heart-healthy nutrition  Healthcare providers will also check to make sure any medicines you take are working  The plan may also include instructions for when you can drive, return to work, and do other normal daily activities  Care for your wound as directed:  Carefully wash the wound with soap and water  If you do not have a bandage, gently pat the incision dry with a clean towel  If you have a bandage, dry the area and put on a new, clean bandage  Change your bandage if it gets wet or dirty  Prevent another blocked artery:   · Eat heart healthy foods  You may need to eat foods that are low in salt, fat, or cholesterol  Healthy foods include fruits, vegetables, whole-grain breads, low-fat dairy products, beans, lean meats, and fish  Ask your healthcare provider for more information about a heart healthy diet  · Do not smoke  Nicotine and other chemicals in cigarettes and cigars can cause heart and lung damage  Ask your healthcare provider for information if you currently smoke and need help to quit  E-cigarettes or smokeless tobacco still contain nicotine  Talk to your healthcare provider before you use these products  · Maintain a healthy weight  Ask your healthcare provider how much you should weigh  Extra weight can increase the stress on your heart  Ask him to help you create a weight loss plan if you are overweight  Get a flu shot: The flu can be dangerous for a person who has heart disease  To prevent influenza (flu), all adults should get the influenza vaccine every year as soon as it becomes available  Follow up with your healthcare provider as directed:  Write down your questions so you remember to ask them during your visits  © 2017 [address] [facility] Information is for End User's use only and may not be sold, redistributed or otherwise used for commercial purposes  All illustrations and images included in CareNotes® are the copyrighted property of [employer]  or Steve Susan  The above information is an  only  It is not intended as medical advice for individual conditions or treatments  Talk to your doctor, nurse or pharmacist before following any medical regimen to see if it is safe and effective for you  Narcotic Pain Management   WHAT YOU NEED TO KNOW:   What do I need to know about narcotics? A narcotic is a type of medicine used to treat pain  Examples of narcotics are codeine, oxycodone, and fentanyl  Why is it important to manage my pain? Pain can cause changes in your physical and emotional health, such as depression and sleep problems  Pain control and management may help you rest, heal, and return to your daily activities  What are the side effects of narcotic medicines? The most common side effect is constipation  Drink more liquids and eat high-fiber foods to help prevent constipation  Ask your healthcare provider what liquids are right for you and how much you should drink  Also ask for a list of foods that contain fiber  Other side effects include nausea, sleepiness, and itchiness   You may need to take your narcotic medicine with food to decrease nausea  Ask your healthcare provider other ways to manage side effects  Why it is important that I take narcotic medicines as directed? · Health problems such as  trouble breathing, liver or kidney damage, or stomach bleeding may occur  Any of these problems can become life-threatening  · Acetaminophen or ibuprofen  may be included in some narcotic medicines  Too much of these medicines can cause liver or kidney damage, or stomach bleeding  These problems can become life-threatening  · Dependence  means your body needs the medicine to keep it from going through withdrawal      · Tolerance  means the medicine does not control pain as well as it used to  You need higher doses of the medicine to get pain relief  · Addiction  means you are not able to control the use of the medicine  You use it when you do not have pain and you have cravings for the medicine  What do I need to know about narcotic safety? · Take your medicine as directed  Ask if you need more information on how to take your medicine correctly  Follow up with your healthcare provider regularly  You may need to have your dose adjusted  Do not use narcotic medicine if you are pregnant or breastfeeding  Narcotic medicines can be transferred to your baby through your blood and breast milk  · Give your healthcare provider a list of all your medicines  Include any over-the-counter medicines, vitamins, and herbs  It can be dangerous to take narcotics with certain other medicines, such as antihistamines  · Keep your medicine in a safe place  Store your narcotic medicine in a locked cabinet to keep it away from children and others  · Do not drink alcohol while you use narcotics  Alcohol use with a narcotic medicine can make you sleepy and slow your breathing rate  You may stop breathing completely       · Do not drive or operate heavy machinery after you take narcotic medicine  Narcotic medicine can make you drowsy and make it hard to concentrate  You may injure yourself or others if you drive or operate heavy machinery while taking your medicine  Call 911 or have someone call 911 for any of the following:   · You are breathing slower than normal, or you have trouble breathing  · You cannot be woken  · You have a seizure  When should I seek immediate care? · Your heart is beating slower than usual     · Your heart feels like it is jumping or fluttering  · You have trouble staying awake  · You have severe muscle pain or weakness  · You see or hear things that are not real   When should I contact my healthcare provider? · You are too dizzy to stand up  · Your pain gets worse or you have new pain  · Your pain does not get better after you use your narcotic medicine  · You cannot do your usual activities because of side effects from the narcotic  · You are constipated or have abdominal pain  · You have questions or concerns about your condition or care  CARE AGREEMENT:   You have the right to help plan your care  Learn about your health condition and how it may be treated  Discuss treatment options with your caregivers to decide what care you want to receive  You always have the right to refuse treatment  The above information is an  only  It is not intended as medical advice for individual conditions or treatments  Talk to your doctor, nurse or pharmacist before following any medical regimen to see if it is safe and effective for you  © 2017 [address] [employer] Information is for End User's use only and may not be sold, redistributed or otherwise used for commercial purposes  All illustrations and images included in CareNotes® are the copyrighted property of [employer]  or Steve Davis            Meal Planning with Diabetes Exchanges   WHAT YOU NEED TO KNOW:   What do I need to know about diabetes exchanges? Exchanges are servings of food that contain similar amounts of carbohydrate, fat, protein, and calories within a food group  The exchanges can be used to develop a healthy meal plan that helps to keep your blood sugar within the recommended levels  A meal plan with the right amount of carbohydrates is especially important  Your blood sugar naturally rises after you eat carbohydrates  Too many carbohydrates in 1 meal or snack can raise your blood sugar level  Carbohydrates are found in starches, fruit, milk, yogurt, and sweets  How do I create a meal plan with exchanges? A dietitian will work with you to develop a healthy meal plan that is right for you  This meal plan will include the amount of exchanges you can have from each food group throughout the day  Follow your meal plan by keeping track of the amount of exchanges you eat for each meal and snack  Your meal plan will be based on your age, weight, blood sugar levels, medicine, and activity level  What are the starch food group exchanges? Each exchange below contains about 15 grams of carbohydrate , 3 grams of protein, 1 gram of fat, and 80 calories  · 1 ounce of white, whole wheat or rye bread (1 slice)    · 1 ounce of bagel (about ¼ of a bagel)    · 1 6-inch flour or corn tortilla or 1 4-inch pancake (about ¼ inch thick)    · ? cup of cooked pasta or rice    · ¾ cup of dry, ready-to-eat cereal with no sugar added     · ½ cup of cooked cereal, such as oatmeal    · 3 queta cracker squares or 8 animal crackers    · 6 saltine-type crackers or     · 3 cups of popcorn or ¾ ounce of pretzels     · Starchy vegetables and cooked legumes:      ¨ ½ cup of corn, green peas, sweet potatoes, or mashed potatoes     ¨ ¼ of a large baked potato     ¨ 1 cup of acorn, butternut squash, or pumpkin     ¨ ½ cup of beans, lentils, or peas (such as meredith, kidney, or black-eyed)    ¨ ? cup of lima beans  What are the fruit group exchanges?   Each exchange contains about 15 grams of carbohydrate  and 60 calories  · 1 small (4 ounce) apple, banana orange, or nectarine    · ½ cup of canned or fresh fruit    · ½ cup (4 ounces) of unsweetened fruit juice    · 2 tablespoons of dried fruit  What are the milk group exchanges? Each exchange contains about 12 grams of carbohydrate  and 8 grams of protein  The amount of fat and calories in each serving depends on the type of milk (such as whole, low-fat, or fat-free)  · 1 cup fat-free or low-fat milk    · ¾ cup of plain, nonfat yogurt    · 1 cup fat-free, flavored yogurt with artificial (no calorie) sweetener  What are the non-starchy vegetable group exchanges? Each exchange contains about 5 grams of carbohydrate , 2 grams of protein, and 25 calories  Examples include beets, broccoli, cabbage, carrots, cauliflower, cucumber, mushrooms, tomatoes, and zucchini  · ½ cup of cooked vegetables or 1 cup of raw vegetables     · ½ cup of vegetable juice  What are the meat and meat substitute group exchanges? Each exchange of a lean meat  listed below contains about 7 grams of protein, 0 to 3 grams of fat, and 45 calories  The meat and meat substitutes food group does not contain any carbohydrates  Medium and high-fat meats have more calories  · 1 ounce of chicken or turkey without skin, or 1 ounce of fish (not breaded or fried)     · 1 ounce of lean beef, pork, or lamb     · 1-inch cube or 1 ounce of low-fat cheese     · 2 egg whites or ¼ cup of egg substitute     · ½ cup of tofu  What are the sweets, desserts, and other carbohydrate group exchanges? · Sweets and other desserts:  Each exchange has about 15 grams of carbohydrate       ¨ 1 ounce of heriberto food cake or 2-inch square cake (unfrosted)    ¨ 2 small cookies     ¨ ½ cup of sugar-free, fat-free ice cream    ¨ 1 tablespoon of syrup, jam, jelly, table sugar, or honey    · Combination foods:     ¨ 1 cup of an entrée, such as lasagna, spaghetti with meatballs, macaroni and cheese, and chili with beans (each serving counts as 2 carbohydrate exchanges )     ¨ 1 cup of tomato or vegetable beef soup (each serving counts as 1 carbohydrate exchange )  What are the fat group exchanges? Each exchange contains 5 grams of fat and 45 calories  · 1 teaspoon of oil (such as canola, olive, or corn oil)     · 6 almonds or cashews, 10 peanuts, or 4 pecan halves     · 2 tablespoons of avocado     · ½ tablespoon of peanut butter     · 1 teaspoon of regular margarine or 2 teaspoons of low-fat margarine     · 1 teaspoon of regular butter or 1 tablespoon of low-fat butter     · 1 teaspoon of regular mayonnaise or 1 tablespoon of low-fat mayonnaise     · 1 tablespoon of regular salad dressing or 2 tablespoons of low-fat salad dressing  What are free foods? The foods on this list are called free foods because they have very few calories  Free foods usually do not increase your blood sugar if you limit them  · 1 tablespoon of catsup or taco sauce     · ¼ cup of salsa     · 2 tablespoons of sugar-free syrup or 2 teaspoons of light jam or jelly     · 1 tablespoon of fat-free salad dressing     · 4 tablespoons of fat-free margarine or fat-free mayonnaise     · Sugar-free drinks: diet soda, sugar-free drink mixes, or mineral water     · Low-sodium bouillon or fat-free broth     · Mustard     · Seasonings such as spices, herbs, and garlic     · Sugar-free gelatin without added fruit  What other healthy nutrition guidelines should I follow? · Eat more fiber  Choose foods that are good sources of fiber, such as fruits, vegetables, and whole grains  Cereals that contain 5 or more grams of fiber per serving are good sources of fiber  Legumes such as garbanzo, meredith beans, kidney beans, and lentils are also good sources  · Limit fat  Ask your dietitian or healthcare provider how much fat you should eat each day  Choose foods low in fat, saturated fat, trans fat, and cholesterol   Examples include turkey or chicken without the skin, fish, lean cuts of meat, and beans  Low-fat dairy foods, such as low-fat or fat-free milk and low-fat yogurt are also good choices  Omega-3 fatty acids are healthy fats that are found in canola oil, soybean oil and fatty fish  [place], albacore tuna, and sardines are good sources of omega 3 fatty acids  Eat 2 servings of these types of fish each week  Do not eat fried fish  · Limit sugar  Sugar and sweets must be counted toward the carbohydrate exchanges that you can have within your meal plan  Limit sugar and sweets because they are usually also high in calories and fat  Eat smaller portions of sweets by sharing a dessert or asking for a child-size portion at a restaurant  · Limit sodium  (salt) to about 2,300 mg per day  You may need to eat even less sodium if you have certain medical conditions  Foods high in sodium include soy sauce, potato chips, and soup  · Limit alcohol  Ask your healthcare provider if it is safe for you to drink alcohol  If alcohol is safe for you to have, eat a meal when you drink alcohol  If you drink alcohol on an empty stomach, your blood sugar may drop to a low level  Women should limit alcohol to 1 drink per day  Men should limit alcohol to 2 drinks per day  A drink of alcohol is 5 ounces of wine, 12 ounces of beer, or 1½ ounces of liquor  What else can I do to manage my diabetes? · Control your blood sugar level  Test your blood sugar level regularly and keep a record of the results  Ask your healthcare provider when and how often to test your blood sugar  You may need to check your blood sugar level at least 3 times each day  · Talk to your healthcare provider about your weight  Ask if you need to lose weight, and how much you need to lose  If you are overweight, you may need to make other changes to lose weight   Ask your healthcare provider to help you create a weight loss program      · Exercise  can help to control your blood sugar levels and decrease your risk of heart disease  It can also help you lose or maintain your weight  Get at least 30 minutes of exercise, 5 times each week  Do resistance training (using weights) 2 times each week  Do not sit for longer than 90 minutes  Work with your healthcare provider to plan the best exercise program for you  When should I contact my healthcare provider? · You have high blood sugar levels during a certain time of day, or almost all of the time  · You often have low blood sugar levels  · You have questions or concerns about your condition or care  CARE AGREEMENT:   You have the right to help plan your care  Discuss treatment options with your caregivers to decide what care you want to receive  You always have the right to refuse treatment  The above information is an  only  It is not intended as medical advice for individual conditions or treatments  Talk to your doctor, nurse or pharmacist before following any medical regimen to see if it is safe and effective for you  © 2017 [address] [employer] Information is for End User's use only and may not be sold, redistributed or otherwise used for commercial purposes  All illustrations and images included in CareNotes® are the copyrighted property of [employer]  or Steve Davis

## 2018-07-26 ENCOUNTER — APPOINTMENT (INPATIENT)
Dept: RADIOLOGY | Facility: HOSPITAL | Age: 63
DRG: 235 | End: 2018-07-26
Payer: COMMERCIAL

## 2018-07-26 PROBLEM — D62 ACUTE BLOOD LOSS AS CAUSE OF POSTOPERATIVE ANEMIA: Status: ACTIVE | Noted: 2018-07-26

## 2018-07-26 PROBLEM — D72.829 LEUKOCYTOSIS: Status: ACTIVE | Noted: 2018-07-26

## 2018-07-26 PROBLEM — R06.02 SHORTNESS OF BREATH: Status: ACTIVE | Noted: 2018-07-26

## 2018-07-26 LAB
ANION GAP SERPL CALCULATED.3IONS-SCNC: 4 MMOL/L (ref 4–13)
BUN SERPL-MCNC: 35 MG/DL (ref 5–25)
CALCIUM SERPL-MCNC: 8.5 MG/DL (ref 8.3–10.1)
CHLORIDE SERPL-SCNC: 104 MMOL/L (ref 100–108)
CO2 SERPL-SCNC: 32 MMOL/L (ref 21–32)
CREAT SERPL-MCNC: 1.17 MG/DL (ref 0.6–1.3)
ERYTHROCYTE [DISTWIDTH] IN BLOOD BY AUTOMATED COUNT: 13.1 % (ref 11.6–15.1)
GFR SERPL CREATININE-BSD FRML MDRD: 66 ML/MIN/1.73SQ M
GLUCOSE SERPL-MCNC: 126 MG/DL (ref 65–140)
GLUCOSE SERPL-MCNC: 130 MG/DL (ref 65–140)
GLUCOSE SERPL-MCNC: 133 MG/DL (ref 65–140)
GLUCOSE SERPL-MCNC: 138 MG/DL (ref 65–140)
GLUCOSE SERPL-MCNC: 140 MG/DL (ref 65–140)
HCT VFR BLD AUTO: 34.6 % (ref 36.5–49.3)
HGB BLD-MCNC: 10.9 G/DL (ref 12–17)
MAGNESIUM SERPL-MCNC: 2.6 MG/DL (ref 1.6–2.6)
MCH RBC QN AUTO: 29.1 PG (ref 26.8–34.3)
MCHC RBC AUTO-ENTMCNC: 31.5 G/DL (ref 31.4–37.4)
MCV RBC AUTO: 92 FL (ref 82–98)
PLATELET # BLD AUTO: 181 THOUSANDS/UL (ref 149–390)
PMV BLD AUTO: 10.1 FL (ref 8.9–12.7)
POTASSIUM SERPL-SCNC: 4.2 MMOL/L (ref 3.5–5.3)
RBC # BLD AUTO: 3.75 MILLION/UL (ref 3.88–5.62)
SODIUM SERPL-SCNC: 140 MMOL/L (ref 136–145)
WBC # BLD AUTO: 22.87 THOUSAND/UL (ref 4.31–10.16)

## 2018-07-26 PROCEDURE — 99024 POSTOP FOLLOW-UP VISIT: CPT | Performed by: PHYSICIAN ASSISTANT

## 2018-07-26 PROCEDURE — 85027 COMPLETE CBC AUTOMATED: CPT | Performed by: NURSE PRACTITIONER

## 2018-07-26 PROCEDURE — G8979 MOBILITY GOAL STATUS: HCPCS

## 2018-07-26 PROCEDURE — 97116 GAIT TRAINING THERAPY: CPT

## 2018-07-26 PROCEDURE — 82948 REAGENT STRIP/BLOOD GLUCOSE: CPT

## 2018-07-26 PROCEDURE — G8978 MOBILITY CURRENT STATUS: HCPCS

## 2018-07-26 PROCEDURE — 83735 ASSAY OF MAGNESIUM: CPT | Performed by: NURSE PRACTITIONER

## 2018-07-26 PROCEDURE — 80048 BASIC METABOLIC PNL TOTAL CA: CPT | Performed by: NURSE PRACTITIONER

## 2018-07-26 PROCEDURE — 97163 PT EVAL HIGH COMPLEX 45 MIN: CPT

## 2018-07-26 PROCEDURE — 99232 SBSQ HOSP IP/OBS MODERATE 35: CPT | Performed by: INTERNAL MEDICINE

## 2018-07-26 PROCEDURE — C9113 INJ PANTOPRAZOLE SODIUM, VIA: HCPCS | Performed by: NURSE PRACTITIONER

## 2018-07-26 PROCEDURE — 94668 MNPJ CHEST WALL SBSQ: CPT

## 2018-07-26 PROCEDURE — 71045 X-RAY EXAM CHEST 1 VIEW: CPT

## 2018-07-26 PROCEDURE — 94640 AIRWAY INHALATION TREATMENT: CPT

## 2018-07-26 RX ORDER — ALBUTEROL SULFATE 2.5 MG/3ML
2.5 SOLUTION RESPIRATORY (INHALATION) EVERY 4 HOURS PRN
Status: DISCONTINUED | OUTPATIENT
Start: 2018-07-26 | End: 2018-07-30 | Stop reason: HOSPADM

## 2018-07-26 RX ORDER — ALBUTEROL SULFATE 2.5 MG/3ML
SOLUTION RESPIRATORY (INHALATION)
Status: COMPLETED
Start: 2018-07-26 | End: 2018-07-26

## 2018-07-26 RX ORDER — FUROSEMIDE 10 MG/ML
40 INJECTION INTRAMUSCULAR; INTRAVENOUS ONCE
Status: DISCONTINUED | OUTPATIENT
Start: 2018-07-26 | End: 2018-07-26 | Stop reason: SDUPTHER

## 2018-07-26 RX ORDER — FUROSEMIDE 10 MG/ML
40 INJECTION INTRAMUSCULAR; INTRAVENOUS
Status: DISCONTINUED | OUTPATIENT
Start: 2018-07-26 | End: 2018-07-26

## 2018-07-26 RX ORDER — GUAIFENESIN 600 MG
600 TABLET, EXTENDED RELEASE 12 HR ORAL EVERY 12 HOURS SCHEDULED
Status: DISCONTINUED | OUTPATIENT
Start: 2018-07-26 | End: 2018-07-26

## 2018-07-26 RX ORDER — FUROSEMIDE 10 MG/ML
40 INJECTION INTRAMUSCULAR; INTRAVENOUS DAILY
Status: DISCONTINUED | OUTPATIENT
Start: 2018-07-27 | End: 2018-07-27

## 2018-07-26 RX ORDER — DEXTROSE AND SODIUM CHLORIDE 5; .45 G/100ML; G/100ML
50 INJECTION, SOLUTION INTRAVENOUS CONTINUOUS
Status: DISCONTINUED | OUTPATIENT
Start: 2018-07-26 | End: 2018-07-28

## 2018-07-26 RX ORDER — POTASSIUM CHLORIDE 14.9 MG/ML
20 INJECTION INTRAVENOUS ONCE
Status: COMPLETED | OUTPATIENT
Start: 2018-07-26 | End: 2018-07-26

## 2018-07-26 RX ADMIN — FONDAPARINUX SODIUM 2.5 MG: 2.5 INJECTION, SOLUTION SUBCUTANEOUS at 08:35

## 2018-07-26 RX ADMIN — METOPROLOL TARTRATE 2.5 MG: 5 INJECTION, SOLUTION INTRAVENOUS at 14:59

## 2018-07-26 RX ADMIN — DEXTROSE AND SODIUM CHLORIDE 50 ML/HR: 5; .45 INJECTION, SOLUTION INTRAVENOUS at 13:12

## 2018-07-26 RX ADMIN — METOPROLOL TARTRATE 2.5 MG: 5 INJECTION, SOLUTION INTRAVENOUS at 08:35

## 2018-07-26 RX ADMIN — PANTOPRAZOLE SODIUM 40 MG: 40 INJECTION, POWDER, FOR SOLUTION INTRAVENOUS at 08:36

## 2018-07-26 RX ADMIN — ALBUTEROL SULFATE 2.5 MG: 2.5 SOLUTION RESPIRATORY (INHALATION) at 17:45

## 2018-07-26 RX ADMIN — MUPIROCIN 1 APPLICATION: 20 OINTMENT TOPICAL at 08:36

## 2018-07-26 RX ADMIN — HYDROMORPHONE HYDROCHLORIDE 0.5 MG: 1 INJECTION, SOLUTION INTRAMUSCULAR; INTRAVENOUS; SUBCUTANEOUS at 02:08

## 2018-07-26 RX ADMIN — FUROSEMIDE 40 MG: 10 INJECTION, SOLUTION INTRAMUSCULAR; INTRAVENOUS at 08:42

## 2018-07-26 RX ADMIN — METOPROLOL TARTRATE 2.5 MG: 5 INJECTION, SOLUTION INTRAVENOUS at 21:08

## 2018-07-26 RX ADMIN — MUPIROCIN 1 APPLICATION: 20 OINTMENT TOPICAL at 21:09

## 2018-07-26 RX ADMIN — HYDROMORPHONE HYDROCHLORIDE 0.5 MG: 1 INJECTION, SOLUTION INTRAMUSCULAR; INTRAVENOUS; SUBCUTANEOUS at 13:14

## 2018-07-26 RX ADMIN — POTASSIUM CHLORIDE 20 MEQ: 200 INJECTION, SOLUTION INTRAVENOUS at 08:43

## 2018-07-26 RX ADMIN — HYDROMORPHONE HYDROCHLORIDE 0.5 MG: 1 INJECTION, SOLUTION INTRAMUSCULAR; INTRAVENOUS; SUBCUTANEOUS at 07:50

## 2018-07-26 RX ADMIN — HYDROMORPHONE HYDROCHLORIDE 0.5 MG: 1 INJECTION, SOLUTION INTRAMUSCULAR; INTRAVENOUS; SUBCUTANEOUS at 20:18

## 2018-07-26 RX ADMIN — METOPROLOL TARTRATE 2.5 MG: 5 INJECTION, SOLUTION INTRAVENOUS at 02:08

## 2018-07-26 NOTE — CASE MANAGEMENT
Daniela Fitzpatrick, RN Registered Nurse Signed   Case Management Date of Service: 7/25/2018  3:35 PM         []Hide copied text  Thank you,  Carolynnchente Aqq  [facility] Utilization Review Department  Phone: [phone]; Fax [phone]  ATTENTION: The Network Utilization Review Department is now centralized for our 9 Facilities  Make a note that we have a new phone and fax numbers for our Department  Please call with any questions or concerns to [phone] and carefully follow the prompts so that you are directed to the right person  All voicemails are confidential  Fax any determinations, approvals, denials, and requests for initial or continue stay review clinical to [phone]   Due to HIGH CALL volume, it would be easier if you could please send faxed requests to expedite your requests and in part, help us provide discharge notifications faster      ==================================================================     Initial Clinical Review     Age/Sex: 59 y o  male     Surgery Date: 07/24/2018     Procedure: Coronary artery bypass grafting x 3 with left internal mammary artery to left anterior descending artery, saphenous vein graft to obtuse marginal 1 and saphenous vein graft to right coronary artery     Anesthesia:General endotracheal anesthesia with transesophageal echocardiogram  guidance, Dr Marni Ross      Admission Orders: Date/Time/Statement: 7/24/18 @ 18         Orders Placed This Encounter   Procedures    Inpatient Admission       Standing Status:   Standing       Number of Occurrences:   1       Order Specific Question:   Admitting Physician       Answer:   Shaq Robin [[id number]]       Order Specific Question:   Level of Care       Answer:   Critical Care [15]       Order Specific Question:   Estimated length of stay       Answer:   More than 2 Midnights       Order Specific Question:   Certification       Answer:   I certify that inpatient services are medically necessary for this patient for a duration of greater than two midnights  See H&P and MD Progress Notes for additional information about the patient's course of treatment          Vital Signs: /70 (BP Location: Left arm) Comment: Map86  Pulse 97   Temp 98 1 °F (36 7 °C) (Oral)   Resp 19   Ht 5' 6" (1 676 m)   Wt 88 6 kg (195 lb 5 2 oz)   SpO2 90%   BMI 31 53 kg/m²      Diet:                 Diet Orders                      Start     Ordered     07/25/18 0914   Diet NPO  Diet effective now     Question Answer Comment   Diet Type NPO     RD to adjust diet per protocol? No         07/25/18 0915       Consult PT/OT  A  Line right radial / Triple CVC Line / Introducer  Chest Tubes #1,2,3  (-20cm)  Epicardial pacing wires A/V     Mobility: Up with assistance / Up as tolerated / POD#1 OOB to chair and for all meals     DVT Prophylaxis: right leg SCD     Pain Control:        Pain Medications                 aspirin 325 mg tablet Take 325 mg by mouth daily     Acetaminophen 325 MG CAPS Take 1 capsule by mouth as needed             Scheduled Meds:  Current Facility-Administered Medications:  acetaminophen 650 mg Oral Q4H PRN   amiodarone 200 mg Oral Q8H JACK   aspirin 81 mg Oral Daily   atorvastatin 80 mg Oral Daily With Dinner   bisacodyl 10 mg Rectal Daily PRN   clopidogrel 75 mg Oral Daily   docusate sodium 100 mg Oral BID   fondaparinux 2 5 mg Subcutaneous Daily   furosemide 40 mg Intravenous Daily   HYDROmorphone 0 5 mg Intravenous Q3H PRN   hydrOXYzine HCL 25 mg Oral Q6H PRN   insulin lispro 1-5 Units Subcutaneous TID AC   insulin lispro 1-5 Units Subcutaneous HS   metoprolol 2 5 mg Intravenous Q6H   mupirocin 1 application Nasal O91F JACK   ondansetron 4 mg Intravenous Q6H PRN   pantoprazole 40 mg Intravenous Q24H JACK   polyethylene glycol 17 g Oral Daily   temazepam 15 mg Oral HS PRN

## 2018-07-26 NOTE — SOCIAL WORK
Pt is S/P CABG x 3 and is recommended to have in-home post-op skilled nursing care via [place] [address] services for his aftercare plan  CM met w/ pt to discuss recommendation  Pt is agreeable  CM provided pt w/ freedom of choice for [place] [address] providers in [place]  Pt requested referral to [facility]  CM made Ecin referral to [facility]  CM to follow

## 2018-07-26 NOTE — RESTORATIVE TECHNICIAN NOTE
Restorative Specialist Mobility Note       Activity: Ambulate in haas, Chair     Assistive Device: Front wheel walker

## 2018-07-26 NOTE — PLAN OF CARE
Problem: DISCHARGE PLANNING - CARE MANAGEMENT  Goal: Discharge to post-acute care or home with appropriate resources  INTERVENTIONS:  - Conduct assessment to determine patient/family and health care team treatment goals, and need for post-acute services based on payer coverage, community resources, and patient preferences, and barriers to discharge  - Address psychosocial, clinical, and financial barriers to discharge as identified in assessment in conjunction with the patient/family and health care team  - Arrange appropriate level of post-acute services according to patient's   needs and preference and payer coverage in collaboration with the physician and health care team  - Communicate with and update the patient/family, physician, and health care team regarding progress on the discharge plan  - Arrange appropriate transportation to post-acute venues  - Return home upon d/c with a [facility] agency to provide in-home post-op skilled nursing care  Outcome: Progressing

## 2018-07-26 NOTE — PROGRESS NOTES
Progress Note - Cardiothoracic Surgery   Alex Devi  59 y o  male MRN: [id number]  Unit/Bed#: [facility] 427-01 Encounter: [id number]  Coronary artery disease  S/P coronary artery bypass grafting x 3; POD # 2      24 Hour Events: Transferred to telemetry yesterday  Pt SOB this morning  Wet sounding cough  Pulling < 250 on IS, using flutter valve  C/O incisional discomfort  Pt reports his abdomen feels better today  Denies flatus or BM  NG tube remains in place      Medications:   Scheduled Meds:  Current Facility-Administered Medications:  acetaminophen 650 mg Oral Q4H PRN Therafaela BRANDON Clark   amiodarone 200 mg Oral [facility] Sherrill Clark, BRANDON   aspirin 81 mg Oral Daily Fidencio Ferreira PA-C   atorvastatin 80 mg Oral Daily With [employer] CorrinaBRANDON call   bisacodyl 10 mg Rectal Daily PRN Therafaela BRANDON Clark   clopidogrel 75 mg Oral Daily Sherrill Clark PA-C   docusate sodium 100 mg Oral BID WILLIS Bang   fondaparinux 2 5 mg Subcutaneous Daily Nickoa BRANDON Clark   furosemide 40 mg Intravenous Daily WILLIS Bang   HYDROmorphone 0 5 mg Intravenous Q3H PRN Reagan Hardin PA-C   hydrOXYzine HCL 25 mg Oral Q6H PRN Thesalomon Clark PA-C   insulin lispro 1-5 Units Subcutaneous TID AC Yamini Spirovolodymyro V CRNP   insulin lispro 1-5 Units Subcutaneous HS Yamini Spirovolodymyro WILLIS ANDERSON   metoprolol 2 5 mg Intravenous Q6H Yamini Conleyo WILLIS ANDERSON   mupirocin 1 application Nasal [address] Yuan Houser PA-C   ondansetron 4 mg Intravenous Q6H PRN Thesalomon Clark PA-C   pantoprazole 40 mg Intravenous Q24H [address] Yamini Spirovolodymyro VHORTENCIANP   polyethylene glycol 17 g Oral Daily Yuan Houser PA-C   temazepam 15 mg Oral HS PRN Yamini Spironello VHORTENCIANP     Continuous Infusions:   PRN Meds:   acetaminophen    bisacodyl    HYDROmorphone    hydrOXYzine HCL    ondansetron    temazepam    Vitals:   Vitals:    07/25/18 1917 07/25/18 2001 07/26/18 0003 07/26/18 0334   BP: 138/67  113/68 126/73   BP Location: Left arm  Left arm Right arm   Pulse: 91  102 93   Resp: 18  20 20   Temp: 98 °F (36 7 °C)  98 5 °F (36 9 °C) 98 5 °F (36 9 °C)   TempSrc: Oral  Oral Oral   SpO2: 97% 96% 92% 95%   Weight:       Height:           Telemetry: Sinus Tachycardia; Heart Rate: 106    Respiratory:   SpO2: SpO2: 95 %; 3 LPM    Intake/Output:   I/O       07/24 0701 - 07/25 0700 07/25 0701 - 07/26 0700 07/26 0701 - 07/27 0700    P  O   0     I V  (mL/kg) 3411 6 (38 5) 155 5 (1 8)     NG/GT 60 0     IV Piggyback 650 100     Cell Saver 750      Total Intake(mL/kg) 4871 6 (55) 255 5 (2 9)     Urine (mL/kg/hr) 1830 (0 9) 865 (0 4)     Emesis/NG output 250 310     Blood 750      Chest Tube 540 170     Total Output 3370 1345      Net +1501 6 -1089 5                   Chest tube Output:  NG tube  300 ml / 8 hrs   610ml / 24 hrs  Mediastinal tubes: 30 mL/8 hours  140 mL/24 hours   Pleural tubes: 10 mL/8 hours  70 mL/24 hours     Weights:   Weight (last 2 days)     Date/Time   Weight    07/25/18 0558  88 6 (195 33)    07/24/18 0755  82 1 (181)            Admit weight: 82 1 kg    Results:     Results from last 7 days  Lab Units 07/26/18  0520 07/25/18 0412 07/24/18 2152 07/24/18  1755   WBC Thousand/uL 22 87* 16 60*  --   --    HEMOGLOBIN g/dL 10 9* 12 4 12 8  --    HEMATOCRIT % 34 6* 37 9 39 0  --    PLATELETS Thousands/uL 181 168  --  207       Results from last 7 days  Lab Units 07/26/18 0520 07/25/18 0412 07/24/18 2152 07/24/18  1526   SODIUM mmol/L 140 140  --  142   POTASSIUM mmol/L 4 2 4 1 4 3 3 3*   CHLORIDE mmol/L 104 106  --  106   CO2 mmol/L 32 27  --  28   BUN mg/dL 35* 22  --  19   CREATININE mg/dL 1 17 1 23  --  0 91   GLUCOSE RANDOM mg/dL 133 164*  --  113   CALCIUM mg/dL 8 5 7 8*  --  7 6*       Results from last 7 days  Lab Units 07/24/18  1755   INR  1 35*   PTT seconds 30     Point of care glucose:  - 172   1 unit SSIC / 24 hrs    Studies:  No studies past 24 hrs    Invasive Lines/Tubes:  Invasive Devices     Central Venous Catheter Line            CVC Central Lines 07/24/18 Triple Right Internal jugular 1 day          Peripheral Intravenous Line            Peripheral IV 07/24/18 Right Hand 1 day          Line            Pacer Wires 1 day    Pacer Wires 1 day          Drain            Chest Tube 1 Left Pleural 32 Fr  1 day    Chest Tube 2 Posterior Mediastinal 32 Fr  1 day    Chest Tube 3 Anterior Mediastinal 32 Fr  1 day    Chest Tube 4 Left Pleural 32 Fr  1 day    NG/OG/Enteral Tube Nasogastric 18 Fr Left nares 1 day                Physical Exam:    HEENT/NECK:  PERRLA  No jugular venous distention  NG tube in place  Cardiac: Regular rate and rhythm  No rubs/murmurs/gallops  Pulmonary:  Breath sounds diminished at the bases bilaterally  +wet cough  +rhonchi bilaterally  Abdomen:  Non-tender, slightly-distended  hypoactive bowel sounds  Incisions: Sternum is stable  Incision dressed with Acticoat  No erythema or drainage  Saphenectomy incision dressed with Acticoat  No erythema or drainage  Lower extremities: Extremities warm/dry  Radial/PT/DP pulses 2+ bilaterally  1+ edema B/L  Neuro: Alert and oriented X 3  Sensation is grossly intact  No focal deficits  Skin: Warm/Dry, without rashes or lesions  Assessment:  Patient Active Problem List   Diagnosis    Left carotid artery stenosis    Amaurosis fugax    Essential hypertension    Dyslipidemia    Pre-op exam    Carotid artery disorder (HCC)    Carotid stenosis, left    Coronary artery disease of native artery of native heart with stable angina pectoris ([address] )    Disease of cardiovascular system    S/P CABG x 3       Coronary artery disease  S/P coronary artery bypass grafting x 3; POD # 2    Plan:    1  Cardiac:   NSR; HR/BP well-controlled  Lopressor 2 5 mg IV q6  Continue ASA, Plavix and Statin therapy  Epicardial pacing wires no longer required  Remove today  Maintain central IV access today for access  Continue DVT prophylaxis    2   Pulmonary:   Acute post-op pulmonary insufficiency; Requiring 3 liters via nasal cannula, secondary to history of tobacco use, atelectasis, pulmonary vascular congestion and poor inspiratory effort secondary to pain  Continue incentive spirometry/coughing/deep breathing exercises  Wean supplemental oxygen as tolerated for saturation > 90%  Chest tube drainage diminished; D/C today    Stat PCXR for acute shortness of breath    3  Renal:   Intake/Output net: -1089 mL/24 hours  Increase diuresis   Lasix 40 mg IV BID  Potassium Chloride  20mEq PO QD  Post op Creatinine stable; Follow up labs prn    4  Neuro:  Neurologically intact; No active issues  Incisional pain well-controlled; Continue prn Percocet    5  GI:  NPO, maintain NG tube due to increased output  Maintain 1800 mL daily fluid restriction   Continue stool softeners and prn suppository  Continue GI prophylaxis    6  Endo:    No history of diabetes; Glucose well-controlled with sliding scale coverage    7  Hematology:   Post-operative acute blood loss anemia; Hemoglobin and hematocrit decreased, trend prn   PO Leukocytosis - WBC increased, afebrile  Likely atelectasis  Continue to  monitor       8  Disposition:  Follow daily PT/OT recommendations regarding home vs  rehab when medically cleared for discharge    VTE Pharmacologic Prophylaxis: Fondaparinux (Arixtra)  VTE Mechanical Prophylaxis: sequential compression device    Collaborative rounds completed with BARBIE Del Real , and Eri Chowdhury RN    SIGNATURE: Pura rOtez  DATE: July 26, 2018  TIME: 7:37 AM

## 2018-07-26 NOTE — RESPIRATORY THERAPY NOTE
RT Protocol Note  Karen Quinn  59 y o  male MRN: [id number]  Unit/Bed#: [facility] 427-01 Encounter: [id number]    Assessment    Principal Problem:    Disease of cardiovascular system  Active Problems:    Left carotid artery stenosis    Essential hypertension    Dyslipidemia    Carotid stenosis, left    S/P CABG x 3    Leukocytosis    Acute blood loss as cause of postoperative anemia    Shortness of breath      Home Pulmonary Medications:  none       Past Medical History:   Diagnosis Date    Carotid stenosis, left     s/p IR stent    Coronary artery disease     Former tobacco use     Hard of hearing     Hyperlipidemia     Hypertension     Parkinson disease ([address] )      Social History     Social History    Marital status: /Civil Union     Spouse name: N/A    Number of children: N/A    Years of education: N/A     Social History Main Topics    Smoking status: Former Smoker     Packs/day: 1 00     Quit date: 5/14/2018    Smokeless tobacco: Former User     Quit date: 5/3/2018    Alcohol use No    Drug use: No    Sexual activity: Not Currently     Other Topics Concern    None     Social History Narrative    None       Subjective         Objective    Physical Exam:   Assessment Type: (P) Assess only  General Appearance: (P) Alert, Awake  Respiratory Pattern: (P) Normal  Chest Assessment: (P) Chest expansion symmetrical  Bilateral Breath Sounds: (P) Expiratory wheezes, Coarse  Cough: (P) Congested, Non-productive    Vitals:  Blood pressure 137/76, pulse 93, temperature 98 2 °F (36 8 °C), temperature source Oral, resp  rate 22, height 5' 6" (1 676 m), weight 88 7 kg (195 lb 8 8 oz), SpO2 96 %  Results from last 7 days  Lab Units 07/24/18  1523   TROY TEST  Postive Troy Test       Imaging and other studies: I have personally reviewed pertinent reports  Plan    Respiratory Plan: (P) Mild Distress pathway  Airway Clearance Plan: Incentive Spirometer, Flutter     Resp Comments: (P) Pt  ordered on albuterol udn q4prn for wheezing  Currently wheezing due to mucus  Pt  is NPO and has sticky sputum  Flutter valve encouraged

## 2018-07-26 NOTE — SOCIAL WORK
Pt is <30-day readmit  Pt's last admit was 7/9/18 - 7/10/18  CM met w/ pt to obtain info  Pt reported he resides w/ his wife only in a 2-story house w/ 1st floor setup for pt and 3 steps to enter house  Pt's wife Case Jasso ([phone]) is primary contact  Pt reported he is independent at baseline w/ ambulating and performing his ADLS  Pt reported no DME in home, no hx of HHC services, and no hx of i/p rehab placements  Pt reported no hx of mental health issues nor D&A issues  Pt reported his PCP is Dr Minal Titus through [facility] located in [place]  Pt reported he uses [facility] located in [place]    [place] for his Rx needs  Pt reported he is employed full-time and receives an income  Pt is enrolled [employer] through his employer for healthcare coverage and Rx benefits  Pt reported he does not have a legally pre-designated medical POA appointed for himself  Pt reported his family members can provide transportation for him at time of d/c     CM reviewed d/c planning process including the following: identifying help at home, patient preference for d/c planning needs, Discharge Lounge, [facility] Meds to Bed program, availability of treatment team to discuss questions or concerns patient and/or family may have regarding understanding medications and recognizing signs and symptoms once discharged  CM also encouraged patient to follow up with all recommended appointments after discharge  Patient advised of importance for patient and family to participate in managing patients medical well being  Patient/caregiver received discharge checklist  Content reviewed  Patient/caregiver encouraged to participate in discharge plan of care prior to discharge home  CM will reassess pt for d/c needs once recommendations for his aftercare are made by the tx team  CM to follow

## 2018-07-26 NOTE — RESTORATIVE TECHNICIAN NOTE
Restorative Specialist Mobility Note       Activity:  (back to bed to get tubes/wires d/c'd)     Assistive Device: Front wheel walker

## 2018-07-26 NOTE — PROGRESS NOTES
Cardiology Progress Note - Abraham Jean Baptiste  59 y o  male MRN: [id number]    Unit/Bed#: [facility] 427-01 Encounter: [id number]    Assessment and plan  1  Multivessel coronary disease status post CABG x3  2  Preserved LV systolic function  3  Left ICA stent  4  Hypertension  5  Hyperlipidemia  6  Partial bowel obstruction NG tube in place    Recommendations:  Patient denies of any anginal sounding chest discomfort, mild shortness of breath with productive cough  Continue incentive spirometer and flutter valve  Hopefully NGT will be able to be removed soon  No significant events on telemetry  Continue aspirin, statin, beta-blocker, Plavix  Check ECG tomorrow for QT interval       Subjective:    No significant events overnight  Complains of abdominal fullness no anginal sounding chest pain or dyspnea  ROS    Objective:   Vitals: Blood pressure 116/71, pulse 97, temperature 98 1 °F (36 7 °C), temperature source Oral, resp  rate 20, height 5' 6" (1 676 m), weight 88 7 kg (195 lb 8 8 oz), SpO2 94 %  , Body mass index is 31 56 kg/m² , Orthostatic Blood Pressures      Most Recent Value   Blood Pressure  116/71 filed at 07/26/2018 0824   Patient Position - Orthostatic VS  Sitting filed at 07/26/2018 6651         Systolic (22ANI), OWZ:412 , Min:109 , YHB:526     Diastolic (80XEX), BTA:37, Min:67, Max:73      Intake/Output Summary (Last 24 hours) at 07/26/18 1049  Last data filed at 07/26/18 0828   Gross per 24 hour   Intake                0 ml   Output             1350 ml   Net            -1350 ml     Weight (last 2 days)     Date/Time   Weight    07/26/18 0600  88 7 (195 55)    07/25/18 0558  88 6 (195 33)    07/24/18 0755  82 1 (181)                Telemetry Review: No significant arrhythmias seen on telemetry review  EKG personally reviewed by Pratima Richard DO  Physical Exam   Constitutional: He is oriented to person, place, and time  He appears well-nourished  No distress     HENT:   Head: Atraumatic  Eyes: Conjunctivae are normal  Pupils are equal, round, and reactive to light  Neck: Neck supple  Cardiovascular: Normal rate, regular rhythm and normal heart sounds  Exam reveals no gallop and no friction rub  No murmur heard  Pulmonary/Chest: Effort normal  No respiratory distress  He has decreased breath sounds  He has no wheezes  He has rhonchi  He has no rales  Abdominal: Bowel sounds are normal  He exhibits distension  There is no tenderness  There is no rebound  Musculoskeletal: He exhibits no edema or deformity  Neurological: He is alert and oriented to person, place, and time  No cranial nerve deficit  Skin: Skin is warm and dry  No erythema  Nursing note and vitals reviewed          Laboratory Results:        CBC with diff:   Results from last 7 days  Lab Units 07/26/18  0520 07/25/18 0412 07/24/18 2152 07/24/18  1755 07/24/18  1526 07/24/18  1523 07/24/18  1355 07/24/18  1332 07/24/18  1318   WBC Thousand/uL 22 87* 16 60*  --   --   --   --   --   --   --    HEMOGLOBIN g/dL 10 9* 12 4 12 8  --  11 3*  --   --   --   --    I STAT HEMOGLOBIN g/dl  --   --   --   --   --  9 9* 8 2* 8 8*  --    HEMATOCRIT % 34 6* 37 9 39 0  --  33 4*  --   --   --   --    MCV fL 92 90  --   --   --   --   --   --   --    PLATELETS Thousands/uL 181 168  --  207  --   --   --   --  197   MCH pg 29 1 29 5  --   --   --   --   --   --   --    MCHC g/dL 31 5 32 7  --   --   --   --   --   --   --    RDW % 13 1 12 7  --   --   --   --   --   --   --    MPV fL 10 1 10 1  --  9 8  --   --   --   --  9 6         CMP:  Results from last 7 days  Lab Units 07/26/18  0520 07/25/18 0412 07/24/18 2152 07/24/18  1526 07/24/18  1523 07/24/18  1355 07/24/18  1332 07/24/18  1253   SODIUM mmol/L 140 140  --  142  --   --   --   --    POTASSIUM mmol/L 4 2 4 1 4 3 3 3*  --   --   --   --    CHLORIDE mmol/L 104 106  --  106  --   --   --   --    CO2 mmol/L 32 27  --  28  --   --   --   --    ANION GAP mmol/L 4 7 --  8  --   --   --   --    BUN mg/dL 35* 22  --  19  --   --   --   --    CREATININE mg/dL 1 17 1 23  --  0 91  --   --   --   --    GLUCOSE RANDOM mg/dL 133 164*  --  113  --   --   --   --    GLUCOSE, ISTAT mg/dl  --   --   --   --  109 185* 156* 112   CALCIUM mg/dL 8 5 7 8*  --  7 6*  --   --   --   --    EGFR ml/min/1 73sq m 66 63  --  90  --   --   --   --          BMP:  Results from last 7 days  Lab Units 18  0520 18  0412 18  2152 18  1526   SODIUM mmol/L 140 140  --  142   POTASSIUM mmol/L 4 2 4 1 4 3 3 3*   CHLORIDE mmol/L 104 106  --  106   CO2 mmol/L 32 27  --  28   BUN mg/dL 35* 22  --  19   CREATININE mg/dL 1 17 1 23  --  0 91   GLUCOSE RANDOM mg/dL 133 164*  --  113   CALCIUM mg/dL 8 5 7 8*  --  7 6*       BNP: No results for input(s): BNP in the last 72 hours      Magnesium:   Results from last 7 days  Lab Units 18  0520 18  0412   MAGNESIUM mg/dL 2 6 2 4       Coags:   Results from last 7 days  Lab Units 18  1755   PTT seconds 30   INR  1 35*       TSH:        Hemoglobin A1C       Lipid Profile:   Results from last 7 days  Lab Units 18  1034   CHOLESTEROL mg/dL 135   TRIGLYCERIDES mg/dL 161*   HDL mg/dL 40       Cardiac testing:   Results for orders placed during the hospital encounter of 18   Echo complete with contrast if indicated    Narrative [address]  [address] [facility] [address]  [phone]    Transthoracic Echocardiogram    Study date:  2018    Patient: Alva Eng  MR number: [id number]  Account number: [de-identified]  : 1955  Age: 59 years  Gender: Male  Status: Routine  Location: Echo lab  Height: 68 in  Weight: 186 6 lb  BP: 134/ 82 mmHg    Indications: Dyspnea    Diagnoses: R06 00 - Dyspnea, unspecified    Sonographer:  ARUNA June  Primary Physician:  Nuvia Graff MD  Referring Physician:  Edgar Sanchez DO  Group:  [facility]  Interpreting Physician: Sarah Dupont MD    SUMMARY    LEFT VENTRICLE:  Systolic function was normal  Ejection fraction was estimated in the range of 60 % to 65 %  There were no regional wall motion abnormalities  AORTIC VALVE:  There was trace regurgitation  TRICUSPID VALVE:  The tricuspid jet envelope definition was inadequate for estimation of RV systolic pressure  There are no indirect findings (abnormal RV volume or geometry, altered pulmonary flow velocity profile, or leftward septal displacement) which  would suggest moderate or severe pulmonary hypertension  HISTORY: PRIOR HISTORY: HTN, Hyperlipidemia, Parkinson Disease    PROCEDURE: The procedure was performed in the echo lab  This was a routine study  The transthoracic approach was used  The heart rate was 70 bpm, at the start of the study  Image quality was adequate  LEFT VENTRICLE: Size was normal  Systolic function was normal  Ejection fraction was estimated in the range of 60 % to 65 %  There were no regional wall motion abnormalities  Wall thickness was normal  No evidence of apical thrombus  DOPPLER: Left ventricular diastolic function parameters were normal     RIGHT VENTRICLE: The size was normal  Systolic function was normal  Wall thickness was normal     LEFT ATRIUM: Size was normal     RIGHT ATRIUM: Size was normal     MITRAL VALVE: Valve structure was normal  There was normal leaflet separation  DOPPLER: The transmitral velocity was within the normal range  There was no evidence for stenosis  There was no significant regurgitation  AORTIC VALVE: The valve was trileaflet  Leaflets exhibited mildly increased thickness, mild to moderate calcification, normal cuspal separation, and sclerosis  DOPPLER: Transaortic velocity was within the normal range  There was no  evidence for stenosis  There was trace regurgitation  TRICUSPID VALVE: The valve structure was normal  There was normal leaflet separation   DOPPLER: The transtricuspid velocity was within the normal range  There was no evidence for stenosis  There was no significant regurgitation  The  tricuspid jet envelope definition was inadequate for estimation of RV systolic pressure  There are no indirect findings (abnormal RV volume or geometry, altered pulmonary flow velocity profile, or leftward septal displacement) which would  suggest moderate or severe pulmonary hypertension  PULMONIC VALVE: Leaflets exhibited normal thickness, no calcification, and normal cuspal separation  DOPPLER: The transpulmonic velocity was within the normal range  There was no significant regurgitation  PERICARDIUM: There was no pericardial effusion  The pericardium was normal in appearance  AORTA: The root exhibited normal size  SYSTEMIC VEINS: IVC: The inferior vena cava was normal in size  SYSTEM MEASUREMENT TABLES    2D mode  AoR Diam 2D: 3 5 cm  LA Diam (2D): 3 3 cm  LA/Ao (2D): 0 94  FS (2D Teich): 32 %  IVSd (2D): 1 cm  LVDEV: 120 cm³  LVESV: 48 1 cm³  LVIDd(2D): 5 03 cm  LVISd (2D): 3 42 cm  LVOT Area 2D: 3 14 cm squared  LVPWd (2D): 1 03 cm  SV (Teich): 71 9 cm³    Apical four chamber  LVEF A4C: 62 %    Unspecified Scan Mode  LVOT Diam : 2 cm  MV Peak A Patrick: 491 mm/s  MV Peak E Patrick  Mean: 626 mm/s  MVA (PHT): 3 93 cm squared  PHT: 56 ms  Max P mm[Hg]  V Max: 2220 mm/s  Vmax: 2220 mm/s  RA Area: 13 8 cm squared  RA Volume: 31 1 cm³  TAPSE: 1 8 cm    [facility] Accredited Echocardiography Laboratory    Prepared and electronically signed by    Td Prince MD  Signed 2018 19:24:11       No results found for this or any previous visit  No results found for this or any previous visit    Results for orders placed during the hospital encounter of 18   NM myocardial perfusion spect (stress and/or rest)    Narrative [address]  [address] [facility] [phone]    Rest/Stress Gated SPECT Myocardial Perfusion Imaging After Exercise    Patient: Nina Monroy  MR number: [id number]  Account number: [de-identified]  : 1955  Age: 59 years  Gender: Male  Status: Outpatient  Location: Stress lab  Height: 68 in  Weight: 187 lb  BP: 136/ 84 mmHg    Allergies: NO KNOWN ALLERGIES    Diagnosis: Z01 810 - Encounter for preprocedural cardiovascular examination    Primary Physician:  Santos Wilkinson MD  RN:  AYALA Lynn  Referring Physician:  Jamia Mitchell DO  Group:  [employer]  Report Prepared By[de-identified]  AYALA Lynn  Interpreting Physician:  Speedy Garcia MD    INDICATIONS: Evaluation for coronary artery disease  HISTORY: The patient is a 59year old  male  Chest pain status: no chest pain  Other symptoms: dyspnea  Coronary artery disease risk factors: dyslipidemia, hypertension, and family history of premature coronary artery disease  Cardiovascular history: peripheral vascular occlusive disease, left carotid artery stenosis  Medications: aspirin, a lipid lowering agent, and an antihypertensive agent  PHYSICAL EXAM: Baseline physical exam screening: normal     REST ECG: Normal sinus rhythm  PROCEDURE: The study was performed in the stress lab  Treadmill exercise testing was performed, using the Cal protocol  Gated SPECT myocardial perfusion imaging was performed after stress and at rest  Systolic blood pressure was 136  mmHg, at the start of the study  Diastolic blood pressure was 84 mmHg, at the start of the study  The heart rate was 68 bpm, at the start of the study  IV double checked      CAL PROTOCOL:  HR bpm SBP mmHg DBP mmHg Symptoms ST change Rhythm/conduct  Baseline 67 136 84 none -- NSR  Stage 1 117 154 80 none -- --  Stage 2 139 162 80 none horizontal depression, 1 0 mm in V5 and V6 --  Stage 3 150 -- -- mild dyspnea horizontal depression, 1 5-2 mm in V5 and V6, II --  Immediate 144 150 78 subsiding horizontal depression, 1-1 5 mm in V5 and V6 --  Recovery 1 100 140 76 none same as above --  Recovery 2 89 124 76 none return to baseline --  No medications or fluids given  STRESS SUMMARY: Dr Jose Espitia ,made aware of above EKG changes- spoek to patient and his wife  Duration of exercise was 7 min  The patient exercised to protocol stage 3  Maximal work rate was 10 1 METs  Maximal heart rate during stress was  150 bpm ( 95 % of maximal predicted heart rate)  The heart rate response to stress was normal  There was normal resting blood pressure with an appropriate response to stress  The rate-pressure product for the peak heart rate and blood  pressure was 18928  There was no chest pain during stress  The stress test was terminated due to achievement of target heart rate  Pre oxygen saturation: 99 %  Peak oxygen saturation: 99 %  2mm st depression in inferolateral leads The  stress ECG was consistent with myocardial ischemia  ISOTOPE ADMINISTRATION:  Resting isotope administration Stress isotope administration  Agent Tetrofosmin Tetrofosmin  Dose 10 6 mCi 33 mCi  Date 06/13/2018 06/13/2018    Radiopharmaceutical was administered 5 min, 26 sec into the stress protocol  There was 1 min and 30 sec of exercise after the injection  MYOCARDIAL PERFUSION IMAGING:  The image quality was good  PERFUSION DEFECTS:  -  There was a small to moderate, partially reversible myocardial perfusion defect of the inferior wall  Small area of perfusion defect which correlated with ecg changes  GATED SPECT:  The calculated left ventricular ejection fraction was 54 %  Left ventricular ejection fraction was within normal limits by visual estimate  There was no left ventricular regional abnormality  SUMMARY:  -  Stress results: Duration of exercise was 7 min  Maximal work rate was 10 1 METs  Maximal heart rate during stress was 150 bpm ( 95 % of maximal predicted heart rate)  Target heart rate was achieved  There was no chest pain during  stress  -  ECG conclusions: The stress ECG was consistent with myocardial ischemia    -  Perfusion imaging: There was a small to moderate, partially reversible myocardial perfusion defect of the inferior wall  Small area of perfusion defect which correlated with ecg changes  -  Gated SPECT: The calculated left ventricular ejection fraction was 54 %  Left ventricular ejection fraction was within normal limits by visual estimate  There was no left ventricular regional abnormality   -  Impressions and recommendations: Abnormal study after maximal exercise  IMPRESSIONS: Abnormal study after maximal exercise      Prepared and signed by    Td Prince MD  Signed 06/13/2018 18:41:55         Meds/Allergies   all current active meds have been reviewed  Prescriptions Prior to Admission   Medication    aspirin 325 mg tablet    Acetaminophen 325 MG CAPS          Assessment:  Principal Problem:    Disease of cardiovascular system  Active Problems:    Left carotid artery stenosis    Essential hypertension    Dyslipidemia    Carotid stenosis, left    S/P CABG x 3    Leukocytosis    Acute blood loss as cause of postoperative anemia    Shortness of breath

## 2018-07-26 NOTE — PLAN OF CARE
Problem: PHYSICAL THERAPY ADULT  Goal: Performs mobility at highest level of function for planned discharge setting  See evaluation for individualized goals  Treatment/Interventions: Functional transfer training, LE strengthening/ROM, Elevations, Therapeutic exercise, Endurance training, Patient/family training, Equipment eval/education, Bed mobility, Gait training, Spoke to nursing, Spoke to case management          See flowsheet documentation for full assessment, interventions and recommendations  Prognosis: Good  Problem List: Decreased strength, Decreased endurance, Impaired balance, Decreased mobility, Decreased coordination, Decreased safety awareness, Obesity, Decreased skin integrity, Pain  Assessment: Pt is a 58 y/o male admitted to [facility]* disease of cardiovascular system and s/p CABG x3  Pt lives with wife in 2 story home with first floor setup,(+)MANJIT,no use of DME PTA,reports being completely I PTA,(+)fulltime work and reports no recent falls  pt currently is not at functional mobility baseline,needs Ax1-2 for mobility,inc fatigue and dec endurance,multiple lines,IV medicine management,cont telemetry monitoring,recent surgical cardiac procedure,use of 2 L NC O2 and ongoing medical care  Pt demonstrates moderate to minimal deficits during functional mobility and gait including dec endurance,dec balance,dec BLE strength,inc pain and discomfort,ataxic and unsteady gait pattern and needs modAx1 for transfers and minAx1 for gait with use of RW  Pt would cont to benefit from skilled inpt PT services to maximize functional independence  Barriers to Discharge: Inaccessible home environment (MANJIT)                See flowsheet documentation for full assessment

## 2018-07-26 NOTE — PROGRESS NOTES
07/26/18    Procedure: Epicardial Pacing Wire removal    Koko Titus  was returned to bed and informed of mandatory one hour post-procedure bed rest   The assigned nurse was notified  Epicardial pacing wires removed in routine fashion, without incident  The patient tolerated the procedure well  Vital signs ordered  q 15 minutes for one hour, as per protocol  SIGNATUREJohn Lind  DATE: July 26, 2018  TIME: 2:09 PM        07/26/18    Procedure: Chest tube removal    Chest tubes removed in routine fashion without incident  Insertion site dressed with Acticoat  Koko Titus  tolerated the procedure well  Nurse notified      SIGNATURE: Tomy Porter PA-C  DATE: July 26, 2018  TIME: 2:09 PM

## 2018-07-26 NOTE — PHYSICAL THERAPY NOTE
Physical Therapy Evaluation:    2 forms of pt ID verified:name,birthdate and pt ID rufus    Patient's Name: Jennifer Shepherd  Admitting Diagnosis  Disease of cardiovascular system [I25 10]    Problem List  Patient Active Problem List   Diagnosis    Left carotid artery stenosis    Amaurosis fugax    Essential hypertension    Dyslipidemia    Pre-op exam    Carotid artery disorder (HCC)    Carotid stenosis, left    Coronary artery disease of native artery of native heart with stable angina pectoris ([facility] [address] )    Disease of cardiovascular system    S/P CABG x 3    Leukocytosis    Acute blood loss as cause of postoperative anemia    Shortness of breath       Past Medical History  Past Medical History:   Diagnosis Date    Carotid stenosis, left     s/p IR stent    Coronary artery disease     Former tobacco use     Hard of hearing     Hyperlipidemia     Hypertension     Parkinson disease ([facility] [address] )        Past Surgical History  Past Surgical History:   Procedure Laterality Date    AMPUTATION OF REPLICATED FINGERS       rt 2nd and 3rd fingers traumatically amputated at work    APPENDECTOMY      MANDIBLE FRACTURE SURGERY      upper and lower    ORBITAL FRACTURE SURGERY Right     MD CABG, VEIN, FOUR N/A 7/24/2018    Procedure: CORONARY ARTERY BYPASS GRAFT (CABG) 3 VESSELS ; NATE to LAD, SVG--> OM and Distal right;  Surgeon: Kandace Prader, DO;  [facility];  Service: Cardiac Surgery    MD ECHO TRANSESOPHAG MONTR CARDIAC PUMP FUNCTJ N/A 7/24/2018    Procedure: TRANSESOPHAGEAL ECHOCARDIOGRAM (HECTOR);   Surgeon: Kandace Prader, DO;  [facility];  Service: Cardiac Surgery    MD TCAT IV STENT CRV CRTD ART EMBOLIC PROTECJ Left 1/5/7349    Procedure: TRANSCAROTID ARTERY REVASCULARIZATION;  Surgeon: Awilda Carrasco MD;  [facility];  Service: Vascular      07/26/18 0855   Note Type   Note type Eval/Treat   Pain Assessment   Pain Assessment 0-10   Pain Score 7   Pain Type Acute pain;Surgical pain   Pain Location Chest;Incision  (mid sternal chest and chest tube incision site)   Pain Orientation Mid;Lower   Hospital Pain Intervention(s) Repositioned; Ambulation/increased activity; Elevated; Emotional support; Environmental changes; Rest   Home Living   Type of [address] Two level; Able to live on main level with bedroom/bathroom  ((+)MANJIT)   Home Equipment Other (Comment)  (no DME PTA)   Additional Comments pt reports being completely I PTA,works fulltime,A from wife upon D/C available,reports no recent falls and reports no use of DME PTA   Prior Function   Level of [place] Independent with ADLs and functional mobility  (per pt PTA)   Lives With Spouse   Receives Help From Family  (as needed per pt PTA)   ADL Assistance Independent   IADLs Independent   Falls in the last 6 months 0   Vocational Full time employment   Restrictions/Precautions   Other Precautions Cardiac/sternal;Multiple lines;Telemetry;O2;Fall Risk;Pain  (body habitus,use of 2 L NC O2,(+)chest tube)   General   Additional Pertinent History disease of cardiovascular system;s/p CABG x3 07/24/18 with multiple lines and chest tube   Family/Caregiver Present No   Cognition   Overall Cognitive Status WFL   Arousal/Participation Cooperative   Attention Attends with cues to redirect   Orientation Level Oriented X4   Following Commands Follows one step commands with increased time or repetition  (2* inc pain and slow mobility)   RLE Assessment   RLE Assessment (at least 4/5 grossly throughout,limited resistance given)   LLE Assessment   LLE Assessment (at least 4/5 grossly throughout,limited resistance given)   Coordination   Movements are Fluid and Coordinated 0   Coordination and Movement Description pain,ridigity of body 2* multiple lines and inc pain/discomfort,dec BLE step length,wide JACOBO   Sensation WFL   Light Touch   RLE Light Touch Grossly intact   LLE Light Touch Grossly intact   Bed Mobility   Supine to Sit (pt sitting in chair pre and post mobility)   Transfers   Sit to Stand 3  Moderate assistance   Additional items Assist x 1; Armrests; Increased time required;Verbal cues   Stand to Sit 3  Moderate assistance   Additional items Assist x 1; Armrests; Increased time required;Verbal cues  (for safety,education and control descent)   Additional Comments static stand for 1-2 minutes for use of urinal premobility needing minAx1   Ambulation/Elevation   Gait pattern Improper Weight shift; Antalgic; Wide JACOBO; Inconsistent tomi; Short stride; Ataxia; Excessively slow   Gait Assistance 4  Minimal assist   Additional items Assist x 1  (second person A for line management)   Assistive Device Rolling walker   Distance 130 feet with use of RW on various surfaces and 2 L NC O2 use;slow tomi with multiple static stand rest breaks 2* inc pain,dec endurance and dec strength,fatigue   Balance   Static Sitting Good  (in chair postmobility with BLE elevated)   Dynamic Sitting Poor   Static Standing Poor   Dynamic Standing Poor +   Ambulatory Poor +   Endurance Deficit   Endurance Deficit Yes   Endurance Deficit Description weakness,recent surgical procedure,fatigue,use of 2 L NC O2,SOB,pain   Activity Tolerance   Activity Tolerance Patient limited by fatigue;Patient limited by pain  (fair)   Medical Staff Made Aware CM Alicja Pearce and Camille Valles), Vane (restorative therapy aide)   Nurse Made Aware yes   Assessment   Prognosis Good   Problem List Decreased strength;Decreased endurance; Impaired balance;Decreased mobility; Decreased coordination;Decreased safety awareness; Obesity; Decreased skin integrity;Pain   Assessment Pt is a 60 y/o male admitted to [facility]* disease of cardiovascular system and s/p CABG x3  Pt lives with wife in 2 story home with first floor setup,(+)MANJIT,no use of DME PTA,reports being completely I PTA,(+)fulltime work and reports no recent falls   pt currently is not at functional mobility baseline,needs Ax1-2 for mobility,inc fatigue and dec endurance,multiple lines,IV medicine management,cont telemetry monitoring,recent surgical cardiac procedure,use of 2 L NC O2 and ongoing medical care  Pt demonstrates moderate to minimal deficits during functional mobility and gait including dec endurance,dec balance,dec BLE strength,inc pain and discomfort,ataxic and unsteady gait pattern and needs modAx1 for transfers and minAx1 for gait with use of RW  Pt would cont to benefit from skilled inpt PT services to maximize functional independence   Barriers to Discharge Inaccessible home environment  (MANJIT)   Goals   Patient Goals to rest and to work hard   STG Expiration Date 08/05/18   Short Term Goal #1 in 7-10 days:pt will be able to ambulate >300 feet with use of RW on various surfaces S level of A without LOB and no rest breaks,activity tolerance;45mins/45mins,inc balance 1 grade,BM and transfers completely I to and from various surfaces consistently,up and down 1 flight of steps with use of rail S,I with BLE ther ex HEP in various positions,cont verbal and physical instruction and education for energy conservation,pacing techniques,use of DME and breathing techniques   Treatment Day 0   Plan   Treatment/Interventions Functional transfer training;LE strengthening/ROM; Elevations; Therapeutic exercise; Endurance training;Patient/family training;Equipment eval/education; Bed mobility;Gait training;Spoke to nursing;Spoke to case management   PT Frequency Other (Comment)  (4-6x/week)   Barthel Index   Feeding 10   Bathing 0   Grooming Score 0   Dressing Score 0   Bladder Score 0   Bowels Score 10   Toilet Use Score 5   Transfers (Bed/Chair) Score 5   Mobility (Level Surface) Score 0   Stairs Score 0   Barthel Index Score 30     Chica Cox, PT, DPT    Physical Therapy Tx Session (Additional session): 2552-9177  Pt able to ambulate an additional 130 feet with use of RW on various surfaces needing minAx1 and second person A for line management   Pt reports inc fatigue,SOB and weakness throughout upright mobility  Pt needs multiple static stand rest breaks 2* inc pain,weakness,SOB and fatigue 2* recent cardiac surgical procedure  NSG aware and restorative therapy aide present  Pt would cont to benefit from skilled inpt PT services to maximize functional independence

## 2018-07-27 ENCOUNTER — APPOINTMENT (INPATIENT)
Dept: RADIOLOGY | Facility: HOSPITAL | Age: 63
DRG: 235 | End: 2018-07-27
Payer: COMMERCIAL

## 2018-07-27 PROBLEM — R19.15: Status: ACTIVE | Noted: 2018-07-27

## 2018-07-27 PROBLEM — R06.02 SHORTNESS OF BREATH: Status: RESOLVED | Noted: 2018-07-26 | Resolved: 2018-07-27

## 2018-07-27 LAB
ABO GROUP BLD BPU: NORMAL
ANION GAP SERPL CALCULATED.3IONS-SCNC: 3 MMOL/L (ref 4–13)
BPU ID: NORMAL
BUN SERPL-MCNC: 26 MG/DL (ref 5–25)
CALCIUM SERPL-MCNC: 8.6 MG/DL (ref 8.3–10.1)
CHLORIDE SERPL-SCNC: 106 MMOL/L (ref 100–108)
CO2 SERPL-SCNC: 34 MMOL/L (ref 21–32)
CREAT SERPL-MCNC: 0.85 MG/DL (ref 0.6–1.3)
ERYTHROCYTE [DISTWIDTH] IN BLOOD BY AUTOMATED COUNT: 12.9 % (ref 11.6–15.1)
GFR SERPL CREATININE-BSD FRML MDRD: 93 ML/MIN/1.73SQ M
GLUCOSE SERPL-MCNC: 129 MG/DL (ref 65–140)
GLUCOSE SERPL-MCNC: 135 MG/DL (ref 65–140)
GLUCOSE SERPL-MCNC: 136 MG/DL (ref 65–140)
GLUCOSE SERPL-MCNC: 143 MG/DL (ref 65–140)
GLUCOSE SERPL-MCNC: 148 MG/DL (ref 65–140)
HCT VFR BLD AUTO: 31.7 % (ref 36.5–49.3)
HGB BLD-MCNC: 10.1 G/DL (ref 12–17)
MCH RBC QN AUTO: 29.6 PG (ref 26.8–34.3)
MCHC RBC AUTO-ENTMCNC: 31.9 G/DL (ref 31.4–37.4)
MCV RBC AUTO: 93 FL (ref 82–98)
PLATELET # BLD AUTO: 171 THOUSANDS/UL (ref 149–390)
PMV BLD AUTO: 9.9 FL (ref 8.9–12.7)
POTASSIUM SERPL-SCNC: 3.8 MMOL/L (ref 3.5–5.3)
RBC # BLD AUTO: 3.41 MILLION/UL (ref 3.88–5.62)
SODIUM SERPL-SCNC: 143 MMOL/L (ref 136–145)
UNIT DISPENSE STATUS: NORMAL
UNIT PRODUCT CODE: NORMAL
UNIT RH: NORMAL
WBC # BLD AUTO: 16.97 THOUSAND/UL (ref 4.31–10.16)

## 2018-07-27 PROCEDURE — 97530 THERAPEUTIC ACTIVITIES: CPT

## 2018-07-27 PROCEDURE — 85027 COMPLETE CBC AUTOMATED: CPT | Performed by: PHYSICIAN ASSISTANT

## 2018-07-27 PROCEDURE — 82948 REAGENT STRIP/BLOOD GLUCOSE: CPT

## 2018-07-27 PROCEDURE — 99024 POSTOP FOLLOW-UP VISIT: CPT | Performed by: THORACIC SURGERY (CARDIOTHORACIC VASCULAR SURGERY)

## 2018-07-27 PROCEDURE — 74018 RADEX ABDOMEN 1 VIEW: CPT

## 2018-07-27 PROCEDURE — C9113 INJ PANTOPRAZOLE SODIUM, VIA: HCPCS | Performed by: NURSE PRACTITIONER

## 2018-07-27 PROCEDURE — 94668 MNPJ CHEST WALL SBSQ: CPT

## 2018-07-27 PROCEDURE — 80048 BASIC METABOLIC PNL TOTAL CA: CPT | Performed by: PHYSICIAN ASSISTANT

## 2018-07-27 PROCEDURE — 99232 SBSQ HOSP IP/OBS MODERATE 35: CPT | Performed by: INTERNAL MEDICINE

## 2018-07-27 PROCEDURE — 97116 GAIT TRAINING THERAPY: CPT

## 2018-07-27 PROCEDURE — 94640 AIRWAY INHALATION TREATMENT: CPT

## 2018-07-27 PROCEDURE — 94760 N-INVAS EAR/PLS OXIMETRY 1: CPT

## 2018-07-27 RX ORDER — ASPIRIN 300 MG/1
300 SUPPOSITORY RECTAL DAILY
Status: DISCONTINUED | OUTPATIENT
Start: 2018-07-27 | End: 2018-07-28

## 2018-07-27 RX ORDER — METOPROLOL TARTRATE 5 MG/5ML
5 INJECTION INTRAVENOUS EVERY 6 HOURS
Status: DISCONTINUED | OUTPATIENT
Start: 2018-07-27 | End: 2018-07-28

## 2018-07-27 RX ADMIN — DEXTROSE AND SODIUM CHLORIDE 50 ML/HR: 5; .45 INJECTION, SOLUTION INTRAVENOUS at 05:48

## 2018-07-27 RX ADMIN — MUPIROCIN 1 APPLICATION: 20 OINTMENT TOPICAL at 09:04

## 2018-07-27 RX ADMIN — MUPIROCIN 1 APPLICATION: 20 OINTMENT TOPICAL at 21:17

## 2018-07-27 RX ADMIN — METOPROLOL TARTRATE 5 MG: 1 INJECTION, SOLUTION INTRAVENOUS at 21:15

## 2018-07-27 RX ADMIN — METOPROLOL TARTRATE 2.5 MG: 5 INJECTION, SOLUTION INTRAVENOUS at 09:04

## 2018-07-27 RX ADMIN — HYDROMORPHONE HYDROCHLORIDE 0.5 MG: 1 INJECTION, SOLUTION INTRAMUSCULAR; INTRAVENOUS; SUBCUTANEOUS at 21:17

## 2018-07-27 RX ADMIN — PANTOPRAZOLE SODIUM 40 MG: 40 INJECTION, POWDER, FOR SOLUTION INTRAVENOUS at 09:04

## 2018-07-27 RX ADMIN — METOPROLOL TARTRATE 2.5 MG: 5 INJECTION, SOLUTION INTRAVENOUS at 02:54

## 2018-07-27 RX ADMIN — HYDROMORPHONE HYDROCHLORIDE 0.5 MG: 1 INJECTION, SOLUTION INTRAMUSCULAR; INTRAVENOUS; SUBCUTANEOUS at 09:21

## 2018-07-27 RX ADMIN — METOPROLOL TARTRATE 5 MG: 1 INJECTION, SOLUTION INTRAVENOUS at 16:16

## 2018-07-27 RX ADMIN — HYDROMORPHONE HYDROCHLORIDE 0.5 MG: 1 INJECTION, SOLUTION INTRAMUSCULAR; INTRAVENOUS; SUBCUTANEOUS at 02:54

## 2018-07-27 RX ADMIN — ASPIRIN 300 MG: 300 SUPPOSITORY RECTAL at 14:28

## 2018-07-27 RX ADMIN — FUROSEMIDE 40 MG: 10 INJECTION, SOLUTION INTRAMUSCULAR; INTRAVENOUS at 09:03

## 2018-07-27 RX ADMIN — ALBUTEROL SULFATE 2.5 MG: 2.5 SOLUTION RESPIRATORY (INHALATION) at 07:14

## 2018-07-27 RX ADMIN — FONDAPARINUX SODIUM 2.5 MG: 2.5 INJECTION, SOLUTION SUBCUTANEOUS at 09:04

## 2018-07-27 NOTE — PHYSICAL THERAPY NOTE
Physical Therapy Progress Note     07/27/18 1103   Pain Assessment   Pain Assessment No/denies pain   Restrictions/Precautions   Other Precautions Cardiac/sternal;Multiple lines;Telemetry;O2;Fall Risk;Pain   General   Family/Caregiver Present No   Subjective   Subjective Pt presented to therapy seated in chair, pleasant & agreeable to treat  1st attempt pt unavailable with nursing & requested pain meds before ambulating  Transfers   Sit to Stand 5  Supervision   Additional items Assist x 1; Armrests; Increased time required   Stand to Sit 4  Minimal assistance   Additional items Assist x 1; Armrests; Increased time required   Ambulation/Elevation   Gait pattern Poor UE support;Decreased foot clearance; Forward Flexion; Inconsistent tomi; Short stride; Excessively slow  (extrememly slow)   Gait Assistance 4  Minimal assist   Additional items Assist x 1   Assistive Device Rolling walker   Distance 90' x 2   Endurance Deficit   Endurance Deficit Yes   Endurance Deficit Description desat on 1L O2 nc   Activity Tolerance   Activity Tolerance Patient tolerated treatment well;Patient limited by fatigue   Nurse Made Aware Silas Vargas RN   Assessment   Prognosis Good   Problem List Decreased strength;Decreased range of motion;Decreased endurance; Impaired balance;Decreased mobility; Decreased safety awareness; Obesity;Pain;Decreased skin integrity; Decreased coordination   Assessment Pt demonstrated improved functional mobility and activity tolerance this session, performing all transfers with decreased assist or S and ambulating beyond household distances with min A for safety due to history of O2 desat  Pt on 1L O2 nc this session, 95% at rest, 92% after ambulation  No complaints of dizziness, pain or weakness this session    Pt required instruction and increased assist to sit due to coughing attack when attempting to use urinal at end of ambulation trial   Pt then performed static standing to use urinal without assist   Pt educated on importance of ambulation to improve breathing, bowel function, and urination  Pt verbalized understanding and is eager to remain mobile  Pt will continue to benefit from therapy services to improve strength, mobility, endurance, activity tolerance, and safety awareness to maximize long term independence  Barriers to Discharge Inaccessible home environment   Goals   Patient Goals to be able to eat and drink again   STG Expiration Date 08/05/18   Treatment Day 1   Plan   Treatment/Interventions Functional transfer training;LE strengthening/ROM; Elevations; Therapeutic exercise; Endurance training;Patient/family training;Equipment eval/education; Bed mobility;Gait training   Progress Progressing toward goals   PT Frequency (4-6x/week)   Recommendation   Recommendation (pending PT assessment)   Equipment Recommended Yobany Lamas, PTA

## 2018-07-27 NOTE — PLAN OF CARE
Problem: PHYSICAL THERAPY ADULT  Goal: Performs mobility at highest level of function for planned discharge setting  See evaluation for individualized goals  Treatment/Interventions: Functional transfer training, LE strengthening/ROM, Elevations, Therapeutic exercise, Endurance training, Patient/family training, Equipment eval/education, Bed mobility, Gait training, Spoke to nursing, Spoke to case management          See flowsheet documentation for full assessment, interventions and recommendations  Outcome: Progressing  Prognosis: Good  Problem List: Decreased strength, Decreased range of motion, Decreased endurance, Impaired balance, Decreased mobility, Decreased safety awareness, Obesity, Pain, Decreased skin integrity, Decreased coordination  Assessment: Pt demonstrated improved functional mobility and activity tolerance this session, performing all transfers with decreased assist or S and ambulating beyond household distances with min A for safety due to history of O2 desat  Pt on 1L O2 nc this session, 95% at rest, 92% after ambulation  No complaints of dizziness, pain or weakness this session  Pt required instruction and increased assist to sit due to coughing attack when attempting to use urinal at end of ambulation trial   Pt then performed static standing to use urinal without assist   Pt educated on importance of ambulation to improve breathing, bowel function, and urination  Pt verbalized understanding and is eager to remain mobile  Pt will continue to benefit from therapy services to improve strength, mobility, endurance, activity tolerance, and safety awareness to maximize long term independence  Barriers to Discharge: Inaccessible home environment                See flowsheet documentation for full assessment

## 2018-07-27 NOTE — PROGRESS NOTES
Cardiology Progress Note - Jennifer Shepherd  59 y o  male MRN: [id number]    Unit/Bed#: [facility] 427-01 Encounter: [id number]    Assessment and plan  1  Multivessel coronary disease status post CABG x3  2  Preserved LV systolic function  3  Left ICA stent  4  Hypertension  5  Hyperlipidemia  6  Partial bowel obstruction NG tube in place     Recommendations:  Patient denies of any anginal sounding chest discomfort, mild shortness of breath with productive cough  Continue incentive spirometer and flutter valve  For KUB today to check abdomen continue NG tube per CT surgery  No significant events on telemetry  Continue aspirin, statin, beta-blocker, Plavix  Check ECG tomorrow for QT interval     Subjective:    No significant events overnight  Cardiac-wise he has been doing well  No events on telemetry  No anginal sounding chest discomfort continue with diuresis  ROS    Objective:   Vitals: Blood pressure 122/75, pulse 78, temperature 98 1 °F (36 7 °C), temperature source Oral, resp  rate 19, height 5' 6" (1 676 m), weight 83 6 kg (184 lb 4 9 oz), SpO2 92 %  , Body mass index is 29 75 kg/m² , Orthostatic Blood Pressures      Most Recent Value   Blood Pressure  122/75 [[email or web address]] filed at 07/27/2018 8162   Patient Position - Orthostatic VS  Sitting filed at 07/27/2018 5544         Systolic (04SLA), DQZ:176 , Min:117 , OCV:399     Diastolic (34FSR), ERZ:34, Min:73, Max:87      Intake/Output Summary (Last 24 hours) at 07/27/18 1048  Last data filed at 07/27/18 1046   Gross per 24 hour   Intake           829 17 ml   Output             1900 ml   Net         -1070 83 ml     Weight (last 2 days)     Date/Time   Weight    07/27/18 0600  83 6 (184 3)    07/26/18 0600  88 7 (195 55)    07/25/18 0558  88 6 (195 33)                Telemetry Review: No significant arrhythmias seen on telemetry review  EKG personally reviewed by Pam Armstrong DO       Physical Exam   Constitutional: He is oriented to person, place, and time  He appears well-nourished  No distress  HENT:   Head: Atraumatic  Eyes: Conjunctivae are normal  Pupils are equal, round, and reactive to light  Neck: Neck supple  Cardiovascular: Normal rate, regular rhythm and normal heart sounds  Exam reveals no friction rub  No murmur heard  Pulmonary/Chest: Effort normal and breath sounds normal  No respiratory distress  He has no wheezes  He has no rales  Abdominal: Bowel sounds are normal  He exhibits distension  There is no tenderness  There is no rebound  Musculoskeletal: He exhibits edema  Neurological: He is alert and oriented to person, place, and time  No cranial nerve deficit  Skin: Skin is warm and dry  No erythema  Nursing note and vitals reviewed  Laboratory Results:        CBC with diff:   Results from last 7 days  Lab Units 07/27/18  0514 07/26/18  0520 07/25/18  0412 07/24/18  2152 07/24/18  1755 07/24/18  1526 07/24/18  1523 07/24/18  1355  07/24/18  1318   WBC Thousand/uL 16 97* 22 87* 16 60*  --   --   --   --   --   --   --    HEMOGLOBIN g/dL 10 1* 10 9* 12 4 12 8  --  11 3*  --   --   --   --    I STAT HEMOGLOBIN g/dl  --   --   --   --   --   --  9 9* 8 2*  < >  --    HEMATOCRIT % 31 7* 34 6* 37 9 39 0  --  33 4*  --   --   --   --    MCV fL 93 92 90  --   --   --   --   --   --   --    PLATELETS Thousands/uL 171 181 168  --  207  --   --   --   --  197   MCH pg 29 6 29 1 29 5  --   --   --   --   --   --   --    MCHC g/dL 31 9 31 5 32 7  --   --   --   --   --   --   --    RDW % 12 9 13 1 12 7  --   --   --   --   --   --   --    MPV fL 9 9 10 1 10 1  --  9 8  --   --   --   --  9 6   < > = values in this interval not displayed        CMP:  Results from last 7 days  Lab Units 07/27/18  0514 07/26/18  0520 07/25/18  0412 07/24/18  2152 07/24/18  1526 07/24/18  1523 07/24/18  1355 07/24/18  1332   SODIUM mmol/L 143 140 140  --  142  --   --   --    POTASSIUM mmol/L 3 8 4 2 4 1 4 3 3 3*  --   --   --    CHLORIDE mmol/L 106 104 106  --  106  --   --   --    CO2 mmol/L 34* 32 27  --  28  --   --   --    ANION GAP mmol/L 3* 4 7  --  8  --   --   --    BUN mg/dL 26* 35* 22  --  19  --   --   --    CREATININE mg/dL 0 85 1 17 1 23  --  0 91  --   --   --    GLUCOSE RANDOM mg/dL 135 133 164*  --  113  --   --   --    GLUCOSE, ISTAT mg/dl  --   --   --   --   --  109 185* 156*   CALCIUM mg/dL 8 6 8 5 7 8*  --  7 6*  --   --   --    EGFR ml/min/1 73sq m 93 66 63  --  90  --   --   --          BMP:  Results from last 7 days  Lab Units 18  0514 18  0520 18  0412 18  2152 18  1526   SODIUM mmol/L 143 140 140  --  142   POTASSIUM mmol/L 3 8 4 2 4 1 4 3 3 3*   CHLORIDE mmol/L 106 104 106  --  106   CO2 mmol/L 34* 32 27  --  28   BUN mg/dL 26* 35* 22  --  19   CREATININE mg/dL 0 85 1 17 1 23  --  0 91   GLUCOSE RANDOM mg/dL 135 133 164*  --  113   CALCIUM mg/dL 8 6 8 5 7 8*  --  7 6*       BNP: No results for input(s): BNP in the last 72 hours      Magnesium:   Results from last 7 days  Lab Units 18  0520 18  0412   MAGNESIUM mg/dL 2 6 2 4       Coags:   Results from last 7 days  Lab Units 18  1755   PTT seconds 30   INR  1 35*       TSH:        Hemoglobin A1C       Lipid Profile:   Results from last 7 days  Lab Units 18  1034   CHOLESTEROL mg/dL 135   TRIGLYCERIDES mg/dL 161*   HDL mg/dL 40       Cardiac testing:   Results for orders placed during the hospital encounter of 18   Echo complete with contrast if indicated    [address]  [address] [facility]  [phone]    Transthoracic Echocardiogram    Study date:  2018    Patient: Irving Magallanes  MR number: [id number]  Account number: [de-identified]  : 1955  Age: 59 years  Gender: Male  Status: Routine  Location: Echo lab  Height: 68 in  Weight: 186 6 lb  BP: 134/ 82 mmHg    Indications: Dyspnea    Diagnoses: R06 00 - Dyspnea, unspecified    Sonographer:  Valentina Neely, [facility]  Primary Physician:  Corie Berry MD  Referring Physician:  Elin Ortega DO  Group:  [facility]  Interpreting Physician:  Carly Arriaza MD    SUMMARY    LEFT VENTRICLE:  Systolic function was normal  Ejection fraction was estimated in the range of 60 % to 65 %  There were no regional wall motion abnormalities  AORTIC VALVE:  There was trace regurgitation  TRICUSPID VALVE:  The tricuspid jet envelope definition was inadequate for estimation of RV systolic pressure  There are no indirect findings (abnormal RV volume or geometry, altered pulmonary flow velocity profile, or leftward septal displacement) which  would suggest moderate or severe pulmonary hypertension  HISTORY: PRIOR HISTORY: HTN, Hyperlipidemia, Parkinson Disease    PROCEDURE: The procedure was performed in the echo lab  This was a routine study  The transthoracic approach was used  The heart rate was 70 bpm, at the start of the study  Image quality was adequate  LEFT VENTRICLE: Size was normal  Systolic function was normal  Ejection fraction was estimated in the range of 60 % to 65 %  There were no regional wall motion abnormalities  Wall thickness was normal  No evidence of apical thrombus  DOPPLER: Left ventricular diastolic function parameters were normal     RIGHT VENTRICLE: The size was normal  Systolic function was normal  Wall thickness was normal     LEFT ATRIUM: Size was normal     RIGHT ATRIUM: Size was normal     MITRAL VALVE: Valve structure was normal  There was normal leaflet separation  DOPPLER: The transmitral velocity was within the normal range  There was no evidence for stenosis  There was no significant regurgitation  AORTIC VALVE: The valve was trileaflet  Leaflets exhibited mildly increased thickness, mild to moderate calcification, normal cuspal separation, and sclerosis  DOPPLER: Transaortic velocity was within the normal range  There was no  evidence for stenosis   There was trace regurgitation  TRICUSPID VALVE: The valve structure was normal  There was normal leaflet separation  DOPPLER: The transtricuspid velocity was within the normal range  There was no evidence for stenosis  There was no significant regurgitation  The  tricuspid jet envelope definition was inadequate for estimation of RV systolic pressure  There are no indirect findings (abnormal RV volume or geometry, altered pulmonary flow velocity profile, or leftward septal displacement) which would  suggest moderate or severe pulmonary hypertension  PULMONIC VALVE: Leaflets exhibited normal thickness, no calcification, and normal cuspal separation  DOPPLER: The transpulmonic velocity was within the normal range  There was no significant regurgitation  PERICARDIUM: There was no pericardial effusion  The pericardium was normal in appearance  AORTA: The root exhibited normal size  SYSTEMIC VEINS: IVC: The inferior vena cava was normal in size  SYSTEM MEASUREMENT TABLES    2D mode  AoR Diam 2D: 3 5 cm  LA Diam (2D): 3 3 cm  LA/Ao (2D): 0 94  FS (2D Teich): 32 %  IVSd (2D): 1 cm  LVDEV: 120 cm³  LVESV: 48 1 cm³  LVIDd(2D): 5 03 cm  LVISd (2D): 3 42 cm  LVOT Area 2D: 3 14 cm squared  LVPWd (2D): 1 03 cm  SV (Teich): 71 9 cm³    Apical four chamber  LVEF A4C: 62 %    Unspecified Scan Mode  LVOT Diam : 2 cm  MV Peak A Patrick: 491 mm/s  MV Peak E Patrick  Mean: 626 mm/s  MVA (PHT): 3 93 cm squared  PHT: 56 ms  Max P mm[Hg]  V Max: 2220 mm/s  Vmax: 2220 mm/s  RA Area: 13 8 cm squared  RA Volume: 31 1 cm³  TAPSE: 1 8 cm    [employer] Accredited Echocardiography Laboratory    Prepared and electronically signed by    Joana Nova MD  Signed 2018 19:24:11       No results found for this or any previous visit  No results found for this or any previous visit    Results for orders placed during the hospital encounter of 18   NM myocardial perfusion spect (stress and/or rest)    [address] [facility]  [address] [facility]  [phone]    Rest/Stress Gated SPECT Myocardial Perfusion Imaging After Exercise    Patient: Sukhdeep Dash  MR number: [id number]  Account number: [de-identified]  : 1955  Age: 59 years  Gender: Male  Status: Outpatient  Location: Stress lab  Height: 68 in  Weight: 187 lb  BP: 136/ 84 mmHg    Allergies: NO KNOWN ALLERGIES    Diagnosis: Z01 810 - Encounter for preprocedural cardiovascular examination    Primary Physician:  Sonia Florian MD  RN:  AYALA Malagon  Referring Physician:  Sun Galo DO  Group:  [facility]  Report Prepared By[de-identified]  AYALA Malagon  Interpreting Physician:  Bryn Walker MD    INDICATIONS: Evaluation for coronary artery disease  HISTORY: The patient is a 59year old  male  Chest pain status: no chest pain  Other symptoms: dyspnea  Coronary artery disease risk factors: dyslipidemia, hypertension, and family history of premature coronary artery disease  Cardiovascular history: peripheral vascular occlusive disease, left carotid artery stenosis  Medications: aspirin, a lipid lowering agent, and an antihypertensive agent  PHYSICAL EXAM: Baseline physical exam screening: normal     REST ECG: Normal sinus rhythm  PROCEDURE: The study was performed in the stress lab  Treadmill exercise testing was performed, using the Cal protocol  Gated SPECT myocardial perfusion imaging was performed after stress and at rest  Systolic blood pressure was 136  mmHg, at the start of the study  Diastolic blood pressure was 84 mmHg, at the start of the study  The heart rate was 68 bpm, at the start of the study  IV double checked      CAL PROTOCOL:  HR bpm SBP mmHg DBP mmHg Symptoms ST change Rhythm/conduct  Baseline 67 136 84 none -- NSR  Stage 1 117 154 80 none -- --  Stage 2 139 162 80 none horizontal depression, 1 0 mm in V5 and V6 --  Stage 3 150 -- -- mild dyspnea horizontal depression, 1 5-2 mm in V5 and V6, II --  Immediate 144 150 78 subsiding horizontal depression, 1-1 5 mm in V5 and V6 --  Recovery 1 100 140 76 none same as above --  Recovery 2 89 124 76 none return to baseline --  No medications or fluids given  STRESS SUMMARY: Dr Torsten Russo ,made aware of above EKG changes- spoek to patient and his wife  Duration of exercise was 7 min  The patient exercised to protocol stage 3  Maximal work rate was 10 1 METs  Maximal heart rate during stress was  150 bpm ( 95 % of maximal predicted heart rate)  The heart rate response to stress was normal  There was normal resting blood pressure with an appropriate response to stress  The rate-pressure product for the peak heart rate and blood  pressure was 47944  There was no chest pain during stress  The stress test was terminated due to achievement of target heart rate  Pre oxygen saturation: 99 %  Peak oxygen saturation: 99 %  2mm st depression in inferolateral leads The  stress ECG was consistent with myocardial ischemia  ISOTOPE ADMINISTRATION:  Resting isotope administration Stress isotope administration  Agent Tetrofosmin Tetrofosmin  Dose 10 6 mCi 33 mCi  Date 06/13/2018 06/13/2018    Radiopharmaceutical was administered 5 min, 26 sec into the stress protocol  There was 1 min and 30 sec of exercise after the injection  MYOCARDIAL PERFUSION IMAGING:  The image quality was good  PERFUSION DEFECTS:  -  There was a small to moderate, partially reversible myocardial perfusion defect of the inferior wall  Small area of perfusion defect which correlated with ecg changes  GATED SPECT:  The calculated left ventricular ejection fraction was 54 %  Left ventricular ejection fraction was within normal limits by visual estimate  There was no left ventricular regional abnormality  SUMMARY:  -  Stress results: Duration of exercise was 7 min  Maximal work rate was 10 1 METs  Maximal heart rate during stress was 150 bpm ( 95 % of maximal predicted heart rate)   Target heart rate was achieved  There was no chest pain during  stress  -  ECG conclusions: The stress ECG was consistent with myocardial ischemia  -  Perfusion imaging: There was a small to moderate, partially reversible myocardial perfusion defect of the inferior wall  Small area of perfusion defect which correlated with ecg changes  -  Gated SPECT: The calculated left ventricular ejection fraction was 54 %  Left ventricular ejection fraction was within normal limits by visual estimate  There was no left ventricular regional abnormality   -  Impressions and recommendations: Abnormal study after maximal exercise  IMPRESSIONS: Abnormal study after maximal exercise      Prepared and signed by    Bryn Walker MD  Signed 06/13/2018 18:41:55         Meds/Allergies   all current active meds have been reviewed  Prescriptions Prior to Admission   Medication    aspirin 325 mg tablet    Acetaminophen 325 MG CAPS         dextrose 5 % and sodium chloride 0 45 % 50 mL/hr Last Rate: 50 mL/hr (07/27/18 0548)     Assessment:  Principal Problem:    Disease of cardiovascular system  Active Problems:    Left carotid artery stenosis    Essential hypertension    Dyslipidemia    Carotid stenosis, left    S/P CABG x 3    Leukocytosis    Acute blood loss as cause of postoperative anemia    Bowel sounds abnormal

## 2018-07-27 NOTE — PROGRESS NOTES
Progress Note - Cardiothoracic Surgery   Sravani Clayton  59 y o  male MRN: [id number]  Unit/Bed#: [facility] 427-01 Encounter: [id number]  Coronary artery disease  S/P coronary artery bypass grafting x 3; POD # 3      24 Hour Events: Feels better today  Denies CP or SOB  Remains on 1L NC  Pt expectorating thick mucous secretions  Denies any SOB or CP   Remains sinus tachycardic and BP    Medications:   Scheduled Meds:    Current Facility-Administered Medications:  acetaminophen 650 mg Oral Q4H PRN Luke Camacho PA-C    albuterol 2 5 mg Nebulization Q4H PRN Shane Lauren,     amiodarone 200 mg Oral [facility] Luke Camacho PA-C    aspirin 81 mg Oral Daily Minerva Tom PA-C    atorvastatin 80 mg Oral Daily With Donna Houser PA-C    bisacodyl 10 mg Rectal Daily PRN Luke Camacho PA-C    clopidogrel 75 mg Oral Daily Yuan Houser PA-C    dextrose 5 % and sodium chloride 0 45 % 50 mL/hr Intravenous Continuous [place] BRANDON Guardado Last Rate: 50 mL/hr (07/27/18 0548)   docusate sodium 100 mg Oral BID WILLIS Bang    fondaparinux 2 5 mg Subcutaneous Daily Luke Camacho PA-C    HYDROmorphone 0 5 mg Intravenous Q3H PRN Bran Avitia PA-C    hydrOXYzine HCL 25 mg Oral Q6H PRN Luke Camacho PA-C    insulin lispro 1-5 Units Subcutaneous TID AC WILLIS Bang    insulin lispro 1-5 Units Subcutaneous HS WILLIS Bang    metoprolol 2 5 mg Intravenous Q6H WILLIS Bang    mupirocin 1 application Nasal E85Z [facility] Yuan Houser PA-C    ondansetron 4 mg Intravenous Q6H PRN Luke Camacho PA-C    pantoprazole 40 mg Intravenous Q24H [facility] WILLIS Bang    polyethylene glycol 17 g Oral Daily Yuan Houser PA-C    temazepam 15 mg Oral HS PRN Yamini Spironello V, CRNP      Continuous Infusions:    dextrose 5 % and sodium chloride 0 45 % 50 mL/hr Last Rate: 50 mL/hr (07/27/18 0548)     PRN Meds:   acetaminophen    albuterol    bisacodyl    HYDROmorphone   hydrOXYzine HCL    ondansetron    temazepam    Vitals:   Vitals:    07/27/18 0253 07/27/18 0333 07/27/18 0600 07/27/18 0649   BP: 146/87   122/75   BP Location: Right arm   Right arm   Pulse: (!) 113 88  78   Resp: 22   19   Temp: 100 °F (37 8 °C) 98 6 °F (37 °C)  98 1 °F (36 7 °C)   TempSrc: Oral Oral  Oral   SpO2: 91%   92%   Weight:   83 6 kg (184 lb 4 9 oz)    Height:           Telemetry: Sinus Tachycardia; Heart Rate: 105    Respiratory:   SpO2: SpO2: 92 %; 1 LPM    Intake/Output: -500 ml / 24 hrs  I/O       07/24 0701 - 07/25 0700 07/25 0701 - 07/26 0700 07/26 0701 - 07/27 0700    P  O   0     I V  (mL/kg) 3411 6 (38 5) 155 5 (1 8)     NG/GT 60 0     IV Piggyback 650 100     Cell Saver 750      Total Intake(mL/kg) 4871 6 (55) 255 5 (2 9)     Urine (mL/kg/hr) 1830 (0 9) 865 (0 4)     Emesis/NG output 250 310     Blood 750      Chest Tube 540 170     Total Output 3370 1345      Net +1501 6 -1089 5                   NG tube  75 ml / 8 hrs   200ml / 24 hrs              Weights:   Weight (last 2 days)     Date/Time   Weight    07/27/18 0600  83 6 (184 3)    07/26/18 0600  88 7 (195 55)    07/25/18 0558  88 6 (195 33)            Admit weight: 82 1 kg    Results:     Results from last 7 days  Lab Units 07/27/18  0514 07/26/18  0520 07/25/18  0412   WBC Thousand/uL 16 97* 22 87* 16 60*   HEMOGLOBIN g/dL 10 1* 10 9* 12 4   HEMATOCRIT % 31 7* 34 6* 37 9   PLATELETS Thousands/uL 171 181 168       Results from last 7 days  Lab Units 07/27/18  0514 07/26/18  0520 07/25/18  0412   SODIUM mmol/L 143 140 140   POTASSIUM mmol/L 3 8 4 2 4 1   CHLORIDE mmol/L 106 104 106   CO2 mmol/L 34* 32 27   BUN mg/dL 26* 35* 22   CREATININE mg/dL 0 85 1 17 1 23   GLUCOSE RANDOM mg/dL 135 133 164*   CALCIUM mg/dL 8 6 8 5 7 8*       Results from last 7 days  Lab Units 07/24/18  1755   INR  1 35*   PTT seconds 30     Point of care glucose:  - 143   No SSIC / 24 hrs    Studies:  PCXR 7/26: Interval placement of nasogastric tube and replacement of Alpha-Sacha catheter  Status post recent surgery  No evidence of pneumothorax  No acute cardiopulmonary disease  Invasive Lines/Tubes:  Invasive Devices     Central Venous Catheter Line            CVC Central Lines 07/24/18 Triple Right Internal jugular 2 days          Peripheral Intravenous Line            Peripheral IV 07/24/18 Right Hand 2 days          Drain            NG/OG/Enteral Tube Nasogastric 18 Fr Left nares 2 days                Physical Exam:    HEENT/NECK:  PERRLA  No jugular venous distention  NG tube in place  Cardiac: Regular rate and rhythm  No rubs/murmurs/gallops  Pulmonary:  Breath sounds diminished at the bases bilaterally  Faint inspiratory wheeze  Abdomen:  Non-tender, slightly distended  hypoactive bowel sounds  Incisions: Sternum is stable  Incision dressed with Acticoat  No erythema or drainage  Saphenectomy incision D/C/I  No erythema or drainage  Lower extremities: Extremities warm/dry  Radial/PT/DP pulses 2+ bilaterally  1+ edema B/L  Neuro: Alert and oriented X 3  Sensation is grossly intact  No focal deficits  Skin: Warm/Dry, without rashes or lesions  Assessment:  Patient Active Problem List   Diagnosis    Left carotid artery stenosis    Amaurosis fugax    Essential hypertension    Dyslipidemia    Pre-op exam    Carotid artery disorder (HCC)    Carotid stenosis, left    Coronary artery disease of native artery of native heart with stable angina pectoris ([address] )    Disease of cardiovascular system    S/P CABG x 3    Leukocytosis    Acute blood loss as cause of postoperative anemia    Shortness of breath       Coronary artery disease  S/P coronary artery bypass grafting x 3; POD # 3    Plan:    1  Cardiac:   NSR; HR/BP well-controlled  Increase Lopressor 5 mg IV q6  Continue ASA, Plavix and Statin therapy  Epicardial pacing wires no longer required    Remove today  Maintain central IV access today for access  Continue DVT prophylaxis    2  Pulmonary:   Acute post-op pulmonary insufficiency; Requiring 1 liters via nasal cannula, secondary to history of tobacco use, atelectasis, pulmonary vascular congestion and poor inspiratory effort secondary to pain  Continue incentive spirometry/coughing/deep breathing exercises  Wean supplemental oxygen as tolerated for saturation > 90%   CTs have been removed      3  Renal:   Intake/Output net: -500 mL/24 hours  No diuretic due to NPO status  Continue D5 1/2 NS @ 50ml/hr  Post op Creatinine stable; Follow up labs prn    4  Neuro:  Neurologically intact; No active issues  Incisional pain well-controlled; Continue prn Percocet    5  GI:  NPO, maintain NG tube due to increased output, possible postoperative ileus  Maintain 1800 mL daily fluid restriction   Continue stool softeners and prn suppository  Continue GI prophylaxis    6  Endo:    No history of diabetes; Glucose well-controlled with sliding scale coverage    7  Hematology:   Post-operative acute blood loss anemia; Hemoglobin and hematocrit stable, trend prn   PO Leukocytosis - WBC decreased, afebrile  Likely atelectasis  Continue to  monitor       8  Disposition:  Follow daily PT/OT recommendations regarding home vs  rehab when medically cleared for discharge    VTE Pharmacologic Prophylaxis: Fondaparinux (Arixtra)  VTE Mechanical Prophylaxis: sequential compression device    Collaborative rounds completed with BARBIE Ag , and Carlos A Lazar RN    SIGNATURE: [place]  DATE: July 27, 2018  TIME: 9:36 AM

## 2018-07-28 PROBLEM — E87.6 HYPOKALEMIA: Status: ACTIVE | Noted: 2018-07-28

## 2018-07-28 LAB
ANION GAP SERPL CALCULATED.3IONS-SCNC: 3 MMOL/L (ref 4–13)
BUN SERPL-MCNC: 22 MG/DL (ref 5–25)
CALCIUM SERPL-MCNC: 8.1 MG/DL (ref 8.3–10.1)
CHLORIDE SERPL-SCNC: 108 MMOL/L (ref 100–108)
CO2 SERPL-SCNC: 33 MMOL/L (ref 21–32)
CREAT SERPL-MCNC: 0.73 MG/DL (ref 0.6–1.3)
GFR SERPL CREATININE-BSD FRML MDRD: 99 ML/MIN/1.73SQ M
GLUCOSE SERPL-MCNC: 118 MG/DL (ref 65–140)
GLUCOSE SERPL-MCNC: 122 MG/DL (ref 65–140)
GLUCOSE SERPL-MCNC: 128 MG/DL (ref 65–140)
GLUCOSE SERPL-MCNC: 85 MG/DL (ref 65–140)
GLUCOSE SERPL-MCNC: 85 MG/DL (ref 65–140)
POTASSIUM SERPL-SCNC: 3.2 MMOL/L (ref 3.5–5.3)
SODIUM SERPL-SCNC: 144 MMOL/L (ref 136–145)

## 2018-07-28 PROCEDURE — 99024 POSTOP FOLLOW-UP VISIT: CPT | Performed by: THORACIC SURGERY (CARDIOTHORACIC VASCULAR SURGERY)

## 2018-07-28 PROCEDURE — 82948 REAGENT STRIP/BLOOD GLUCOSE: CPT

## 2018-07-28 PROCEDURE — 99232 SBSQ HOSP IP/OBS MODERATE 35: CPT | Performed by: INTERNAL MEDICINE

## 2018-07-28 PROCEDURE — 94760 N-INVAS EAR/PLS OXIMETRY 1: CPT

## 2018-07-28 PROCEDURE — 80048 BASIC METABOLIC PNL TOTAL CA: CPT | Performed by: PHYSICIAN ASSISTANT

## 2018-07-28 PROCEDURE — C9113 INJ PANTOPRAZOLE SODIUM, VIA: HCPCS | Performed by: NURSE PRACTITIONER

## 2018-07-28 PROCEDURE — 94668 MNPJ CHEST WALL SBSQ: CPT

## 2018-07-28 RX ORDER — HYDROCODONE BITARTRATE AND ACETAMINOPHEN 5; 325 MG/1; MG/1
1 TABLET ORAL EVERY 4 HOURS PRN
Status: DISCONTINUED | OUTPATIENT
Start: 2018-07-28 | End: 2018-07-30

## 2018-07-28 RX ORDER — PANTOPRAZOLE SODIUM 40 MG/1
40 TABLET, DELAYED RELEASE ORAL
Status: DISCONTINUED | OUTPATIENT
Start: 2018-07-28 | End: 2018-07-30 | Stop reason: HOSPADM

## 2018-07-28 RX ORDER — HYDROCODONE BITARTRATE AND ACETAMINOPHEN 5; 325 MG/1; MG/1
2 TABLET ORAL EVERY 6 HOURS PRN
Status: DISCONTINUED | OUTPATIENT
Start: 2018-07-28 | End: 2018-07-30 | Stop reason: HOSPADM

## 2018-07-28 RX ORDER — ASPIRIN 81 MG/1
81 TABLET ORAL DAILY
Status: DISCONTINUED | OUTPATIENT
Start: 2018-07-28 | End: 2018-07-30 | Stop reason: HOSPADM

## 2018-07-28 RX ORDER — POTASSIUM CHLORIDE 20 MEQ/1
20 TABLET, EXTENDED RELEASE ORAL
Status: DISCONTINUED | OUTPATIENT
Start: 2018-07-28 | End: 2018-07-29

## 2018-07-28 RX ADMIN — METOPROLOL TARTRATE 5 MG: 1 INJECTION, SOLUTION INTRAVENOUS at 08:57

## 2018-07-28 RX ADMIN — POTASSIUM CHLORIDE 20 MEQ: 1500 TABLET, EXTENDED RELEASE ORAL at 15:54

## 2018-07-28 RX ADMIN — TEMAZEPAM 15 MG: 15 CAPSULE ORAL at 23:00

## 2018-07-28 RX ADMIN — HYDROCODONE BITARTRATE AND ACETAMINOPHEN 1 TABLET: 5; 325 TABLET ORAL at 19:05

## 2018-07-28 RX ADMIN — ATORVASTATIN CALCIUM 80 MG: 80 TABLET, FILM COATED ORAL at 15:54

## 2018-07-28 RX ADMIN — HYDROCODONE BITARTRATE AND ACETAMINOPHEN 1 TABLET: 5; 325 TABLET ORAL at 23:00

## 2018-07-28 RX ADMIN — MUPIROCIN 1 APPLICATION: 20 OINTMENT TOPICAL at 08:57

## 2018-07-28 RX ADMIN — METOPROLOL TARTRATE 5 MG: 1 INJECTION, SOLUTION INTRAVENOUS at 03:36

## 2018-07-28 RX ADMIN — DEXTROSE AND SODIUM CHLORIDE 50 ML/HR: 5; .45 INJECTION, SOLUTION INTRAVENOUS at 01:02

## 2018-07-28 RX ADMIN — PANTOPRAZOLE SODIUM 40 MG: 40 INJECTION, POWDER, FOR SOLUTION INTRAVENOUS at 08:56

## 2018-07-28 RX ADMIN — MUPIROCIN 1 APPLICATION: 20 OINTMENT TOPICAL at 21:00

## 2018-07-28 RX ADMIN — CLOPIDOGREL BISULFATE 75 MG: 75 TABLET, FILM COATED ORAL at 08:57

## 2018-07-28 RX ADMIN — METOPROLOL TARTRATE 25 MG: 25 TABLET ORAL at 21:00

## 2018-07-28 RX ADMIN — POTASSIUM CHLORIDE 20 MEQ: 1500 TABLET, EXTENDED RELEASE ORAL at 11:17

## 2018-07-28 RX ADMIN — ASPIRIN 81 MG: 81 TABLET, COATED ORAL at 11:16

## 2018-07-28 RX ADMIN — FONDAPARINUX SODIUM 2.5 MG: 2.5 INJECTION, SOLUTION SUBCUTANEOUS at 08:57

## 2018-07-28 NOTE — PROGRESS NOTES
Cardiology Progress Note - Jhoana Del Toro  59 y o  male MRN: [id number]    Unit/Bed#: [facility] 427-01 Encounter: [id number]    Assessment and plan  1   Multivessel coronary disease status post CABG x3  2   Preserved LV systolic function  3   Left ICA stent  4   Hypertension  5   Hyperlipidemia  6   Partial bowel obstruction NG tube in place     Recommendations:  Patient denies of any anginal sounding chest discomfort, mild shortness of breath with productive cough   Continue incentive spirometer and flutter valve  NG tube was discontinued he has been feeling better started to have a little bit of clear liquid today  No significant events on telemetry   Continue aspirin, statin, beta-blocker, Plavix       Subjective:    No significant events overnight  Denies chest pain or dyspnea slight lower extremity edema ambulating in the halls without difficulty    ROS    Objective:   Vitals: Blood pressure 127/61, pulse 80, temperature 98 1 °F (36 7 °C), temperature source Oral, resp  rate 16, height 5' 6" (1 676 m), weight 82 7 kg (182 lb 5 1 oz), SpO2 96 %  , Body mass index is 29 43 kg/m² , Orthostatic Blood Pressures      Most Recent Value   Blood Pressure  127/61 filed at 07/28/2018 0730   Patient Position - Orthostatic VS  Sitting filed at 07/28/2018 0498         Systolic (07YCO), NIY:719 , Min:116 , DFQ:890     Diastolic (87VIS), TYW:89, Min:61, Max:72      Intake/Output Summary (Last 24 hours) at 07/28/18 0941  Last data filed at 07/28/18 0630   Gross per 24 hour   Intake          1099 16 ml   Output             1825 ml   Net          -725 84 ml     Weight (last 2 days)     Date/Time   Weight    07/28/18 0600  82 7 (182 32)    07/27/18 0600  83 6 (184 3)    07/26/18 0600  88 7 (195 55)                Telemetry Review: No significant arrhythmias seen on telemetry review  EKG personally reviewed by Emerson Lewis, DO  Physical Exam   Constitutional: He is oriented to person, place, and time   He appears well-nourished  No distress  HENT:   Head: Atraumatic  Eyes: Conjunctivae are normal  Pupils are equal, round, and reactive to light  Neck: Neck supple  Cardiovascular: Normal rate, regular rhythm and normal heart sounds  Exam reveals no friction rub  No murmur heard  Pulmonary/Chest: Effort normal and breath sounds normal  No respiratory distress  He has no wheezes  He has no rales  Abdominal: Bowel sounds are normal  He exhibits no distension  There is no tenderness  There is no rebound  Musculoskeletal: He exhibits edema  He exhibits no deformity  Neurological: He is alert and oriented to person, place, and time  No cranial nerve deficit  Skin: Skin is warm and dry  No erythema  Nursing note and vitals reviewed  Laboratory Results:        CBC with diff:   Results from last 7 days  Lab Units 07/27/18  0514 07/26/18  0520 07/25/18 0412 07/24/18 2152 07/24/18  1755 07/24/18  1526 07/24/18  1523 07/24/18  1355  07/24/18  1318   WBC Thousand/uL 16 97* 22 87* 16 60*  --   --   --   --   --   --   --    HEMOGLOBIN g/dL 10 1* 10 9* 12 4 12 8  --  11 3*  --   --   --   --    I STAT HEMOGLOBIN g/dl  --   --   --   --   --   --  9 9* 8 2*  < >  --    HEMATOCRIT % 31 7* 34 6* 37 9 39 0  --  33 4*  --   --   --   --    MCV fL 93 92 90  --   --   --   --   --   --   --    PLATELETS Thousands/uL 171 181 168  --  207  --   --   --   --  197   MCH pg 29 6 29 1 29 5  --   --   --   --   --   --   --    MCHC g/dL 31 9 31 5 32 7  --   --   --   --   --   --   --    RDW % 12 9 13 1 12 7  --   --   --   --   --   --   --    MPV fL 9 9 10 1 10 1  --  9 8  --   --   --   --  9 6   < > = values in this interval not displayed        CMP:  Results from last 7 days  Lab Units 07/28/18  0521 07/27/18  0514 07/26/18  0520 07/25/18 0412 07/24/18 2152 07/24/18  1526 07/24/18  1523 07/24/18  1355   SODIUM mmol/L 144 143 140 140  --  142  --   --    POTASSIUM mmol/L 3 2* 3 8 4 2 4 1 4 3 3 3*  --   --    CHLORIDE mmol/L 108 106 104 106  --  106  --   --    CO2 mmol/L 33* 34* 32 27  --  28  --   --    ANION GAP mmol/L 3* 3* 4 7  --  8  --   --    BUN mg/dL 22 26* 35* 22  --  19  --   --    CREATININE mg/dL 0 73 0 85 1 17 1 23  --  0 91  --   --    GLUCOSE RANDOM mg/dL 122 135 133 164*  --  113  --   --    GLUCOSE, ISTAT mg/dl  --   --   --   --   --   --  109 185*   CALCIUM mg/dL 8 1* 8 6 8 5 7 8*  --  7 6*  --   --    EGFR ml/min/1 73sq m 99 93 66 63  --  90  --   --          BMP:  Results from last 7 days  Lab Units 18  0521 18  0514 18  0520 18  0412 18  2152 18  1526   SODIUM mmol/L 144 143 140 140  --  142   POTASSIUM mmol/L 3 2* 3 8 4 2 4 1 4 3 3 3*   CHLORIDE mmol/L 108 106 104 106  --  106   CO2 mmol/L 33* 34* 32 27  --  28   BUN mg/dL 22 26* 35* 22  --  19   CREATININE mg/dL 0 73 0 85 1 17 1 23  --  0 91   GLUCOSE RANDOM mg/dL 122 135 133 164*  --  113   CALCIUM mg/dL 8 1* 8 6 8 5 7 8*  --  7 6*       BNP: No results for input(s): BNP in the last 72 hours      Magnesium:   Results from last 7 days  Lab Units 18  0520 18  0412   MAGNESIUM mg/dL 2 6 2 4       Coags:   Results from last 7 days  Lab Units 18  1755   PTT seconds 30   INR  1 35*       TSH:        Hemoglobin A1C       Lipid Profile:   Results from last 7 days  Lab Units 18  1034   CHOLESTEROL mg/dL 135   TRIGLYCERIDES mg/dL 161*   HDL mg/dL 40       Cardiac testing:   Results for orders placed during the hospital encounter of 18   Echo complete with contrast if indicated    [address]  [address] [facility]  [place]  [phone]    Transthoracic Echocardiogram    Study date:  2018    Patient: Rodríguez Savage  MR number: [id number]  Account number: [de-identified]  : 1955  Age: 59 years  Gender: Male  Status: Routine  Location: Echo lab  Height: 68 in  Weight: 186 6 lb  BP: 134/ 82 mmHg    Indications: Dyspnea    Diagnoses: R06 00 - Dyspnea, unspecified    Sonographer:  ARUNA Bain  Primary Physician:  Omer Mejia MD  Referring Physician:  Immanuel Heath DO  Group:  [facility]  Interpreting Physician:  Jorge Alas MD    SUMMARY    LEFT VENTRICLE:  Systolic function was normal  Ejection fraction was estimated in the range of 60 % to 65 %  There were no regional wall motion abnormalities  AORTIC VALVE:  There was trace regurgitation  TRICUSPID VALVE:  The tricuspid jet envelope definition was inadequate for estimation of RV systolic pressure  There are no indirect findings (abnormal RV volume or geometry, altered pulmonary flow velocity profile, or leftward septal displacement) which  would suggest moderate or severe pulmonary hypertension  HISTORY: PRIOR HISTORY: HTN, Hyperlipidemia, Parkinson Disease    PROCEDURE: The procedure was performed in the echo lab  This was a routine study  The transthoracic approach was used  The heart rate was 70 bpm, at the start of the study  Image quality was adequate  LEFT VENTRICLE: Size was normal  Systolic function was normal  Ejection fraction was estimated in the range of 60 % to 65 %  There were no regional wall motion abnormalities  Wall thickness was normal  No evidence of apical thrombus  DOPPLER: Left ventricular diastolic function parameters were normal     RIGHT VENTRICLE: The size was normal  Systolic function was normal  Wall thickness was normal     LEFT ATRIUM: Size was normal     RIGHT ATRIUM: Size was normal     MITRAL VALVE: Valve structure was normal  There was normal leaflet separation  DOPPLER: The transmitral velocity was within the normal range  There was no evidence for stenosis  There was no significant regurgitation  AORTIC VALVE: The valve was trileaflet  Leaflets exhibited mildly increased thickness, mild to moderate calcification, normal cuspal separation, and sclerosis  DOPPLER: Transaortic velocity was within the normal range   There was no  evidence for stenosis  There was trace regurgitation  TRICUSPID VALVE: The valve structure was normal  There was normal leaflet separation  DOPPLER: The transtricuspid velocity was within the normal range  There was no evidence for stenosis  There was no significant regurgitation  The  tricuspid jet envelope definition was inadequate for estimation of RV systolic pressure  There are no indirect findings (abnormal RV volume or geometry, altered pulmonary flow velocity profile, or leftward septal displacement) which would  suggest moderate or severe pulmonary hypertension  PULMONIC VALVE: Leaflets exhibited normal thickness, no calcification, and normal cuspal separation  DOPPLER: The transpulmonic velocity was within the normal range  There was no significant regurgitation  PERICARDIUM: There was no pericardial effusion  The pericardium was normal in appearance  AORTA: The root exhibited normal size  SYSTEMIC VEINS: IVC: The inferior vena cava was normal in size  SYSTEM MEASUREMENT TABLES    2D mode  AoR Diam 2D: 3 5 cm  LA Diam (2D): 3 3 cm  LA/Ao (2D): 0 94  FS (2D Teich): 32 %  IVSd (2D): 1 cm  LVDEV: 120 cm³  LVESV: 48 1 cm³  LVIDd(2D): 5 03 cm  LVISd (2D): 3 42 cm  LVOT Area 2D: 3 14 cm squared  LVPWd (2D): 1 03 cm  SV (Teich): 71 9 cm³    Apical four chamber  LVEF A4C: 62 %    Unspecified Scan Mode  LVOT Diam : 2 cm  MV Peak A Patrick: 491 mm/s  MV Peak E Patrick  Mean: 626 mm/s  MVA (PHT): 3 93 cm squared  PHT: 56 ms  Max P mm[Hg]  V Max: 2220 mm/s  Vmax: 2220 mm/s  RA Area: 13 8 cm squared  RA Volume: 31 1 cm³  TAPSE: 1 8 cm    [employer] Accredited Echocardiography Laboratory    Prepared and electronically signed by    Jania Barrientos MD  Signed 2018 19:24:11       No results found for this or any previous visit  No results found for this or any previous visit    Results for orders placed during the hospital encounter of 18   NM myocardial perfusion spect (stress and/or rest)    Narrative [address]  [address] [facility]  [place] [address]  [phone]    Rest/Stress Gated SPECT Myocardial Perfusion Imaging After Exercise    Patient: Faviola Clark  MR number: [id number]  Account number: [de-identified]  : 1955  Age: 59 years  Gender: Male  Status: Outpatient  Location: Stress lab  Height: 68 in  Weight: 187 lb  BP: 136/ 84 mmHg    Allergies: NO KNOWN ALLERGIES    Diagnosis: Z01 810 - Encounter for preprocedural cardiovascular examination    Primary Physician:  Roosevelt Mcelroy MD  RN:  AYALA Gomes  Referring Physician:  Therese Cowan DO  Group:  [facility]  Report Prepared By[de-identified]  AYALA Gomes  Interpreting Physician:  Palomo Urrutia MD    INDICATIONS: Evaluation for coronary artery disease  HISTORY: The patient is a 59year old  male  Chest pain status: no chest pain  Other symptoms: dyspnea  Coronary artery disease risk factors: dyslipidemia, hypertension, and family history of premature coronary artery disease  Cardiovascular history: peripheral vascular occlusive disease, left carotid artery stenosis  Medications: aspirin, a lipid lowering agent, and an antihypertensive agent  PHYSICAL EXAM: Baseline physical exam screening: normal     REST ECG: Normal sinus rhythm  PROCEDURE: The study was performed in the stress lab  Treadmill exercise testing was performed, using the Juli protocol  Gated SPECT myocardial perfusion imaging was performed after stress and at rest  Systolic blood pressure was 136  mmHg, at the start of the study  Diastolic blood pressure was 84 mmHg, at the start of the study  The heart rate was 68 bpm, at the start of the study  IV double checked      JULI PROTOCOL:  HR bpm SBP mmHg DBP mmHg Symptoms ST change Rhythm/conduct  Baseline 67 136 84 none -- NSR  Stage 1 117 154 80 none -- --  Stage 2 139 162 80 none horizontal depression, 1 0 mm in V5 and V6 --  Stage 3 150 -- -- mild dyspnea horizontal depression, 1 5-2 mm in V5 and V6, II --  Immediate 144 150 78 subsiding horizontal depression, 1-1 5 mm in V5 and V6 --  Recovery 1 100 140 76 none same as above --  Recovery 2 89 124 76 none return to baseline --  No medications or fluids given  STRESS SUMMARY: Dr Anamika Campa ,made aware of above EKG changes- spoek to patient and his wife  Duration of exercise was 7 min  The patient exercised to protocol stage 3  Maximal work rate was 10 1 METs  Maximal heart rate during stress was  150 bpm ( 95 % of maximal predicted heart rate)  The heart rate response to stress was normal  There was normal resting blood pressure with an appropriate response to stress  The rate-pressure product for the peak heart rate and blood  pressure was 56320  There was no chest pain during stress  The stress test was terminated due to achievement of target heart rate  Pre oxygen saturation: 99 %  Peak oxygen saturation: 99 %  2mm st depression in inferolateral leads The  stress ECG was consistent with myocardial ischemia  ISOTOPE ADMINISTRATION:  Resting isotope administration Stress isotope administration  Agent Tetrofosmin Tetrofosmin  Dose 10 6 mCi 33 mCi  Date 06/13/2018 06/13/2018    Radiopharmaceutical was administered 5 min, 26 sec into the stress protocol  There was 1 min and 30 sec of exercise after the injection  MYOCARDIAL PERFUSION IMAGING:  The image quality was good  PERFUSION DEFECTS:  -  There was a small to moderate, partially reversible myocardial perfusion defect of the inferior wall  Small area of perfusion defect which correlated with ecg changes  GATED SPECT:  The calculated left ventricular ejection fraction was 54 %  Left ventricular ejection fraction was within normal limits by visual estimate  There was no left ventricular regional abnormality  SUMMARY:  -  Stress results: Duration of exercise was 7 min  Maximal work rate was 10 1 METs   Maximal heart rate during stress was 150 bpm ( 95 % of maximal predicted heart rate)  Target heart rate was achieved  There was no chest pain during  stress  -  ECG conclusions: The stress ECG was consistent with myocardial ischemia  -  Perfusion imaging: There was a small to moderate, partially reversible myocardial perfusion defect of the inferior wall  Small area of perfusion defect which correlated with ecg changes  -  Gated SPECT: The calculated left ventricular ejection fraction was 54 %  Left ventricular ejection fraction was within normal limits by visual estimate  There was no left ventricular regional abnormality   -  Impressions and recommendations: Abnormal study after maximal exercise  IMPRESSIONS: Abnormal study after maximal exercise      Prepared and signed by    Palomo Urrutia MD  Signed 06/13/2018 18:41:55         Meds/Allergies   all current active meds have been reviewed  Prescriptions Prior to Admission   Medication    aspirin 325 mg tablet    Acetaminophen 325 MG CAPS          Assessment:  Principal Problem:    Disease of cardiovascular system  Active Problems:    Left carotid artery stenosis    Essential hypertension    Dyslipidemia    Carotid stenosis, left    S/P CABG x 3    Leukocytosis    Acute blood loss as cause of postoperative anemia    Bowel sounds abnormal    Hypokalemia

## 2018-07-28 NOTE — PROGRESS NOTES
Progress Note - Cardiothoracic Surgery   Luis Adams  59 y o  male MRN: [id number]  Unit/Bed#: [facility] 427-01 Encounter: [id number]  Coronary artery disease  S/P coronary artery bypass grafting x 3; POD # 4      24 Hour Events: Pt feels better today  Denies SOB or CP  Now passing flatus and had a "smear of a BM" last evening  Continues with NG tube in place and IVF @ 50/hr      Medications:   Scheduled Meds:    Current Facility-Administered Medications:  acetaminophen 650 mg Oral Q4H PRN Zenon Calvo PA-C    albuterol 2 5 mg Nebulization Q4H PRN Micaela Mojica DO    aspirin 300 mg Rectal Daily [facility], BRANDON    atorvastatin 80 mg Oral Daily With Donna Houser PA-C    bisacodyl 10 mg Rectal Daily PRN Zenon Calvo PA-C    clopidogrel 75 mg Oral Daily Zenon Calvo PA-C    dextrose 5 % and sodium chloride 0 45 % 50 mL/hr [facility]venous [facility] Last Rate: 50 mL/hr (07/28/18 0102)   fondaparinux 2 5 mg Subcutaneous Daily Zenon Calvo PA-C    HYDROmorphone 0 5 mg Intravenous Q3H PRN Arthur Cevallos PA-C    hydrOXYzine HCL 25 mg Oral Q6H PRN Zenon Calvo PA-C    insulin lispro 1-5 Units Subcutaneous TID AC Yamini Spironello V, CRNP    insulin lispro 1-5 Units Subcutaneous HS WILLIS Bang    metoprolol 5 mg Intravenous Q6H [facility]    mupirocin 1 application Nasal G96X [address] Yuan Houser PA-C    ondansetron 4 mg Intravenous Q6H PRN Zenon Calvo PA-C    pantoprazole 40 mg Intravenous Q24H [address] Yamini Spironello VWILLIS    temazepam 15 mg Oral HS PRN Yamini Spironello V, CRNP      Continuous Infusions:    dextrose 5 % and sodium chloride 0 45 % 50 mL/hr Last Rate: 50 mL/hr (07/28/18 0102)     PRN Meds:   acetaminophen    albuterol    bisacodyl    HYDROmorphone    hydrOXYzine HCL    ondansetron    temazepam    Vitals:   Vitals:    07/27/18 1929 07/28/18 0007 07/28/18 0326 07/28/18 0600   BP: 116/72 120/63 122/68    BP Location: Left arm Right arm Right arm    Pulse: 76 75 64    Resp: 18 18 18    Temp: 98 8 °F (37 1 °C) 98 7 °F (37 1 °C) 98 2 °F (36 8 °C)    TempSrc: Oral Oral Oral    SpO2: 98% 94% 97%    Weight:    82 7 kg (182 lb 5 1 oz)   Height:           Telemetry: NSR Heart Rate: 64    Respiratory:   SpO2: SpO2: 97 %; Room air    Intake/Output: -640 ml / 24 hrs  I/O       07/24 0701 - 07/25 0700 07/25 0701 - 07/26 0700 07/26 0701 - 07/27 0700    P  O   0     I V  (mL/kg) 3411 6 (38 5) 155 5 (1 8)     NG/GT 60 0     IV Piggyback 650 100     Cell Saver 750      Total Intake(mL/kg) 4871 6 (55) 255 5 (2 9)     Urine (mL/kg/hr) 1830 (0 9) 865 (0 4)     Emesis/NG output 250 310     Blood 750      Chest Tube 540 170     Total Output 3370 1345      Net +1501 6 -1089 5                   NG tube  25 ml / 8 hrs   150ml / 24 hrs              Weights:   Weight (last 2 days)     Date/Time   Weight    07/28/18 0600  82 7 (182 32)    07/27/18 0600  83 6 (184 3)    07/26/18 0600  88 7 (195 55)            Admit weight: 82 1 kg    Results:     Results from last 7 days  Lab Units 07/27/18  0514 07/26/18  0520 07/25/18  0412   WBC Thousand/uL 16 97* 22 87* 16 60*   HEMOGLOBIN g/dL 10 1* 10 9* 12 4   HEMATOCRIT % 31 7* 34 6* 37 9   PLATELETS Thousands/uL 171 181 168       Results from last 7 days  Lab Units 07/28/18  0521 07/27/18  0514 07/26/18  0520   SODIUM mmol/L 144 143 140   POTASSIUM mmol/L 3 2* 3 8 4 2   CHLORIDE mmol/L 108 106 104   CO2 mmol/L 33* 34* 32   BUN mg/dL 22 26* 35*   CREATININE mg/dL 0 73 0 85 1 17   GLUCOSE RANDOM mg/dL 122 135 133   CALCIUM mg/dL 8 1* 8 6 8 5       Results from last 7 days  Lab Units 07/24/18  1755   INR  1 35*   PTT seconds 30     Point of care glucose:  - 148   No SSIC / 24 hrs    Studies:  PCXR 7/26: Interval placement of nasogastric tube and replacement of Winfield-Sacha catheter  Status post recent surgery  No evidence of pneumothorax  No acute cardiopulmonary disease      KUB 7/27: Report pending    Invasive Lines/Tubes:  Invasive Devices     Central Venous Catheter Line            CVC Central Lines 07/24/18 Triple Right Internal jugular 3 days          Drain            NG/OG/Enteral Tube Nasogastric 18 Fr Left nares 3 days                Physical Exam:    HEENT/NECK:  PERRLA  No jugular venous distention  NG tube in place  Cardiac: Regular rate and rhythm  No rubs/murmurs/gallops  Pulmonary:  Breath sounds diminished at the bases bilaterally  + expiratory wheezes bilaterally  Abdomen:  Non-tender, soft  Faint Bowel sounds present  Incisions: Sternum is stable  Incision dressed with Acticoat  No erythema or drainage  Saphenectomy incision D/C/I  No erythema or drainage  Lower extremities: Extremities warm/dry  Radial/PT/DP pulses 2+ bilaterally  1+ edema B/L  Neuro: Alert and oriented X 3  Sensation is grossly intact  No focal deficits  Skin: Warm/Dry, without rashes or lesions  Assessment:  Patient Active Problem List   Diagnosis    Left carotid artery stenosis    Amaurosis fugax    Essential hypertension    Dyslipidemia    Pre-op exam    Carotid artery disorder (HCC)    Carotid stenosis, left    Coronary artery disease of native artery of native heart with stable angina pectoris ([address] )    Disease of cardiovascular system    S/P CABG x 3    Leukocytosis    Acute blood loss as cause of postoperative anemia    Bowel sounds abnormal       Coronary artery disease  S/P coronary artery bypass grafting x 3; POD # 4    Plan:    1  Cardiac:   NSR; HR/BP well-controlled   Lopressor 5 mg IV q6  Continue ASA, Plavix and Statin therapy  Epicardial pacing wires no longer required  Remove today  Maintain central IV access today for access  Continue DVT prophylaxis    2  Pulmonary:   Good room air oxygen saturation  Continue IS   CTs have been removed      3  Renal:   Intake/Output net: -640 mL/24 hours  No diuretic due to NPO status  Continue D5 1/2 NS @ 50ml/hr  Post op Creatinine stable;  Follow up labs prn   Hypokalemia - replete    4  Neuro:  Neurologically intact; No active issues  Incisional pain well-controlled; Continue prn Percocet    5  GI:  NPO  PO Ileus -  Improving  Now passing gas and had a BM smear  KUB reviewed by Dr Deon Bhatia  Remove NG tube  Start Clear liqs and transition meds to PO  D/C IVF  Maintain 1800 mL daily fluid restriction   Continue stool softeners and prn suppository  Continue GI prophylaxis    6  Endo:    No history of diabetes; Glucose well-controlled with sliding scale coverage    7  Hematology:   Post-operative acute blood loss anemia; Hemoglobin and hematocrit stable, trend prn   PO Leukocytosis - WBC decreased, afebrile  Likely atelectasis  Continue to  monitor       8  Disposition:  Follow daily PT/OT recommendations regarding home vs  rehab when medically cleared for discharge    VTE Pharmacologic Prophylaxis: Fondaparinux (Arixtra)  VTE Mechanical Prophylaxis: sequential compression device    Collaborative rounds completed with BARBIE Rothman , and Milan Chadwick RN    SIGNATURE: [facility], PAChayaC  DATE: July 28, 2018  TIME: 7:23 AM

## 2018-07-29 PROBLEM — D72.829 LEUKOCYTOSIS: Status: RESOLVED | Noted: 2018-07-26 | Resolved: 2018-07-29

## 2018-07-29 PROBLEM — R19.15: Status: RESOLVED | Noted: 2018-07-27 | Resolved: 2018-07-29

## 2018-07-29 LAB
ANION GAP SERPL CALCULATED.3IONS-SCNC: 4 MMOL/L (ref 4–13)
BUN SERPL-MCNC: 18 MG/DL (ref 5–25)
CALCIUM SERPL-MCNC: 8.2 MG/DL (ref 8.3–10.1)
CHLORIDE SERPL-SCNC: 108 MMOL/L (ref 100–108)
CO2 SERPL-SCNC: 30 MMOL/L (ref 21–32)
CREAT SERPL-MCNC: 0.74 MG/DL (ref 0.6–1.3)
ERYTHROCYTE [DISTWIDTH] IN BLOOD BY AUTOMATED COUNT: 13 % (ref 11.6–15.1)
GFR SERPL CREATININE-BSD FRML MDRD: 99 ML/MIN/1.73SQ M
GLUCOSE SERPL-MCNC: 100 MG/DL (ref 65–140)
GLUCOSE SERPL-MCNC: 116 MG/DL (ref 65–140)
GLUCOSE SERPL-MCNC: 89 MG/DL (ref 65–140)
GLUCOSE SERPL-MCNC: 94 MG/DL (ref 65–140)
GLUCOSE SERPL-MCNC: 97 MG/DL (ref 65–140)
HCT VFR BLD AUTO: 28.4 % (ref 36.5–49.3)
HGB BLD-MCNC: 9.2 G/DL (ref 12–17)
MCH RBC QN AUTO: 29.7 PG (ref 26.8–34.3)
MCHC RBC AUTO-ENTMCNC: 32.4 G/DL (ref 31.4–37.4)
MCV RBC AUTO: 92 FL (ref 82–98)
PLATELET # BLD AUTO: 197 THOUSANDS/UL (ref 149–390)
PMV BLD AUTO: 9.6 FL (ref 8.9–12.7)
POTASSIUM SERPL-SCNC: 3.5 MMOL/L (ref 3.5–5.3)
RBC # BLD AUTO: 3.1 MILLION/UL (ref 3.88–5.62)
SODIUM SERPL-SCNC: 142 MMOL/L (ref 136–145)
WBC # BLD AUTO: 9.2 THOUSAND/UL (ref 4.31–10.16)

## 2018-07-29 PROCEDURE — 85027 COMPLETE CBC AUTOMATED: CPT | Performed by: PHYSICIAN ASSISTANT

## 2018-07-29 PROCEDURE — 80048 BASIC METABOLIC PNL TOTAL CA: CPT | Performed by: PHYSICIAN ASSISTANT

## 2018-07-29 PROCEDURE — 94668 MNPJ CHEST WALL SBSQ: CPT

## 2018-07-29 PROCEDURE — 99024 POSTOP FOLLOW-UP VISIT: CPT | Performed by: THORACIC SURGERY (CARDIOTHORACIC VASCULAR SURGERY)

## 2018-07-29 PROCEDURE — 94760 N-INVAS EAR/PLS OXIMETRY 1: CPT

## 2018-07-29 PROCEDURE — 82948 REAGENT STRIP/BLOOD GLUCOSE: CPT

## 2018-07-29 RX ORDER — POTASSIUM CHLORIDE 20 MEQ/1
40 TABLET, EXTENDED RELEASE ORAL DAILY
Status: DISCONTINUED | OUTPATIENT
Start: 2018-07-30 | End: 2018-07-30 | Stop reason: HOSPADM

## 2018-07-29 RX ADMIN — CLOPIDOGREL BISULFATE 75 MG: 75 TABLET, FILM COATED ORAL at 09:39

## 2018-07-29 RX ADMIN — METOPROLOL TARTRATE 25 MG: 25 TABLET ORAL at 21:14

## 2018-07-29 RX ADMIN — ASPIRIN 81 MG: 81 TABLET, COATED ORAL at 09:39

## 2018-07-29 RX ADMIN — HYDROCODONE BITARTRATE AND ACETAMINOPHEN 2 TABLET: 5; 325 TABLET ORAL at 21:20

## 2018-07-29 RX ADMIN — TEMAZEPAM 15 MG: 15 CAPSULE ORAL at 22:23

## 2018-07-29 RX ADMIN — ATORVASTATIN CALCIUM 80 MG: 80 TABLET, FILM COATED ORAL at 17:29

## 2018-07-29 RX ADMIN — FONDAPARINUX SODIUM 2.5 MG: 2.5 INJECTION, SOLUTION SUBCUTANEOUS at 09:39

## 2018-07-29 RX ADMIN — METOPROLOL TARTRATE 25 MG: 25 TABLET ORAL at 09:39

## 2018-07-29 RX ADMIN — MUPIROCIN 1 APPLICATION: 20 OINTMENT TOPICAL at 21:14

## 2018-07-29 RX ADMIN — PANTOPRAZOLE SODIUM 40 MG: 40 TABLET, DELAYED RELEASE ORAL at 05:35

## 2018-07-29 RX ADMIN — MUPIROCIN 1 APPLICATION: 20 OINTMENT TOPICAL at 09:38

## 2018-07-29 RX ADMIN — HYDROCODONE BITARTRATE AND ACETAMINOPHEN 1 TABLET: 5; 325 TABLET ORAL at 05:35

## 2018-07-29 NOTE — PROGRESS NOTES
Progress Note - Cardiothoracic Surgery   Martell Davis  59 y o  male MRN: [id number]  Unit/Bed#: [facility] 427-01 Encounter: [id number]  Coronary artery disease  S/P coronary artery bypass grafting x 3; POD # 5      24 Hour Events: Pt had BM yesterday  Abdomen feels much better, denies abd pain, bloating  Passing gas, tolerating clear liquid diet  Denies CP or SOB      Medications:   Scheduled Meds:    Current Facility-Administered Medications:  acetaminophen 650 mg Oral Q4H PRN Osborn August, BRANDON   albuterol 2 5 mg Nebulization Q4H PRN Bj Aasbrigid, DO   aspirin 81 mg Oral Daily Ava Curran, BRANDON   atorvastatin 80 mg Oral Daily With [employer] Corrina, BRANDON   bisacodyl 10 mg Rectal Daily PRN Osborn August, BRANDON   clopidogrel 75 mg Oral Daily Osborn August, BRANDON   fondaparinux 2 5 mg Subcutaneous Daily Osborn August, BRANDON   HYDROcodone-acetaminophen 1 tablet Oral Q4H PRN Ava Curran PA-C   HYDROcodone-acetaminophen 2 tablet Oral Q6H PRN Ava Curran PA-C   hydrOXYzine HCL 25 mg Oral Q6H PRN Osborn August, BRANDON   insulin lispro 1-5 Units Subcutaneous TID AC Yamini Spirovolodymyro V, CRNP   insulin lispro 1-5 Units Subcutaneous HS Yamini Aleman V, CRNP   metoprolol tartrate 25 mg Oral Q12H [facility] Jacy Carrera PA-C   mupirocin 1 application Nasal Z82G [facility] Jacy Pat PA-C   ondansetron 4 mg Intravenous Q6H PRN Osborn August, BRANDON   pantoprazole 40 mg Oral Early Morning Jacy Pat PA-C   potassium chloride 20 mEq Oral TID With [employer] Ava Curran PA-C   temazepam 15 mg Oral HS PRN Yamini Spironello V, CRNP     Continuous Infusions:     PRN Meds:   acetaminophen    albuterol    bisacodyl    HYDROcodone-acetaminophen    HYDROcodone-acetaminophen    hydrOXYzine HCL    ondansetron    temazepam    Vitals:   Vitals:    07/28/18 1915 07/28/18 2059 07/28/18 2311 07/29/18 0600   BP: 159/75  118/61    BP Location: Left arm  Left arm    Pulse: 85  81    Resp: 18  18    Temp: 98 8 °F (37 1 °C)  98 1 °F (36 7 °C)    TempSrc: Oral  Oral    SpO2: 98% 98% 96%    Weight:    83 8 kg (184 lb 11 9 oz)   Height:           Telemetry: NSR Heart Rate: 63    Respiratory:   SpO2: SpO2: 96 %; Room air    Intake/Output: -705 ml / 24 hrs  I/O       07/24 0701 - 07/25 0700 07/25 0701 - 07/26 0700 07/26 0701 - 07/27 0700    P  O   0     I V  (mL/kg) 3411 6 (38 5) 155 5 (1 8)     NG/GT 60 0     IV Piggyback 650 100     Cell Saver 750      Total Intake(mL/kg) 4871 6 (55) 255 5 (2 9)     Urine (mL/kg/hr) 1830 (0 9) 865 (0 4)     Emesis/NG output 250 310     Blood 750      Chest Tube 540 170     Total Output 3370 1345      Net +1501 6 -1089 5                   Weights:   Weight (last 2 days)     Date/Time   Weight    07/29/18 0600  83 8 (184 75)    07/28/18 0600  82 7 (182 32)    07/27/18 0600  83 6 (184 3)            Admit weight: 82 1 kg    Results:     Results from last 7 days  Lab Units 07/29/18  0533 07/27/18  0514 07/26/18  0520   WBC Thousand/uL 9 20 16 97* 22 87*   HEMOGLOBIN g/dL 9 2* 10 1* 10 9*   HEMATOCRIT % 28 4* 31 7* 34 6*   PLATELETS Thousands/uL 197 171 181       Results from last 7 days  Lab Units 07/29/18  0533 07/28/18  0521 07/27/18  0514   SODIUM mmol/L 142 144 143   POTASSIUM mmol/L 3 5 3 2* 3 8   CHLORIDE mmol/L 108 108 106   CO2 mmol/L 30 33* 34*   BUN mg/dL 18 22 26*   CREATININE mg/dL 0 74 0 73 0 85   GLUCOSE RANDOM mg/dL 94 122 135   CALCIUM mg/dL 8 2* 8 1* 8 6       Results from last 7 days  Lab Units 07/24/18  1755   INR  1 35*   PTT seconds 30     Point of care glucose: BS 85 - 128   No SSIC / 24 hrs    Studies:  PCXR 7/26: Interval placement of nasogastric tube and replacement of [place]-Sacha catheter  Status post recent surgery  No evidence of pneumothorax  No acute cardiopulmonary disease  KUB 7/27: Unremarkable examination      Invasive Lines/Tubes:  Invasive Devices     Central Venous Catheter Line            CVC Central Lines 07/24/18 Triple Right Internal jugular 4 days Physical Exam:    HEENT/NECK:  PERRLA  No jugular venous distention  Cardiac: Regular rate and rhythm  No rubs/murmurs/gallops  Pulmonary:  Breath sounds slightly diminished at the bases bilaterally  Abdomen:  Non-tender, soft, non-distended  + BS x 4 quadrants  Incisions: Sternum is stable  Incision dressed with Acticoat  No erythema or drainage  Saphenectomy incision D/C/I  No erythema or drainage  Lower extremities: Extremities warm/dry  Radial/PT/DP pulses 2+ bilaterally  Trace - 1+ edema B/L  Neuro: Alert and oriented X 3  Sensation is grossly intact  No focal deficits  Skin: Warm/Dry, without rashes or lesions  Assessment:  Patient Active Problem List   Diagnosis    Left carotid artery stenosis    Amaurosis fugax    Essential hypertension    Dyslipidemia    Pre-op exam    Carotid artery disorder (HCC)    Carotid stenosis, left    Coronary artery disease of native artery of native heart with stable angina pectoris (HCC)    Disease of cardiovascular system    S/P CABG x 3    Leukocytosis    Acute blood loss as cause of postoperative anemia    Bowel sounds abnormal    Hypokalemia       Coronary artery disease  S/P coronary artery bypass grafting x 3; POD # 5    Plan:    1  Cardiac:   NSR; HR/BP well-controlled   Lopressor 25mg PO q12  Continue ASA, Plavix and Statin therapy  EPW no longer in place  Central IV access no longer required  Remove  Continue DVT prophylaxis    2  Pulmonary:   Good room air oxygen saturation  Continue IS   CTs have been removed      3  Renal:   Intake/Output net: -705 mL/24 hours  No diuretic, autodiuresing  IVF discontinued yesterday  Post op Creatinine stable; Follow up labs prn   Hypokalemia - replete    4  Neuro:  Neurologically intact; No active issues  Incisional pain well-controlled; Continue prn Percocet    5  GI:  Advance to TLC diet  Possible PO Ileus -  Resolved  +BM yesterday   KUB unremakrable  Maintain 1800 mL daily fluid restriction Continue stool softeners and prn suppository  Continue GI prophylaxis    6  Endo:    No history of diabetes; Glucose well-controlled with sliding scale coverage    7  Hematology:   Post-operative acute blood loss anemia; Hemoglobin and hematocrit decreased, trend prn   PO Leukocytosis - resolved, afebrile  Likely atelectasis  Continue to  monitor  8  Disposition:  Follow daily PT/OT recommendations regarding home vs  rehab when medically cleared for discharge   Anticipate discharge tomorrow    VTE Pharmacologic Prophylaxis: Fondaparinux (Arixtra)  VTE Mechanical Prophylaxis: sequential compression device    Collaborative rounds completed with BARBIE Hoffman , and Stacie Bernal RN    SIGNATURE: [place], [place]  DATE: July 29, 2018  TIME: 7:18 AM

## 2018-07-29 NOTE — RESTORATIVE TECHNICIAN NOTE
Restorative Specialist Mobility Note       Activity: Ambulate in haas, Chair     Assistive Device: None

## 2018-07-30 VITALS
TEMPERATURE: 98.4 F | HEIGHT: 66 IN | OXYGEN SATURATION: 95 % | DIASTOLIC BLOOD PRESSURE: 66 MMHG | RESPIRATION RATE: 16 BRPM | SYSTOLIC BLOOD PRESSURE: 116 MMHG | HEART RATE: 61 BPM | BODY MASS INDEX: 29.44 KG/M2 | WEIGHT: 183.2 LBS

## 2018-07-30 PROBLEM — Z01.818 PRE-OP EXAM: Status: RESOLVED | Noted: 2018-05-22 | Resolved: 2018-07-30

## 2018-07-30 LAB
GLUCOSE SERPL-MCNC: 107 MG/DL (ref 65–140)
GLUCOSE SERPL-MCNC: 145 MG/DL (ref 65–140)
HCT VFR BLD AUTO: 32.9 % (ref 36.5–49.3)
HGB BLD-MCNC: 10.6 G/DL (ref 12–17)

## 2018-07-30 PROCEDURE — 85014 HEMATOCRIT: CPT | Performed by: PHYSICIAN ASSISTANT

## 2018-07-30 PROCEDURE — 85018 HEMOGLOBIN: CPT | Performed by: PHYSICIAN ASSISTANT

## 2018-07-30 PROCEDURE — 82948 REAGENT STRIP/BLOOD GLUCOSE: CPT

## 2018-07-30 PROCEDURE — 99024 POSTOP FOLLOW-UP VISIT: CPT | Performed by: THORACIC SURGERY (CARDIOTHORACIC VASCULAR SURGERY)

## 2018-07-30 RX ORDER — ATORVASTATIN CALCIUM 80 MG/1
80 TABLET, FILM COATED ORAL
Qty: 90 TABLET | Refills: 0 | Status: SHIPPED | OUTPATIENT
Start: 2018-07-30 | End: 2018-08-14 | Stop reason: SDUPTHER

## 2018-07-30 RX ORDER — HYDROCODONE BITARTRATE AND ACETAMINOPHEN 5; 325 MG/1; MG/1
1 TABLET ORAL EVERY 6 HOURS PRN
Status: DISCONTINUED | OUTPATIENT
Start: 2018-07-30 | End: 2018-07-30 | Stop reason: HOSPADM

## 2018-07-30 RX ORDER — HYDROCODONE BITARTRATE AND ACETAMINOPHEN 5; 325 MG/1; MG/1
1 TABLET ORAL EVERY 6 HOURS PRN
Qty: 50 TABLET | Refills: 0 | Status: SHIPPED | OUTPATIENT
Start: 2018-07-30 | End: 2018-08-15

## 2018-07-30 RX ORDER — POTASSIUM CHLORIDE 20 MEQ/1
20 TABLET, EXTENDED RELEASE ORAL DAILY
Qty: 7 TABLET | Refills: 0 | Status: SHIPPED | OUTPATIENT
Start: 2018-07-30 | End: 2018-08-14

## 2018-07-30 RX ORDER — ASPIRIN 81 MG/1
81 TABLET ORAL DAILY
Qty: 90 TABLET | Refills: 0 | Status: SHIPPED | OUTPATIENT
Start: 2018-07-31 | End: 2021-06-15

## 2018-07-30 RX ORDER — PANTOPRAZOLE SODIUM 20 MG/1
20 TABLET, DELAYED RELEASE ORAL DAILY
Qty: 30 TABLET | Refills: 0 | Status: SHIPPED | OUTPATIENT
Start: 2018-07-30 | End: 2018-08-22

## 2018-07-30 RX ORDER — TORSEMIDE 20 MG/1
20 TABLET ORAL DAILY
Qty: 7 TABLET | Refills: 0 | Status: SHIPPED | OUTPATIENT
Start: 2018-07-30 | End: 2018-08-14

## 2018-07-30 RX ORDER — POLYETHYLENE GLYCOL 3350 17 G/17G
17 POWDER, FOR SOLUTION ORAL DAILY
Qty: 510 G | Refills: 0 | Status: SHIPPED | OUTPATIENT
Start: 2018-07-30 | End: 2018-08-14

## 2018-07-30 RX ADMIN — HYDROCODONE BITARTRATE AND ACETAMINOPHEN 2 TABLET: 5; 325 TABLET ORAL at 05:57

## 2018-07-30 RX ADMIN — MUPIROCIN 1 APPLICATION: 20 OINTMENT TOPICAL at 08:59

## 2018-07-30 RX ADMIN — ASPIRIN 81 MG: 81 TABLET, COATED ORAL at 08:58

## 2018-07-30 RX ADMIN — PANTOPRAZOLE SODIUM 40 MG: 40 TABLET, DELAYED RELEASE ORAL at 05:52

## 2018-07-30 RX ADMIN — CLOPIDOGREL BISULFATE 75 MG: 75 TABLET, FILM COATED ORAL at 08:58

## 2018-07-30 RX ADMIN — POTASSIUM CHLORIDE 40 MEQ: 1500 TABLET, EXTENDED RELEASE ORAL at 08:58

## 2018-07-30 RX ADMIN — METOPROLOL TARTRATE 25 MG: 25 TABLET ORAL at 08:58

## 2018-07-30 NOTE — SOCIAL WORK
Pt is cleared for d/c by Cardiothoracic Surgery  JAKE Angulo Doctor was notified of d/c  Pt is accepted for services by [facility] for his aftercare  The pt and his wife Leana Maradiaga were both informed of d/c  Wife will transport pt home later this day, pickup time TBD  No IMM required  No chart copy required  CM to follow

## 2018-07-30 NOTE — CASE MANAGEMENT
Continued Stay Review    Date: 07/28/2018  Coronary artery disease  S/P coronary artery bypass grafting x 3; POD # 4    Vital Signs:  98 2 - 64 - 18  122/68,  P O  97%,  WT 82 7Kg  Telemetry: NSR Heart Rate: 64  NG Tube Left nares  Triple CVC Line      Medications:   acetaminophen 650 mg Oral Q4H PRN     albuterol 2 5 mg Nebulization Q4H PRN     aspirin 300 mg Rectal Daily     atorvastatin 80 mg Oral Daily With Dinner     bisacodyl 10 mg Rectal Daily PRN     clopidogrel 75 mg Oral Daily     dextrose 5 % and sodium chloride 0 45 % 50 mL/hr Intravenous Continuous Last Rate: 50 mL/hr (07/28/18 0102)   fondaparinux 2 5 mg Subcutaneous Daily     HYDROmorphone 0 5 mg Intravenous Q3H PRN     hydrOXYzine HCL 25 mg Oral Q6H PRN     insulin lispro 1-5 Units Subcutaneous TID AC     insulin lispro 1-5 Units Subcutaneous HS     metoprolol 5 mg Intravenous Q6H     mupirocin 1 application Nasal B88F JACK     ondansetron 4 mg Intravenous Q6H PRN     pantoprazole 40 mg Intravenous Q24H JACK     temazepam 15 mg Oral HS PRN        Continuous Infusions:   dextrose 5 % and sodium chloride 0 45 % 50 mL/hr Last Rate: 50 mL/hr (07/28/18 0102)       Abnormal Labs/Diagnostic Results:     Age/Sex: 59 y o  male     Assessment/Plan:   Assessment:      Patient Active Problem List   Diagnosis    Left carotid artery stenosis    Amaurosis fugax    Essential hypertension    Dyslipidemia    Pre-op exam    Carotid artery disorder (HCC)    Carotid stenosis, left    Coronary artery disease of native artery of native heart with stable angina pectoris (HCC)    Disease of cardiovascular system    S/P CABG x 3    Leukocytosis    Acute blood loss as cause of postoperative anemia    Bowel sounds abnormal         Coronary artery disease  S/P coronary artery bypass grafting x 3; POD # 4     Plan:     1  Cardiac:   NSR; HR/BP well-controlled   Lopressor 5 mg IV q6  Continue ASA, Plavix and Statin therapy  Epicardial pacing wires no longer required  Remove today  Maintain central IV access today for access  Continue DVT prophylaxis     2  Pulmonary:   Good room air oxygen saturation  Continue IS              CTs have been removed        3  Renal:   Intake/Output net: -640 mL/24 hours  No diuretic due to NPO status  Continue D5 1/2 NS @ 50ml/hr  Post op Creatinine stable; Follow up labs prn              Hypokalemia - replete     4  Neuro:  Neurologically intact; No active issues  Incisional pain well-controlled; Continue prn Percocet     5  GI:  NPO  PO Ileus -  Improving  Now passing gas and had a BM smear  KUB reviewed by Dr Dom Izquierdo  Remove NG tube  Start Clear liqs and transition meds to PO  D/C IVF  Maintain 1800 mL daily fluid restriction   Continue stool softeners and prn suppository  Continue GI prophylaxis     6  Endo:         No history of diabetes; Glucose well-controlled with sliding scale coverage     7  Hematology:   Post-operative acute blood loss anemia; Hemoglobin and hematocrit stable, trend prn              PO Leukocytosis - WBC decreased, afebrile  Likely atelectasis  Continue to          monitor       8   Disposition:  Follow daily PT/OT recommendations regarding home vs  rehab when medically cleared for discharge     VTE Pharmacologic Prophylaxis: Fondaparinux (Arixtra)  VTE Mechanical Prophylaxis: sequential compression device

## 2018-07-30 NOTE — INCIDENTAL FINDINGS
The following findings require follow up:  Radiographic finding   Finding: subcentimeter pulmonary nodules  recommending 12 mo f/u w/ CT chest w/o   Follow up required: recommending 12 mo f/u w/ CT chest w/o   Follow up should be done within 12 month(s)    Please notify the following clinician to assist with the follow up:   Dr Sonia Florian MD

## 2018-07-30 NOTE — DISCHARGE SUMMARY
Discharge Summary - Cardiothoracic Surgery   Jennifer Shepherd  59 y o  male MRN: [id number]  Unit/Bed#: [facility] 427-01 Encounter: [id number]    Admission Date: 7/24/2018     Discharge Date: 07/30/18    Admitting Diagnosis: Disease of cardiovascular system [I25 10]    Primary Discharge Diagnosis:   Coronary artery disease  S/P coronary artery bypass grafting;    Secondary Discharge Diagnosis:   former tobacco use, LICA stenosis s/p IR stent, PD, HTN, HL, post op respiratory insufficiency (resolved), acute post op blood loss anemia  Attending: Kandace Prader, D O  Consulting Physician(s):   Cardiology  Medical/Critical Care      Procedures Performed:   Coronary artery bypass grafting x 3 with left internal mammary artery to left anterior descending artery, saphenous vein graft to obtuse marginal 1 and saphenous vein graft to right coronary artery    Hospital Course: The patient was seen in consultation prior to this admission for evaluation of Coronary artery disease  Risks  and benefits of coronary artery bypass grafting were discussed in detail, and patient was agreeable  Routine  preoperative evaluation was completed and informed consent was obtained prior to admission  7/24: CABGx3  Patient tolerated the procedure well & was transferred post-operatively to the ICU hemodynamically stable on epi 2 & earle 60  Given 1 PRBC intra-op  NSR  Not bleeding  Wean to extubate  PM: Weaned off epi, earle up to 100  CTs w/ high venous output, coags sent  On vent wean   7/25: Earle and epi weaned off  Complaints of abd pain and distention last night, KUB showed non-obstructive gas pattern  NG tube placed with 300ml out and mild relief  Given PRN Lasix for low UOP with good response  Lasix 40 BID started  Start IV metoprolol 2 5  Remains on insulin drip  Keep EPW/CT  DC bhavya  Tx to med/surg  7/26: SOB with wet sounding cough  Pulling < 250 on IS, using flutter valve  Remains on 3L NC   Stat PCXR today shows atelectasis and no ptx or effusions  Abdomen feels better today, but remains distended with NG tube in place, 600ml output/24 hrs  WBC elevated 22 87  Start D5 ½ NS @ 50ml/hr  D/C CT and EPW  Maintain daily Lasix  PM: Respiratory status remains stable, using IS more, on 2LNC, chest PT ordered  7/27: O2 weaned to 1L NC  Improved respiratory status today, no further SOB  NG output decreased, but continues to have distended abdomen with hypoactive BS  Maintain NG  KUB ordered today  Continue ½ NS D5@ 50/hr  WBC decreased 16 97  Continues with sinus tachy and higher BP  increase Metoprolol 5mg IV q6   7/28: Abd feels better today, soft, non-distended  + flatus and smear of BM   D/C NG tube, D/C IVF  Start Clear liqs and transition meds back to PO  KCl supplementation for hypokalemia  7/29: Pt had BM yesterday  Abdomen softer, non-distended, +BS  Resume solid diet  D/C TLC  Anticipate discharge home tomorrow  NSR    7/30: No issues overnight  Patient remains in NSR  Tolerating PO diet  He is ambulating  independently on  room air  Condition at Discharge:   good     Discharge Physical Exam:  HEENT/NECK:  PERRLA  No jugular venous distention  Cardiac: Regular rate and rhythm  No rubs/murmurs/gallops  Pulmonary:  Breath sounds slightly diminished at the bases bilaterally  Abdomen:  Non-tender, Non-distended  Positive bowel sounds  Incisions: Sternum is stable  Incision is clean, dry, and intact     Saphenectomy incision is clean, dry, and intact  Lower extremities: Extremities warm/dry  Radial/PT/DP pulses 2+ bilaterally  Trace edema B/L  Neuro: Alert and oriented X 3  Sensation is grossly intact  No focal deficits  Skin: Warm/Dry, without rashes or lesions      Discharge Data:    Results from last 7 days  Lab Units 07/30/18  0526 07/29/18  0533 07/27/18  0514 07/26/18  0520   WBC Thousand/uL  --  9 20 16 97* 22 87*   HEMOGLOBIN g/dL 10 6* 9 2* 10 1* 10 9*   HEMATOCRIT % 32 9* 28 4* 31 7* 34 6* PLATELETS Thousands/uL  --  197 171 181       Results from last 7 days  Lab Units 07/29/18  0533 07/28/18  0521 07/27/18  0514   SODIUM mmol/L 142 144 143   POTASSIUM mmol/L 3 5 3 2* 3 8   CHLORIDE mmol/L 108 108 106   CO2 mmol/L 30 33* 34*   BUN mg/dL 18 22 26*   CREATININE mg/dL 0 74 0 73 0 85   GLUCOSE RANDOM mg/dL 94 122 135   CALCIUM mg/dL 8 2* 8 1* 8 6       Results from last 7 days  Lab Units 07/24/18  1755   INR  1 35*   PTT seconds 30       Discharge instructions/Information to patient and family:   See after visit summary for information provided to patient and family  Anitra Camacho  was educated on restrictions regarding driving and lifting, and techniques of proper incisional care  They were specifically counselled on signs and symptoms of a sternal wound infection, and advised to contact our service immediately should they develop fevers, sweats, chill, redness or drainage at the site of any incisions  Provisions for Follow-Up Care:  See after visit summary for information related to follow-up care and any pertinent home health orders  Disposition:  Home    Planned Readmission:   No    Discharge Medications:  See after visit summary for reconciled discharge medications provided to patient and family  Anitra Camacho  was provided contact information and scheduled a follow up appointment with CHRISSY Brooks  Additionally, they were advised to schedule follow up appointments to be seen by their primary care physician within 7-10 days, and their cardiologist within 2-3 weeks  Contact information was provided  The patient was discharged on ongoing diuretic therapy with Torsemide 20 mg, PO QD and Potassium Chloride 20 mEq, PO QD  They were advised to continue these medications for 7 days, unless otherwise directed  Narcotic pain medication was prescribed in the form of Norco 5/325    Prior to prescribing, their prescription profile was reviewed on the [place] department of health prescription drug monitoring program     The patient was informed that following their postoperative surgical evaluation, they will be referred to outpatient cardiac rehabilitation  They were counseled that this program is run by specialists who will help them safely strengthen their heart and prevent more heart disease  Cardiac rehabilitation will include exercise, relaxation, stress management, and heart-healthy nutrition  Caregivers will also check to make sure their medication regimen is working  I spent 30 minutes discharging the patient  This time was spent on the day of discharge  I had direct contact with the patient on the day of discharge  Additional documentation is required if more than 30 minutes were spent on discharge       SIGNATURE: Tesha Carty PA-C  DATE: July 30, 2018  TIME: 10:00 AM

## 2018-07-30 NOTE — PROGRESS NOTES
Progress Note - Cardiothoracic Surgery   Emma Kehr  59 y o  male MRN: [id number]  Unit/Bed#: [facility] 427-01 Encounter: [id number]  Coronary artery disease  S/P coronary artery bypass grafting x 3; POD # 6      24 Hour Events: No issues overnight  Tolerating PO diet  Pain well-controlled with PO medications       Medications:   Scheduled Meds:    Current Facility-Administered Medications:  acetaminophen 650 mg Oral Q4H PRN Mane Sprague PA-C   albuterol 2 5 mg Nebulization Q4H PRN Sally Morales DO   aspirin 81 mg Oral Daily Tavia Stands, BRANDON   atorvastatin 80 mg Oral Daily With [employer] CorrinaBRANDON   bisacodyl 10 mg Rectal Daily PRN Mane Darcie, BRANDON   clopidogrel 75 mg Oral Daily Mane Darcie, BRANDON   fondaparinux 2 5 mg Subcutaneous Daily Mane BRANDON Sprague   HYDROcodone-acetaminophen 1 tablet Oral Q4H PRN Tavia Stands, BRANDON   HYDROcodone-acetaminophen 2 tablet Oral Q6H PRN Tavia Stands, BRANDON   hydrOXYzine HCL 25 mg Oral Q6H PRN Mane Sprague PA-C   insulin lispro 1-5 Units Subcutaneous TID AC Yamini Spironello V, CRNP   insulin lispro 1-5 Units Subcutaneous HS Yamini Spironello V, CRNP   metoprolol tartrate 25 mg Oral Q12H [address] Jacy Carrera PA-C   mupirocin 1 application Nasal L54B [address] Jacy Pat PA-C   ondansetron 4 mg Intravenous Q6H PRN Mane Sprague PA-C   pantoprazole 40 mg Oral Early Morning Jacy Pat PA-C   potassium chloride 40 mEq Oral Daily Jacy Pat PA-C   temazepam 15 mg Oral HS PRN Yamini Spironello V CRNP     Continuous Infusions:     PRN Meds:   acetaminophen    albuterol    bisacodyl    HYDROcodone-acetaminophen    HYDROcodone-acetaminophen    hydrOXYzine HCL    ondansetron    temazepam    Vitals:   Vitals:    07/30/18 0004 07/30/18 0421 07/30/18 0600 07/30/18 0716   BP: 117/68 120/59  134/69   BP Location: Left arm Left arm  Left arm   Pulse: 70 67  63   Resp: 16 18  16   Temp: 98 2 °F (36 8 °C) 97 6 °F (36 4 °C)  97 7 °F (36 5 °C) TempSrc: Oral Oral  Oral   SpO2: 96% 96%  96%   Weight:   83 1 kg (183 lb 3 2 oz)    Height:           Telemetry: NSR Heart Rate: 82    Respiratory:   SpO2: SpO2: 96 %; Room air    Intake/Output:    Intake/Output Summary (Last 24 hours) at 07/30/18 0835  Last data filed at 07/30/18 0824   Gross per 24 hour   Intake              860 ml   Output              750 ml   Net              110 ml         Weights:   Weight (last 2 days)     Date/Time   Weight    07/30/18 0600  83 1 (183 2)    07/29/18 0600  83 8 (184 75)    07/28/18 0600  82 7 (182 32)            Admit weight: 82 1 kg    Results:     Results from last 7 days  Lab Units 07/30/18  0526 07/29/18  0533 07/27/18  0514 07/26/18  0520   WBC Thousand/uL  --  9 20 16 97* 22 87*   HEMOGLOBIN g/dL 10 6* 9 2* 10 1* 10 9*   HEMATOCRIT % 32 9* 28 4* 31 7* 34 6*   PLATELETS Thousands/uL  --  197 171 181       Results from last 7 days  Lab Units 07/29/18  0533 07/28/18  0521 07/27/18  0514   SODIUM mmol/L 142 144 143   POTASSIUM mmol/L 3 5 3 2* 3 8   CHLORIDE mmol/L 108 108 106   CO2 mmol/L 30 33* 34*   BUN mg/dL 18 22 26*   CREATININE mg/dL 0 74 0 73 0 85   GLUCOSE RANDOM mg/dL 94 122 135   CALCIUM mg/dL 8 2* 8 1* 8 6       Results from last 7 days  Lab Units 07/24/18  1755   INR  1 35*   PTT seconds 30     Point of care glucose: BS    No SSIC / 24 hrs    Studies:  PCXR 7/26: Interval placement of nasogastric tube and replacement of [place]-Sacha catheter  Status post recent surgery  No evidence of pneumothorax  No acute cardiopulmonary disease  KUB 7/27: Unremarkable examination  Invasive Lines/Tubes:  Invasive Devices     Peripheral Intravenous Line            Peripheral IV 07/29/18 Left Antecubital less than 1 day                Physical Exam:    HEENT/NECK:  PERRLA  No jugular venous distention  Cardiac: Regular rate and rhythm  No rubs/murmurs/gallops  Pulmonary:  Breath sounds slightly diminished at the bases bilaterally    Abdomen:  Non-tender, Non-distended  Positive bowel sounds  Incisions: Sternum is stable  Incision is clean, dry, and intact  Saphenectomy incision is clean, dry, and intact  Lower extremities: Extremities warm/dry  Radial/PT/DP pulses 2+ bilaterally  Trace edema B/L  Neuro: Alert and oriented X 3  Sensation is grossly intact  No focal deficits  Skin: Warm/Dry, without rashes or lesions  Assessment:  Patient Active Problem List   Diagnosis    Left carotid artery stenosis    Amaurosis fugax    Essential hypertension    Dyslipidemia    Carotid artery disorder (HCC)    Carotid stenosis, left    Coronary artery disease of native artery of native heart with stable angina pectoris ([facility] [address] )    Disease of cardiovascular system    S/P CABG x 3    Acute blood loss as cause of postoperative anemia    Hypokalemia       Coronary artery disease  S/P coronary artery bypass grafting x 3; POD # 6    Plan:    1  Cardiac:   NSR; HR/BP well-controlled   Lopressor 25mg PO q12  Continue ASA, Plavix and Statin therapy  EPW no longer in place  Central IV access no longer required  Remove  Continue DVT prophylaxis    2  Pulmonary:   Good room air oxygen saturation  Continue IS   CTs have been removed      3  Renal:   Intake/Output net: -705 mL/24 hours  No diuretic, autodiuresing  IVF discontinued yesterday  Post op Creatinine stable; Follow up labs prn   Hypokalemia - replete    4  Neuro:  Neurologically intact; No active issues  Incisional pain well-controlled; Continue prn Percocet    5  GI:  Tolerating PO diet  Possible PO Ileus -  Resolved  +BM yesterday  KUB unremakrable  Maintain 1800 mL daily fluid restriction   Continue stool softeners and prn suppository  Continue GI prophylaxis    6  Endo:    No history of diabetes; Glucose well-controlled with sliding scale coverage    7  Hematology:   Post-operative acute blood loss anemia; Hemoglobin and hematocrit decreased, trend prn   PO Leukocytosis - resolved, afebrile   Likely atelectasis  Continue to  monitor  8  Disposition:  Discharge to home today  VTE Pharmacologic Prophylaxis: Fondaparinux (Arixtra)  VTE Mechanical Prophylaxis: sequential compression device    Collaborative rounds completed with Mireya Nichols MD and nursing staff       SIGNATURE: Tesha Carty PA-C  DATE: July 30, 2018  TIME: 8:33 AM

## 2018-07-31 NOTE — CASE MANAGEMENT
Notification of Discharge  This is a Notification of Discharge from our facility [address]  Please be advised that this patient has been discharge from our facility  Below you will find the admission and discharge date and time including the patients disposition  PRESENTATION DATE: 7/24/2018  6:36 AM  IP ADMISSION DATE: 7/24/18 1456  DISCHARGE DATE: 7/30/2018  1:19 PM  DISPOSITION: Home with [address] [place] [address] in the [facility]  Phone: [phone]; Fax [phone]  ATTENTION: The Network Utilization Review Department is now centralized for our 9 Facilities  Make a note that we have a new phone and fax numbers for our Department  Please call with any questions or concerns to [phone] and carefully follow the prompts so that you are directed to the right person  All voicemails are confidential  Fax any determinations, approvals, denials, and requests for initial or continue stay review clinical to [phone]  Due to HIGH CALL volume, it would be easier if you could please send faxed requests to expedite your requests and in part, help us provide discharge notifications faster

## 2018-08-07 ENCOUNTER — TELEPHONE (OUTPATIENT)
Dept: CARDIAC SURGERY | Facility: CLINIC | Age: 63
End: 2018-08-07

## 2018-08-07 NOTE — TELEPHONE ENCOUNTER
POST OP CALL    7/24/18: CABG x 3  7/30/18: Discharge home    8/6/18: Spoke to wife  She reports Audi Diaz is doing ok but c/o feeling tired and not interested in doing much activity  He denies SOB, lightheadedness or angina with ambulation  Appetite is fair  No GI issues such as pain, N/V/constipaton  Weight is 170 lb (9 lb below pre op weight), no edema, no fever, incisions healing well  He is receiving PT/OT in the home  Encouraged wife to have patient get up and walk as much as tolerated  Climbing stairs is permitted  Suggested protein supplements to improve his nutritional intake  Medications reviewed  Questions answered    Cardiology appt 8/14/18  Post op CTS appt 8/22/18  No PCP appt made at this point; wife states patient dissatisfied that PCP did not diagnose his issues before now  Encouraged to make a f/u appt with PCP for routine medical care

## 2018-08-14 ENCOUNTER — OFFICE VISIT (OUTPATIENT)
Dept: CARDIOLOGY CLINIC | Facility: CLINIC | Age: 63
End: 2018-08-14
Payer: COMMERCIAL

## 2018-08-14 VITALS
HEIGHT: 66 IN | OXYGEN SATURATION: 99 % | WEIGHT: 173 LBS | BODY MASS INDEX: 27.8 KG/M2 | HEART RATE: 68 BPM | DIASTOLIC BLOOD PRESSURE: 74 MMHG | SYSTOLIC BLOOD PRESSURE: 118 MMHG

## 2018-08-14 DIAGNOSIS — I10 ESSENTIAL HYPERTENSION: ICD-10-CM

## 2018-08-14 DIAGNOSIS — I25.118 CORONARY ARTERY DISEASE OF NATIVE ARTERY OF NATIVE HEART WITH STABLE ANGINA PECTORIS (HCC): ICD-10-CM

## 2018-08-14 DIAGNOSIS — Z95.1 S/P CABG X 3: ICD-10-CM

## 2018-08-14 DIAGNOSIS — E78.5 DYSLIPIDEMIA: ICD-10-CM

## 2018-08-14 DIAGNOSIS — I65.22 LEFT CAROTID ARTERY STENOSIS: Primary | Chronic | ICD-10-CM

## 2018-08-14 PROCEDURE — 99214 OFFICE O/P EST MOD 30 MIN: CPT | Performed by: INTERNAL MEDICINE

## 2018-08-14 RX ORDER — POLYETHYLENE GLYCOL 3350 17 G/17G
17 POWDER, FOR SOLUTION ORAL DAILY
COMMUNITY
Start: 2018-07-31 | End: 2018-08-14

## 2018-08-14 RX ORDER — ATORVASTATIN CALCIUM 80 MG/1
80 TABLET, FILM COATED ORAL
Qty: 90 TABLET | Refills: 2 | Status: SHIPPED | OUTPATIENT
Start: 2018-08-14 | End: 2020-01-30 | Stop reason: SDUPTHER

## 2018-08-14 NOTE — PROGRESS NOTES
Cardiology Follow Up    Brenda Bess  1955  [id number]  [address] [facility] [address] [address]  [phone]    1  Left carotid artery stenosis     2  Essential hypertension     3  S/P CABG x 3     4  Dyslipidemia     5  Coronary artery disease of native artery of native heart with stable angina pectoris [facility])         Interval History: 7/24/18 had 3 vessel CABG with LIMA-LAD, SVG-OM1, SVG- RCA because of dyspnea and abnormal stress test   There was no complications seen  He was seen previously as part of a preoperative exam for carotid artery disease  He is feeling well since then  He denies any shortness of breath or severe pain at sternotomy site  Had carotid artery stent on 7/9/2018  Denies LE edema, orthopnea or PND  No longer using torsemide  He is able to ambulate to exercise with walking and stretching  Scheduled to see Dr Vee Cooper next week  Patient Active Problem List   Diagnosis    Left carotid artery stenosis    Amaurosis fugax    Essential hypertension    Dyslipidemia    Carotid artery disorder (HCC)    Carotid stenosis, left    Coronary artery disease of native artery of native heart with stable angina pectoris (HCC)    S/P CABG x 3    Acute blood loss as cause of postoperative anemia    Hypokalemia     Past Medical History:   Diagnosis Date    Carotid stenosis, left     s/p IR stent    Coronary artery disease     Former tobacco use     Hard of hearing     Hyperlipidemia     Hypertension     Parkinson disease ([address] )      Social History     Social History    Marital status: /Civil Union     Spouse name: N/A    Number of children: N/A    Years of education: N/A     Occupational History    Not on file       Social History Main Topics    Smoking status: Former Smoker     Packs/day: 1 00     Quit date: 5/14/2018    Smokeless tobacco: Former User     Quit date: 5/3/2018    Alcohol use No    Drug use: No    Sexual activity: Not Currently     Other Topics Concern    Not on file     Social History Narrative    No narrative on file      Family History   Problem Relation Age of Onset    Cancer Mother     Heart attack Father     Stroke Father      Past Surgical History:   Procedure Laterality Date    AMPUTATION OF REPLICATED FINGERS       rt 2nd and 3rd fingers traumatically amputated at work    APPENDECTOMY      MANDIBLE FRACTURE SURGERY      upper and lower    ORBITAL FRACTURE SURGERY Right     DE CABG, VEIN, FOUR N/A 7/24/2018    Procedure: CORONARY ARTERY BYPASS GRAFT (CABG) 3 VESSELS ; NATE to LAD, SVG--> OM and Distal right;  Surgeon: Toshia Flood DO;  [facility];  Service: Cardiac Surgery    DE ECHO TRANSESOPHAG [address] N/A 7/24/2018    Procedure: TRANSESOPHAGEAL ECHOCARDIOGRAM (HECTOR);   Surgeon: Toshia Flood DO;  [facility];  Service: Cardiac Surgery    DE TCAT IV STENT CRV CRTD ART EMBOLIC PROTECJ Left 9/6/4363    Procedure: TRANSCAROTID ARTERY REVASCULARIZATION;  Surgeon: Alek Ulloa MD;  [facility];  Service: Vascular       Current Outpatient Prescriptions:     aspirin (ECOTRIN LOW STRENGTH) 81 mg EC tablet, Take 1 tablet (81 mg total) by mouth daily, Disp: 90 tablet, Rfl: 0    atorvastatin (LIPITOR) 80 mg tablet, Take 1 tablet (80 mg total) by mouth daily with dinner, Disp: 90 tablet, Rfl: 0    clopidogrel (PLAVIX) 75 mg tablet, Take 1 tablet (75 mg total) by mouth daily, Disp: 30 tablet, Rfl: 1    HYDROcodone-acetaminophen (NORCO) 5-325 mg per tablet, Take 1 tablet by mouth every 6 (six) hours as needed (moderate pain) for up to 50 doses Max Daily Amount: 4 tablets, Disp: 50 tablet, Rfl: 0    metoprolol tartrate (LOPRESSOR) 25 mg tablet, Take 1 tablet (25 mg total) by mouth every 12 (twelve) hours, Disp: 180 tablet, Rfl: 0    pantoprazole (PROTONIX) 20 mg tablet, Take 1 tablet (20 mg total) by mouth daily for 30 days, Disp: 30 tablet, Rfl: 0  Allergies   Allergen Reactions    Percocet [Oxycodone-Acetaminophen] Itching     Trouble sleeping        Labs:  Lab Results   Component Value Date     07/29/2018    K 3 5 07/29/2018     07/29/2018    CO2 30 07/29/2018    BUN 18 07/29/2018    CREATININE 0 74 07/29/2018    GLUCOSE 94 07/29/2018    GLUCOSE 109 07/24/2018    CALCIUM 8 2 (L) 07/29/2018     Lab Results   Component Value Date    WBC 9 20 07/29/2018    HGB 10 6 (L) 07/30/2018    HCT 32 9 (L) 07/30/2018    MCV 92 07/29/2018     07/29/2018     Lab Results   Component Value Date    CHOL 135 07/23/2018    TRIG 161 (H) 07/23/2018    HDL 40 07/23/2018     Imaging: Xr Chest Portable    Result Date: 7/26/2018  Narrative: CHEST INDICATION:   shortness of breath  COMPARISON:  7/25/2018 chest x-ray EXAM PERFORMED/VIEWS:  XR CHEST PORTABLE Single view FINDINGS: Strand-like opacities mid lung fields bilaterally, likely atelectasis, unchanged Right IJ [place]-Sacha catheter has been replaced by a CVP line which terminates at cavoatrial junction  No pneumothorax  Large caliber bilateral chest tubes,, unchanged Sternal wires again noted NG tube has been placed, typical course, terminating in the projection of the gastric fundus Persistent mild cardiomegaly No pleural effusion Osseous structures appear within normal limits for patient age  Impression: Interval placement of nasogastric tube and replacement of [place]-Sacha catheter Status post recent surgery  No evidence of pneumothorax No acute cardiopulmonary disease  Workstation performed: [id number]     Xr Chest Portable    Result Date: 7/25/2018  Narrative: CHEST INDICATION:   Post Open Heart Surgey  COMPARISON:  7/24/2018  EXAM PERFORMED/VIEWS:  XR CHEST PORTABLE FINDINGS:  Median sternotomy wires are present  Chest tubes and mediastinal drains are present    [place]-Sacha catheter tip overlies the right pulmonary artery  Right jugular central venous catheter tip overlies the SVC  Cardiomediastinal silhouette appears unremarkable  Bilateral linear atelectasis is present  Osseous structures appear within normal limits for patient age  Impression: Bilateral linear atelectasis  Workstation performed: [id number]     Xr Abdomen 1 View Kub    Result Date: 7/28/2018  Narrative: ABDOMEN INDICATION:   r/o ileus  COMPARISON:  July 25, 2018 VIEWS:  AP supine FINDINGS: Moderate volume of stool in the ascending colon  There is a nonobstructive bowel gas pattern  No discernible free air on this supine study  Upright or left lateral decubitus imaging is more sensitive to detect subtle free air in the appropriate setting  No pathologic calcifications or soft tissue masses  Visualized lung bases are clear  Visualized osseous structures are unremarkable for the patient's age  Impression: Unremarkable examination  Workstation performed: [id number]     Xr Abdomen 1 View Kub    Result Date: 7/25/2018  Narrative: ABDOMEN INDICATION:   abdominal distension, pain  COMPARISON:  None VIEWS:  AP supine FINDINGS: Limited evaluation due to multiple overlying mediastinal and chest tubes and monitoring leads  There are epicardial pacing leads  There is a J[place]-Sacha catheter in place overlying the pulmonary flow tract  Median sternotomy wires  Romo catheter  There is a nonobstructive bowel gas pattern  No discernible free air on this supine study  Upright or left lateral decubitus imaging is more sensitive to detect subtle free air in the appropriate setting  No pathologic calcifications or soft tissue masses  Visualized lung bases are clear  Visualized osseous structures are unremarkable for the patient's age  Impression: Nonobstructive bowel gas pattern  Workstation performed: [id number]     Xr Chest Portable Icu    Result Date: 7/24/2018  Narrative: CHEST INDICATION:   S/P open heart   COMPARISON:  July 3, 2018 EXAM PERFORMED/VIEWS:  XR CHEST PORTABLE ICU FINDINGS:  Newly status post open heart surgery  ET tube in satisfactory position  NG tube below left hemidiaphragm, tip not visualized  Side port hole  The the level junction  Right IJ central line with tip at the level of the SVC  [place]-Sacha catheter present with tip overlying the main pulmonary artery  Bilateral chest tubes present  Cardiac silhouette within expected normal recent CABG  Limited inspiratory volume  Linear opacity in the left upper lung favoring atelectasis  Generalized background reticular interstitial thickening in this setting most likely related to interstitial fluid  No pleural fluid collections  No pneumothorax  Osseous structures appear within normal limits for patient age  Impression: New postoperative changes  Bilateral diffuse background interstitial thickening suggesting  Linear atelectasis in the left upper lobe  Lines and tubes in satisfactory position  Workstation performed: [id number]       Review of Systems:  Review of Systems   Constitutional: Negative for chills, fatigue and fever  HENT: Negative for congestion, nosebleeds and postnasal drip  Respiratory: Positive for shortness of breath  Negative for cough and chest tightness  Cardiovascular: Negative for chest pain, palpitations and leg swelling  Gastrointestinal: Negative for abdominal distention, abdominal pain, diarrhea, nausea and vomiting  Endocrine: Negative for polydipsia, polyphagia and polyuria  Musculoskeletal: Negative for gait problem and myalgias  Skin: Negative for color change, pallor and rash  Allergic/Immunologic: Negative for environmental allergies, food allergies and immunocompromised state  Neurological: Negative for dizziness, seizures, syncope and light-headedness  Hematological: Negative for adenopathy  Does not bruise/bleed easily  Psychiatric/Behavioral: Negative for dysphoric mood  The patient is not nervous/anxious  Physical Exam:  /74 (BP Location: Left arm, Patient Position: Sitting, Cuff Size: Standard)   Pulse 68   Ht 5' 6" (1 676 m)   Wt 78 5 kg (173 lb)   SpO2 99%   BMI 27 92 kg/m²     Physical Exam   Constitutional: He is oriented to person, place, and time  He appears well-developed  No distress  HENT:   Head: Normocephalic and atraumatic  Eyes: Conjunctivae and EOM are normal  Pupils are equal, round, and reactive to light  Neck: Neck supple  No JVD present  No thyromegaly present  Cardiovascular: Normal rate, regular rhythm, normal heart sounds and intact distal pulses  Exam reveals no gallop and no friction rub  No murmur heard  Pulmonary/Chest: Effort normal and breath sounds normal    Abdominal: Soft  He exhibits no distension  There is no tenderness  Musculoskeletal: He exhibits no edema  Neurological: He is alert and oriented to person, place, and time  No cranial nerve deficit  Skin: Skin is warm and dry  No rash noted  He is not diaphoretic  No erythema  Incision sites healed well   Psychiatric: He has a normal mood and affect  His behavior is normal  Judgment and thought content normal        Discussion/Summary:  1  Left carotid artery stenosis    2  Essential hypertension    3  S/P CABG x 3    4  Dyslipidemia    5  Coronary artery disease of native artery of native heart with stable angina pectoris ([address] )      - Progressing well post CABG  - Start cardiac rehab in 2 weeks or when cleared by Dr Samuel Prater  - ASA and Plavix  - Atorvastatin 80 mg daily  - Check lipid profile in 3-4 months    - Discussed risk factor reduction including refraining from smoking, eating a diet high in fruits and vegetables, maintaining a healthy weight, limiting screen time along with controlling BP and cholesterol

## 2018-08-14 NOTE — LETTER
August 14, 2018     Grecia Esteban, [address] [address]    Patient: Koko Titus  YOB: 1955   Date of Visit: 8/14/2018       Dear Dr Park Sheets:    Thank you for referring Latishail Mario to me for evaluation  Below are my notes for this consultation  If you have questions, please do not hesitate to call me  I look forward to following your patient along with you  Sincerely,        Pato Byrne DO        CC: No Recipients  Pato Byrne DO  8/14/2018  3:24 PM  Sign at close encounter                                             Cardiology Follow Up    Koko Titus   1955  [id number]  [address] [facility] [address] [address] [phone]    1  Left carotid artery stenosis     2  Essential hypertension     3  S/P CABG x 3     4  Dyslipidemia     5  Coronary artery disease of native artery of native heart with stable angina pectoris [facility])         Interval History: 7/24/18 had 3 vessel CABG with LIMA-LAD, SVG-OM1, SVG- RCA because of dyspnea and abnormal stress test   There was no complications seen  He was seen previously as part of a preoperative exam for carotid artery disease  He is feeling well since then  He denies any shortness of breath or severe pain at sternotomy site  Had carotid artery stent on 7/9/2018  Denies LE edema, orthopnea or PND  No longer using torsemide  He is able to ambulate to exercise with walking and stretching  Scheduled to see Dr Aisha Erickson next week             Patient Active Problem List   Diagnosis    Left carotid artery stenosis    Amaurosis fugax    Essential hypertension    Dyslipidemia    Carotid artery disorder (HCC)    Carotid stenosis, left    Coronary artery disease of native artery of native heart with stable angina pectoris (HCC)    S/P CABG x 3    Acute blood loss as cause of postoperative anemia    Hypokalemia     Past Medical History:   Diagnosis Date    Carotid stenosis, left     s/p IR stent    Coronary artery disease     Former tobacco use     Hard of hearing     Hyperlipidemia     Hypertension     Parkinson disease ([facility] [address] )      Social History     Social History    Marital status: /Civil Union     Spouse name: N/A    Number of children: N/A    Years of education: N/A     Occupational History    Not on file  Social History Main Topics    Smoking status: Former Smoker     Packs/day: 1 00     Quit date: 5/14/2018    Smokeless tobacco: Former User     Quit date: 5/3/2018    Alcohol use No    Drug use: No    Sexual activity: Not Currently     Other Topics Concern    Not on file     Social History Narrative    No narrative on file      Family History   Problem Relation Age of Onset    Cancer Mother     Heart attack Father     Stroke Father      Past Surgical History:   Procedure Laterality Date    AMPUTATION OF REPLICATED FINGERS       rt 2nd and 3rd fingers traumatically amputated at work    APPENDECTOMY      MANDIBLE FRACTURE SURGERY      upper and lower    ORBITAL FRACTURE SURGERY Right     AL CABG, VEIN, FOUR N/A 7/24/2018    Procedure: CORONARY ARTERY BYPASS GRAFT (CABG) 3 VESSELS ; NATE to LAD, SVG--> OM and Distal right;  Surgeon: Azalia Fields DO;  [facility];  Service: Cardiac Surgery    AL ECHO TRANSESOPHAG [address] N/A 7/24/2018    Procedure: TRANSESOPHAGEAL ECHOCARDIOGRAM (HECTOR);   Surgeon: Azalia Fields DO;  [facility];  Service: Cardiac Surgery    AL TCAT IV STENT CRV CRTD ART EMBOLIC PROTECJ Left 9/7/3086    Procedure: TRANSCAROTID ARTERY REVASCULARIZATION;  Surgeon: Shimon Quintanilla MD;  [facility];  Service: Vascular       Current Outpatient Prescriptions:     aspirin (ECOTRIN LOW STRENGTH) 81 mg EC tablet, Take 1 tablet (81 mg total) by mouth daily, Disp: 90 tablet, Rfl: 0    atorvastatin (LIPITOR) 80 mg tablet, Take 1 tablet (80 mg total) by mouth daily with dinner, Disp: 90 tablet, Rfl: 0    clopidogrel (PLAVIX) 75 mg tablet, Take 1 tablet (75 mg total) by mouth daily, Disp: 30 tablet, Rfl: 1    HYDROcodone-acetaminophen (NORCO) 5-325 mg per tablet, Take 1 tablet by mouth every 6 (six) hours as needed (moderate pain) for up to 50 doses Max Daily Amount: 4 tablets, Disp: 50 tablet, Rfl: 0    metoprolol tartrate (LOPRESSOR) 25 mg tablet, Take 1 tablet (25 mg total) by mouth every 12 (twelve) hours, Disp: 180 tablet, Rfl: 0    pantoprazole (PROTONIX) 20 mg tablet, Take 1 tablet (20 mg total) by mouth daily for 30 days, Disp: 30 tablet, Rfl: 0  Allergies   Allergen Reactions    Percocet [Oxycodone-Acetaminophen] Itching     Trouble sleeping        Labs:  Lab Results   Component Value Date     07/29/2018    K 3 5 07/29/2018     07/29/2018    CO2 30 07/29/2018    BUN 18 07/29/2018    CREATININE 0 74 07/29/2018    GLUCOSE 94 07/29/2018    GLUCOSE 109 07/24/2018    CALCIUM 8 2 (L) 07/29/2018     Lab Results   Component Value Date    WBC 9 20 07/29/2018    HGB 10 6 (L) 07/30/2018    HCT 32 9 (L) 07/30/2018    MCV 92 07/29/2018     07/29/2018     Lab Results   Component Value Date    CHOL 135 07/23/2018    TRIG 161 (H) 07/23/2018    HDL 40 07/23/2018     Imaging: Xr Chest Portable    Result Date: 7/26/2018  Narrative: CHEST INDICATION:   shortness of breath  COMPARISON:  7/25/2018 chest x-ray EXAM PERFORMED/VIEWS:  XR CHEST PORTABLE Single view FINDINGS: Strand-like opacities mid lung fields bilaterally, likely atelectasis, unchanged Right IJ [place]-Sacha catheter has been replaced by a CVP line which terminates at cavoatrial junction  No pneumothorax   Large caliber bilateral chest tubes,, unchanged Sternal wires again noted NG tube has been placed, typical course, terminating in the projection of the gastric fundus Persistent mild cardiomegaly No pleural effusion Osseous structures appear within normal limits for patient age      Impression: Interval placement of nasogastric tube and replacement of [place]-Sacha catheter Status post recent surgery  No evidence of pneumothorax No acute cardiopulmonary disease  Workstation performed: [id number]     Xr Chest Portable    Result Date: 7/25/2018  Narrative: CHEST INDICATION:   Post Open Heart Surgey  COMPARISON:  7/24/2018  EXAM PERFORMED/VIEWS:  XR CHEST PORTABLE FINDINGS:  Median sternotomy wires are present  Chest tubes and mediastinal drains are present  [place]-Sacha catheter tip overlies the right pulmonary artery  Right jugular central venous catheter tip overlies the SVC  Cardiomediastinal silhouette appears unremarkable  Bilateral linear atelectasis is present  Osseous structures appear within normal limits for patient age  Impression: Bilateral linear atelectasis  Workstation performed: [id number]     Xr Abdomen 1 View Kub    Result Date: 7/28/2018  Narrative: ABDOMEN INDICATION:   r/o ileus  COMPARISON:  July 25, 2018 VIEWS:  AP supine FINDINGS: Moderate volume of stool in the ascending colon  There is a nonobstructive bowel gas pattern  No discernible free air on this supine study  Upright or left lateral decubitus imaging is more sensitive to detect subtle free air in the appropriate setting  No pathologic calcifications or soft tissue masses  Visualized lung bases are clear  Visualized osseous structures are unremarkable for the patient's age  Impression: Unremarkable examination  Workstation performed: [id number]     Xr Abdomen 1 View Kub    Result Date: 7/25/2018  Narrative: ABDOMEN INDICATION:   abdominal distension, pain  COMPARISON:  None VIEWS:  AP supine FINDINGS: Limited evaluation due to multiple overlying mediastinal and chest tubes and monitoring leads  There are epicardial pacing leads  There is a [place]-Sacha catheter in place overlying the pulmonary flow tract  Median sternotomy wires  Romo catheter  There is a nonobstructive bowel gas pattern   No discernible free air on this supine study  Upright or left lateral decubitus imaging is more sensitive to detect subtle free air in the appropriate setting  No pathologic calcifications or soft tissue masses  Visualized lung bases are clear  Visualized osseous structures are unremarkable for the patient's age  Impression: Nonobstructive bowel gas pattern  Workstation performed: [id number]     Xr Chest Portable Icu    Result Date: 7/24/2018  Narrative: CHEST INDICATION:   S/P open heart  COMPARISON:  July 3, 2018 EXAM PERFORMED/VIEWS:  XR CHEST PORTABLE ICU FINDINGS:  Newly status post open heart surgery  ET tube in satisfactory position  NG tube below left hemidiaphragm, tip not visualized  Side port hole  The the level junction  Right IJ central line with tip at the level of the SVC  [place]-Sacha catheter present with tip overlying the main pulmonary artery  Bilateral chest tubes present  Cardiac silhouette within expected normal recent CABG  Limited inspiratory volume  Linear opacity in the left upper lung favoring atelectasis  Generalized background reticular interstitial thickening in this setting most likely related to interstitial fluid  No pleural fluid collections  No pneumothorax  Osseous structures appear within normal limits for patient age  Impression: New postoperative changes  Bilateral diffuse background interstitial thickening suggesting  Linear atelectasis in the left upper lobe  Lines and tubes in satisfactory position  Workstation performed: [id number]       Review of Systems:  Review of Systems   Constitutional: Negative for chills, fatigue and fever  HENT: Negative for congestion, nosebleeds and postnasal drip  Respiratory: Positive for shortness of breath  Negative for cough and chest tightness  Cardiovascular: Negative for chest pain, palpitations and leg swelling  Gastrointestinal: Negative for abdominal distention, abdominal pain, diarrhea, nausea and vomiting  Endocrine: Negative for polydipsia, polyphagia and polyuria  Musculoskeletal: Negative for gait problem and myalgias  Skin: Negative for color change, pallor and rash  Allergic/Immunologic: Negative for environmental allergies, food allergies and immunocompromised state  Neurological: Negative for dizziness, seizures, syncope and light-headedness  Hematological: Negative for adenopathy  Does not bruise/bleed easily  Psychiatric/Behavioral: Negative for dysphoric mood  The patient is not nervous/anxious  Physical Exam:  /74 (BP Location: Left arm, Patient Position: Sitting, Cuff Size: Standard)   Pulse 68   Ht 5' 6" (1 676 m)   Wt 78 5 kg (173 lb)   SpO2 99%   BMI 27 92 kg/m²      Physical Exam   Constitutional: He is oriented to person, place, and time  He appears well-developed  No distress  HENT:   Head: Normocephalic and atraumatic  Eyes: Conjunctivae and EOM are normal  Pupils are equal, round, and reactive to light  Neck: Neck supple  No JVD present  No thyromegaly present  Cardiovascular: Normal rate, regular rhythm, normal heart sounds and intact distal pulses  Exam reveals no gallop and no friction rub  No murmur heard  Pulmonary/Chest: Effort normal and breath sounds normal    Abdominal: Soft  He exhibits no distension  There is no tenderness  Musculoskeletal: He exhibits no edema  Neurological: He is alert and oriented to person, place, and time  No cranial nerve deficit  Skin: Skin is warm and dry  No rash noted  He is not diaphoretic  No erythema  Incision sites healed well   Psychiatric: He has a normal mood and affect  His behavior is normal  Judgment and thought content normal        Discussion/Summary:  1  Left carotid artery stenosis    2  Essential hypertension    3  S/P CABG x 3    4  Dyslipidemia    5   Coronary artery disease of native artery of native heart with stable angina pectoris ([address] )      - Progressing well post CABG  - Start cardiac rehab in 2 weeks or when cleared by Dr Tonja Pham  - ASA and Plavix  - Atorvastatin 80 mg daily  - Check lipid profile in 3-4 months    - Discussed risk factor reduction including refraining from smoking, eating a diet high in fruits and vegetables, maintaining a healthy weight, limiting screen time along with controlling BP and cholesterol

## 2018-08-15 ENCOUNTER — APPOINTMENT (EMERGENCY)
Dept: RADIOLOGY | Facility: HOSPITAL | Age: 63
End: 2018-08-15
Payer: COMMERCIAL

## 2018-08-15 ENCOUNTER — HOSPITAL ENCOUNTER (EMERGENCY)
Facility: HOSPITAL | Age: 63
Discharge: HOME | End: 2018-08-15
Attending: EMERGENCY MEDICINE | Admitting: EMERGENCY MEDICINE
Payer: COMMERCIAL

## 2018-08-15 ENCOUNTER — TELEPHONE (OUTPATIENT)
Dept: CARDIOLOGY CLINIC | Facility: CLINIC | Age: 63
End: 2018-08-15

## 2018-08-15 ENCOUNTER — OFFICE VISIT (OUTPATIENT)
Dept: CARDIOLOGY CLINIC | Facility: CLINIC | Age: 63
End: 2018-08-15
Payer: COMMERCIAL

## 2018-08-15 VITALS
WEIGHT: 175 LBS | HEIGHT: 66 IN | DIASTOLIC BLOOD PRESSURE: 78 MMHG | SYSTOLIC BLOOD PRESSURE: 132 MMHG | OXYGEN SATURATION: 100 % | HEART RATE: 177 BPM | BODY MASS INDEX: 28.12 KG/M2

## 2018-08-15 VITALS
WEIGHT: 175 LBS | SYSTOLIC BLOOD PRESSURE: 135 MMHG | RESPIRATION RATE: 19 BRPM | TEMPERATURE: 97.7 F | HEIGHT: 67 IN | HEART RATE: 62 BPM | OXYGEN SATURATION: 99 % | BODY MASS INDEX: 27.47 KG/M2 | DIASTOLIC BLOOD PRESSURE: 68 MMHG

## 2018-08-15 DIAGNOSIS — Z95.1 S/P CABG X 3: ICD-10-CM

## 2018-08-15 DIAGNOSIS — R06.02 SHORT OF BREATH ON EXERTION: ICD-10-CM

## 2018-08-15 DIAGNOSIS — R00.0 TACHYCARDIA: Primary | ICD-10-CM

## 2018-08-15 DIAGNOSIS — I47.1 SVT (SUPRAVENTRICULAR TACHYCARDIA) (HCC): Primary | ICD-10-CM

## 2018-08-15 DIAGNOSIS — E78.5 DYSLIPIDEMIA: ICD-10-CM

## 2018-08-15 DIAGNOSIS — I10 ESSENTIAL HYPERTENSION: ICD-10-CM

## 2018-08-15 DIAGNOSIS — I25.118 CORONARY ARTERY DISEASE OF NATIVE ARTERY OF NATIVE HEART WITH STABLE ANGINA PECTORIS (HCC): ICD-10-CM

## 2018-08-15 DIAGNOSIS — I65.22 CAROTID STENOSIS, LEFT: ICD-10-CM

## 2018-08-15 LAB
ALBUMIN SERPL BCP-MCNC: 3.5 G/DL (ref 3.5–5)
ALP SERPL-CCNC: 131 U/L (ref 46–116)
ALT SERPL W P-5'-P-CCNC: 28 U/L (ref 12–78)
ANION GAP SERPL CALCULATED.3IONS-SCNC: 3 MMOL/L (ref 4–13)
APTT PPP: 35 SECONDS (ref 24–33)
AST SERPL W P-5'-P-CCNC: 17 U/L (ref 5–45)
BASOPHILS # BLD AUTO: 0.17 THOUSANDS/ΜL (ref 0–0.1)
BASOPHILS NFR BLD AUTO: 2 % (ref 0–1)
BILIRUB SERPL-MCNC: 0.4 MG/DL (ref 0.2–1)
BUN SERPL-MCNC: 17 MG/DL (ref 5–25)
CALCIUM SERPL-MCNC: 9.4 MG/DL (ref 8.3–10.1)
CHLORIDE SERPL-SCNC: 103 MMOL/L (ref 100–108)
CO2 SERPL-SCNC: 32 MMOL/L (ref 21–32)
CREAT SERPL-MCNC: 1.04 MG/DL (ref 0.6–1.3)
EOSINOPHIL # BLD AUTO: 0.75 THOUSAND/ΜL (ref 0–0.61)
EOSINOPHIL NFR BLD AUTO: 8 % (ref 0–6)
ERYTHROCYTE [DISTWIDTH] IN BLOOD BY AUTOMATED COUNT: 13 % (ref 11.6–15.1)
GFR SERPL CREATININE-BSD FRML MDRD: 77 ML/MIN/1.73SQ M
GLUCOSE SERPL-MCNC: 111 MG/DL (ref 65–140)
HCT VFR BLD AUTO: 43.7 % (ref 36.5–49.3)
HGB BLD-MCNC: 13.9 G/DL (ref 12–17)
IMM GRANULOCYTES # BLD AUTO: 0.05 THOUSAND/UL (ref 0–0.2)
IMM GRANULOCYTES NFR BLD AUTO: 1 % (ref 0–2)
INR PPP: 1.02 (ref 0.86–1.16)
LYMPHOCYTES # BLD AUTO: 2.92 THOUSANDS/ΜL (ref 0.6–4.47)
LYMPHOCYTES NFR BLD AUTO: 30 % (ref 14–44)
MCH RBC QN AUTO: 29.1 PG (ref 26.8–34.3)
MCHC RBC AUTO-ENTMCNC: 31.8 G/DL (ref 31.4–37.4)
MCV RBC AUTO: 92 FL (ref 82–98)
MONOCYTES # BLD AUTO: 0.78 THOUSAND/ΜL (ref 0.17–1.22)
MONOCYTES NFR BLD AUTO: 8 % (ref 4–12)
NEUTROPHILS # BLD AUTO: 5.08 THOUSANDS/ΜL (ref 1.85–7.62)
NEUTS SEG NFR BLD AUTO: 51 % (ref 43–75)
NRBC BLD AUTO-RTO: 0 /100 WBCS
PLATELET # BLD AUTO: 396 THOUSANDS/UL (ref 149–390)
PMV BLD AUTO: 9.4 FL (ref 8.9–12.7)
POTASSIUM SERPL-SCNC: 4.7 MMOL/L (ref 3.5–5.3)
PROT SERPL-MCNC: 8.5 G/DL (ref 6.4–8.2)
PROTHROMBIN TIME: 10.7 SECONDS (ref 9.4–11.7)
RBC # BLD AUTO: 4.77 MILLION/UL (ref 3.88–5.62)
SODIUM SERPL-SCNC: 138 MMOL/L (ref 136–145)
TROPONIN I SERPL-MCNC: <0.02 NG/ML
WBC # BLD AUTO: 9.75 THOUSAND/UL (ref 4.31–10.16)

## 2018-08-15 PROCEDURE — 99214 OFFICE O/P EST MOD 30 MIN: CPT | Performed by: INTERNAL MEDICINE

## 2018-08-15 PROCEDURE — 85610 PROTHROMBIN TIME: CPT | Performed by: EMERGENCY MEDICINE

## 2018-08-15 PROCEDURE — 85025 COMPLETE CBC W/AUTO DIFF WBC: CPT | Performed by: EMERGENCY MEDICINE

## 2018-08-15 PROCEDURE — 93000 ELECTROCARDIOGRAM COMPLETE: CPT | Performed by: INTERNAL MEDICINE

## 2018-08-15 PROCEDURE — 96360 HYDRATION IV INFUSION INIT: CPT

## 2018-08-15 PROCEDURE — 99285 EMERGENCY DEPT VISIT HI MDM: CPT

## 2018-08-15 PROCEDURE — 84484 ASSAY OF TROPONIN QUANT: CPT | Performed by: EMERGENCY MEDICINE

## 2018-08-15 PROCEDURE — 93005 ELECTROCARDIOGRAM TRACING: CPT

## 2018-08-15 PROCEDURE — 36415 COLL VENOUS BLD VENIPUNCTURE: CPT | Performed by: EMERGENCY MEDICINE

## 2018-08-15 PROCEDURE — 71045 X-RAY EXAM CHEST 1 VIEW: CPT

## 2018-08-15 PROCEDURE — 80053 COMPREHEN METABOLIC PANEL: CPT | Performed by: EMERGENCY MEDICINE

## 2018-08-15 PROCEDURE — 85730 THROMBOPLASTIN TIME PARTIAL: CPT | Performed by: EMERGENCY MEDICINE

## 2018-08-15 RX ORDER — ADENOSINE 3 MG/ML
INJECTION, SOLUTION INTRAVENOUS
Status: DISCONTINUED
Start: 2018-08-15 | End: 2018-08-15 | Stop reason: HOSPADM

## 2018-08-15 RX ADMIN — SODIUM CHLORIDE 1000 ML: 0.9 INJECTION, SOLUTION INTRAVENOUS at 12:04

## 2018-08-15 NOTE — ED NOTES
Received pt to unit c/o dyspnea and rapid HR  Pt showing SVT on monitor with a rate of approx 170  Pt able to answer questions  Immediately prior to adenosine admin, tech layed pt flat to obtain EKG and pt converted to NSR with a rate of 68  MD and charge RN in room at the time  Pt stable and stated he was experiencing relief         Jean Claude Fang RN  08/15/18 0059

## 2018-08-15 NOTE — DISCHARGE INSTRUCTIONS
Supraventricular Tachycardia   WHAT YOU NEED TO KNOW:   Supraventricular tachycardia (SVT) is a condition that causes your heart to beat much faster than it should  SVT is a type of abnormal heart rhythm, called an arrhythmia, that starts in the upper part of your heart  It may last a few seconds or hours to several days  DISCHARGE INSTRUCTIONS:   Call 911 for the following:   · You have any of the following signs of a heart attack:      ¨ Squeezing, pressure, or pain in your chest that lasts longer than 5 minutes or returns    ¨ Discomfort or pain in your back, neck, jaw, stomach, or arm     ¨ Trouble breathing    ¨ Nausea or vomiting    ¨ Lightheadedness or a sudden cold sweat, especially with chest pain or trouble breathing    Return to the emergency department if:   · You have dizziness, lightheadedness, or feel faint  · You have sudden numbness or weakness in your arms or legs  Contact your healthcare provider if:   · Your symptoms get worse, or you have new symptoms  · You have swelling in your ankles or feet  · You have questions or concerns about your condition or care  Medicines:   · Medicines  can help control your heart rate and rhythm  You may need more than one medicine to treat your symptoms  · Take your medicine as directed  Contact your healthcare provider if you think your medicine is not helping or if you have side effects  Tell him or her if you are allergic to any medicine  Keep a list of the medicines, vitamins, and herbs you take  Include the amounts, and when and why you take them  Bring the list or the pill bottles to follow-up visits  Carry your medicine list with you in case of an emergency  How to manage or prevent SVT:   · Perform vagal maneuvers as directed when you have symptoms of SVT  Lie down flat and bear down like you are having a bowel movement  Do this for 10 to 30 seconds  · Do not drink caffeine or alcohol    These can increase your risk for SVT     · Keep a record of your symptoms  Write down what you ate or what you were doing before an episode of SVT  Also write down anything you did to make the SVT stop  Bring your record to follow up visits with your healthcare provider  · Eat heart-healthy foods  These include fruits, vegetables, whole-grain breads, low-fat dairy products, beans, lean meats, and fish  Replace butter and margarine with heart-healthy oils such as olive oil and canola oil  · Exercise regularly and maintain a healthy weight  Ask about the best exercise plan for you  Ask your healthcare provider how much you should weigh  Ask him to help you create a weight loss plan if you are overweight  · Do not smoke  Nicotine and other chemicals in cigarettes and cigars can cause heart and lung damage  Ask your healthcare provider for information if you currently smoke and need help to quit  E-cigarettes or smokeless tobacco still contain nicotine  Talk to your healthcare provider before you use these products  Follow up with your healthcare provider as directed:  Write down your questions so you remember to ask them during your visits  © 2017 [facility] Information is for End User's use only and may not be sold, redistributed or otherwise used for commercial purposes  All illustrations and images included in CareNotes® are the copyrighted property of A D A M , Inc  or Steve Davis  The above information is an  only  It is not intended as medical advice for individual conditions or treatments  Talk to your doctor, nurse or pharmacist before following any medical regimen to see if it is safe and effective for you

## 2018-08-15 NOTE — LETTER
August 15, 2018     Lisa Hodgson, [address] [address]    Patient: Emma Kehr  YOB: 1955   Date of Visit: 8/15/2018       Dear Dr Kp Best:    Thank you for referring Giovanna Silva to me for evaluation  Below are my notes for this consultation  If you have questions, please do not hesitate to call me  I look forward to following your patient along with you  Sincerely,        Wanda Beltrán DO        CC: No Recipients  Wanda Beltrán DO  8/15/2018 12:54 PM  Sign at close encounter                                             Cardiology Follow Up    Emma Kehr  1955  [id number]      Interval History: Mr Rikki Kussmaul is being seen today as an acute visit because this morning he developed tachycardia during physical therapy  He awoke feeling lightheaded with intermittent palpitations  His physical therapist came a few hours later and noted the patient's heart rate was 75 beats per minute  After walking, the patient felt fatigued, diaphoretic and short of breath  Repeat check of heart rate was 190 beats per minute  He called the office and came for evaluation  EKG done now shows a narrow complex tachycardia at 180 beats per minute  Patient feels palpitations, lightheadedness and short of breath  He denies any syncope or near syncope  He was able to walk into office without difficulty  He was driven to the office by his wife  He was seen yesterday as a routine follow-up visit for his recent CABG and carotid artery stent  He was feeling well yesterday  He has no history of tachycardia  The following portions of the patient's history were reviewed and updated as appropriate: allergies, current medications, past family history, past medical history, past social history, past surgical history and problem list       Review of Systems:  Review of Systems   Constitutional: Negative for chills, fatigue and fever     HENT: Negative for congestion, nosebleeds and postnasal drip  Respiratory: Positive for shortness of breath  Negative for cough and chest tightness  Cardiovascular: Positive for palpitations  Negative for chest pain and leg swelling  Gastrointestinal: Negative for abdominal distention, abdominal pain, diarrhea, nausea and vomiting  Endocrine: Negative for polydipsia, polyphagia and polyuria  Musculoskeletal: Negative for gait problem and myalgias  Skin: Negative for color change, pallor and rash  Allergic/Immunologic: Negative for environmental allergies, food allergies and immunocompromised state  Neurological: Negative for dizziness, seizures, syncope and light-headedness  Hematological: Negative for adenopathy  Does not bruise/bleed easily  Psychiatric/Behavioral: Negative for dysphoric mood  The patient is not nervous/anxious  Physical Exam:  /78 (BP Location: Left arm, Patient Position: Sitting, Cuff Size: Standard)   Pulse (!) 177 Comment: apical  Ht 5' 6" (1 676 m)   Wt 79 4 kg (175 lb)   SpO2 100%   BMI 28 25 kg/m²      Physical Exam   Constitutional: He is oriented to person, place, and time  He appears well-developed  No distress  HENT:   Head: Normocephalic and atraumatic  Eyes: Conjunctivae and EOM are normal  Pupils are equal, round, and reactive to light  Neck: Neck supple  No JVD present  No thyromegaly present  Cardiovascular: Regular rhythm, normal heart sounds and intact distal pulses  Tachycardia present  Exam reveals no gallop and no friction rub  No murmur heard  Pulmonary/Chest: Effort normal and breath sounds normal    Abdominal: Soft  He exhibits no distension  There is no tenderness  Musculoskeletal: He exhibits no edema  Neurological: He is alert and oriented to person, place, and time  No cranial nerve deficit  Skin: Skin is warm and dry  No rash noted  He is not diaphoretic  No erythema  Psychiatric: He has a normal mood and affect   His behavior is normal  Judgment and thought content normal        Cardiographics  ECG:  Narrow complex tachycardia at 180 beats per minute    Labs:  Lab Results   Component Value Date     08/15/2018    K 4 7 08/15/2018     08/15/2018    CO2 32 08/15/2018    BUN 17 08/15/2018    CREATININE 1 04 08/15/2018    GLUCOSE 111 08/15/2018    GLUCOSE 109 07/24/2018    CALCIUM 9 4 08/15/2018     Lab Results   Component Value Date    WBC 9 75 08/15/2018    HGB 13 9 08/15/2018    HCT 43 7 08/15/2018    MCV 92 08/15/2018     (H) 08/15/2018     Lab Results   Component Value Date    CHOL 135 07/23/2018    TRIG 161 (H) 07/23/2018    HDL 40 07/23/2018     Discussion/Summary:  1  Tachycardia    2  Short of breath on exertion    3  Essential hypertension    4  Carotid stenosis, left    5  S/P CABG x 3    6  Dyslipidemia    7  Coronary artery disease of native artery of native heart with stable angina pectoris ([address] )      -EKG reviewed with patient and his wife  Will be referred to the emergency room at [facility] for treatment of tachycardia which is SVT versus atrial flutter    Recommend adenosine for further evaluation and possible treatment    -discussed with ER provider and Dr Lio Treviño  -see note from August 14, 2018

## 2018-08-15 NOTE — TELEPHONE ENCOUNTER
Jeremi Dominguez (physical therapist) seeing pt at home - HR remains elevated 175-185 w/o exertion, some shortness of breath; per Dr Clive Trejo instructions - pt will come to office for ekg now - wife driving pt to office

## 2018-08-15 NOTE — ED PROVIDER NOTES
History  Chief Complaint   Patient presents with    Rapid Heart Rate     pt send from Dr Rowena Leyden for abnormal heart rhythm     Patient is about 5 weeks status post percutaneous stenting of the carotid artery  He is 3 weeks post CABG x3 vessels  Patient was at cardiac rehab today when he felt sudden onset of shortness of breath after minimal exertion associated with palpitations in the chest more pronounced posteriorly but also anteriorly  Patient was noted to have a heart rate about 179, and he went to his cardiologist office  EKG shows an SVT and he was referred to the ED  Patient arrives awake and alert, complaining of shortness of breath and palpitations  He has no chest pain no abdominal pain no nausea vomiting  The patient has a rapid regular tachycardia on the monitor  His skin is mildly diaphoretic  Prior to Admission Medications   Prescriptions Last Dose Informant Patient Reported?  Taking?   aspirin (ECOTRIN LOW STRENGTH) 81 mg EC tablet  Self No No   Sig: Take 1 tablet (81 mg total) by mouth daily   atorvastatin (LIPITOR) 80 mg tablet  Self No No   Sig: Take 1 tablet (80 mg total) by mouth daily with dinner   clopidogrel (PLAVIX) 75 mg tablet  Self No No   Sig: Take 1 tablet (75 mg total) by mouth daily   metoprolol tartrate (LOPRESSOR) 25 mg tablet  Self No No   Sig: Take 1 tablet (25 mg total) by mouth every 12 (twelve) hours   pantoprazole (PROTONIX) 20 mg tablet  Self No No   Sig: Take 1 tablet (20 mg total) by mouth daily for 30 days      Facility-Administered Medications: None       Past Medical History:   Diagnosis Date    Carotid stenosis, left     s/p IR stent    Coronary artery disease     Former tobacco use     Hard of hearing     Hyperlipidemia     Hypertension     Parkinson disease ([address] )        Past Surgical History:   Procedure Laterality Date    AMPUTATION OF REPLICATED FINGERS       rt 2nd and 3rd fingers traumatically amputated at work   [employer] APPENDECTOMY      MANDIBLE FRACTURE SURGERY      upper and lower    ORBITAL FRACTURE SURGERY Right     MS CABG, VEIN, FOUR N/A 7/24/2018    Procedure: CORONARY ARTERY BYPASS GRAFT (CABG) 3 VESSELS ; NTAE to LAD, SVG--> OM and Distal right;  Surgeon: Kandace Prader, DO;  [facility];  Service: Cardiac Surgery    MS ECHO TRANSESOPHAG MONTR CARDIAC PUMP FUNCTJ N/A 7/24/2018    Procedure: TRANSESOPHAGEAL ECHOCARDIOGRAM (HECTOR); Surgeon: Kandace Prader, DO;  [facility];  Service: Cardiac Surgery    MS TCAT IV STENT CRV CRTD ART EMBOLIC PROTECJ Left 7/8/9902    Procedure: TRANSCAROTID ARTERY REVASCULARIZATION;  Surgeon: Awilda Carrasco MD;  [facility];  Service: Vascular       Family History   Problem Relation Age of Onset    Cancer Mother     Heart attack Father     Stroke Father      I have reviewed and agree with the history as documented  Social History   Substance Use Topics    Smoking status: Former Smoker     Packs/day: 1 00     Quit date: 5/14/2018    Smokeless tobacco: Former User     Quit date: 5/3/2018    Alcohol use No        Review of Systems   Constitutional: Positive for diaphoresis  Negative for chills and fever  Respiratory: Positive for chest tightness and shortness of breath  Negative for cough and wheezing  Cardiovascular: Positive for palpitations  Negative for chest pain and leg swelling  Gastrointestinal: Negative for abdominal pain  Musculoskeletal: Positive for back pain  Skin: Positive for wound  Neurological: Positive for weakness and light-headedness  Negative for headaches  All other systems reviewed and are negative  Physical Exam  Physical Exam   Constitutional: He is oriented to person, place, and time  He appears well-developed and well-nourished  HENT:   Head: Normocephalic and atraumatic  Mouth/Throat: Oropharynx is clear and moist    Eyes: Conjunctivae are normal    Neck: Normal range of motion  Cardiovascular: Normal heart sounds  Patient is in a rapid regular tachycardia consistent with SVT   Pulmonary/Chest: Effort normal and breath sounds normal  No respiratory distress  He has no rales  Abdominal: Soft  Bowel sounds are normal    Musculoskeletal: Normal range of motion  He exhibits no edema  Neurological: He is alert and oriented to person, place, and time  Skin: Skin is warm  He is diaphoretic  Recent midline sternotomy   Psychiatric: He has a normal mood and affect  His behavior is normal    Nursing note and vitals reviewed        Vital Signs  ED Triage Vitals [08/15/18 1152]   Temperature Pulse Respirations Blood Pressure SpO2   97 7 °F (36 5 °C) 70 16 133/86 99 %      Temp Source Heart Rate Source Patient Position - Orthostatic VS BP Location FiO2 (%)   Oral Monitor Lying Left arm --      Pain Score       No Pain           Vitals:    08/15/18 1315 08/15/18 1330 08/15/18 1345 08/15/18 1400   BP: 118/55 117/56 117/60 118/67   Pulse: 60 62 58 56   Patient Position - Orthostatic VS:           Visual Acuity      ED Medications  Medications   adenosine (ADENOCARD) 6 mg/2 mL injection **AcuDose Override Pull** (  Not Given 8/15/18 1210)   sodium chloride 0 9 % bolus 1,000 mL (0 mL Intravenous Stopped 8/15/18 1310)       Diagnostic Studies  Results Reviewed     Procedure Component Value Units Date/Time    Comprehensive metabolic panel [[id number]]  (Abnormal) Collected:  08/15/18 1206    Lab Status:  Final result Specimen:  Blood from Arm, Left Updated:  08/15/18 1235     Sodium 138 mmol/L      Potassium 4 7 mmol/L      Chloride 103 mmol/L      CO2 32 mmol/L      Anion Gap 3 (L) mmol/L      BUN 17 mg/dL      Creatinine 1 04 mg/dL      Glucose 111 mg/dL      Calcium 9 4 mg/dL      AST 17 U/L      ALT 28 U/L      Alkaline Phosphatase 131 (H) U/L      Total Protein 8 5 (H) g/dL      Albumin 3 5 g/dL      Total Bilirubin 0 40 mg/dL      eGFR 77 ml/min/1 73sq m     Narrative:         National Kidney Disease Education Program recommendations are as follows:  GFR calculation is accurate only with a steady state creatinine  Chronic Kidney disease less than 60 ml/min/1 73 sq  meters  Kidney failure less than 15 ml/min/1 73 sq  meters  Troponin I [[id number]]  (Normal) Collected:  08/15/18 1206    Lab Status:  Final result Specimen:  Blood from Arm, Left Updated:  08/15/18 1235     Troponin I <0 02 ng/mL     APTT [[id number]]  (Abnormal) Collected:  08/15/18 1206    Lab Status:  Final result Specimen:  Blood from Arm, Left Updated:  08/15/18 1235     PTT 35 (H) seconds     Protime-INR [[id number]]  (Normal) Collected:  08/15/18 1206    Lab Status:  Final result Specimen:  Blood from Arm, Left Updated:  08/15/18 1234     Protime 10 7 seconds      INR 1 02    CBC and differential [[id number]]  (Abnormal) Collected:  08/15/18 1206    Lab Status:  Final result Specimen:  Blood from Arm, Left Updated:  08/15/18 1214     WBC 9 75 Thousand/uL      RBC 4 77 Million/uL      Hemoglobin 13 9 g/dL      Hematocrit 43 7 %      MCV 92 fL      MCH 29 1 pg      MCHC 31 8 g/dL      RDW 13 0 %      MPV 9 4 fL      Platelets 086 (H) Thousands/uL      nRBC 0 /100 WBCs      Neutrophils Relative 51 %      Immat GRANS % 1 %      Lymphocytes Relative 30 %      Monocytes Relative 8 %      Eosinophils Relative 8 (H) %      Basophils Relative 2 (H) %      Neutrophils Absolute 5 08 Thousands/µL      Immature Grans Absolute 0 05 Thousand/uL      Lymphocytes Absolute 2 92 Thousands/µL      Monocytes Absolute 0 78 Thousand/µL      Eosinophils Absolute 0 75 (H) Thousand/µL      Basophils Absolute 0 17 (H) Thousands/µL                  XR chest 1 view portable   Final Result by Bonnie Coughlin MD (08/15 1303)      Cardiomegaly  Linear scarring or atelectasis in the left upper lobe              Workstation performed: [id number]                    Procedures  ECG 12 Lead Documentation  Date/Time: 8/15/2018 11:40 AM  Performed by: Jerry Terrell  Authorized by: Jerry Terrell     Indications / Diagnosis:  Post SVT  ECG reviewed by me, the ED Provider: yes    Patient location:  ED  Interpretation:     Interpretation: non-specific    Rate:     ECG rate:  69    ECG rate assessment: normal    Rhythm:     Rhythm: sinus rhythm    Ectopy:     Ectopy: none    QRS:     QRS axis:  Normal    QRS intervals:  Normal  Conduction:     Conduction: normal    ST segments:     ST segments:  Non-specific  T waves:     T waves: normal             Phone Contacts  ED Phone Contact    ED Course                               MDM  Number of Diagnoses or Management Options  Diagnosis management comments: In the process of evaluation, the patient was laid in a more supine position for an EKG, and during that motion the patient Valsalvaed, immediately broke into normal sinus rhythm  Continue cardiac monitoring and check some labs    CritCare Time    Disposition  Final diagnoses:   SVT (supraventricular tachycardia) ([address] )     Time reflects when diagnosis was documented in both MDM as applicable and the Disposition within this note     Time User Action Codes Description Comment    8/15/2018  2:15 PM Sun EPPERSON Add [I47 1] SVT (supraventricular tachycardia) [facility])       ED Disposition     ED Disposition Condition Comment    Discharge  Jesus Dejesus  discharge to home/self care  Condition at discharge: Stable        Follow-up Information     Follow up With Specialties Details Why [address] Rick Ramirez MD    [address] [facility] Vivian Ramirez [address] [place] [address]  [id number], DO Cardiology Schedule an appointment as soon as possible for a visit in 1 day  [address]  [phone]            Patient's Medications   Discharge Prescriptions    No medications on file     No discharge procedures on file      ED Provider  Electronically Signed by           Robert Lazcano MD  08/15/18 8713

## 2018-08-15 NOTE — PROGRESS NOTES
Cardiology Follow Up    Rhoda Vanegas  1955  [id number]      Interval History: Mr Oscar Chang is being seen today as an acute visit because this morning he developed tachycardia during physical therapy  He awoke feeling lightheaded with intermittent palpitations  His physical therapist came a few hours later and noted the patient's heart rate was 75 beats per minute  After walking, the patient felt fatigued, diaphoretic and short of breath  Repeat check of heart rate was 190 beats per minute  He called the office and came for evaluation  EKG done now shows a narrow complex tachycardia at 180 beats per minute  Patient feels palpitations, lightheadedness and short of breath  He denies any syncope or near syncope  He was able to walk into office without difficulty  He was driven to the office by his wife  He was seen yesterday as a routine follow-up visit for his recent CABG and carotid artery stent  He was feeling well yesterday  He has no history of tachycardia  The following portions of the patient's history were reviewed and updated as appropriate: allergies, current medications, past family history, past medical history, past social history, past surgical history and problem list       Review of Systems:  Review of Systems   Constitutional: Negative for chills, fatigue and fever  HENT: Negative for congestion, nosebleeds and postnasal drip  Respiratory: Positive for shortness of breath  Negative for cough and chest tightness  Cardiovascular: Positive for palpitations  Negative for chest pain and leg swelling  Gastrointestinal: Negative for abdominal distention, abdominal pain, diarrhea, nausea and vomiting  Endocrine: Negative for polydipsia, polyphagia and polyuria  Musculoskeletal: Negative for gait problem and myalgias  Skin: Negative for color change, pallor and rash     Allergic/Immunologic: Negative for environmental allergies, food allergies and immunocompromised state  Neurological: Negative for dizziness, seizures, syncope and light-headedness  Hematological: Negative for adenopathy  Does not bruise/bleed easily  Psychiatric/Behavioral: Negative for dysphoric mood  The patient is not nervous/anxious  Physical Exam:  /78 (BP Location: Left arm, Patient Position: Sitting, Cuff Size: Standard)   Pulse (!) 177 Comment: apical  Ht 5' 6" (1 676 m)   Wt 79 4 kg (175 lb)   SpO2 100%   BMI 28 25 kg/m²     Physical Exam   Constitutional: He is oriented to person, place, and time  He appears well-developed  No distress  HENT:   Head: Normocephalic and atraumatic  Eyes: Conjunctivae and EOM are normal  Pupils are equal, round, and reactive to light  Neck: Neck supple  No JVD present  No thyromegaly present  Cardiovascular: Regular rhythm, normal heart sounds and intact distal pulses  Tachycardia present  Exam reveals no gallop and no friction rub  No murmur heard  Pulmonary/Chest: Effort normal and breath sounds normal    Abdominal: Soft  He exhibits no distension  There is no tenderness  Musculoskeletal: He exhibits no edema  Neurological: He is alert and oriented to person, place, and time  No cranial nerve deficit  Skin: Skin is warm and dry  No rash noted  He is not diaphoretic  No erythema  Psychiatric: He has a normal mood and affect   His behavior is normal  Judgment and thought content normal        Cardiographics  ECG:  Narrow complex tachycardia at 180 beats per minute    Labs:  Lab Results   Component Value Date     08/15/2018    K 4 7 08/15/2018     08/15/2018    CO2 32 08/15/2018    BUN 17 08/15/2018    CREATININE 1 04 08/15/2018    GLUCOSE 111 08/15/2018    GLUCOSE 109 07/24/2018    CALCIUM 9 4 08/15/2018     Lab Results   Component Value Date    WBC 9 75 08/15/2018    HGB 13 9 08/15/2018    HCT 43 7 08/15/2018    MCV 92 08/15/2018     (H) 08/15/2018     Lab Results   Component Value Date    CHOL 135 07/23/2018    TRIG 161 (H) 07/23/2018    HDL 40 07/23/2018     Discussion/Summary:  1  Tachycardia    2  Short of breath on exertion    3  Essential hypertension    4  Carotid stenosis, left    5  S/P CABG x 3    6  Dyslipidemia    7  Coronary artery disease of native artery of native heart with stable angina pectoris ([address] )      -EKG reviewed with patient and his wife  Will be referred to the emergency room at [facility] for treatment of tachycardia which is SVT versus atrial flutter    Recommend adenosine for further evaluation and possible treatment    -discussed with ER provider and Dr Sergio Kirk  -see note from August 14, 2018

## 2018-08-16 LAB
ATRIAL RATE: 69 BPM
P AXIS: 44 DEGREES
PR INTERVAL: 134 MS
QRS AXIS: 8 DEGREES
QRSD INTERVAL: 90 MS
QT INTERVAL: 390 MS
QTC INTERVAL: 417 MS
T WAVE AXIS: 64 DEGREES
VENTRICULAR RATE: 69 BPM

## 2018-08-16 PROCEDURE — 93010 ELECTROCARDIOGRAM REPORT: CPT | Performed by: INTERNAL MEDICINE

## 2018-08-20 ENCOUNTER — TELEPHONE (OUTPATIENT)
Dept: CARDIOLOGY CLINIC | Facility: CLINIC | Age: 63
End: 2018-08-20

## 2018-08-20 NOTE — TELEPHONE ENCOUNTER
He is seeing CT surgery on 8/22  If he's still having that much pain, he should speak with them  He was doing very well when I saw him last and didn't mention needing to use pain medication at all    Thanks

## 2018-08-21 PROBLEM — Z48.89 ENCOUNTER FOR POSTOPERATIVE CARE: Status: ACTIVE | Noted: 2018-08-21

## 2018-08-22 ENCOUNTER — OFFICE VISIT (OUTPATIENT)
Dept: CARDIAC SURGERY | Facility: CLINIC | Age: 63
End: 2018-08-22

## 2018-08-22 VITALS
BODY MASS INDEX: 28.28 KG/M2 | SYSTOLIC BLOOD PRESSURE: 126 MMHG | TEMPERATURE: 97.2 F | HEART RATE: 78 BPM | OXYGEN SATURATION: 99 % | DIASTOLIC BLOOD PRESSURE: 72 MMHG | WEIGHT: 176 LBS | RESPIRATION RATE: 14 BRPM | HEIGHT: 66 IN

## 2018-08-22 DIAGNOSIS — I25.118 CORONARY ARTERY DISEASE OF NATIVE ARTERY OF NATIVE HEART WITH STABLE ANGINA PECTORIS (HCC): Primary | ICD-10-CM

## 2018-08-22 DIAGNOSIS — Z95.1 S/P CABG X 3: ICD-10-CM

## 2018-08-22 DIAGNOSIS — Z48.89 ENCOUNTER FOR POSTOPERATIVE CARE: ICD-10-CM

## 2018-08-22 PROCEDURE — 99024 POSTOP FOLLOW-UP VISIT: CPT | Performed by: NURSE PRACTITIONER

## 2018-08-22 RX ORDER — LOSARTAN POTASSIUM 50 MG/1
1 TABLET ORAL DAILY
COMMUNITY
Start: 2018-08-08 | End: 2018-09-07 | Stop reason: DRUGHIGH

## 2018-08-22 NOTE — LETTER
August 22, 2018     Yaakov Verduzco MD  [facility] [address] [place] [address]    Patient: Luis Adams  YOB: 1955   Date of Visit: 8/22/2018       Dear Dr Cara Harper: Thank you for referring Elijah Faustin to me for evaluation  Below are my notes for this consultation  If you have questions, please do not hesitate to call me  I look forward to following your patient along with you  Sincerely,        Micaela Mojica,         CC: MD Dean Agarwal MD Deborha Farrow, CRNP  8/22/2018  4:25 PM  Attested   POST OP FOLLOW UP VISIT    Procedure: S/P coronary artery bypass grafting x 3 (LIMA to LAD, SVG to OM, SVG to distal RCA), performed on 7/24/18  History: Luis Adams  is a 59y o  year old male who presents to our office today for routine follow up care from coronary artery bypass grafting  He originally underwent evaluation after an episode of amaurosis fugax  He was found to have significant left ICA stenosis and was scheduled for CEA  Workup for cardiac clearance prior to his vascular surgery revealed significant CAD  His CEA was cancelled and he underwent left ICA stenting on 7/9/18  After a brief recovery he was then cleared to proceed with CABG surgery on 7/24/18  His surgical procedure was uneventful  His post op course was remarkable for abdominal distension and ileus  He required aggressive diuresis and pulmonary toilet  He responded well to therapy and was stable for discharge home on 7/30/18  He developed an isolated episode of symptomatic tachycardia on 8/15/18 and was seen at his cardiologist's office who obtained an ECG that demonstrated SVT at a rate of 190  He was sent directly to the ER for treatment but [facility] he adriana out of the SVT back into NSR  He has not had any further recurrence of palpitations or tachycardia   He reports he is tolerating walking and riding a stationary bike 10 min per day  He reports a stable weight, no edema  His incisions have healed well and he remains afebrile  Vital Signs:   Vitals:    08/22/18 1500 08/22/18 1532   BP: 134/80 126/72   BP Location: Left arm Right arm   Cuff Size: Adult    Pulse: 78    Resp: 14    Temp: (!) 97 2 °F (36 2 °C)    TempSrc: Oral    SpO2: 99%    Weight: 79 8 kg (176 lb)    Height: 5' 6" (1 676 m)        Home Medications:   Prior to Admission medications    Medication Sig Start Date End Date Taking? Authorizing Provider   aspirin (ECOTRIN LOW STRENGTH) 81 mg EC tablet Take 1 tablet (81 mg total) by mouth daily 7/31/18  Yes Keven Escobar PA-C   atorvastatin (LIPITOR) 80 mg tablet Take 1 tablet (80 mg total) by mouth daily with dinner 8/14/18  Yes Pato Byrne DO   clopidogrel (PLAVIX) 75 mg tablet Take 1 tablet (75 mg total) by mouth daily 7/25/18  Yes WILLIS Saleem   losartan (COZAAR) 50 mg tablet Take 1 tablet by mouth daily 8/8/18  Yes Historical Provider, MD   metoprolol tartrate (LOPRESSOR) 25 mg tablet Take 1 tablet (25 mg total) by mouth every 12 (twelve) hours 7/30/18  Yes Keven Escobar PA-C   pantoprazole (PROTONIX) 20 mg tablet Take 1 tablet (20 mg total) by mouth daily for 30 days  Patient not taking: Reported on 8/22/2018 7/30/18 8/22/18  Keven Escobar PA-C       Physical Exam:  General: well developed, no acute distress  HEENT/NECK:  PERRLA  No jugular venous distention  Cardiac:Regular rate and rhythm, No murmurs, rubs or gallops  Pulmonary:Breath sounds clear bilaterally  Abdomen:  Non-tender, Non-distended  Positive bowel sounds  Upper extremities: 2+ radial pulses; brisk capillary refill  Lower extremities: Extremities warm/dry  PT/DP pules 2+ bilaterally  No edema B/L  Incisions: Sternum is stable  Incision is clean, dry, and intact  Saphenectomy incision is clean, dry, and intact  Neuro: Alert and oriented X 3  Sensation is grossly intact    No focal deficits  Skin: Warm/Dry, without rashes or lesions  Lab Results:   Lab Results   Component Value Date    WBC 9 75 08/15/2018    HGB 13 9 08/15/2018    HCT 43 7 08/15/2018    MCV 92 08/15/2018     (H) 08/15/2018     Lab Results   Component Value Date     08/15/2018    K 4 7 08/15/2018     08/15/2018    CO2 32 08/15/2018    ANIONGAP 3 (L) 08/15/2018    BUN 17 08/15/2018    CREATININE 1 04 08/15/2018    GLUCOSE 111 08/15/2018    GLUF 98 07/03/2018    CALCIUM 9 4 08/15/2018    AST 17 08/15/2018    ALT 28 08/15/2018    ALKPHOS 131 (H) 08/15/2018    PROT 8 5 (H) 08/15/2018    BILITOT 0 40 08/15/2018    EGFR 77 08/15/2018     Lab Results   Component Value Date    HGBA1C 6 5 (H) 07/03/2018     Lab Results   Component Value Date    TROPONINI <0 02 08/15/2018       Assessment: Coronary artery disease  S/P coronary artery bypass grafting;    Plan:     Ana Pal  continues to make good progress in his recovery following coronary artery bypass grafting  He is now 4 weeks post op  Incisions are well-healed and his sternum is stable  Weight and VS are stable  I reviewed his medications and made no changes  I discussed the benefits of participating in cardiac rehab and have cleared him to begin the outpatient  program  He may resume driving if he is also cleared by his vascular surgeon  I reviewed his lifting restriction of no more than 25 lbs for an additional 2 months  Ana Pal  has already been evaluated by his primary care physician and cardiologist for ongoing medical care  At this point I have not scheduled him for routine followup care with our office  I have advised him to call with any new concerns that may arise  Ana Pal  was comfortable with our recommendations and his questions were answered to his satisfaction      WILLIS Callahan  8/22/18  Attestation signed by Maciel Galvan DO at 8/22/2018  4:27 PM:  The patient was seen and examined, and I agree with the midlevel's history, physical exam, assessment and plan with the following additions:    Chery Villegas is recovering well from his coronary artery bypass grafting  He is cleared from a cardiac standpoint to enter into outpatient cardiac rehab  Also he is cleared from our standpoint to begin driving  We have asked him not to lift more than 25 lb for another 4 weeks  He will continue to follow up with Cardiology  He will see us on a as needed basis

## 2018-08-22 NOTE — PROGRESS NOTES
POST OP FOLLOW UP VISIT    Procedure: S/P coronary artery bypass grafting x 3 (LIMA to LAD, SVG to OM, SVG to distal RCA), performed on 7/24/18  History: Kenji Smart  is a 59y o  year old male who presents to our office today for routine follow up care from coronary artery bypass grafting  He originally underwent evaluation after an episode of amaurosis fugax  He was found to have significant left ICA stenosis and was scheduled for CEA  Workup for cardiac clearance prior to his vascular surgery revealed significant CAD  His CEA was cancelled and he underwent left ICA stenting on 7/9/18  After a brief recovery he was then cleared to proceed with CABG surgery on 7/24/18  His surgical procedure was uneventful  His post op course was remarkable for abdominal distension and ileus  He required aggressive diuresis and pulmonary toilet  He responded well to therapy and was stable for discharge home on 7/30/18  He developed an isolated episode of symptomatic tachycardia on 8/15/18 and was seen at his cardiologist's office who obtained an ECG that demonstrated SVT at a rate of 190  He was sent directly to the ER for treatment but while moving onto the [facility], he adriana out of the SVT back into NSR  He has not had any further recurrence of palpitations or tachycardia  He reports he is tolerating walking and riding a stationary bike 10 min per day  He reports a stable weight, no edema  His incisions have healed well and he remains afebrile  Vital Signs:   Vitals:    08/22/18 1500 08/22/18 1532   BP: 134/80 126/72   BP Location: Left arm Right arm   Cuff Size: Adult    Pulse: 78    Resp: 14    Temp: (!) 97 2 °F (36 2 °C)    TempSrc: Oral    SpO2: 99%    Weight: 79 8 kg (176 lb)    Height: 5' 6" (1 676 m)        Home Medications:   Prior to Admission medications    Medication Sig Start Date End Date Taking?  Authorizing Provider   aspirin (ECOTRIN LOW STRENGTH) 81 mg EC tablet Take 1 tablet (81 mg total) by mouth daily 7/31/18  Yes Connie Mike PA-C   atorvastatin (LIPITOR) 80 mg tablet Take 1 tablet (80 mg total) by mouth daily with dinner 8/14/18  Yes Pato Byrne DO   clopidogrel (PLAVIX) 75 mg tablet Take 1 tablet (75 mg total) by mouth daily 7/25/18  Yes WILLIS Saleem   losartan (COZAAR) 50 mg tablet Take 1 tablet by mouth daily 8/8/18  Yes Historical Provider, MD   metoprolol tartrate (LOPRESSOR) 25 mg tablet Take 1 tablet (25 mg total) by mouth every 12 (twelve) hours 7/30/18  Yes Connie Mike PA-C   pantoprazole (PROTONIX) 20 mg tablet Take 1 tablet (20 mg total) by mouth daily for 30 days  Patient not taking: Reported on 8/22/2018 7/30/18 8/22/18  Connie Mike PA-C       Physical Exam:  General: well developed, no acute distress  HEENT/NECK:  PERRLA  No jugular venous distention  Cardiac:Regular rate and rhythm, No murmurs, rubs or gallops  Pulmonary:Breath sounds clear bilaterally  Abdomen:  Non-tender, Non-distended  Positive bowel sounds  Upper extremities: 2+ radial pulses; brisk capillary refill  Lower extremities: Extremities warm/dry  PT/DP pules 2+ bilaterally  No edema B/L  Incisions: Sternum is stable  Incision is clean, dry, and intact  Saphenectomy incision is clean, dry, and intact  Neuro: Alert and oriented X 3  Sensation is grossly intact  No focal deficits  Skin: Warm/Dry, without rashes or lesions      Lab Results:   Lab Results   Component Value Date    WBC 9 75 08/15/2018    HGB 13 9 08/15/2018    HCT 43 7 08/15/2018    MCV 92 08/15/2018     (H) 08/15/2018     Lab Results   Component Value Date     08/15/2018    K 4 7 08/15/2018     08/15/2018    CO2 32 08/15/2018    ANIONGAP 3 (L) 08/15/2018    BUN 17 08/15/2018    CREATININE 1 04 08/15/2018    GLUCOSE 111 08/15/2018    GLUF 98 07/03/2018    CALCIUM 9 4 08/15/2018    AST 17 08/15/2018    ALT 28 08/15/2018    ALKPHOS 131 (H) 08/15/2018    PROT 8 5 (H) 08/15/2018    BILITOT 0 40 08/15/2018    EGFR 77 08/15/2018     Lab Results   Component Value Date    HGBA1C 6 5 (H) 07/03/2018     Lab Results   Component Value Date    TROPONINI <0 02 08/15/2018       Assessment: Coronary artery disease  S/P coronary artery bypass grafting;    Plan:     Sonia Arguello  continues to make good progress in his recovery following coronary artery bypass grafting  He is now 4 weeks post op  Incisions are well-healed and his sternum is stable  Weight and VS are stable  I reviewed his medications and made no changes  I discussed the benefits of participating in cardiac rehab and have cleared him to begin the outpatient  program  He may resume driving if he is also cleared by his vascular surgeon  I reviewed his lifting restriction of no more than 25 lbs for an additional 2 months  Sonia Arguello  has already been evaluated by his primary care physician and cardiologist for ongoing medical care  At this point I have not scheduled him for routine followup care with our office  I have advised him to call with any new concerns that may arise  Sonia Arguello  was comfortable with our recommendations and his questions were answered to his satisfaction      Erika Najjar, CRNP  8/22/18

## 2018-08-28 ENCOUNTER — CLINICAL SUPPORT (OUTPATIENT)
Dept: CARDIAC REHAB | Facility: CLINIC | Age: 63
End: 2018-08-28
Payer: COMMERCIAL

## 2018-08-28 VITALS — WEIGHT: 173 LBS | HEIGHT: 66 IN | BODY MASS INDEX: 27.8 KG/M2

## 2018-08-28 DIAGNOSIS — Z95.1 S/P CABG X 3: ICD-10-CM

## 2018-08-28 DIAGNOSIS — I25.118 CORONARY ARTERY DISEASE OF NATIVE ARTERY OF NATIVE HEART WITH STABLE ANGINA PECTORIS (HCC): ICD-10-CM

## 2018-08-28 PROCEDURE — 93797 PHYS/QHP OP CAR RHAB WO ECG: CPT

## 2018-08-28 NOTE — PROGRESS NOTES
CARDIAC REHAB ASSESSMENT    Today's date: 2018  Patient name: Kelly Ma  : 1955       MRN: [id number]  PCP: Anthony Julien MD  Referring Physician: WILLIS Mancia  Cardiologist: Brayden Rodriguez  Surgeon: Fe Kang  Dx:   Encounter Diagnoses   Name Primary?     Coronary artery disease of native artery of native heart with stable angina pectoris (HCC)     S/P CABG x 3        Date of onset: 2018  Cultural needs: none    Height:    Wt Readings from Last 1 Encounters:   18 78 5 kg (173 lb)      Weight:   Ht Readings from Last 1 Encounters:   18 5' 6" (1 676 m)     Medical History:   Past Medical History:   Diagnosis Date    Carotid stenosis, left     s/p IR stent    Coronary artery disease     Former tobacco use     Hard of hearing     Hyperlipidemia     Hypertension     Parkinson disease ([address] )          Physical Limitations: 25 pound lifting restrictions    Risk Factors   Cholesterol: Yes  Smoking: former quit  2018 1ppd  HTN: Yes  DM: No  Obesity: Yes   Inactivity: started walking 5 to 7 minutes at a time several times a day  Family History:  Family History   Problem Relation Age of Onset    Cancer Mother     Heart attack Father     Stroke Father        Allergies: Percocet [oxycodone-acetaminophen]  ETOH:   History   Alcohol Use No         Current Medications:   Current Outpatient Prescriptions   Medication Sig Dispense Refill    aspirin (ECOTRIN LOW STRENGTH) 81 mg EC tablet Take 1 tablet (81 mg total) by mouth daily 90 tablet 0    atorvastatin (LIPITOR) 80 mg tablet Take 1 tablet (80 mg total) by mouth daily with dinner 90 tablet 2    clopidogrel (PLAVIX) 75 mg tablet Take 1 tablet (75 mg total) by mouth daily 30 tablet 1    losartan (COZAAR) 50 mg tablet Take 1 tablet by mouth daily      metoprolol tartrate (LOPRESSOR) 25 mg tablet Take 1 tablet (25 mg total) by mouth every 12 (twelve) hours 180 tablet 0     No current facility-administered medications for this visit  Functional Status Prior to Diagnosis for Treatment   Occupation: , plans to return to work october  Recreation: working on cars and [employer]: resumed all ADLs within sternal precautions  [place]: resumed all ADLs within sternal precautions  Exercise: none  Other: none    Current Functional Status  Occupation: plans to return to work october  Recreation: none  ADLs: resumed all ADLs within sternal precautions  [place]: resumed all ADLs within sternal precautions  Exercise: started walking 5 to 10 minutes several times a day  Other: none    Short Term Program Goals: return to work    Long Term Goals: Increase stamina to return to work in october    Ability to reach goals/rehabilitation potential:  Very Good     Projected return to function: 6-8 weeks  Objective tests: sub-max TM ETT  6 MWT  sit to stand  arm curl      Nutritional   Reviewed details of Rate your Plate  Discussed key elements of heart healthy eating  Reviewed patient goals for dietary modifications and their clinical implications  Reviewed most recent lipid profile       Goals for dietary modification: continue to eat a heart healthy diet scored 60 on rate your plate      Emotional/Social  Patient reports he/she is coping well with good social support and denies depression or anxiety  Patient reports feeling some depression since d/c    SOCIAL SUPPORT NETWORK    Marital status:     Rate 1-5:    Marriage: 0   Family: 0   Financial: 5   Relationships: 0   Spirituality: 0   Intellectual: 0    Perceived Stress: 5/10   Stressors:finiancial stress due to unemployed at this time   Goals for Stress Management:increase strength and endurance to return to work in October    Domestic Violence Screening: yes, denies any abuse    Comments: initial evaluation completed

## 2018-08-28 NOTE — PROGRESS NOTES
Karen Quinn    1955     Risk: moderate     Pre Post % Change Goal   Date: 8/28/18      Physical       Sub Max ETT (mets) 4 3   10% increase   6MWT (feet) 1270   10% increase   Arm Curls 16   5 pt decrease   Chair to Stands 12      ALCARAZ Al (est peak O2) 6 45      Peak exercise CR/TN (mets) 3 6   40% increase   Emotional       PHQ9 (> 10 refer to MD) 13   4 pt decrease   [place] (lower score = improvement)       Total 28   < 27   Feelings 3   < 3   Physical Fitness 4   < 3   Social Support 1   < 3   Daily Activities 4   < 3   Social Activities 3   < 3   Pain 3   < 3   Overall Health 4   < 3   Quality of Life 5   < 3   Change in Health 1   < 3   Dietary       Rate your plate 60   > 58   Measurements       Weight 173   2 5 - 5%   BMI 27 9   19 - 25   Waist Circ  37 5   < 40 M / < 35 F   % Body fat 32 2   < 25 M / < 33 F   BP left arm               (systolic) 077   < 805   (diastolic) 76   < 90   Smoking #/day  (if applicable) 0   0   Lipids/Glucose (Date) 7/23/18      Total cholesterol 135   50 - 200   Triglycerides 161   < 150   HDL 40   40 - 60   LDL 63   < 100   A1C 6 5   4 0 - 5 6%   Fasting BG 98

## 2018-08-28 NOTE — PROGRESS NOTES
Cardiac Rehabilitation Plan of Care   Initial      Today's date: 2018   Visits: Initial  Patient name: Jesus Dejesus  : 1955  Age: 59 y o  MRN: [id number]  Referring Physician: WILLIS Lorenzana Raiford [place], [place]  Provider: Darlys Brunner  Clinician: Monika Cunningham RN    Dx:   Encounter Diagnoses   Name Primary?  Coronary artery disease of native artery of native heart with stable angina pectoris (HCC)     S/P CABG x 3      Date of onset: 2018    Comments: Completed initial evaluation  Medication compliance: Yes   Comments: pt states compliant with medication  Fall Risk: low       EXERCISE/ACTIVITY    Cardiovascular:   Min: 23   METS: 3 6   Hr: 103   RPE: 5   O2 sat: 95-99    Modalities: Treadmill, NuStep and Recumbent bike  Strength trainin-3 days / week, 12-15 repitations  and 1-2 sets per modality    Modalities: Lateral raise and Arm curl  EKG changes: NSR at rest and with exercise  Dyspnea score: 2  Home activity: started walking at home 5 to 7 minutes several times a day  Goals: increase stamina to return to work in October  Education: Explained cardiac rehabilitation, cardiac risk factors, 6 minute walk test, sub max treadmill test, arm curl and chair to stand test  Plan: treadmill 15 minutes at 2 5 mph with a 2% incline, nu-step 10 minutes level 4, recumbent bike 10 minutes level 3   Increase time and resistance as tolerated  Readiness to change: 10    NUTRITION    Weight control:    Starting weight: 173   Current weight: Weight - Scale: 78 5 kg (173 lb)   Waist circumference:    Startin 5   Current: Waist circumference (inches): 37 5 Inches  Diabetes: denies  Lipid management: Lipitor and low fat diet  Goals: continue to eat a heart healthy diet  Education: rate your plate, heart healthy diet  Plan: continue to eat healthy  Readiness to change: 1    PSYCHOSOCIAL    Emotional: PHQ-9 Total Score: 13  Self-reported stress level: 5   Social support: spouse  Goals: increase stamina to return to work to decrease financial stress in 12 sessions  Education:   Plan: attend cardiac rehabilitation for 12 sessions  Readiness to change: 10    OTHER CORE COMPONENTS     Tobacco:   History   Smoking Status    Former Smoker    Packs/day: 1 00    Quit date: 5/14/2018   Smokeless Tobacco    Former User    Quit date: 5/3/2018     Blood pressure:    Resting: Resting BP: 130/76   Exercise:    Goals: continue to eat healthy and take medication as prescribed  Education: common heart mediations  Plan: continue to eat a heart healthy diet and take medication as prescribed  Readiness to change: 1

## 2018-08-29 ENCOUNTER — CLINICAL SUPPORT (OUTPATIENT)
Dept: CARDIAC REHAB | Facility: CLINIC | Age: 63
End: 2018-08-29
Payer: COMMERCIAL

## 2018-08-29 ENCOUNTER — HOSPITAL ENCOUNTER (EMERGENCY)
Facility: HOSPITAL | Age: 63
Discharge: HOME | End: 2018-08-29
Attending: EMERGENCY MEDICINE | Admitting: EMERGENCY MEDICINE
Payer: COMMERCIAL

## 2018-08-29 VITALS
WEIGHT: 173 LBS | OXYGEN SATURATION: 98 % | SYSTOLIC BLOOD PRESSURE: 136 MMHG | BODY MASS INDEX: 27.92 KG/M2 | HEART RATE: 82 BPM | RESPIRATION RATE: 22 BRPM | DIASTOLIC BLOOD PRESSURE: 78 MMHG | TEMPERATURE: 97.5 F

## 2018-08-29 DIAGNOSIS — Z95.1 S/P CABG X 3: ICD-10-CM

## 2018-08-29 DIAGNOSIS — I47.1 SVT (SUPRAVENTRICULAR TACHYCARDIA) (HCC): Primary | ICD-10-CM

## 2018-08-29 LAB
ALBUMIN SERPL BCP-MCNC: 3.4 G/DL (ref 3.5–5)
ALP SERPL-CCNC: 109 U/L (ref 46–116)
ALT SERPL W P-5'-P-CCNC: 25 U/L (ref 12–78)
ANION GAP SERPL CALCULATED.3IONS-SCNC: 6 MMOL/L (ref 4–13)
APTT PPP: 34 SECONDS (ref 24–33)
AST SERPL W P-5'-P-CCNC: 17 U/L (ref 5–45)
BASOPHILS # BLD AUTO: 0.13 THOUSANDS/ΜL (ref 0–0.1)
BASOPHILS NFR BLD AUTO: 2 % (ref 0–1)
BILIRUB SERPL-MCNC: 0.5 MG/DL (ref 0.2–1)
BUN SERPL-MCNC: 17 MG/DL (ref 5–25)
CALCIUM SERPL-MCNC: 8.8 MG/DL (ref 8.3–10.1)
CHLORIDE SERPL-SCNC: 105 MMOL/L (ref 100–108)
CO2 SERPL-SCNC: 28 MMOL/L (ref 21–32)
CREAT SERPL-MCNC: 0.94 MG/DL (ref 0.6–1.3)
EOSINOPHIL # BLD AUTO: 0.77 THOUSAND/ΜL (ref 0–0.61)
EOSINOPHIL NFR BLD AUTO: 9 % (ref 0–6)
ERYTHROCYTE [DISTWIDTH] IN BLOOD BY AUTOMATED COUNT: 13.1 % (ref 11.6–15.1)
GFR SERPL CREATININE-BSD FRML MDRD: 87 ML/MIN/1.73SQ M
GLUCOSE SERPL-MCNC: 133 MG/DL (ref 65–140)
HCT VFR BLD AUTO: 42.5 % (ref 36.5–49.3)
HGB BLD-MCNC: 13.4 G/DL (ref 12–17)
IMM GRANULOCYTES # BLD AUTO: 0.03 THOUSAND/UL (ref 0–0.2)
IMM GRANULOCYTES NFR BLD AUTO: 0 % (ref 0–2)
INR PPP: 1.03 (ref 0.86–1.16)
LYMPHOCYTES # BLD AUTO: 2.5 THOUSANDS/ΜL (ref 0.6–4.47)
LYMPHOCYTES NFR BLD AUTO: 30 % (ref 14–44)
MAGNESIUM SERPL-MCNC: 2 MG/DL (ref 1.6–2.6)
MCH RBC QN AUTO: 28.6 PG (ref 26.8–34.3)
MCHC RBC AUTO-ENTMCNC: 31.5 G/DL (ref 31.4–37.4)
MCV RBC AUTO: 91 FL (ref 82–98)
MONOCYTES # BLD AUTO: 0.63 THOUSAND/ΜL (ref 0.17–1.22)
MONOCYTES NFR BLD AUTO: 8 % (ref 4–12)
NEUTROPHILS # BLD AUTO: 4.26 THOUSANDS/ΜL (ref 1.85–7.62)
NEUTS SEG NFR BLD AUTO: 51 % (ref 43–75)
NRBC BLD AUTO-RTO: 0 /100 WBCS
PLATELET # BLD AUTO: 230 THOUSANDS/UL (ref 149–390)
PMV BLD AUTO: 9.6 FL (ref 8.9–12.7)
POTASSIUM SERPL-SCNC: 4 MMOL/L (ref 3.5–5.3)
PROT SERPL-MCNC: 7.6 G/DL (ref 6.4–8.2)
PROTHROMBIN TIME: 10.8 SECONDS (ref 9.4–11.7)
RBC # BLD AUTO: 4.69 MILLION/UL (ref 3.88–5.62)
SODIUM SERPL-SCNC: 139 MMOL/L (ref 136–145)
WBC # BLD AUTO: 8.32 THOUSAND/UL (ref 4.31–10.16)

## 2018-08-29 PROCEDURE — 93798 PHYS/QHP OP CAR RHAB W/ECG: CPT

## 2018-08-29 PROCEDURE — 99285 EMERGENCY DEPT VISIT HI MDM: CPT

## 2018-08-29 PROCEDURE — 80053 COMPREHEN METABOLIC PANEL: CPT | Performed by: EMERGENCY MEDICINE

## 2018-08-29 PROCEDURE — 83735 ASSAY OF MAGNESIUM: CPT | Performed by: EMERGENCY MEDICINE

## 2018-08-29 PROCEDURE — 85025 COMPLETE CBC W/AUTO DIFF WBC: CPT | Performed by: EMERGENCY MEDICINE

## 2018-08-29 PROCEDURE — 85610 PROTHROMBIN TIME: CPT | Performed by: EMERGENCY MEDICINE

## 2018-08-29 PROCEDURE — 36415 COLL VENOUS BLD VENIPUNCTURE: CPT | Performed by: EMERGENCY MEDICINE

## 2018-08-29 PROCEDURE — 85730 THROMBOPLASTIN TIME PARTIAL: CPT | Performed by: EMERGENCY MEDICINE

## 2018-08-29 PROCEDURE — 93005 ELECTROCARDIOGRAM TRACING: CPT

## 2018-08-29 RX ORDER — METOPROLOL TARTRATE 50 MG/1
50 TABLET, FILM COATED ORAL EVERY 12 HOURS SCHEDULED
Qty: 60 TABLET | Refills: 0 | Status: SHIPPED | OUTPATIENT
Start: 2018-08-29 | End: 2018-10-17 | Stop reason: SDUPTHER

## 2018-08-29 NOTE — DISCHARGE INSTRUCTIONS
Supraventricular Tachycardia - Dr Beena López on call for cardiology advised that you should increase your Metoprolol from 25 mg every 12 hours to 50 mg every 12 hours  And he wants you to see an Electrophysiology specialist as well as Dr Felton Schwab in the office  Please call and make appointments  You can continue your cardiac rehab as planned  WHAT YOU NEED TO KNOW:   Supraventricular tachycardia (SVT) is a condition that causes your heart to beat much faster than it should  SVT is a type of abnormal heart rhythm, called an arrhythmia, that starts in the upper part of your heart  It may last a few seconds or hours to several days  DISCHARGE INSTRUCTIONS:   Call 911 for the following:   · You have any of the following signs of a heart attack:      ¨ Squeezing, pressure, or pain in your chest that lasts longer than 5 minutes or returns    ¨ Discomfort or pain in your back, neck, jaw, stomach, or arm     ¨ Trouble breathing    ¨ Nausea or vomiting    ¨ Lightheadedness or a sudden cold sweat, especially with chest pain or trouble breathing    Return to the emergency department if:   · You have dizziness, lightheadedness, or feel faint  · You have sudden numbness or weakness in your arms or legs  Contact your healthcare provider if:   · Your symptoms get worse, or you have new symptoms  · You have swelling in your ankles or feet  · You have questions or concerns about your condition or care  Medicines:   · Medicines  can help control your heart rate and rhythm  You may need more than one medicine to treat your symptoms  · Take your medicine as directed  Contact your healthcare provider if you think your medicine is not helping or if you have side effects  Tell him or her if you are allergic to any medicine  Keep a list of the medicines, vitamins, and herbs you take  Include the amounts, and when and why you take them  Bring the list or the pill bottles to follow-up visits   Carry your medicine list with you in case of an emergency  How to manage or prevent SVT:   · Perform vagal maneuvers as directed when you have symptoms of SVT  Lie down flat and bear down like you are having a bowel movement  Do this for 10 to 30 seconds  · Do not drink caffeine or alcohol  These can increase your risk for SVT  · Keep a record of your symptoms  Write down what you ate or what you were doing before an episode of SVT  Also write down anything you did to make the SVT stop  Bring your record to follow up visits with your healthcare provider  · Eat heart-healthy foods  These include fruits, vegetables, whole-grain breads, low-fat dairy products, beans, lean meats, and fish  Replace butter and margarine with heart-healthy oils such as olive oil and canola oil  · Exercise regularly and maintain a healthy weight  Ask about the best exercise plan for you  Ask your healthcare provider how much you should weigh  Ask him to help you create a weight loss plan if you are overweight  · Do not smoke  Nicotine and other chemicals in cigarettes and cigars can cause heart and lung damage  Ask your healthcare provider for information if you currently smoke and need help to quit  E-cigarettes or smokeless tobacco still contain nicotine  Talk to your healthcare provider before you use these products  Follow up with your healthcare provider as directed:  Write down your questions so you remember to ask them during your visits  © 2017 [employer] Information is for End User's use only and may not be sold, redistributed or otherwise used for commercial purposes  All illustrations and images included in CareNotes® are the copyrighted property of [employer]  or [employer]  The above information is an  only  It is not intended as medical advice for individual conditions or treatments   Talk to your doctor, nurse or pharmacist before following any medical regimen to see if it is safe and effective for you

## 2018-08-29 NOTE — ED PROVIDER NOTES
History  Chief Complaint   Patient presents with    Rapid Heart Rate     Pt brought in by EMS for rapid heart rate  Pt heart rate for       63 yowm one month s/p CABG and carotid stent developed sudden onset palpitations while sitting and eating dinner just prior to arrival   EMS found him to be in SVT, given adenosine 6 mg with resolution of tachycardia  Pt  Says this is the second time this has happened  Has been doing well post surgery, has been doing his cardiac rehab and other than these episodes has been well  No fever  No sob  No chest pain  No nausea, vomiting, or diarrhea  History provided by:  Patient   used: No    Rapid Heart Rate   Associated symptoms: no back pain, no chest pain, no cough, no dizziness, no nausea, no shortness of breath and no vomiting        Prior to Admission Medications   Prescriptions Last Dose Informant Patient Reported?  Taking?   aspirin (ECOTRIN LOW STRENGTH) 81 mg EC tablet 8/29/2018 at Unknown time Self No Yes   Sig: Take 1 tablet (81 mg total) by mouth daily   atorvastatin (LIPITOR) 80 mg tablet 8/28/2018 at Unknown time Self No Yes   Sig: Take 1 tablet (80 mg total) by mouth daily with dinner   clopidogrel (PLAVIX) 75 mg tablet 8/29/2018 at Unknown time Self No Yes   Sig: Take 1 tablet (75 mg total) by mouth daily   losartan (COZAAR) 50 mg tablet 8/29/2018 at Unknown time Self Yes Yes   Sig: Take 1 tablet by mouth daily      Facility-Administered Medications: None       Past Medical History:   Diagnosis Date    Carotid stenosis, left     s/p IR stent    Coronary artery disease     Former tobacco use     Hard of hearing     Hyperlipidemia     Hypertension     Parkinson disease ([facility] [address] )     SVT (supraventricular tachycardia) ([facility] [address] )        Past Surgical History:   Procedure Laterality Date    AMPUTATION OF REPLICATED FINGERS       rt 2nd and 3rd fingers traumatically amputated at work    APPENDECTOMY      MANDIBLE FRACTURE SURGERY      upper and lower    ORBITAL FRACTURE SURGERY Right     NV CABG, VEIN, FOUR N/A 7/24/2018    Procedure: CORONARY ARTERY BYPASS GRAFT (CABG) 3 VESSELS ; NATE to LAD, SVG--> OM and Distal right;  Surgeon: Trang Barrios DO;  [facility];  Service: Cardiac Surgery    NV ECHO TRANSESOPHAG MONTR CARDIAC PUMP FUNCTJ N/A 7/24/2018    Procedure: TRANSESOPHAGEAL ECHOCARDIOGRAM (HECTOR); Surgeon: Trang Barrios DO;  [facility];  Service: Cardiac Surgery    NV TCAT IV STENT CRV CRTD ART EMBOLIC PROTECJ Left 7/8/5541    Procedure: TRANSCAROTID ARTERY REVASCULARIZATION;  Surgeon: Sapphire Andrea MD;  [facility];  Service: Vascular       Family History   Problem Relation Age of Onset    Cancer Mother     Heart attack Father     Stroke Father      I have reviewed and agree with the history as documented  Social History   Substance Use Topics    Smoking status: Former Smoker     Packs/day: 1 00     Quit date: 5/14/2018    Smokeless tobacco: Former User     Quit date: 5/3/2018    Alcohol use No        Review of Systems   Constitutional: Negative  Negative for chills and fever  HENT: Negative  Negative for congestion and sore throat  Eyes: Negative  Respiratory: Negative  Negative for cough and shortness of breath  Cardiovascular: Positive for palpitations  Negative for chest pain and leg swelling  Gastrointestinal: Negative  Negative for abdominal pain, diarrhea, nausea and vomiting  Genitourinary: Negative  Negative for dysuria, flank pain and hematuria  Musculoskeletal: Negative  Negative for back pain and myalgias  Skin: Negative  Negative for rash and wound  Neurological: Negative  Negative for dizziness and headaches  Psychiatric/Behavioral: Negative  Negative for confusion and hallucinations  The patient is not nervous/anxious  All other systems reviewed and are negative        Physical Exam  Physical Exam   Constitutional: He is oriented to person, place, and time  He appears well-developed and well-nourished  No distress  HENT:   Head: Normocephalic and atraumatic  Eyes: Conjunctivae are normal  Pupils are equal, round, and reactive to light  No scleral icterus  Neck: Normal range of motion  Neck supple  Cardiovascular: Normal rate, regular rhythm and normal heart sounds  No murmur heard  Pulmonary/Chest: Effort normal and breath sounds normal  No respiratory distress  He exhibits no tenderness  Abdominal: Soft  Bowel sounds are normal  He exhibits no distension  There is no tenderness  Musculoskeletal: Normal range of motion  He exhibits no edema, tenderness or deformity  Neurological: He is alert and oriented to person, place, and time  No cranial nerve deficit  He exhibits normal muscle tone  Skin: Skin is warm and dry  No rash noted  He is not diaphoretic  No erythema  No pallor  Psychiatric: He has a normal mood and affect  His behavior is normal    Nursing note and vitals reviewed        Vital Signs  ED Triage Vitals [08/29/18 1717]   Temperature Pulse Respirations Blood Pressure SpO2   97 5 °F (36 4 °C) 94 18 154/80 98 %      Temp src Heart Rate Source Patient Position - Orthostatic VS BP Location FiO2 (%)   -- Monitor Lying Left arm --      Pain Score       No Pain           Vitals:    08/29/18 1717 08/29/18 1730 08/29/18 1815 08/29/18 1845   BP: 154/80 154/80 154/86 136/78   Pulse: 94 80 78 82   Patient Position - Orthostatic VS: Lying          Visual Acuity      ED Medications  Medications - No data to display    Diagnostic Studies  Results Reviewed     Procedure Component Value Units Date/Time    Comprehensive metabolic panel [[id number]]  (Abnormal) Collected:  08/29/18 1808    Lab Status:  Final result Specimen:  Blood from Arm, Left Updated:  08/29/18 1831     Sodium 139 mmol/L      Potassium 4 0 mmol/L      Chloride 105 mmol/L      CO2 28 mmol/L      ANION GAP 6 mmol/L      BUN 17 mg/dL      Creatinine 0 94 mg/dL      Glucose 133 mg/dL      Calcium 8 8 mg/dL      AST 17 U/L      ALT 25 U/L      Alkaline Phosphatase 109 U/L      Total Protein 7 6 g/dL      Albumin 3 4 (L) g/dL      Total Bilirubin 0 50 mg/dL      eGFR 87 ml/min/1 73sq m     Narrative:         National Kidney Disease Education Program recommendations are as follows:  GFR calculation is accurate only with a steady state creatinine  Chronic Kidney disease less than 60 ml/min/1 73 sq  meters  Kidney failure less than 15 ml/min/1 73 sq  meters      Magnesium [[id number]]  (Normal) Collected:  08/29/18 1808    Lab Status:  Final result Specimen:  Blood from Arm, Left Updated:  08/29/18 1831     Magnesium 2 0 mg/dL     Protime-INR [[id number]]  (Normal) Collected:  08/29/18 1808    Lab Status:  Final result Specimen:  Blood from Arm, Left Updated:  08/29/18 1830     Protime 10 8 seconds      INR 1 03    APTT [[id number]]  (Abnormal) Collected:  08/29/18 1808    Lab Status:  Final result Specimen:  Blood from Arm, Left Updated:  08/29/18 1830     PTT 34 (H) seconds     CBC and differential [[id number]]  (Abnormal) Collected:  08/29/18 1808    Lab Status:  Final result Specimen:  Blood from Arm, Left Updated:  08/29/18 1814     WBC 8 32 Thousand/uL      RBC 4 69 Million/uL      Hemoglobin 13 4 g/dL      Hematocrit 42 5 %      MCV 91 fL      MCH 28 6 pg      MCHC 31 5 g/dL      RDW 13 1 %      MPV 9 6 fL      Platelets 704 Thousands/uL      nRBC 0 /100 WBCs      Neutrophils Relative 51 %      Immat GRANS % 0 %      Lymphocytes Relative 30 %      Monocytes Relative 8 %      Eosinophils Relative 9 (H) %      Basophils Relative 2 (H) %      Neutrophils Absolute 4 26 Thousands/µL      Immature Grans Absolute 0 03 Thousand/uL      Lymphocytes Absolute 2 50 Thousands/µL      Monocytes Absolute 0 63 Thousand/µL      Eosinophils Absolute 0 77 (H) Thousand/µL      Basophils Absolute 0 13 (H) Thousands/µL                  No orders to display              Procedures  ECG 12 Lead Documentation  Date/Time: 8/29/2018 5:59 PM  Performed by: Paula Mcguire  Authorized by: Paula Mcguire     Indications / Diagnosis:  SVT  ECG reviewed by me, the ED Provider: yes    Patient location:  ED  Previous ECG:     Previous ECG:  Unavailable  Interpretation:     Interpretation: abnormal    Rate:     ECG rate:  91    ECG rate assessment: normal    Rhythm:     Rhythm: sinus rhythm    Ectopy:     Ectopy: none    QRS:     QRS axis:  Left  Conduction:     Conduction: normal    ST segments:     ST segments:  Normal  T waves:     T waves: normal    Q waves:     Q waves:  III and aVF           Phone Contacts  ED Phone Contact    ED Course                               MDM  Number of Diagnoses or Management Options  Diagnosis management comments: Labs ok at this time, vitals noted and discussed situation with Dr Esme Mauricio on call for cardiology  He referred pt's chart and advised increase metoprolol to 50 mg every 12 hours and follow up with Dr Linda Eduardo and Dr Barbra ThompsontCosvaldo Time    Disposition  Final diagnoses:   SVT (supraventricular tachycardia) ([address] )     Time reflects when diagnosis was documented in both MDM as applicable and the Disposition within this note     Time User Action Codes Description Comment    9/94/6062  0:65 PM [place] A Add [M19 9] SVT (supraventricular tachycardia) [facility])       ED Disposition     ED Disposition Condition Comment    Discharge  [facility]  discharge to home/self care      Condition at discharge: Stable        Follow-up Information     Follow up With Specialties Details Why Contact Carey Weinberg DO Cardiology Schedule an appointment as soon as possible for a visit  [address] [address]  [phone]            Patient's Medications   Discharge Prescriptions    METOPROLOL TARTRATE (LOPRESSOR) 50 MG TABLET    Take 1 tablet (50 mg total) by mouth every 12 (twelve) hours       Start Date: 8/29/2018 End Date: --       Order Dose: 50 mg Quantity: 60 tablet    Refills: 0     No discharge procedures on file      ED Provider  Electronically Signed by           Zan Acosta MD  57/72/69 0981

## 2018-08-30 LAB
ATRIAL RATE: 91 BPM
P AXIS: 50 DEGREES
PR INTERVAL: 132 MS
QRS AXIS: -12 DEGREES
QRSD INTERVAL: 88 MS
QT INTERVAL: 378 MS
QTC INTERVAL: 464 MS
T WAVE AXIS: 50 DEGREES
VENTRICULAR RATE: 91 BPM

## 2018-08-30 PROCEDURE — 93010 ELECTROCARDIOGRAM REPORT: CPT | Performed by: INTERNAL MEDICINE

## 2018-08-31 ENCOUNTER — CLINICAL SUPPORT (OUTPATIENT)
Dept: CARDIAC REHAB | Facility: CLINIC | Age: 63
End: 2018-08-31
Payer: COMMERCIAL

## 2018-08-31 ENCOUNTER — HOSPITAL ENCOUNTER (OUTPATIENT)
Dept: RADIOLOGY | Facility: HOSPITAL | Age: 63
Discharge: HOME | End: 2018-08-31
Payer: COMMERCIAL

## 2018-08-31 DIAGNOSIS — I65.22 LEFT CAROTID ARTERY STENOSIS: Chronic | ICD-10-CM

## 2018-08-31 DIAGNOSIS — Z95.1 S/P CABG X 3: ICD-10-CM

## 2018-08-31 PROCEDURE — 93798 PHYS/QHP OP CAR RHAB W/ECG: CPT

## 2018-08-31 PROCEDURE — 93880 EXTRACRANIAL BILAT STUDY: CPT | Performed by: SURGERY

## 2018-08-31 PROCEDURE — 93880 EXTRACRANIAL BILAT STUDY: CPT

## 2018-09-03 ENCOUNTER — APPOINTMENT (OUTPATIENT)
Dept: CARDIAC REHAB | Facility: CLINIC | Age: 63
End: 2018-09-03
Payer: COMMERCIAL

## 2018-09-04 ENCOUNTER — APPOINTMENT (OUTPATIENT)
Dept: CARDIAC REHAB | Facility: CLINIC | Age: 63
End: 2018-09-04
Payer: COMMERCIAL

## 2018-09-05 ENCOUNTER — CLINICAL SUPPORT (OUTPATIENT)
Dept: CARDIAC REHAB | Facility: CLINIC | Age: 63
End: 2018-09-05
Payer: COMMERCIAL

## 2018-09-05 DIAGNOSIS — Z95.1 S/P CABG X 3: ICD-10-CM

## 2018-09-05 PROCEDURE — 93798 PHYS/QHP OP CAR RHAB W/ECG: CPT

## 2018-09-07 ENCOUNTER — CLINICAL SUPPORT (OUTPATIENT)
Dept: CARDIAC REHAB | Facility: CLINIC | Age: 63
End: 2018-09-07
Payer: COMMERCIAL

## 2018-09-07 ENCOUNTER — OFFICE VISIT (OUTPATIENT)
Dept: VASCULAR SURGERY | Facility: CLINIC | Age: 63
End: 2018-09-07

## 2018-09-07 VITALS
HEIGHT: 68 IN | TEMPERATURE: 97.7 F | SYSTOLIC BLOOD PRESSURE: 126 MMHG | DIASTOLIC BLOOD PRESSURE: 70 MMHG | WEIGHT: 172 LBS | BODY MASS INDEX: 26.07 KG/M2 | HEART RATE: 76 BPM

## 2018-09-07 DIAGNOSIS — Z95.1 S/P CABG X 3: ICD-10-CM

## 2018-09-07 DIAGNOSIS — I65.22 SYMPTOMATIC STENOSIS OF LEFT CAROTID ARTERY WITHOUT INFARCTION: Primary | ICD-10-CM

## 2018-09-07 PROCEDURE — 99024 POSTOP FOLLOW-UP VISIT: CPT | Performed by: NURSE PRACTITIONER

## 2018-09-07 PROCEDURE — 93798 PHYS/QHP OP CAR RHAB W/ECG: CPT

## 2018-09-07 RX ORDER — ZOLPIDEM TARTRATE 5 MG/1
TABLET ORAL
Status: ON HOLD | COMMUNITY
Start: 2018-08-20 | End: 2019-09-11

## 2018-09-07 RX ORDER — LOSARTAN POTASSIUM 50 MG/1
50 TABLET ORAL DAILY
COMMUNITY
End: 2018-10-09 | Stop reason: SDUPTHER

## 2018-09-07 NOTE — PATIENT INSTRUCTIONS
Symptomatic left carotid stenosis with visual disturbances, now status post left carotid stent placement on 7/9/18 by Dr Satish Quintero  Status post CABG on 7/24/18  -CV duplex results: Your left carotid artery stent is wide open and looking good post surgery, minimal disease to the right carotid artery  -We will monitor your carotid stent with an ultrasound every 3 months for the first year, then every 6 months during the second year, and then annually thereafter  -Continue aspirin, Plavix and atorvastatin  -Followup in 1 year    We will mail you your test results, if there are any major change in results we will call you for office visit prior  -You are cleared to drive

## 2018-09-07 NOTE — PROGRESS NOTES
Assessment/Plan:    Symptomatic carotid stenosis with left eye amaurosis fugax, during preop clearance for CEA he was noted to have severe multivessel CAD requiring open revascularization, underwent left carotid stent placement on 7/9/18 by Dr Soraida Naylor, prior to CABG on 7/24/18  Symptomatic stenosis of left carotid artery without infarction  -CV duplex results:  Left carotid stent widely patent, minimal disease to right carotid  -Continue aspirin, Plavix and atorvastatin  -Discussed postop surveillance with CV duplex every 3 months x1 year, ever 6 months during the second year and annually thereafter  -Followup in 1 year, notify office sooner with any questions/concerns       Diagnoses and all orders for this visit:    Symptomatic stenosis of left carotid artery without infarction  -     VAS carotid complete study; Future  -     VAS carotid complete study; Future    Other orders  -     zolpidem (AMBIEN) 5 mg tablet;   -     losartan (COZAAR) 100 MG tablet; Take 100 mg by mouth daily          Subjective:      Patient ID: Kelly Ma  is a 59 y o  male  Patient presents today as a follow up visit for CV done on 8/31/2018  Patient denies any headaches, confusion, no changes in vision, no difficulty speaking, no facial drooping  He states that he has been experiencing some overall weakness, but he attributes that to not being as active since his surgery in July  Patient is taking Plavix, Lipitor and Aspirin 81mg  Patient is s/p left carotid stent placement on 7/9/2018 by Dr Soraida Naylor for tx of symptomatic left carotid stenosis with 2 episodes of left eye amaurosis fugax  No further episodes since surgery  He is neurologically intact  Denies any difficulties  He underwent CABG x3 on 7/24/18 and is completing cardiac rehab  Denies any CP/SOB  States that when he lays flat on his back at night he feels slightly SOB, but when he rolls onto his side he is fine     He will followup with cardiology in regards to this   He is on daily aspirin, Plavix and statin therapy  Non-smoker  The following portions of the patient's history were reviewed and updated as appropriate: allergies, current medications, past family history, past medical history, past social history, past surgical history and problem list     Review of Systems   Constitutional: Positive for fatigue  HENT: Negative  Eyes: Negative  Respiratory: Positive for shortness of breath  Cardiovascular:        Irregular heart rate   Gastrointestinal: Negative  Endocrine: Negative  Genitourinary: Negative  Musculoskeletal: Negative  Skin: Negative  Allergic/Immunologic: Negative  Neurological: Positive for light-headedness  Hematological: Negative  Psychiatric/Behavioral: Negative  Objective:       Physical Exam   Constitutional: He is oriented to person, place, and time  No distress  HENT:   Head: Normocephalic and atraumatic  Eyes: EOM are normal    Neck: Neck supple  No JVD present  Cardiovascular: Normal rate, regular rhythm and normal heart sounds  No murmur heard  Pulses:       Radial pulses are 2+ on the right side, and 2+ on the left side  Left carotid bruit   Pulmonary/Chest: Effort normal  No respiratory distress  Musculoskeletal: Normal range of motion  He exhibits no edema  Absent right hand 2nd and 3rd digits    Neurological: He is alert and oriented to person, place, and time  Neurologically intact with no observable deficits  Steady gait, bilateral  strength 5/5   Skin: Skin is warm and dry  Psychiatric: He has a normal mood and affect           Vitals:    09/07/18 1319 09/07/18 1321   BP: 124/74 126/70   BP Location: Left arm Right arm   Patient Position: Sitting Sitting   Pulse: 76    Temp: 97 7 °F (36 5 °C)    TempSrc: Tympanic    Weight: 78 kg (172 lb)    Height: 5' 8" (1 727 m)        Patient Active Problem List   Diagnosis    Left carotid artery stenosis    Amaurosis fugax    Essential hypertension    Dyslipidemia    Symptomatic stenosis of left carotid artery without infarction    Coronary artery disease of native artery of native heart with stable angina pectoris (HCC)    S/P CABG x 3    Acute blood loss as cause of postoperative anemia    Hypokalemia    Tachycardia    Encounter for postoperative care       Past Surgical History:   Procedure Laterality Date    AMPUTATION OF REPLICATED FINGERS       rt 2nd and 3rd fingers traumatically amputated at work    APPENDECTOMY      MANDIBLE FRACTURE SURGERY      upper and lower    ORBITAL FRACTURE SURGERY Right     [place] CABG, VEIN, FOUR N/A 7/24/2018    Procedure: CORONARY ARTERY BYPASS GRAFT (CABG) 3 VESSELS ; NATE to LAD, SVG--> OM and Distal right;  Surgeon: Jasiel Carrera DO;  [facility];  Service: Cardiac Surgery    [place] ECHO TRANSESOPHAG MONTR CARDIAC PUMP FUNCTJ N/A 7/24/2018    Procedure: TRANSESOPHAGEAL ECHOCARDIOGRAM (HECTOR); Surgeon: Jasiel Carrera DO;  [facility];  Service: Cardiac Surgery    OK TCAT IV STENT CRV CRTD ART EMBOLIC PROTECJ Left 7/6/3989    Procedure: TRANSCAROTID ARTERY REVASCULARIZATION;  Surgeon: Mendoza Britt MD;  [facility];  Service: Vascular       Family History   Problem Relation Age of Onset    Cancer Mother     Heart attack Father     Stroke Father        Social History     Social History    Marital status: /Civil Union     Spouse name: N/A    Number of children: N/A    Years of education: N/A     Occupational History    Not on file       Social History Main Topics    Smoking status: Former Smoker     Packs/day: 1 00     Quit date: 5/14/2018    Smokeless tobacco: Former User     Quit date: 5/3/2018    Alcohol use No    Drug use: No    Sexual activity: Not Currently     Other Topics Concern    Not on file     Social History Narrative    No narrative on file       Allergies   Allergen Reactions    Percocet [Oxycodone-Acetaminophen] Itching Trouble sleeping          Current Outpatient Prescriptions:     aspirin (ECOTRIN LOW STRENGTH) 81 mg EC tablet, Take 1 tablet (81 mg total) by mouth daily, Disp: 90 tablet, Rfl: 0    atorvastatin (LIPITOR) 80 mg tablet, Take 1 tablet (80 mg total) by mouth daily with dinner, Disp: 90 tablet, Rfl: 2    clopidogrel (PLAVIX) 75 mg tablet, Take 1 tablet (75 mg total) by mouth daily, Disp: 30 tablet, Rfl: 1    losartan (COZAAR) 100 MG tablet, Take 100 mg by mouth daily, Disp: , Rfl:     metoprolol tartrate (LOPRESSOR) 50 mg tablet, Take 1 tablet (50 mg total) by mouth every 12 (twelve) hours, Disp: 60 tablet, Rfl: 0    zolpidem (AMBIEN) 5 mg tablet, , Disp: , Rfl:

## 2018-09-07 NOTE — ASSESSMENT & PLAN NOTE
-CV duplex results:  Left carotid stent widely patent, minimal disease to right carotid  -Continue aspirin, Plavix and atorvastatin  -Discussed postop surveillance with CV duplex every 3 months x1 year, ever 6 months during the second year and annually thereafter  -Followup in 1 year, notify office sooner with any questions/concerns

## 2018-09-11 ENCOUNTER — CLINICAL SUPPORT (OUTPATIENT)
Dept: CARDIAC REHAB | Facility: CLINIC | Age: 63
End: 2018-09-11
Payer: COMMERCIAL

## 2018-09-11 DIAGNOSIS — Z95.1 S/P CABG X 3: ICD-10-CM

## 2018-09-11 PROCEDURE — 93798 PHYS/QHP OP CAR RHAB W/ECG: CPT

## 2018-09-12 ENCOUNTER — CLINICAL SUPPORT (OUTPATIENT)
Dept: CARDIAC REHAB | Facility: CLINIC | Age: 63
End: 2018-09-12
Payer: COMMERCIAL

## 2018-09-12 DIAGNOSIS — Z95.1 S/P CABG X 3: ICD-10-CM

## 2018-09-12 PROCEDURE — 93798 PHYS/QHP OP CAR RHAB W/ECG: CPT

## 2018-09-14 ENCOUNTER — CLINICAL SUPPORT (OUTPATIENT)
Dept: CARDIAC REHAB | Facility: CLINIC | Age: 63
End: 2018-09-14
Payer: COMMERCIAL

## 2018-09-14 DIAGNOSIS — Z95.1 S/P CABG X 3: ICD-10-CM

## 2018-09-14 PROCEDURE — 93798 PHYS/QHP OP CAR RHAB W/ECG: CPT

## 2018-09-17 ENCOUNTER — APPOINTMENT (OUTPATIENT)
Dept: CARDIAC REHAB | Facility: CLINIC | Age: 63
End: 2018-09-17
Payer: COMMERCIAL

## 2018-09-21 ENCOUNTER — APPOINTMENT (OUTPATIENT)
Dept: CARDIAC REHAB | Facility: CLINIC | Age: 63
End: 2018-09-21
Payer: COMMERCIAL

## 2018-09-24 ENCOUNTER — CLINICAL SUPPORT (OUTPATIENT)
Dept: CARDIAC REHAB | Facility: CLINIC | Age: 63
End: 2018-09-24
Payer: COMMERCIAL

## 2018-09-24 DIAGNOSIS — Z95.1 S/P CABG X 3: ICD-10-CM

## 2018-09-24 PROCEDURE — 93798 PHYS/QHP OP CAR RHAB W/ECG: CPT

## 2018-09-26 ENCOUNTER — CLINICAL SUPPORT (OUTPATIENT)
Dept: CARDIAC REHAB | Facility: CLINIC | Age: 63
End: 2018-09-26
Payer: COMMERCIAL

## 2018-09-26 DIAGNOSIS — Z95.1 S/P CABG X 3: ICD-10-CM

## 2018-09-26 PROCEDURE — 93798 PHYS/QHP OP CAR RHAB W/ECG: CPT

## 2018-09-26 RX ORDER — ATORVASTATIN CALCIUM 20 MG/1
TABLET, FILM COATED ORAL
COMMUNITY
Start: 2018-09-10 | End: 2018-10-01 | Stop reason: ALTCHOICE

## 2018-09-26 RX ORDER — RASAGILINE 1 MG/1
TABLET ORAL
COMMUNITY
Start: 2018-09-10 | End: 2018-10-01 | Stop reason: ALTCHOICE

## 2018-09-26 NOTE — PROGRESS NOTES
Cardiac Rehabilitation Plan of Care   30 day       Today's date: 2018   Visits: 10  Patient name: Kelly Ma  : 1955  Age: 59 y o  MRN: [id number]  Referring Physician: WILLIS Mancia, [place], [place]  Provider: Cristian Lopez  Clinician: Anikte Monet RN    Dx:   Encounter Diagnosis   Name Primary?  S/P CABG x 3      Date of onset: 2018    Comment: Mr Mitra Cat completed 10 sessions of the cardiac rehabilitation program  His telemetry monitor has been NSR at rest and with exercise  He has a normal hemodynamic response to exercise  He completes 45 minutes of cardiovascular exercise  His exercise MET level is 3 6 MET's  He missed one week due to URI  His weight decreased by one pound since the start of his cardiac rehabilitation program  Provided information on sodium content in food and label reading  Will continue to  Monitor  Medication compliance: Yes   Comments: pt states compliant with medication  Fall Risk: low       EXERCISE/ACTIVITY    Cardiovascular:   Min: 45   METS: 3 6   Hr: 100   RPE: 4-5   O2 sat: 95-99    Modalities: Treadmill, NuStep and Recumbent bike  Strength trainin-3 days / week, 12-15 repitations  and 1-2 sets per modality    Modalities: Lateral raise and Arm curl  EKG changes: NSR at rest and with exercise  Dyspnea score: 2  Home activity: started walking at home 5 to 7 minutes several times a day  Goals: increase stamina to return to work in October  Education: Explained cardiac rehabilitation, cardiac risk factors, 6 minute walk test, sub max treadmill test, arm curl and chair to stand test  Plan: treadmill 15 minutes at 2 5 mph with a 2% incline, nu-step 10 minutes level 4, recumbent bike 10 minutes level 3   Increase time and resistance as tolerated  Readiness to change: 10    NUTRITION    Weight control:    Starting weight: 173   Current weight:   172  Waist circumference:    Startin 5   Current:    Diabetes: denies  Lipid management: Lipitor and low fat diet  Goals: continue to eat a heart healthy diet  Education: rate your plate, heart healthy diet   Label reading  Plan: continue to eat healthy  Readiness to change: 1    PSYCHOSOCIAL    Emotional:    Self-reported stress level: 5   Social support: spouse  Goals: increase stamina to return to work to decrease financial stress in 12 sessions  Education:   Plan: attend cardiac rehabilitation for 12 sessions  Readiness to change: 10    OTHER CORE COMPONENTS     Tobacco:   History   Smoking Status    Former Smoker    Packs/day: 1 00    Quit date: 2018   Smokeless Tobacco    Former User    Quit date: 5/3/2018     Blood pressure:    Restin/68   Exercise:  128/76  Goals: continue to eat healthy and take medication as prescribed  Education: common heart mediations, sodium  Plan: continue to eat a heart healthy diet and take medication as prescribed  Readiness to change: 1

## 2018-09-28 ENCOUNTER — CLINICAL SUPPORT (OUTPATIENT)
Dept: CARDIAC REHAB | Facility: CLINIC | Age: 63
End: 2018-09-28
Payer: COMMERCIAL

## 2018-09-28 DIAGNOSIS — Z95.1 S/P CABG X 3: ICD-10-CM

## 2018-09-28 PROCEDURE — 93798 PHYS/QHP OP CAR RHAB W/ECG: CPT

## 2018-10-01 ENCOUNTER — OFFICE VISIT (OUTPATIENT)
Dept: CARDIOLOGY CLINIC | Facility: CLINIC | Age: 63
End: 2018-10-01
Payer: COMMERCIAL

## 2018-10-01 ENCOUNTER — CLINICAL SUPPORT (OUTPATIENT)
Dept: CARDIAC REHAB | Facility: CLINIC | Age: 63
End: 2018-10-01
Payer: COMMERCIAL

## 2018-10-01 VITALS
HEIGHT: 68 IN | WEIGHT: 174.1 LBS | SYSTOLIC BLOOD PRESSURE: 118 MMHG | HEART RATE: 64 BPM | DIASTOLIC BLOOD PRESSURE: 70 MMHG | BODY MASS INDEX: 26.39 KG/M2

## 2018-10-01 DIAGNOSIS — I47.1 SVT (SUPRAVENTRICULAR TACHYCARDIA) (HCC): Primary | ICD-10-CM

## 2018-10-01 DIAGNOSIS — I10 ESSENTIAL HYPERTENSION: ICD-10-CM

## 2018-10-01 DIAGNOSIS — Z95.1 S/P CABG X 3: ICD-10-CM

## 2018-10-01 DIAGNOSIS — I25.118 CORONARY ARTERY DISEASE OF NATIVE ARTERY OF NATIVE HEART WITH STABLE ANGINA PECTORIS (HCC): ICD-10-CM

## 2018-10-01 PROBLEM — I47.10 SVT (SUPRAVENTRICULAR TACHYCARDIA): Status: ACTIVE | Noted: 2018-10-01

## 2018-10-01 PROCEDURE — 99214 OFFICE O/P EST MOD 30 MIN: CPT | Performed by: INTERNAL MEDICINE

## 2018-10-01 PROCEDURE — 93798 PHYS/QHP OP CAR RHAB W/ECG: CPT

## 2018-10-01 PROCEDURE — 93000 ELECTROCARDIOGRAM COMPLETE: CPT | Performed by: INTERNAL MEDICINE

## 2018-10-01 NOTE — LETTER
October 1, 2018     Maria Ines Knapp,   [address] [address]    Patient: Brady Connor  YOB: 1955   Date of Visit: 10/1/2018       Dear Dr Maximo Flores:    Thank you for referring Vicky Amaya to me for evaluation  Below are my notes for this consultation  If you have questions, please do not hesitate to call me  I look forward to following your patient along with you  Sincerely,        Ruby Ashford MD        CC: No Recipients  Ruby Ashford MD  10/1/2018  4:05 PM  Sign at close encounter  [address] [facility]    Outpatient New Consult Today's Date: 10/01/18        Patient name: Brady Connor  YOB: 1955  Sex: male         Chief Complaint: Referral from DR Maximo Flores for SVT      ASSESSMENT:  Problem List Items Addressed This Visit     None      Visit Diagnoses     SVT (supraventricular tachycardia) ([address] )    -  Primary    Relevant Orders    POCT ECG        60 yo male  1)SVT- 2 episodes in August, one broke by EMS w adenosine, other sent to ER 190bpm and broke as he laid down to get EKG  Neither have strips available for review  HE feels palpitations, light headed, like heart is "beating in his back" during episodes  None prior to CABG  HE had his  Metoprolol doubled to  50mg bid  And since then no recurrence  No preexcitation on EKG> He notes HR 116bpm in morning a few days ago for no reason  2) H/o CABGx3 July 2018 by Dr Lori Blue  Normal EF    3) PVD s/p carotid stenting  4) HTN well controlled    PLAN:  1  Discussed option of SVT ablation vs watchful waiting on metoprolol  To see if recurred  Explained ablation 95% cure rate and medications may or may not work  He wishes to avoid another procedure right now as getting back to work  He will call if wishes to proceed w ablation or if SVT recurs  Went over SVT ablation risks and benefits    Risks, benefits and alternatives to ablation of Supraventricular Tachycardia (and/or atrial flutter) were discussed  Alternative options of medical therapy and observation discussed  Treatment success is estimated 95% but may vary depending on the exact mechanism of arrhythmia that cannot be determined until during procedure and repeat ablation maybe required  Additional arrhythmias may arise later in the patients life different than the arrhythmia targeted during ablation  Most arrhythmias can be treated with catheters on right side of heart and coronary sinus which has slightly lower risk than left sided ablation, but if necessary a Transseptal puncture and left heart catherization and ablation may be required which does include the need for blood thinners intraop and post operatively  Risks include but are not limited to heart and vascular injury, bleeding, bleeding around the heart, need for a pacemakerand rarely risk of open heart surgery/stroke/death  Orders Placed This Encounter   Procedures    POCT ECG     Medications Discontinued During This Encounter   Medication Reason    rasagiline (AZILECT) 1 MG Therapy completed    atorvastatin (LIPITOR) 20 mg tablet Therapy completed             HPI/Subjective: 58 yo male  1)SVT- 2 episodes in August, one broke by EMS w adenosine, other sent to ER 190bpm and broke as he laid down to get EKG  Neither have strips available for review  HE feels palpitations, light headed, like heart is "beating in his back" during episodes  None prior to CABG  HE had his  Metoprolol doubled to  50mg bid  And since then no recurrence  No preexcitation on EKG> He notes HR 116bpm in morning a few days ago for no reason  2) H/o CABGx3 July 2018 by Dr Lina Villavicencio   Normal EF    3) PVD s/p carotid stenting  4) HTN well controlled      He feels well, recovered enough from surgery to go back to work  He has left sided CP palpable, he thinks related to weight lifting  He denies recent palpitations other than 2 episodes of SVT discussed above  Please note HPI is listed by problem with with update following it, it is copied again in the assessment above and reflects medical decision making as well  Complete 12 point ROS reviewed and otherwise non pertinent or negative except as per HPI pertinent positives in Cardiovascular and Respiratory emphasized  Please see paper chart for outpatient clinic patients where the patient completed the 12 point ROS survey  Past Medical History:   Diagnosis Date    Carotid stenosis, left     s/p IR stent    Coronary artery disease     Former tobacco use     Hard of hearing     Hyperlipidemia     Hypertension     Parkinson disease ([address] )     SV[facility]        Allergies   Allergen Reactions    Percocet [Oxycodone-Acetaminophen] Itching     Trouble sleeping      I reviewed the Home Medication list and Allergies in the chart  Scheduled Meds:  Current Outpatient Prescriptions   Medication Sig Dispense Refill    aspirin (ECOTRIN LOW STRENGTH) 81 mg EC tablet Take 1 tablet (81 mg total) by mouth daily 90 tablet 0    atorvastatin (LIPITOR) 80 mg tablet Take 1 tablet (80 mg total) by mouth daily with dinner 90 tablet 2    clopidogrel (PLAVIX) 75 mg tablet Take 1 tablet (75 mg total) by mouth daily 30 tablet 1    losartan (COZAAR) 50 mg tablet Take 50 mg by mouth daily        metoprolol tartrate (LOPRESSOR) 50 mg tablet Take 1 tablet (50 mg total) by mouth every 12 (twelve) hours 60 tablet 0    zolpidem (AMBIEN) 5 mg tablet        No current facility-administered medications for this visit        PRN Meds:         Family History   Problem Relation Age of Onset    Cancer Mother     Heart attack Father     Stroke Father        Social History     Social History    Marital status: /Civil Union     Spouse name: N/A   Ishmael Diehl Number of children: N/A    Years of education: N/A     Occupational History    Not on file  Social History Main Topics    Smoking status: Former Smoker     Packs/day: 1 00     Quit date: 5/14/2018    Smokeless tobacco: Current User     Types: Chew     Last attempt to quit: 5/3/2018    Alcohol use No    Drug use: No    Sexual activity: Not Currently     Other Topics Concern    Not on file     Social History Narrative    No narrative on file         OBJECTIVE:    /70 (BP Location: Left arm, Patient Position: Sitting, Cuff Size: Large)   Pulse 64   Ht 5' 8" (1 727 m)   Wt 79 kg (174 lb 1 6 oz)   BMI 26 47 kg/m²    Vitals:    10/01/18 1523   Weight: 79 kg (174 lb 1 6 oz)     GEN: No acute distress, Alert and oriented, well appearing  HEENT:Head, neck, ears, oral pharynx: Mucus membranes moist, oral pharynx clear, nares clear  External ears normal  EYES: Pupils equal, sclera anicteric, midline, normal conjuctiva  NECK: No JVD, supple, no obvious masses or thryomegaly or goiter  CARDIOVASCULAR:  RRR, No murmur, rub, gallops S1,S2  LUNGS: Clear To auscultation bilaterally, normal effort, no rales, rhonchi, crackles  ABDOMEN:  nondistended,  without obvious organomegaly or ascites  EXTREMITIES/VASCULAR:  No edema  Radial pulses intact, pedal pulses difficult to palpate, warm an well perfused  PSYCH: Normal Affect, no overt suicidal ideation, linear speech pattern without evidence of psychosis  NEURO: Grossly intact, moving all extremiteis equal, face symmetric, alert and responsive, no obvious focal defecits  GAIT:  Ambulates normally without difficulty  HEME: No bleeding, bruising, petechia, purpura  SKIN: No significant rashes, warm, no diaphoresis or pallor       Lab Results:       LABS:      Chemistry        Component Value Date/Time     08/29/2018 1808    K 4 0 08/29/2018 1808     08/29/2018 1808    CO2 28 08/29/2018 1808    BUN 17 08/29/2018 1808    CREATININE 0 94 08/29/2018 1808 Component Value Date/Time    CALCIUM 8 8 2018 1808    ALKPHOS 109 2018 1808    AST 17 2018 1808    ALT 25 2018 1808            No results found for: CHOL  Lab Results   Component Value Date    HDL 40 2018     Lab Results   Component Value Date    LDLCALC 63 2018     Lab Results   Component Value Date    TRIG 161 (H) 2018     No components found for: CHOLHDL    IMAGING: No results found  Cardiac testing:   Results for orders placed during the hospital encounter of 18   Echo complete with contrast if indicated    Narrative [address]  [address] [facility], [address]  [phone]    Transthoracic Echocardiogram    Study date:  2018    Patient: Jane Bernal  MR number: [id number]  Account number: [de-identified]  : 1955  Age: 59 years  Gender: Male  Status: Routine  Location: Echo lab  Height: 68 in  Weight: 186 6 lb  BP: 134/ 82 mmHg    Indications: Dyspnea    Diagnoses: R06 00 - Dyspnea, unspecified    Sonographer:  ARUNA Bearden  Primary Physician:  Marielena Avery MD  Referring Physician:  Gloria Bejarano DO  Group:  [facility]  Interpreting Physician:  Daniel Vallejo MD    SUMMARY    LEFT VENTRICLE:  Systolic function was normal  Ejection fraction was estimated in the range of 60 % to 65 %  There were no regional wall motion abnormalities  AORTIC VALVE:  There was trace regurgitation  TRICUSPID VALVE:  The tricuspid jet envelope definition was inadequate for estimation of RV systolic pressure  There are no indirect findings (abnormal RV volume or geometry, altered pulmonary flow velocity profile, or leftward septal displacement) which  would suggest moderate or severe pulmonary hypertension  HISTORY: PRIOR HISTORY: HTN, Hyperlipidemia, Parkinson Disease    PROCEDURE: The procedure was performed in the echo lab  This was a routine study  The transthoracic approach was used   The heart rate was 70 bpm, at the start of the study  Image quality was adequate  LEFT VENTRICLE: Size was normal  Systolic function was normal  Ejection fraction was estimated in the range of 60 % to 65 %  There were no regional wall motion abnormalities  Wall thickness was normal  No evidence of apical thrombus  DOPPLER: Left ventricular diastolic function parameters were normal     RIGHT VENTRICLE: The size was normal  Systolic function was normal  Wall thickness was normal     LEFT ATRIUM: Size was normal     RIGHT ATRIUM: Size was normal     MITRAL VALVE: Valve structure was normal  There was normal leaflet separation  DOPPLER: The transmitral velocity was within the normal range  There was no evidence for stenosis  There was no significant regurgitation  AORTIC VALVE: The valve was trileaflet  Leaflets exhibited mildly increased thickness, mild to moderate calcification, normal cuspal separation, and sclerosis  DOPPLER: Transaortic velocity was within the normal range  There was no  evidence for stenosis  There was trace regurgitation  TRICUSPID VALVE: The valve structure was normal  There was normal leaflet separation  DOPPLER: The transtricuspid velocity was within the normal range  There was no evidence for stenosis  There was no significant regurgitation  The  tricuspid jet envelope definition was inadequate for estimation of RV systolic pressure  There are no indirect findings (abnormal RV volume or geometry, altered pulmonary flow velocity profile, or leftward septal displacement) which would  suggest moderate or severe pulmonary hypertension  PULMONIC VALVE: Leaflets exhibited normal thickness, no calcification, and normal cuspal separation  DOPPLER: The transpulmonic velocity was within the normal range  There was no significant regurgitation  PERICARDIUM: There was no pericardial effusion  The pericardium was normal in appearance  AORTA: The root exhibited normal size      SYSTEMIC VEINS: IVC: The inferior vena cava was normal in size  SYSTEM MEASUREMENT TABLES    2D mode  AoR Diam 2D: 3 5 cm  LA Diam (2D): 3 3 cm  LA/Ao (2D): 0 94  FS (2D Teich): 32 %  IVSd (2D): 1 cm  LVDEV: 120 cm³  LVESV: 48 1 cm³  LVIDd(2D): 5 03 cm  LVISd (2D): 3 42 cm  LVOT Area 2D: 3 14 cm squared  LVPWd (2D): 1 03 cm  SV (Teich): 71 9 cm³    Apical four chamber  LVEF A4C: 62 %    Unspecified Scan Mode  LVOT Diam : 2 cm  MV Peak A Patrick: 491 mm/s  MV Peak E Patrick  Mean: 626 mm/s  MVA (PHT): 3 93 cm squared  PHT: 56 ms  Max P mm[Hg]  V Max: 2220 mm/s  Vmax: 2220 mm/s  RA Area: 13 8 cm squared  RA Volume: 31 1 cm³  TAPSE: 1 8 cm    [facility]    Prepared and electronically signed by    Gil Mazariegos MD  Signed 2018 19:24:11       No results found for this or any previous visit  No results found for this or any previous visit  Results for orders placed during the hospital encounter of 18   NM myocardial perfusion spect (stress and/or rest)    [facility]  [address] [address]  [phone]    Rest/Stress Gated SPECT Myocardial Perfusion Imaging After Exercise    Patient: Helen Perea  MR number: [id number]  Account number: [de-identified]  : 1955  Age: 59 years  Gender: Male  Status: Outpatient  Location: Stress lab  Height: 68 in  Weight: 187 lb  BP: 136/ 84 mmHg    Allergies: NO KNOWN ALLERGIES    Diagnosis: Z01 810 - Encounter for preprocedural cardiovascular examination    Primary Physician:  Joel Houston MD  RN:  AYALA Woodson  Referring Physician:  Keesha Rossi DO  Group:  [facility]  Report Prepared By[de-identified]  AYALA Woodson  Interpreting Physician:  Gil Mazariegos MD    INDICATIONS: Evaluation for coronary artery disease  HISTORY: The patient is a 59year old  male  Chest pain status: no chest pain  Other symptoms: dyspnea   Coronary artery disease risk factors: dyslipidemia, hypertension, and family history of premature coronary artery disease  Cardiovascular history: peripheral vascular occlusive disease, left carotid artery stenosis  Medications: aspirin, a lipid lowering agent, and an antihypertensive agent  PHYSICAL EXAM: Baseline physical exam screening: normal     REST ECG: Normal sinus rhythm  PROCEDURE: The study was performed in the stress lab  Treadmill exercise testing was performed, using the Juli protocol  Gated SPECT myocardial perfusion imaging was performed after stress and at rest  Systolic blood pressure was 136  mmHg, at the start of the study  Diastolic blood pressure was 84 mmHg, at the start of the study  The heart rate was 68 bpm, at the start of the study  IV double checked  JULI PROTOCOL:  HR bpm SBP mmHg DBP mmHg Symptoms ST change Rhythm/conduct  Baseline 67 136 84 none -- NSR  Stage 1 117 154 80 none -- --  Stage 2 139 162 80 none horizontal depression, 1 0 mm in V5 and V6 --  Stage 3 150 -- -- mild dyspnea horizontal depression, 1 5-2 mm in V5 and V6, II --  Immediate 144 150 78 subsiding horizontal depression, 1-1 5 mm in V5 and V6 --  Recovery 1 100 140 76 none same as above --  Recovery 2 89 124 76 none return to baseline --  No medications or fluids given  STRESS SUMMARY: Dr Sid Hudson ,made aware of above EKG changes- spoek to patient and his wife  Duration of exercise was 7 min  The patient exercised to protocol stage 3  Maximal work rate was 10 1 METs  Maximal heart rate during stress was  150 bpm ( 95 % of maximal predicted heart rate)  The heart rate response to stress was normal  There was normal resting blood pressure with an appropriate response to stress  The rate-pressure product for the peak heart rate and blood  pressure was 38144  There was no chest pain during stress  The stress test was terminated due to achievement of target heart rate  Pre oxygen saturation: 99 %  Peak oxygen saturation: 99 %   2mm st depression in inferolateral leads The  stress ECG was consistent with myocardial ischemia  ISOTOPE ADMINISTRATION:  Resting isotope administration Stress isotope administration  Agent Tetrofosmin Tetrofosmin  Dose 10 6 mCi 33 mCi  Date 06/13/2018 06/13/2018    Radiopharmaceutical was administered 5 min, 26 sec into the stress protocol  There was 1 min and 30 sec of exercise after the injection  MYOCARDIAL PERFUSION IMAGING:  The image quality was good  PERFUSION DEFECTS:  -  There was a small to moderate, partially reversible myocardial perfusion defect of the inferior wall  Small area of perfusion defect which correlated with ecg changes  GATED SPECT:  The calculated left ventricular ejection fraction was 54 %  Left ventricular ejection fraction was within normal limits by visual estimate  There was no left ventricular regional abnormality  SUMMARY:  -  Stress results: Duration of exercise was 7 min  Maximal work rate was 10 1 METs  Maximal heart rate during stress was 150 bpm ( 95 % of maximal predicted heart rate)  Target heart rate was achieved  There was no chest pain during  stress  -  ECG conclusions: The stress ECG was consistent with myocardial ischemia  -  Perfusion imaging: There was a small to moderate, partially reversible myocardial perfusion defect of the inferior wall  Small area of perfusion defect which correlated with ecg changes  -  Gated SPECT: The calculated left ventricular ejection fraction was 54 %  Left ventricular ejection fraction was within normal limits by visual estimate  There was no left ventricular regional abnormality   -  Impressions and recommendations: Abnormal study after maximal exercise  IMPRESSIONS: Abnormal study after maximal exercise      Prepared and signed by    Rafy Guzmán MD  Signed 06/13/2018 18:41:55             I reviewed and interpreted the following LABS/EKG/TELE/IMAGING and below is summary of my interpretation (if data available):    Current EKG and Rhythm Strip: NSR, inferior infarct pattern

## 2018-10-01 NOTE — PROGRESS NOTES
HEART AND VASCULAR  CARDIAC [address]    Outpatient New Consult Today's Date: 10/01/18        Patient name: Kevin Yu  YOB: 1955  Sex: male         Chief Complaint: Referral from DR Sugar Siddiqui for SVT      ASSESSMENT:  Problem List Items Addressed This Visit     None      Visit Diagnoses     SVT (supraventricular tachycardia) ([address] )    -  Primary    Relevant Orders    POCT ECG        58 yo male  1)SVT- 2 episodes in August, one broke by EMS w adenosine, other sent to ER 190bpm and broke as he laid down to get EKG  Neither have strips available for review  HE feels palpitations, light headed, like heart is "beating in his back" during episodes  None prior to CABG  HE had his  Metoprolol doubled to  50mg bid  And since then no recurrence  No preexcitation on EKG> He notes HR 116bpm in morning a few days ago for no reason  2) H/o CABGx3 July 2018 by Dr Melecio Ryan  Normal EF    3) PVD s/p carotid stenting  4) HTN well controlled    PLAN:  1  Discussed option of SVT ablation vs watchful waiting on metoprolol  To see if recurred  Explained ablation 95% cure rate and medications may or may not work  He wishes to avoid another procedure right now as getting back to work  He will call if wishes to proceed w ablation or if SVT recurs  Went over SVT ablation risks and benefits  Risks, benefits and alternatives to ablation of Supraventricular Tachycardia (and/or atrial flutter) were discussed  Alternative options of medical therapy and observation discussed  Treatment success is estimated 95% but may vary depending on the exact mechanism of arrhythmia that cannot be determined until during procedure and repeat ablation maybe required  Additional arrhythmias may arise later in the patients life different than the arrhythmia targeted during ablation      Most arrhythmias can be treated with catheters on right side of heart and coronary sinus which has slightly lower risk than left sided ablation, but if necessary a Transseptal puncture and left heart catherization and ablation may be required which does include the need for blood thinners intraop and post operatively  Risks include but are not limited to heart and vascular injury, bleeding, bleeding around the heart, need for a pacemakerand rarely risk of open heart surgery/stroke/death  Orders Placed This Encounter   Procedures    POCT ECG     Medications Discontinued During This Encounter   Medication Reason    rasagiline (AZILECT) 1 MG Therapy completed    atorvastatin (LIPITOR) 20 mg tablet Therapy completed             HPI/Subjective: 58 yo male  1)SVT- 2 episodes in August, one broke by EMS w adenosine, other sent to ER 190bpm and broke as he laid down to get EKG  Neither have strips available for review  HE feels palpitations, light headed, like heart is "beating in his back" during episodes  None prior to CABG  HE had his  Metoprolol doubled to  50mg bid  And since then no recurrence  No preexcitation on EKG> He notes HR 116bpm in morning a few days ago for no reason  2) H/o CABGx3 July 2018 by Dr Lucinda Kidd  Normal EF    3) PVD s/p carotid stenting  4) HTN well controlled      He feels well, recovered enough from surgery to go back to work  He has left sided CP palpable, he thinks related to weight lifting  He denies recent palpitations other than 2 episodes of SVT discussed above  Please note HPI is listed by problem with with update following it, it is copied again in the assessment above and reflects medical decision making as well  Complete 12 point ROS reviewed and otherwise non pertinent or negative except as per HPI pertinent positives in Cardiovascular and Respiratory emphasized   Please see paper chart for outpatient clinic patients where the patient completed the 12 point ROS survey  Past Medical History:   Diagnosis Date    Carotid stenosis, left     s/p IR stent    Coronary artery disease     Former tobacco use     Hard of hearing     Hyperlipidemia     Hypertension     Parkinson disease ([address] )     [facility]ricular [facility]) [facility])        Allergies   Allergen Reactions    Percocet [Oxycodone-Acetaminophen] Itching     Trouble sleeping      I reviewed the Home Medication list and Allergies in the chart  Scheduled Meds:  Current Outpatient Prescriptions   Medication Sig Dispense Refill    aspirin (ECOTRIN LOW STRENGTH) 81 mg EC tablet Take 1 tablet (81 mg total) by mouth daily 90 tablet 0    atorvastatin (LIPITOR) 80 mg tablet Take 1 tablet (80 mg total) by mouth daily with dinner 90 tablet 2    clopidogrel (PLAVIX) 75 mg tablet Take 1 tablet (75 mg total) by mouth daily 30 tablet 1    losartan (COZAAR) 50 mg tablet Take 50 mg by mouth daily        metoprolol tartrate (LOPRESSOR) 50 mg tablet Take 1 tablet (50 mg total) by mouth every 12 (twelve) hours 60 tablet 0    zolpidem (AMBIEN) 5 mg tablet        No current facility-administered medications for this visit  PRN Meds:         Family History   Problem Relation Age of Onset    Cancer Mother     Heart attack Father     Stroke Father        Social History     Social History    Marital status: /Civil Union     Spouse name: N/A    Number of children: N/A    Years of education: N/A     Occupational History    Not on file       Social History Main Topics    Smoking status: Former Smoker     Packs/day: 1 00     Quit date: 5/14/2018    Smokeless tobacco: Current User     Types: Chew     Last attempt to quit: 5/3/2018    Alcohol use No    Drug use: No    Sexual activity: Not Currently     Other Topics Concern    Not on file     Social History Narrative    No narrative on file         OBJECTIVE:    /70 (BP Location: Left arm, Patient Position: Sitting, Cuff Size: Large) Pulse 64   Ht 5' 8" (1 727 m)   Wt 79 kg (174 lb 1 6 oz)   BMI 26 47 kg/m²   Vitals:    10/01/18 1523   Weight: 79 kg (174 lb 1 6 oz)     GEN: No acute distress, Alert and oriented, well appearing  HEENT:Head, neck, ears, oral pharynx: Mucus membranes moist, oral pharynx clear, nares clear  External ears normal  EYES: Pupils equal, sclera anicteric, midline, normal conjuctiva  NECK: No JVD, supple, no obvious masses or thryomegaly or goiter  CARDIOVASCULAR:  RRR, No murmur, rub, gallops S1,S2  LUNGS: Clear To auscultation bilaterally, normal effort, no rales, rhonchi, crackles  ABDOMEN:  nondistended,  without obvious organomegaly or ascites  EXTREMITIES/VASCULAR:  No edema  Radial pulses intact, pedal pulses difficult to palpate, warm an well perfused  PSYCH: Normal Affect, no overt suicidal ideation, linear speech pattern without evidence of psychosis  NEURO: Grossly intact, moving all extremiteis equal, face symmetric, alert and responsive, no obvious focal defecits  GAIT:  Ambulates normally without difficulty  HEME: No bleeding, bruising, petechia, purpura  SKIN: No significant rashes, warm, no diaphoresis or pallor  Lab Results:       LABS:      Chemistry        Component Value Date/Time     08/29/2018 1808    K 4 0 08/29/2018 1808     08/29/2018 1808    CO2 28 08/29/2018 1808    BUN 17 08/29/2018 1808    CREATININE 0 94 08/29/2018 1808        Component Value Date/Time    CALCIUM 8 8 08/29/2018 1808    ALKPHOS 109 08/29/2018 1808    AST 17 08/29/2018 1808    ALT 25 08/29/2018 1808            No results found for: CHOL  Lab Results   Component Value Date    HDL 40 07/23/2018     Lab Results   Component Value Date    LDLCALC 63 07/23/2018     Lab Results   Component Value Date    TRIG 161 (H) 07/23/2018     No components found for: CHOLHDL    IMAGING: No results found       Cardiac testing:   Results for orders placed during the hospital encounter of 06/13/18   Echo complete with contrast if indicated    Narrative [address]  [address] [facility]  [address]  [phone]    Transthoracic Echocardiogram    Study date:  2018    Patient: Helen Perea  MR number: [id number]  Account number: [de-identified]  : 1955  Age: 59 years  Gender: Male  Status: Routine  Location: Echo lab  Height: 68 in  Weight: 186 6 lb  BP: 134/ 82 mmHg    Indications: Dyspnea    Diagnoses: R06 00 - Dyspnea, unspecified    Sonographer:  ARUNA Delvalle  Primary Physician:  Joel Houston MD  Referring Physician:  Keesha Rossi DO  Group:  [facility]  Interpreting Physician:  Gil Mazariegos MD    SUMMARY    LEFT VENTRICLE:  Systolic function was normal  Ejection fraction was estimated in the range of 60 % to 65 %  There were no regional wall motion abnormalities  AORTIC VALVE:  There was trace regurgitation  TRICUSPID VALVE:  The tricuspid jet envelope definition was inadequate for estimation of RV systolic pressure  There are no indirect findings (abnormal RV volume or geometry, altered pulmonary flow velocity profile, or leftward septal displacement) which  would suggest moderate or severe pulmonary hypertension  HISTORY: PRIOR HISTORY: HTN, Hyperlipidemia, Parkinson Disease    PROCEDURE: The procedure was performed in the echo lab  This was a routine study  The transthoracic approach was used  The heart rate was 70 bpm, at the start of the study  Image quality was adequate  LEFT VENTRICLE: Size was normal  Systolic function was normal  Ejection fraction was estimated in the range of 60 % to 65 %  There were no regional wall motion abnormalities  Wall thickness was normal  No evidence of apical thrombus    DOPPLER: Left ventricular diastolic function parameters were normal     RIGHT VENTRICLE: The size was normal  Systolic function was normal  Wall thickness was normal     LEFT ATRIUM: Size was normal     RIGHT ATRIUM: Size was normal     MITRAL VALVE: Valve structure was normal  There was normal leaflet separation  DOPPLER: The transmitral velocity was within the normal range  There was no evidence for stenosis  There was no significant regurgitation  AORTIC VALVE: The valve was trileaflet  Leaflets exhibited mildly increased thickness, mild to moderate calcification, normal cuspal separation, and sclerosis  DOPPLER: Transaortic velocity was within the normal range  There was no  evidence for stenosis  There was trace regurgitation  TRICUSPID VALVE: The valve structure was normal  There was normal leaflet separation  DOPPLER: The transtricuspid velocity was within the normal range  There was no evidence for stenosis  There was no significant regurgitation  The  tricuspid jet envelope definition was inadequate for estimation of RV systolic pressure  There are no indirect findings (abnormal RV volume or geometry, altered pulmonary flow velocity profile, or leftward septal displacement) which would  suggest moderate or severe pulmonary hypertension  PULMONIC VALVE: Leaflets exhibited normal thickness, no calcification, and normal cuspal separation  DOPPLER: The transpulmonic velocity was within the normal range  There was no significant regurgitation  PERICARDIUM: There was no pericardial effusion  The pericardium was normal in appearance  AORTA: The root exhibited normal size  SYSTEMIC VEINS: IVC: The inferior vena cava was normal in size  SYSTEM MEASUREMENT TABLES    2D mode  AoR Diam 2D: 3 5 cm  LA Diam (2D): 3 3 cm  LA/Ao (2D): 0 94  FS (2D Teich): 32 %  IVSd (2D): 1 cm  LVDEV: 120 cm³  LVESV: 48 1 cm³  LVIDd(2D): 5 03 cm  LVISd (2D): 3 42 cm  LVOT Area 2D: 3 14 cm squared  LVPWd (2D): 1 03 cm  SV (Teich): 71 9 cm³    Apical four chamber  LVEF A4C: 62 %    Unspecified Scan Mode  LVOT Diam : 2 cm  MV Peak A Patrick: 491 mm/s  MV Peak E Patrick   Mean: 626 mm/s  MVA (PHT): 3 93 cm squared  PHT: 56 ms  Max P mm[Hg]  V Max: 2220 mm/s  Vmax: 2220 mm/s  RA Area: 13 8 cm squared  RA Volume: 31 1 cm³  TAPSE: 1 8 cm    [facility]    Prepared and electronically signed by    Melonie Chan MD  Signed 2018 19:24:11       No results found for this or any previous visit  No results found for this or any previous visit  Results for orders placed during the hospital encounter of 18   NM myocardial perfusion spect (stress and/or rest)    [facility] [address]  [address] [facility] [address] [phone]    Rest/Stress Gated SPECT Myocardial Perfusion Imaging After Exercise    Patient: Yahaira Barkley  MR number: [id number]  Account number: [de-identified]  : 1955  Age: 59 years  Gender: Male  Status: Outpatient  Location: Stress lab  Height: 68 in  Weight: 187 lb  BP: 136/ 84 mmHg    Allergies: NO KNOWN ALLERGIES    Diagnosis: Z01 810 - Encounter for preprocedural cardiovascular examination    Primary Physician:  Jorge Irving MD  RN:  AYALA De Leon  Referring Physician:  Laurie Villegas DO  Group:  [facility]  Report Prepared By[de-identified]  AYALA De Leon  Interpreting Physician:  Melonie Chan MD    INDICATIONS: Evaluation for coronary artery disease  HISTORY: The patient is a 59year old  male  Chest pain status: no chest pain  Other symptoms: dyspnea  Coronary artery disease risk factors: dyslipidemia, hypertension, and family history of premature coronary artery disease  Cardiovascular history: peripheral vascular occlusive disease, left carotid artery stenosis  Medications: aspirin, a lipid lowering agent, and an antihypertensive agent  PHYSICAL EXAM: Baseline physical exam screening: normal     REST ECG: Normal sinus rhythm  PROCEDURE: The study was performed in the stress lab  Treadmill exercise testing was performed, using the Cal protocol   Gated SPECT myocardial perfusion imaging was performed after stress and at rest  Systolic blood pressure was 136  mmHg, at the start of the study  Diastolic blood pressure was 84 mmHg, at the start of the study  The heart rate was 68 bpm, at the start of the study  IV double checked  JULI PROTOCOL:  HR bpm SBP mmHg DBP mmHg Symptoms ST change Rhythm/conduct  Baseline 67 136 84 none -- NSR  Stage 1 117 154 80 none -- --  Stage 2 139 162 80 none horizontal depression, 1 0 mm in V5 and V6 --  Stage 3 150 -- -- mild dyspnea horizontal depression, 1 5-2 mm in V5 and V6, II --  Immediate 144 150 78 subsiding horizontal depression, 1-1 5 mm in V5 and V6 --  Recovery 1 100 140 76 none same as above --  Recovery 2 89 124 76 none return to baseline --  No medications or fluids given  STRESS SUMMARY: Dr Vicky Brown ,made aware of above EKG changes- spoek to patient and his wife  Duration of exercise was 7 min  The patient exercised to protocol stage 3  Maximal work rate was 10 1 METs  Maximal heart rate during stress was  150 bpm ( 95 % of maximal predicted heart rate)  The heart rate response to stress was normal  There was normal resting blood pressure with an appropriate response to stress  The rate-pressure product for the peak heart rate and blood  pressure was 88473  There was no chest pain during stress  The stress test was terminated due to achievement of target heart rate  Pre oxygen saturation: 99 %  Peak oxygen saturation: 99 %  2mm st depression in inferolateral leads The  stress ECG was consistent with myocardial ischemia  ISOTOPE ADMINISTRATION:  Resting isotope administration Stress isotope administration  Agent Tetrofosmin Tetrofosmin  Dose 10 6 mCi 33 mCi  Date 06/13/2018 06/13/2018    Radiopharmaceutical was administered 5 min, 26 sec into the stress protocol  There was 1 min and 30 sec of exercise after the injection  MYOCARDIAL PERFUSION IMAGING:  The image quality was good      PERFUSION DEFECTS:  -  There was a small to moderate, partially reversible myocardial perfusion defect of the inferior wall  Small area of perfusion defect which correlated with ecg changes  GATED SPECT:  The calculated left ventricular ejection fraction was 54 %  Left ventricular ejection fraction was within normal limits by visual estimate  There was no left ventricular regional abnormality  SUMMARY:  -  Stress results: Duration of exercise was 7 min  Maximal work rate was 10 1 METs  Maximal heart rate during stress was 150 bpm ( 95 % of maximal predicted heart rate)  Target heart rate was achieved  There was no chest pain during  stress  -  ECG conclusions: The stress ECG was consistent with myocardial ischemia  -  Perfusion imaging: There was a small to moderate, partially reversible myocardial perfusion defect of the inferior wall  Small area of perfusion defect which correlated with ecg changes  -  Gated SPECT: The calculated left ventricular ejection fraction was 54 %  Left ventricular ejection fraction was within normal limits by visual estimate  There was no left ventricular regional abnormality   -  Impressions and recommendations: Abnormal study after maximal exercise  IMPRESSIONS: Abnormal study after maximal exercise  Prepared and signed by    Alida Lara MD  Signed 06/13/2018 18:41:55             I reviewed and interpreted the following LABS/EKG/TELE/IMAGING and below is summary of my interpretation (if data available):    Current EKG and Rhythm Strip: NSR, inferior infarct pattern

## 2018-10-04 ENCOUNTER — CLINICAL SUPPORT (OUTPATIENT)
Dept: CARDIAC REHAB | Facility: CLINIC | Age: 63
End: 2018-10-04
Payer: COMMERCIAL

## 2018-10-04 DIAGNOSIS — Z95.1 S/P CABG X 3: ICD-10-CM

## 2018-10-04 PROCEDURE — 93798 PHYS/QHP OP CAR RHAB W/ECG: CPT

## 2018-10-08 ENCOUNTER — CLINICAL SUPPORT (OUTPATIENT)
Dept: CARDIAC REHAB | Facility: CLINIC | Age: 63
End: 2018-10-08
Payer: COMMERCIAL

## 2018-10-08 DIAGNOSIS — Z95.1 S/P CABG X 3: ICD-10-CM

## 2018-10-08 PROCEDURE — 93798 PHYS/QHP OP CAR RHAB W/ECG: CPT

## 2018-10-09 DIAGNOSIS — I10 ESSENTIAL HYPERTENSION: Primary | ICD-10-CM

## 2018-10-10 ENCOUNTER — CLINICAL SUPPORT (OUTPATIENT)
Dept: CARDIAC REHAB | Facility: CLINIC | Age: 63
End: 2018-10-10
Payer: COMMERCIAL

## 2018-10-10 DIAGNOSIS — Z95.1 S/P CABG X 3: ICD-10-CM

## 2018-10-10 PROCEDURE — 93798 PHYS/QHP OP CAR RHAB W/ECG: CPT

## 2018-10-10 RX ORDER — LOSARTAN POTASSIUM 50 MG/1
50 TABLET ORAL DAILY
Qty: 90 TABLET | Refills: 3 | Status: SHIPPED | OUTPATIENT
Start: 2018-10-10 | End: 2019-10-13 | Stop reason: SDUPTHER

## 2018-10-12 ENCOUNTER — CLINICAL SUPPORT (OUTPATIENT)
Dept: CARDIAC REHAB | Facility: CLINIC | Age: 63
End: 2018-10-12
Payer: COMMERCIAL

## 2018-10-12 DIAGNOSIS — Z95.1 S/P CABG X 3: ICD-10-CM

## 2018-10-12 PROCEDURE — 93798 PHYS/QHP OP CAR RHAB W/ECG: CPT

## 2018-10-15 ENCOUNTER — CLINICAL SUPPORT (OUTPATIENT)
Dept: CARDIAC REHAB | Facility: CLINIC | Age: 63
End: 2018-10-15
Payer: COMMERCIAL

## 2018-10-15 DIAGNOSIS — Z95.1 S/P CABG X 3: ICD-10-CM

## 2018-10-15 PROCEDURE — 93798 PHYS/QHP OP CAR RHAB W/ECG: CPT

## 2018-10-17 ENCOUNTER — CLINICAL SUPPORT (OUTPATIENT)
Dept: CARDIAC REHAB | Facility: CLINIC | Age: 63
End: 2018-10-17
Payer: COMMERCIAL

## 2018-10-17 DIAGNOSIS — I47.1 SVT (SUPRAVENTRICULAR TACHYCARDIA) (HCC): ICD-10-CM

## 2018-10-17 DIAGNOSIS — Z95.1 S/P CABG X 3: ICD-10-CM

## 2018-10-17 PROCEDURE — 93798 PHYS/QHP OP CAR RHAB W/ECG: CPT

## 2018-10-17 NOTE — TELEPHONE ENCOUNTER
His wife called again  Doretha Cuadra has 2 pills left   Please refill metoprolol as soon as possible

## 2018-10-18 RX ORDER — METOPROLOL TARTRATE 50 MG/1
TABLET, FILM COATED ORAL
Qty: 60 TABLET | Refills: 5 | Status: SHIPPED | OUTPATIENT
Start: 2018-10-18 | End: 2019-04-09 | Stop reason: SDUPTHER

## 2018-10-19 ENCOUNTER — CLINICAL SUPPORT (OUTPATIENT)
Dept: CARDIAC REHAB | Facility: CLINIC | Age: 63
End: 2018-10-19
Payer: COMMERCIAL

## 2018-10-19 DIAGNOSIS — Z95.1 S/P CABG X 3: ICD-10-CM

## 2018-10-19 PROCEDURE — 93798 PHYS/QHP OP CAR RHAB W/ECG: CPT

## 2018-10-22 ENCOUNTER — APPOINTMENT (OUTPATIENT)
Dept: CARDIAC REHAB | Facility: CLINIC | Age: 63
End: 2018-10-22
Payer: COMMERCIAL

## 2018-10-24 ENCOUNTER — CLINICAL SUPPORT (OUTPATIENT)
Dept: CARDIAC REHAB | Facility: CLINIC | Age: 63
End: 2018-10-24
Payer: COMMERCIAL

## 2018-10-24 DIAGNOSIS — Z95.1 S/P CABG X 3: ICD-10-CM

## 2018-10-24 PROCEDURE — 93798 PHYS/QHP OP CAR RHAB W/ECG: CPT

## 2018-10-24 NOTE — PROGRESS NOTES
Cardiac Rehabilitation Plan of Care   60 day      Today's date: 10/24/2018   Visits: 20  Patient name: Bora Hidalgo  : 1955  Age: 59 y o  MRN: [id number]  Referring Physician: WILLIS Chavira Raynette Corrigan [place]  Provider: Rahat Keith  Clinician: Shmuel Galvez RN    Dx:   Encounter Diagnosis   Name Primary?  S/P CABG x 3      Date of onset: 2018    Comment: Mr Kolton Gambino completed 20 sessions of the cardiac rehabilitation program  His telemetry monitor has been NSR at rest and with exercise  He has a normal hemodynamic response to exercise  He completes 45 minutes of cardiovascular exercise  His exercise MET level increased from 3 6 to 3 9 MET's  He missed one week due to URI  His weight decreased by one pound since the start of his cardiac rehabilitation program  Provided information on sodium content in food and label reading  Will continue to  Monitor  Medication compliance: Yes   Comments: pt states compliant with medication  Fall Risk: low       EXERCISE/ACTIVITY    Cardiovascular:   Min: 45   METS: 3 9   Hr: 134   RPE: 4-5   O2 sat: 95-99    Modalities: Treadmill, NuStep and Recumbent bike  Strength trainin-3 days / week, 12-15 repitations  and 1-2 sets per modality    Modalities: Lateral raise and Arm curl  EKG changes: NSR at rest and with exercise  Dyspnea score: 2  Home activity: started walking at home 5 to 7 minutes several times a day  Goals: increase stamina to return to work in October  Education: Explained cardiac rehabilitation, cardiac risk factors, 6 minute walk test, sub max treadmill test, arm curl and chair to stand test  Plan: treadmill 15 minutes at 2 5 mph with a 2% incline, nu-step 10 minutes level 4, recumbent bike 10 minutes level 3   Increase time and resistance as tolerated  Readiness to change: 10    NUTRITION    Weight control:    Starting weight: 173   Current weight:   172 5  Waist circumference:    Startin 5   Current: Diabetes: denies  Lipid management: Lipitor and low fat diet  Goals: continue to eat a heart healthy diet  Education: rate your plate, heart healthy diet   Label reading,accountability  Plan: continue to eat healthy  Readiness to change: 1    PSYCHOSOCIAL    Emotional:    Self-reported stress level: 5   Social support: spouse  Goals: increase stamina to return to work to decrease financial stress in 12 sessions  Education:   Plan: attend cardiac rehabilitation for 12 sessions  Readiness to change: 10    OTHER CORE COMPONENTS     Tobacco:   History   Smoking Status    Former Smoker    Packs/day: 1 00    Quit date: 5/14/2018   Smokeless Tobacco    Current User    Types: Nadya No Last attempt to quit: 5/3/2018     Blood pressure:    Resting: Resting BP: 120/77938/68   Exercise:  128/76  Goals: continue to eat healthy and take medication as prescribed  Education: common heart mediations, sodium, food labels  Plan: continue to eat a heart healthy diet and take medication as prescribed  Readiness to change: 1

## 2018-10-26 ENCOUNTER — CLINICAL SUPPORT (OUTPATIENT)
Dept: CARDIAC REHAB | Facility: CLINIC | Age: 63
End: 2018-10-26
Payer: COMMERCIAL

## 2018-10-26 DIAGNOSIS — Z95.1 S/P CABG X 3: ICD-10-CM

## 2018-10-26 PROCEDURE — 93798 PHYS/QHP OP CAR RHAB W/ECG: CPT

## 2018-10-29 ENCOUNTER — CLINICAL SUPPORT (OUTPATIENT)
Dept: CARDIAC REHAB | Facility: CLINIC | Age: 63
End: 2018-10-29
Payer: COMMERCIAL

## 2018-10-29 DIAGNOSIS — Z95.1 S/P CABG X 3: ICD-10-CM

## 2018-10-29 PROCEDURE — 93798 PHYS/QHP OP CAR RHAB W/ECG: CPT

## 2018-10-31 ENCOUNTER — CLINICAL SUPPORT (OUTPATIENT)
Dept: CARDIAC REHAB | Facility: CLINIC | Age: 63
End: 2018-10-31
Payer: COMMERCIAL

## 2018-10-31 DIAGNOSIS — Z95.1 S/P CABG X 3: ICD-10-CM

## 2018-10-31 PROCEDURE — 93798 PHYS/QHP OP CAR RHAB W/ECG: CPT

## 2018-11-06 DIAGNOSIS — I65.22 LEFT CAROTID STENOSIS: ICD-10-CM

## 2018-11-06 RX ORDER — CLOPIDOGREL BISULFATE 75 MG/1
TABLET ORAL
Qty: 30 TABLET | Refills: 3 | Status: SHIPPED | OUTPATIENT
Start: 2018-11-06 | End: 2019-04-24 | Stop reason: SDUPTHER

## 2018-11-20 VITALS — WEIGHT: 172.5 LBS | HEIGHT: 66 IN | BODY MASS INDEX: 27.72 KG/M2

## 2018-11-20 NOTE — PROGRESS NOTES
Cardiac Rehabilitation Plan of Care   Discharge      Today's date: 2018   Visits: 23  Patient name: Rangel Chang  : 1955  Age: 59 y o  MRN: [id number]  Referring Physician: WILLIS Del Valle, [place], [place]  Provider: Yarely Nance  Clinician: Sonja Cristina RN    Dx:   Encounter Diagnosis   Name Primary?  S/P CABG x 3      Date of onset: 2018    Comment: Mr Estrella Siddiqui completed 23 sessions of the cardiac rehabilitation program  His telemetry monitor has been NSR at rest and with exercise  He has a normal hemodynamic response to exercise  He completes 45 minutes of cardiovascular exercise  His exercise MET level increased from 3 6 to 3 9 MET's  He missed one week due to URI  His sub max treadmill test imp[roved 74 4% increased from 4 3 to 7 4 MET's, six minute walk test increased from 1270 to 1465 feet in 6 minutes, upper and lower body strength improved  Increased arm curls from 16 to 20 in 30 seconds and chair to stands from 12 to 15 in 30 seconds  [facility] activity survey improved from 6 45 to 8 97 MET's  His PHQ9 score improved from 13 to 3  [facility] [place] of Life score improved from 28 to 15  He has been discharged from the cardiac rehabilitation program and will continue to follow up with the cardiologist  He plans to continue to be active via working and possible join the fitness center       Medication compliance: Yes   Comments: pt states compliant with medication  Fall Risk: low       EXERCISE/ACTIVITY    Cardiovascular:   Min: 45   METS: 4   Hr: 122   RPE: 4-5   O2 sat: 95-99    Modalities: Treadmill, NuStep and Recumbent bike  Strength trainin-3 days / week, 12-15 repitations  and 1-2 sets per modality    Modalities: Lateral raise and Arm curl  EKG changes: NSR at rest and with exercise  Dyspnea score: 2  Home activity: started walking at home 5 to 7 minutes several times a day  Goals: increase stamina to return to work in October (MET goal)  Education: Explained cardiac rehabilitation, cardiac risk factors, 6 minute walk test, sub max treadmill test, arm curl and chair to stand test  Plan: treadmill 15 minutes at 2 5 mph with a 2% incline, nu-step 10 minutes level 4, recumbent bike 10 minutes level 3  Increase time and resistance as tolerated  Readiness to change: 10    NUTRITION    Weight control:    Starting weight: 173   Current weight:   172 5  Waist circumference:    Startin 5   Current:    Diabetes: denies  Lipid management: Lipitor and low fat diet  Goals: continue to eat a heart healthy diet (met goal)  Education: rate your plate, heart healthy diet   Label reading,accountability  Plan: continue to eat healthy  Readiness to change: 1    PSYCHOSOCIAL    Emotional:    Self-reported stress level: 5   Social support: spouse  Goals: increase stamina to return to work to decrease financial stress in 12 sessions (met goal)  Education:   Plan: attend cardiac rehabilitation for 12 sessions  Readiness to change: 10    OTHER CORE COMPONENTS     Tobacco:   History   Smoking Status    Former Smoker    Packs/day: 1 00    Quit date: 2018   Smokeless Tobacco    Current User    Types: Michelle Soto Last attempt to quit: 5/3/2018     Blood pressure:    Resting: Resting BP: 108/92451/68   Exercise:  128/76  Goals: continue to eat healthy and take medication as prescribed (met goal)  Education: common heart mediations, sodium, food labels  Plan: continue to eat a heart healthy diet and take medication as prescribed  Readiness to change: 1

## 2018-11-20 NOTE — PROGRESS NOTES
Simran Husbands    1955     Risk: moderate     Pre Post % Change Goal   Date: 8/28/18 10/31/18     Physical       Sub Max ETT (mets) 4 3 7 5 74 4% 10% increase   6MWT (feet) 1270 1465 15 4% 10% increase   Arm Curls 16 20     Chair to Stands 12 15     ALCARAZ Al (est peak O2) 6 45 8 97 39 1%    Peak exercise CR/AK (mets) 3 6 4 11 1% 40% increase   Emotional       PHQ9 (> 10 refer to MD) 13 3 -76 9% 4 pt decrease   [place] (lower score = improvement)       Total 28 15 -46 4% < 27   Feelings 3 2 -33 3% < 3   Physical Fitness 4 3 -25 0% < 3   Social Support 1 1 100 0% < 3   Daily Activities 4 2 -50 0% < 3   Social Activities 3 1 -66 7% < 3   Pain 3 1 -66 7% < 3   Overall Health 4 2 -50 0% < 3   Quality of Life 5 3 -40 0% < 3   Change in Health 1 1 0 0% < 3   Dietary       Rate your plate 60 63 8 0% > 58   Measurements       Weight 173 172 5 -0 3% 2 5 - 5%   BMI 27 9 27 8 -0 4% 19 - 25   Waist Circ  37 5 37 5 -0 0% < 40 M / < 35 F   % Body fat 32 2 32 2 -0 0% < 25 M / < 33 F   BP left arm               (systolic) 054 564 -65 4% < 770   (diastolic) 76 70 -7 8% < 90   Smoking #/day  (if applicable) 0 0 3 3% 0   Lipids/Glucose (Date) 7/23/18      Total cholesterol 135   50 - 200   Triglycerides 161   < 150   HDL 40   40 - 60   LDL 63   < 100   A1C 6 5   4 0 - 5 6%   Fasting BG 98

## 2018-12-03 ENCOUNTER — HOSPITAL ENCOUNTER (OUTPATIENT)
Dept: RADIOLOGY | Facility: HOSPITAL | Age: 63
Discharge: HOME | End: 2018-12-03
Payer: COMMERCIAL

## 2018-12-03 DIAGNOSIS — I65.22 SYMPTOMATIC STENOSIS OF LEFT CAROTID ARTERY WITHOUT INFARCTION: ICD-10-CM

## 2018-12-03 PROCEDURE — 93880 EXTRACRANIAL BILAT STUDY: CPT

## 2018-12-03 PROCEDURE — 93880 EXTRACRANIAL BILAT STUDY: CPT | Performed by: SURGERY

## 2018-12-18 ENCOUNTER — OFFICE VISIT (OUTPATIENT)
Dept: CARDIOLOGY CLINIC | Facility: CLINIC | Age: 63
End: 2018-12-18
Payer: COMMERCIAL

## 2018-12-18 VITALS
BODY MASS INDEX: 28.7 KG/M2 | DIASTOLIC BLOOD PRESSURE: 70 MMHG | SYSTOLIC BLOOD PRESSURE: 130 MMHG | OXYGEN SATURATION: 97 % | WEIGHT: 177.8 LBS | HEART RATE: 65 BPM

## 2018-12-18 DIAGNOSIS — E78.5 DYSLIPIDEMIA: ICD-10-CM

## 2018-12-18 DIAGNOSIS — I65.22 LEFT CAROTID ARTERY STENOSIS: Chronic | ICD-10-CM

## 2018-12-18 DIAGNOSIS — Z95.1 S/P CABG X 3: ICD-10-CM

## 2018-12-18 DIAGNOSIS — I47.1 SVT (SUPRAVENTRICULAR TACHYCARDIA) (HCC): ICD-10-CM

## 2018-12-18 DIAGNOSIS — I25.118 CORONARY ARTERY DISEASE OF NATIVE ARTERY OF NATIVE HEART WITH STABLE ANGINA PECTORIS (HCC): Primary | ICD-10-CM

## 2018-12-18 DIAGNOSIS — I10 ESSENTIAL HYPERTENSION: ICD-10-CM

## 2018-12-18 PROBLEM — G45.3 AMAUROSIS FUGAX: Chronic | Status: RESOLVED | Noted: 2018-05-16 | Resolved: 2018-12-18

## 2018-12-18 PROCEDURE — 99214 OFFICE O/P EST MOD 30 MIN: CPT | Performed by: INTERNAL MEDICINE

## 2018-12-18 NOTE — LETTER
December 18, 2018     Kita Daugherty, [address] [address]    Patient: Arlene Mackey  YOB: 1955   Date of Visit: 12/18/2018       Dear Dr Becerra Older:    Thank you for referring Elle Garcia to me for evaluation  Below are my notes for this consultation  If you have questions, please do not hesitate to call me  I look forward to following your patient along with you  Sincerely,        Pato Byrne DO        CC: No Recipients  Pato Byrne DO  12/18/2018  9:10 AM  Sign at close encounter                                             Cardiology Follow Up    Arlene Mackey   1955  [id number]  [address] [facility] [address] [id number] [phone]    1  Coronary artery disease of native artery of native heart with stable angina pectoris (HCC)  Lipid panel    Comprehensive metabolic panel   2  SVT (supraventricular tachycardia) ([address] )     3  Essential hypertension     4  S/P CABG x 3     5  Dyslipidemia     6  Left carotid artery stenosis         Interval History: MR Kirti Amos is a 59year old male here for followup of CAD and SVT  On 7/24/18 had 3 vessel CABG with LIMA-LAD, SVG-OM1, SVG- RCA because of dyspnea and abnormal stress test   There was no complications seen  He is feeling well since then  He denies any shortness of breath  Had carotid artery stent on 7/9/2018  Denies LE edema, orthopnea or PND  He is exercising regularly without chest pain or dyspnea  Has had episodes of SVT but last occurred on 8/29/18  Has not had any palpitations  No syncope or near syncope          Patient Active Problem List   Diagnosis    Left carotid artery stenosis    Essential hypertension    Dyslipidemia    Symptomatic stenosis of left carotid artery without infarction    Coronary artery disease of native artery of native heart with stable angina pectoris (HCC)    S/P CABG x 3    Acute blood loss as cause of postoperative anemia    Hypokalemia    Tachycardia    Encounter for postoperative care    [facility]     Past Medical History:   Diagnosis Date    Carotid stenosis, left     s/p IR stent    Coronary artery disease     Former tobacco use     Hard of hearing     Hyperlipidemia     Hypertension     Parkinson disease ([facility] )     SVT (supraventricular tachycardia) ([facility] )      Social History     Social History    Marital status: /Civil Union     Spouse name: N/A    Number of children: N/A    Years of education: N/A     Occupational History    Not on file  Social History Main Topics    Smoking status: Former Smoker     Packs/day: 1 00     Quit date: 5/14/2018    Smokeless tobacco: Current User     Types: Chew     Last attempt to quit: 5/3/2018    Alcohol use No    Drug use: No    Sexual activity: Not Currently     Other Topics Concern    Not on file     Social History Narrative    No narrative on file      Family History   Problem Relation Age of Onset    Cancer Mother     Heart attack Father     Stroke Father      Past Surgical History:   Procedure Laterality Date    AMPUTATION OF REPLICATED FINGERS       rt 2nd and 3rd fingers traumatically amputated at work    APPENDECTOMY      MANDIBLE FRACTURE SURGERY      upper and lower    ORBITAL FRACTURE SURGERY Right     ID CABG, VEIN, FOUR N/A 7/24/2018    Procedure: CORONARY ARTERY BYPASS GRAFT (CABG) 3 VESSELS ; NATE to LAD, SVG--> OM and Distal right;  Surgeon: Susan Marroquin DO;  [facility];  Service: Cardiac Surgery    ID ECHO TRANSESOPHAG [address] N/A 7/24/2018    Procedure: TRANSESOPHAGEAL ECHOCARDIOGRAM (HECTOR);   Surgeon: Susan Marroquin DO;  [facility];  Service: Cardiac Surgery    ID TCAT IV STENT CRV CRTD ART EMBOLIC PROTECJ Left 9/9/2567    Procedure: TRANSCAROTID ARTERY REVASCULARIZATION;  Surgeon: Leonora Magallanes MD;  [facility];  Service: Vascular       Current Outpatient Prescriptions:     aspirin (ECOTRIN LOW STRENGTH) 81 mg EC tablet, Take 1 tablet (81 mg total) by mouth daily, Disp: 90 tablet, Rfl: 0    atorvastatin (LIPITOR) 80 mg tablet, Take 1 tablet (80 mg total) by mouth daily with dinner, Disp: 90 tablet, Rfl: 2    clopidogrel (PLAVIX) 75 mg tablet, TAKE 1 TABLET BY MOUTH EVERY DAY, Disp: 30 tablet, Rfl: 3    losartan (COZAAR) 50 mg tablet, Take 1 tablet (50 mg total) by mouth daily, Disp: 90 tablet, Rfl: 3    metoprolol tartrate (LOPRESSOR) 50 mg tablet, TAKE 1 TABLET BY MOUTH EVERY 12 HOURS, Disp: 60 tablet, Rfl: 5    zolpidem (AMBIEN) 5 mg tablet, , Disp: , Rfl:   Allergies   Allergen Reactions    Percocet [Oxycodone-Acetaminophen] Itching     Trouble sleeping        Labs:  Lab Results   Component Value Date    K 4 0 08/29/2018     08/29/2018    CO2 28 08/29/2018    CO2 30 07/24/2018    BUN 17 08/29/2018    CREATININE 0 94 08/29/2018    GLUCOSE 109 07/24/2018    CALCIUM 8 8 08/29/2018     Lab Results   Component Value Date    WBC 8 32 08/29/2018    HGB 13 4 08/29/2018    HCT 42 5 08/29/2018    MCV 91 08/29/2018     08/29/2018     Lab Results   Component Value Date    TRIG 161 (H) 07/23/2018    HDL 40 07/23/2018     Review of Systems:  Review of Systems   Constitutional: Negative for chills, fatigue and fever  HENT: Negative for congestion, nosebleeds and postnasal drip  Respiratory: Negative for cough, chest tightness and shortness of breath  Cardiovascular: Negative for chest pain, palpitations and leg swelling  Gastrointestinal: Negative for abdominal distention, abdominal pain, diarrhea, nausea and vomiting  Endocrine: Negative for polydipsia, polyphagia and polyuria  Musculoskeletal: Positive for arthralgias  Negative for gait problem and myalgias  Skin: Negative for color change, pallor and rash     Allergic/Immunologic: Negative for environmental allergies, food allergies and immunocompromised state  Neurological: Negative for dizziness, seizures, syncope and light-headedness  Hematological: Negative for adenopathy  Does not bruise/bleed easily  Psychiatric/Behavioral: Negative for dysphoric mood  The patient is not nervous/anxious  Physical Exam:  /70 (BP Location: Right arm, Patient Position: Sitting, Cuff Size: Large)   Pulse 65   Wt 80 6 kg (177 lb 12 8 oz)   SpO2 97%   BMI 28 70 kg/m²      Physical Exam   Constitutional: He is oriented to person, place, and time  He appears well-developed  No distress  HENT:   Head: Normocephalic and atraumatic  Eyes: Pupils are equal, round, and reactive to light  Conjunctivae and EOM are normal    Neck: Neck supple  No JVD present  No thyromegaly present  Cardiovascular: Normal rate and regular rhythm  Exam reveals no gallop and no friction rub  Murmur heard  Pulmonary/Chest: Effort normal and breath sounds normal    Abdominal: Soft  He exhibits no distension  There is no tenderness  Musculoskeletal: He exhibits no edema  Neurological: He is alert and oriented to person, place, and time  No cranial nerve deficit  Skin: Skin is warm and dry  No rash noted  He is not diaphoretic  No erythema  Psychiatric: He has a normal mood and affect  His behavior is normal  Judgment and thought content normal        Discussion/Summary:  1  Coronary artery disease of native artery of native heart with stable angina pectoris ([address] )    2  SVT (supraventricular tachycardia) ([address] )    3  Essential hypertension    4  S/P CABG x 3    5  Dyslipidemia    6  Left carotid artery stenosis      - Completed cardiac rehab  - No further palpitations, SVT  Continue metoprolol    - ASA and Plavix  - Atorvastatin 80 mg daily  - Check lipid profile     - Discussed risk factor reduction including refraining from smoking, eating a diet high in fruits and vegetables, maintaining a healthy weight, limiting screen time along with controlling BP and cholesterol   - Continue refraining from smoking

## 2018-12-18 NOTE — PROGRESS NOTES
Cardiology Follow Up    Andrea Plummer  1955  [id number]  [address] [facility] [address] [id number] [phone]    1  Coronary artery disease of native artery of native heart with stable angina pectoris (HCC)  Lipid panel    Comprehensive metabolic panel   2  SVT (supraventricular tachycardia) ([address] )     3  Essential hypertension     4  S/P CABG x 3     5  Dyslipidemia     6  Left carotid artery stenosis         Interval History: MR Estrella Siddiqui is a 59year old male here for followup of CAD and SVT  On 7/24/18 had 3 vessel CABG with LIMA-LAD, SVG-OM1, SVG- RCA because of dyspnea and abnormal stress test   There was no complications seen  He is feeling well since then  He denies any shortness of breath  Had carotid artery stent on 7/9/2018  Denies LE edema, orthopnea or PND  He is exercising regularly without chest pain or dyspnea  Has had episodes of SVT but last occurred on 8/29/18  Has not had any palpitations  No syncope or near syncope  He feels difficulty expectorating that began after surgery           Patient Active Problem List   Diagnosis    Left carotid artery stenosis    Essential hypertension    Dyslipidemia    Symptomatic stenosis of left carotid artery without infarction    Coronary artery disease of native artery of native heart with stable angina pectoris (HCC)    S/P CABG x 3    Acute blood loss as cause of postoperative anemia    Hypokalemia    Tachycardia    Encounter for postoperative care    [facility]     Past Medical History:   Diagnosis Date    Carotid stenosis, left     s/p IR stent    Coronary artery disease     Former tobacco use     Hard of hearing     Hyperlipidemia     Hypertension     Parkinson disease ([address] )     SVT (supraventricular tachycardia) ([address] )      Social History     Social History    Marital status: /Civil Union     Spouse name: N/A    Number of children: N/A    Years of education: N/A     Occupational History    Not on file  Social History Main Topics    Smoking status: Former Smoker     Packs/day: 1 00     Quit date: 5/14/2018    Smokeless tobacco: Current User     Types: Chew     Last attempt to quit: 5/3/2018    Alcohol use No    Drug use: No    Sexual activity: Not Currently     Other Topics Concern    Not on file     Social History Narrative    No narrative on file      Family History   Problem Relation Age of Onset    Cancer Mother     Heart attack Father     Stroke Father      Past Surgical History:   Procedure Laterality Date    AMPUTATION OF REPLICATED FINGERS       rt 2nd and 3rd fingers traumatically amputated at work    APPENDECTOMY      MANDIBLE FRACTURE SURGERY      upper and lower    ORBITAL FRACTURE SURGERY Right     [place] CABG, VEIN, FOUR N/A 7/24/2018    Procedure: CORONARY ARTERY BYPASS GRAFT (CABG) 3 VESSELS ; NATE to LAD, SVG--> OM and Distal right;  Surgeon: Alyssa Theodore DO;  [facility];  Service: Cardiac Surgery    TX ECHO TRANSESOPHAG [address] N/A 7/24/2018    Procedure: TRANSESOPHAGEAL ECHOCARDIOGRAM (HECTOR);   Surgeon: Alyssa Theodore DO;  [facility];  Service: Cardiac Surgery    TX TCAT IV STENT CRV CRTD ART EMBOLIC PROTECJ Left 3/1/2623    Procedure: TRANSCAROTID ARTERY REVASCULARIZATION;  Surgeon: Aura Hill MD;  [facility];  Service: Vascular       Current Outpatient Prescriptions:     aspirin (ECOTRIN LOW STRENGTH) 81 mg EC tablet, Take 1 tablet (81 mg total) by mouth daily, Disp: 90 tablet, Rfl: 0    atorvastatin (LIPITOR) 80 mg tablet, Take 1 tablet (80 mg total) by mouth daily with dinner, Disp: 90 tablet, Rfl: 2    clopidogrel (PLAVIX) 75 mg tablet, TAKE 1 TABLET BY MOUTH EVERY DAY, Disp: 30 tablet, Rfl: 3    losartan (COZAAR) 50 mg tablet, Take 1 tablet (50 mg total) by mouth daily, Disp: 90 tablet, Rfl: 3    metoprolol tartrate (LOPRESSOR) 50 mg tablet, TAKE 1 TABLET BY MOUTH EVERY 12 HOURS, Disp: 60 tablet, Rfl: 5    zolpidem (AMBIEN) 5 mg tablet, , Disp: , Rfl:   Allergies   Allergen Reactions    Percocet [Oxycodone-Acetaminophen] Itching     Trouble sleeping        Labs:  Lab Results   Component Value Date    K 4 0 08/29/2018     08/29/2018    CO2 28 08/29/2018    CO2 30 07/24/2018    BUN 17 08/29/2018    CREATININE 0 94 08/29/2018    GLUCOSE 109 07/24/2018    CALCIUM 8 8 08/29/2018     Lab Results   Component Value Date    WBC 8 32 08/29/2018    HGB 13 4 08/29/2018    HCT 42 5 08/29/2018    MCV 91 08/29/2018     08/29/2018     Lab Results   Component Value Date    TRIG 161 (H) 07/23/2018    HDL 40 07/23/2018     Review of Systems:  Review of Systems   Constitutional: Negative for chills, fatigue and fever  HENT: Negative for congestion, nosebleeds and postnasal drip  Respiratory: Negative for cough, chest tightness and shortness of breath  Cardiovascular: Negative for chest pain, palpitations and leg swelling  Gastrointestinal: Negative for abdominal distention, abdominal pain, diarrhea, nausea and vomiting  Endocrine: Negative for polydipsia, polyphagia and polyuria  Musculoskeletal: Positive for arthralgias  Negative for gait problem and myalgias  Skin: Negative for color change, pallor and rash  Allergic/Immunologic: Negative for environmental allergies, food allergies and immunocompromised state  Neurological: Negative for dizziness, seizures, syncope and light-headedness  Hematological: Negative for adenopathy  Does not bruise/bleed easily  Psychiatric/Behavioral: Negative for dysphoric mood  The patient is not nervous/anxious          Physical Exam:  /70 (BP Location: Right arm, Patient Position: Sitting, Cuff Size: Large)   Pulse 65   Wt 80 6 kg (177 lb 12 8 oz)   SpO2 97%   BMI 28 70 kg/m²     Physical Exam   Constitutional: He is oriented to person, place, and time  He appears well-developed  No distress  HENT:   Head: Normocephalic and atraumatic  Eyes: Pupils are equal, round, and reactive to light  Conjunctivae and EOM are normal    Neck: Neck supple  No JVD present  No thyromegaly present  Cardiovascular: Normal rate and regular rhythm  Exam reveals no gallop and no friction rub  Murmur heard  Pulmonary/Chest: Effort normal and breath sounds normal    Abdominal: Soft  He exhibits no distension  There is no tenderness  Musculoskeletal: He exhibits no edema  Neurological: He is alert and oriented to person, place, and time  No cranial nerve deficit  Skin: Skin is warm and dry  No rash noted  He is not diaphoretic  No erythema  Psychiatric: He has a normal mood and affect  His behavior is normal  Judgment and thought content normal        Discussion/Summary:  1  Coronary artery disease of native artery of native heart with stable angina pectoris ([address] )    2  SVT (supraventricular tachycardia) ([facility] )    3  Essential hypertension    4  S/P CABG x 3    5  Dyslipidemia    6  Left carotid artery stenosis      - Completed cardiac rehab  - No further palpitations, SVT  Continue metoprolol    - ASA and Plavix  - Atorvastatin 80 mg daily  - Check lipid profile  - Discussed risk factor reduction including refraining from smoking, eating a diet high in fruits and vegetables, maintaining a healthy weight, limiting screen time along with controlling BP and cholesterol   - Continue refraining from smoking   - Discussed workup for possible vocal cord dysfunction  He feels symptoms are improving and does not want to go for further evaluation

## 2019-03-08 ENCOUNTER — HOSPITAL ENCOUNTER (OUTPATIENT)
Dept: RADIOLOGY | Facility: HOSPITAL | Age: 64
Discharge: HOME | End: 2019-03-08
Payer: COMMERCIAL

## 2019-03-08 DIAGNOSIS — I65.22 SYMPTOMATIC STENOSIS OF LEFT CAROTID ARTERY WITHOUT INFARCTION: ICD-10-CM

## 2019-03-08 PROCEDURE — 93880 EXTRACRANIAL BILAT STUDY: CPT

## 2019-03-08 PROCEDURE — 93880 EXTRACRANIAL BILAT STUDY: CPT | Performed by: SURGERY

## 2019-04-09 DIAGNOSIS — I47.1 SVT (SUPRAVENTRICULAR TACHYCARDIA) (HCC): ICD-10-CM

## 2019-04-10 RX ORDER — METOPROLOL TARTRATE 50 MG/1
TABLET, FILM COATED ORAL
Qty: 60 TABLET | Refills: 5 | Status: SHIPPED | OUTPATIENT
Start: 2019-04-10 | End: 2019-09-13 | Stop reason: SDUPTHER

## 2019-04-24 DIAGNOSIS — I65.22 LEFT CAROTID STENOSIS: ICD-10-CM

## 2019-04-24 RX ORDER — CLOPIDOGREL BISULFATE 75 MG/1
TABLET ORAL
Qty: 30 TABLET | Refills: 3 | Status: SHIPPED | OUTPATIENT
Start: 2019-04-24 | End: 2020-01-17 | Stop reason: SDUPTHER

## 2019-04-24 RX ORDER — CLOPIDOGREL BISULFATE 75 MG/1
75 TABLET ORAL DAILY
Qty: 30 TABLET | Refills: 5 | Status: ON HOLD | OUTPATIENT
Start: 2019-04-24 | End: 2019-09-11

## 2019-05-10 DIAGNOSIS — Z95.1 S/P CABG X 3: ICD-10-CM

## 2019-05-11 RX ORDER — ATORVASTATIN CALCIUM 80 MG/1
80 TABLET, FILM COATED ORAL
Qty: 90 TABLET | Refills: 0 | Status: SHIPPED | OUTPATIENT
Start: 2019-05-11 | End: 2019-08-16 | Stop reason: SDUPTHER

## 2019-08-16 DIAGNOSIS — Z95.1 S/P CABG X 3: ICD-10-CM

## 2019-08-16 RX ORDER — ATORVASTATIN CALCIUM 80 MG/1
80 TABLET, FILM COATED ORAL
Qty: 30 TABLET | Refills: 2 | Status: ON HOLD | OUTPATIENT
Start: 2019-08-16 | End: 2019-09-11

## 2019-09-11 ENCOUNTER — HOSPITAL ENCOUNTER (INPATIENT)
Facility: HOSPITAL | Age: 64
LOS: 2 days | Discharge: HOME | DRG: 439 | End: 2019-09-13
Attending: EMERGENCY MEDICINE | Admitting: INTERNAL MEDICINE
Payer: COMMERCIAL

## 2019-09-11 ENCOUNTER — APPOINTMENT (EMERGENCY)
Dept: RADIOLOGY | Facility: HOSPITAL | Age: 64
DRG: 439 | End: 2019-09-11
Payer: COMMERCIAL

## 2019-09-11 DIAGNOSIS — R10.9 ABDOMINAL PAIN: Primary | ICD-10-CM

## 2019-09-11 DIAGNOSIS — K85.90 PANCREATITIS: ICD-10-CM

## 2019-09-11 PROBLEM — E87.8 ELECTROLYTE IMBALANCE: Status: ACTIVE | Noted: 2019-09-11

## 2019-09-11 PROBLEM — D72.829 LEUKOCYTOSIS: Status: ACTIVE | Noted: 2019-09-11

## 2019-09-11 LAB
ALBUMIN SERPL BCP-MCNC: 3.7 G/DL (ref 3.5–5)
ALP SERPL-CCNC: 101 U/L (ref 46–116)
ALT SERPL W P-5'-P-CCNC: 36 U/L (ref 12–78)
ANION GAP SERPL CALCULATED.3IONS-SCNC: 5 MMOL/L (ref 4–13)
ANION GAP SERPL CALCULATED.3IONS-SCNC: 6 MMOL/L (ref 4–13)
AST SERPL W P-5'-P-CCNC: 41 U/L (ref 5–45)
BACTERIA UR QL AUTO: ABNORMAL /HPF
BASOPHILS # BLD AUTO: 0.12 THOUSANDS/ΜL (ref 0–0.1)
BASOPHILS NFR BLD AUTO: 1 % (ref 0–1)
BILIRUB SERPL-MCNC: 0.8 MG/DL (ref 0.2–1)
BILIRUB UR QL STRIP: NEGATIVE
BUN SERPL-MCNC: 15 MG/DL (ref 5–25)
BUN SERPL-MCNC: 17 MG/DL (ref 5–25)
CALCIUM SERPL-MCNC: 8.9 MG/DL (ref 8.3–10.1)
CALCIUM SERPL-MCNC: 9 MG/DL (ref 8.3–10.1)
CHLORIDE SERPL-SCNC: 100 MMOL/L (ref 100–108)
CHLORIDE SERPL-SCNC: 101 MMOL/L (ref 100–108)
CHOLEST SERPL-MCNC: 172 MG/DL (ref 50–200)
CLARITY UR: CLEAR
CO2 SERPL-SCNC: 29 MMOL/L (ref 21–32)
CO2 SERPL-SCNC: 30 MMOL/L (ref 21–32)
COLOR UR: ABNORMAL
CREAT SERPL-MCNC: 0.79 MG/DL (ref 0.6–1.3)
CREAT SERPL-MCNC: 0.84 MG/DL (ref 0.6–1.3)
EOSINOPHIL # BLD AUTO: 0.19 THOUSAND/ΜL (ref 0–0.61)
EOSINOPHIL NFR BLD AUTO: 1 % (ref 0–6)
ERYTHROCYTE [DISTWIDTH] IN BLOOD BY AUTOMATED COUNT: 12.4 % (ref 11.6–15.1)
GFR SERPL CREATININE-BSD FRML MDRD: 93 ML/MIN/1.73SQ M
GFR SERPL CREATININE-BSD FRML MDRD: 96 ML/MIN/1.73SQ M
GLUCOSE SERPL-MCNC: 100 MG/DL (ref 65–140)
GLUCOSE SERPL-MCNC: 98 MG/DL (ref 65–140)
GLUCOSE UR STRIP-MCNC: NEGATIVE MG/DL
HCT VFR BLD AUTO: 48.2 % (ref 36.5–49.3)
HDLC SERPL-MCNC: 41 MG/DL (ref 40–60)
HGB BLD-MCNC: 16.1 G/DL (ref 12–17)
HGB UR QL STRIP.AUTO: ABNORMAL
IMM GRANULOCYTES # BLD AUTO: 0.08 THOUSAND/UL (ref 0–0.2)
IMM GRANULOCYTES NFR BLD AUTO: 0 % (ref 0–2)
KETONES UR STRIP-MCNC: NEGATIVE MG/DL
LACTATE SERPL-SCNC: 1.2 MMOL/L (ref 0.5–2)
LDLC SERPL CALC-MCNC: 95 MG/DL (ref 0–100)
LEUKOCYTE ESTERASE UR QL STRIP: NEGATIVE
LIPASE SERPL-CCNC: 540 U/L (ref 73–393)
LYMPHOCYTES # BLD AUTO: 2.03 THOUSANDS/ΜL (ref 0.6–4.47)
LYMPHOCYTES NFR BLD AUTO: 10 % (ref 14–44)
MAGNESIUM SERPL-MCNC: 2 MG/DL (ref 1.6–2.6)
MCH RBC QN AUTO: 30.1 PG (ref 26.8–34.3)
MCHC RBC AUTO-ENTMCNC: 33.4 G/DL (ref 31.4–37.4)
MCV RBC AUTO: 90 FL (ref 82–98)
MONOCYTES # BLD AUTO: 1.1 THOUSAND/ΜL (ref 0.17–1.22)
MONOCYTES NFR BLD AUTO: 6 % (ref 4–12)
NEUTROPHILS # BLD AUTO: 16.21 THOUSANDS/ΜL (ref 1.85–7.62)
NEUTS SEG NFR BLD AUTO: 82 % (ref 43–75)
NITRITE UR QL STRIP: NEGATIVE
NON-SQ EPI CELLS URNS QL MICRO: ABNORMAL /HPF
NONHDLC SERPL-MCNC: 131 MG/DL
NRBC BLD AUTO-RTO: 0 /100 WBCS
PH UR STRIP.AUTO: 5 [PH]
PLATELET # BLD AUTO: 245 THOUSANDS/UL (ref 149–390)
PMV BLD AUTO: 10.3 FL (ref 8.9–12.7)
POTASSIUM SERPL-SCNC: 4.6 MMOL/L (ref 3.5–5.3)
POTASSIUM SERPL-SCNC: 5.9 MMOL/L (ref 3.5–5.3)
PROCALCITONIN SERPL-MCNC: <0.05 NG/ML
PROT SERPL-MCNC: 8.4 G/DL (ref 6.4–8.2)
PROT UR STRIP-MCNC: NEGATIVE MG/DL
RBC # BLD AUTO: 5.34 MILLION/UL (ref 3.88–5.62)
RBC #/AREA URNS AUTO: ABNORMAL /HPF
SODIUM SERPL-SCNC: 135 MMOL/L (ref 136–145)
SODIUM SERPL-SCNC: 136 MMOL/L (ref 136–145)
SP GR UR STRIP.AUTO: 1.02 (ref 1–1.03)
TRIGL SERPL-MCNC: 181 MG/DL
TROPONIN I SERPL-MCNC: <0.02 NG/ML
UROBILINOGEN UR QL STRIP.AUTO: 0.2 E.U./DL
WBC # BLD AUTO: 19.73 THOUSAND/UL (ref 4.31–10.16)
WBC #/AREA URNS AUTO: ABNORMAL /HPF

## 2019-09-11 PROCEDURE — 84484 ASSAY OF TROPONIN QUANT: CPT | Performed by: EMERGENCY MEDICINE

## 2019-09-11 PROCEDURE — 93005 ELECTROCARDIOGRAM TRACING: CPT

## 2019-09-11 PROCEDURE — 87081 CULTURE SCREEN ONLY: CPT | Performed by: NURSE PRACTITIONER

## 2019-09-11 PROCEDURE — 96374 THER/PROPH/DIAG INJ IV PUSH: CPT

## 2019-09-11 PROCEDURE — 96375 TX/PRO/DX INJ NEW DRUG ADDON: CPT

## 2019-09-11 PROCEDURE — 99285 EMERGENCY DEPT VISIT HI MDM: CPT

## 2019-09-11 PROCEDURE — 83690 ASSAY OF LIPASE: CPT | Performed by: EMERGENCY MEDICINE

## 2019-09-11 PROCEDURE — 85025 COMPLETE CBC W/AUTO DIFF WBC: CPT | Performed by: EMERGENCY MEDICINE

## 2019-09-11 PROCEDURE — 83605 ASSAY OF LACTIC ACID: CPT | Performed by: EMERGENCY MEDICINE

## 2019-09-11 PROCEDURE — 80053 COMPREHEN METABOLIC PANEL: CPT | Performed by: EMERGENCY MEDICINE

## 2019-09-11 PROCEDURE — 36415 COLL VENOUS BLD VENIPUNCTURE: CPT | Performed by: EMERGENCY MEDICINE

## 2019-09-11 PROCEDURE — 74177 CT ABD & PELVIS W/CONTRAST: CPT

## 2019-09-11 PROCEDURE — 84145 PROCALCITONIN (PCT): CPT | Performed by: NURSE PRACTITIONER

## 2019-09-11 PROCEDURE — 99222 1ST HOSP IP/OBS MODERATE 55: CPT | Performed by: NURSE PRACTITIONER

## 2019-09-11 PROCEDURE — 81001 URINALYSIS AUTO W/SCOPE: CPT | Performed by: NURSE PRACTITIONER

## 2019-09-11 PROCEDURE — 83735 ASSAY OF MAGNESIUM: CPT | Performed by: EMERGENCY MEDICINE

## 2019-09-11 PROCEDURE — 84484 ASSAY OF TROPONIN QUANT: CPT | Performed by: NURSE PRACTITIONER

## 2019-09-11 PROCEDURE — 80061 LIPID PANEL: CPT | Performed by: EMERGENCY MEDICINE

## 2019-09-11 PROCEDURE — 80048 BASIC METABOLIC PNL TOTAL CA: CPT | Performed by: EMERGENCY MEDICINE

## 2019-09-11 RX ORDER — HYDROMORPHONE HCL/PF 1 MG/ML
0.5 SYRINGE (ML) INJECTION
Status: DISCONTINUED | OUTPATIENT
Start: 2019-09-11 | End: 2019-09-13 | Stop reason: HOSPADM

## 2019-09-11 RX ORDER — METOPROLOL TARTRATE 50 MG/1
50 TABLET, FILM COATED ORAL EVERY 12 HOURS
Status: DISCONTINUED | OUTPATIENT
Start: 2019-09-11 | End: 2019-09-13 | Stop reason: HOSPADM

## 2019-09-11 RX ORDER — HYDROMORPHONE HCL/PF 1 MG/ML
0.2 SYRINGE (ML) INJECTION
Status: DISCONTINUED | OUTPATIENT
Start: 2019-09-11 | End: 2019-09-13 | Stop reason: HOSPADM

## 2019-09-11 RX ORDER — CLOPIDOGREL BISULFATE 75 MG/1
75 TABLET ORAL DAILY
Status: DISCONTINUED | OUTPATIENT
Start: 2019-09-12 | End: 2019-09-13 | Stop reason: HOSPADM

## 2019-09-11 RX ORDER — ACETAMINOPHEN 325 MG/1
650 TABLET ORAL EVERY 6 HOURS PRN
Status: DISCONTINUED | OUTPATIENT
Start: 2019-09-11 | End: 2019-09-13 | Stop reason: HOSPADM

## 2019-09-11 RX ORDER — ONDANSETRON 2 MG/ML
4 INJECTION INTRAMUSCULAR; INTRAVENOUS EVERY 6 HOURS PRN
Status: DISCONTINUED | OUTPATIENT
Start: 2019-09-11 | End: 2019-09-13 | Stop reason: HOSPADM

## 2019-09-11 RX ORDER — SODIUM CHLORIDE 9 MG/ML
100 INJECTION, SOLUTION INTRAVENOUS CONTINUOUS
Status: DISCONTINUED | OUTPATIENT
Start: 2019-09-11 | End: 2019-09-13 | Stop reason: HOSPADM

## 2019-09-11 RX ORDER — MORPHINE SULFATE 4 MG/ML
4 INJECTION, SOLUTION INTRAMUSCULAR; INTRAVENOUS ONCE
Status: COMPLETED | OUTPATIENT
Start: 2019-09-11 | End: 2019-09-11

## 2019-09-11 RX ORDER — LOSARTAN POTASSIUM 50 MG/1
50 TABLET ORAL DAILY
Status: DISCONTINUED | OUTPATIENT
Start: 2019-09-12 | End: 2019-09-13 | Stop reason: HOSPADM

## 2019-09-11 RX ORDER — ONDANSETRON 2 MG/ML
4 INJECTION INTRAMUSCULAR; INTRAVENOUS ONCE
Status: COMPLETED | OUTPATIENT
Start: 2019-09-11 | End: 2019-09-11

## 2019-09-11 RX ORDER — ATORVASTATIN CALCIUM 80 MG/1
80 TABLET, FILM COATED ORAL
Status: DISCONTINUED | OUTPATIENT
Start: 2019-09-12 | End: 2019-09-13 | Stop reason: HOSPADM

## 2019-09-11 RX ORDER — ASPIRIN 81 MG/1
81 TABLET ORAL DAILY
Status: DISCONTINUED | OUTPATIENT
Start: 2019-09-12 | End: 2019-09-13 | Stop reason: HOSPADM

## 2019-09-11 RX ADMIN — HYDROMORPHONE HYDROCHLORIDE 0.5 MG: 1 INJECTION, SOLUTION INTRAMUSCULAR; INTRAVENOUS; SUBCUTANEOUS at 16:26

## 2019-09-11 RX ADMIN — IOHEXOL 100 ML: 350 INJECTION, SOLUTION INTRAVENOUS at 14:01

## 2019-09-11 RX ADMIN — FAMOTIDINE 20 MG: 10 INJECTION, SOLUTION INTRAVENOUS at 17:45

## 2019-09-11 RX ADMIN — ONDANSETRON 4 MG: 2 INJECTION INTRAMUSCULAR; INTRAVENOUS at 13:10

## 2019-09-11 RX ADMIN — HYDROMORPHONE HYDROCHLORIDE 0.5 MG: 1 INJECTION, SOLUTION INTRAMUSCULAR; INTRAVENOUS; SUBCUTANEOUS at 20:18

## 2019-09-11 RX ADMIN — SODIUM CHLORIDE 125 ML/HR: 0.9 INJECTION, SOLUTION INTRAVENOUS at 16:21

## 2019-09-11 RX ADMIN — METOPROLOL TARTRATE 50 MG: 50 TABLET, FILM COATED ORAL at 20:24

## 2019-09-11 RX ADMIN — SODIUM CHLORIDE 150 ML/HR: 0.9 INJECTION, SOLUTION INTRAVENOUS at 23:02

## 2019-09-11 RX ADMIN — MORPHINE SULFATE 4 MG: 4 INJECTION INTRAVENOUS at 13:11

## 2019-09-11 NOTE — H&P
H&P- Abraham Jean Baptiste  1955, 62 y o  male MRN: [id number]    Unit/Bed#: [address] [place] [address] Encounter: [id number]    Primary Care Provider: Sonia Sin MD   Date and time admitted to hospital: 9/11/2019 12:41 PM        * Pancreatitis  Assessment & Plan  · CT scan showed mild acute pancreatitis in the uncinate process and pancreatic head, no pancreatic necrosis or peripancreatic fluid collection Shannan Chard  · Etiology is unclear  No gallstone on CT  Liver/biliary tree unremarkable on CT  Triglycerides 181  Patient denies alcohol abuse, denies history of pancreatitis  Reports family history of pancreatic cancer  · Lipase 540  · NPO  · Aggressive IV hydration  · Antiemetic  p r n  Pain control  · Consult GI  Leukocytosis  Assessment & Plan  WBC 19 73  Likely reactive  Patient afebrile  Mild bibasilar atelectasis on CT scan  Check UA  Incentive spirometer  Check procalcitonin  Repeat lab in a m         Electrolyte imbalance  Assessment & Plan  Sodium 135  Potassium 5 9  Resolved with IV hydration in ED  Monitor    S/P CABG x 3  Assessment & Plan  S/P CABG X 3 in 7/2018  Continue aspirin, Plavix, Lipitor, metoprolol  EKG showed NSR,non specific ST/T-wave changes  Troponin x 1 negative, repeat x2 in view of abdomen pain/ possible chest pain per ED note  Patient denies substernal chest pain to me  Dyslipidemia  Assessment & Plan  Continue Lipitor  LDL 85, triglyceride 181  Essential hypertension  Assessment & Plan  BP labile  Continue losartan, metoprolol  Monitor    Left carotid artery stenosis  Assessment & Plan  Status post stenting  Continue aspirin, Plavix, Lipitor  Follow-up vascular as outpatient  VTE Prophylaxis: Enoxaparin (Lovenox)  / reason for no mechanical VTE prophylaxis on Lovenox injection   Code Status:  Full code  POLST: POLST form is not discussed and not completed at this time      Anticipated Length of Stay:  Patient will be admitted on an Inpatient basis with an anticipated length of stay of  > 2 midnights  Justification for Hospital Stay:  Acute pancreatitis    Total Time for Visit, including Counseling / Coordination of Care: 45 minutes  Greater than 50% of this total time spent on direct patient counseling and coordination of care  Chief Complaint:   Acute onset abdominal pain since 11:30 p m  Last night  History of Present Illness:    Alex Devi  is a 62 y o  male with PMH of CAD, carotid stenosis, hypertension, hyperlipidemia, SVT who presents with acute onset abdominal pain since 11:30 p m  Last night  Patient states he was in his usual state of health when he started with pain underneath his rib cages on both sides around 11: 30 last night  Pain is constant, progressively getting worse, achy kind pain, now pain is diffuse and his entire abdomen hurts with radiation to his back on both sides, associated with nausea and cold sweats, no vomiting  He feels bloated  He did not eat anything today  He denies constipation  He denies alcohol abuse,reports he drank 2 cans of beer on past Saturday when he was out with his son and he seldom drinks  He denies history of pancreatitis  He reports family history of pancreatic cancer  He denies substernal chest pain, headache, dizziness, SOB, fever, chills  In ED, CT ABDOMEN PELVIS showed mild acute pancreatitis  Blood work showed potassium 5 9, lipase 540, triglycerides 181, WBC 19 73  Patient received IV morphine, IV Zofran in ED  Review of Systems:    Review of Systems   Constitutional: Positive for appetite change  Gastrointestinal: Positive for abdominal distention, abdominal pain and nausea  All other systems reviewed and are negative        Past Medical and Surgical History:     Past Medical History:   Diagnosis Date    Carotid stenosis, left     s/p IR stent    Coronary artery disease     Former tobacco use     Hard of hearing     Hyperlipidemia     Hypertension     Parkinson disease [facility] [address] [facility]     SVT (supraventricular tachycardia) ([facility] [address] [facility]        Past Surgical History:   Procedure Laterality Date    AMPUTATION OF REPLICATED FINGERS       rt 2nd and 3rd fingers traumatically amputated at work    APPENDECTOMY      CORONARY ARTERY BYPASS GRAFT  07/2018    x 3    MANDIBLE FRACTURE SURGERY      upper and lower    ORBITAL FRACTURE SURGERY Right     HI CABG, VEIN, FOUR N/A 7/24/2018    Procedure: CORONARY ARTERY BYPASS GRAFT (CABG) 3 VESSELS ; NATE to LAD, SVG--> OM and Distal right;  Surgeon: Mireya Nichols DO;  [facility];  Service: Cardiac Surgery    HI ECHO TRANSESOPHAG MONTR CARDIAC PUMP FUNCTJ N/A 7/24/2018    Procedure: TRANSESOPHAGEAL ECHOCARDIOGRAM (HECTOR); Surgeon: Mireya Nichols DO;  [facility];  Service: Cardiac Surgery    HI TCAT IV STENT CRV CRTD ART EMBOLIC PROTECJ Left 7/7/8210    Procedure: TRANSCAROTID ARTERY REVASCULARIZATION;  Surgeon: Sameera Chaudhry MD;  [facility];  Service: Vascular       Meds/Allergies:    Prior to Admission medications    Medication Sig Start Date End Date Taking?  Authorizing Provider   aspirin (ECOTRIN LOW STRENGTH) 81 mg EC tablet Take 1 tablet (81 mg total) by mouth daily 7/31/18  Yes Tesha Carty PA-C   atorvastatin (LIPITOR) 80 mg tablet Take 1 tablet (80 mg total) by mouth daily with dinner 8/14/18  Yes Pato Byrne DO   clopidogrel (PLAVIX) 75 mg tablet TAKE 1 TABLET BY MOUTH EVERY DAY 4/24/19  Yes Sameera Chaudhry MD   losartan (COZAAR) 50 mg tablet Take 1 tablet (50 mg total) by mouth daily 10/10/18  Yes Pato Byrne DO   metoprolol tartrate (LOPRESSOR) 50 mg tablet TAKE 1 TABLET BY MOUTH EVERY 12 HOURS 4/10/19  Yes Pato Byrne DO   atorvastatin (LIPITOR) 80 mg tablet TAKE 1 TABLET (80 MG TOTAL) BY MOUTH DAILY WITH DINNER  Patient not taking: Reported on 9/11/2019 8/16/19 9/11/19  Pato Byrne DO   clopidogrel (PLAVIX) 75 mg tablet Take 1 tablet (75 mg total) by mouth daily  Patient not taking: Reported on 2019  WILLIS Freed   zolpidem (AMBIEN) 5 mg tablet  18  Historical Provider, MD     I have reviewed home medications with patient personally  Allergies: Allergies   Allergen Reactions    Percocet [Oxycodone-Acetaminophen] Itching     Trouble sleeping        Social History:     Marital Status: /Civil Union   Occupation:    Patient Pre-hospital Living Situation:  Lives with wife  Patient Pre-hospital Level of Mobility:  Independent  Patient Pre-hospital Diet Restrictions:  Regular  Substance Use History:   Social History     Substance and Sexual Activity   Alcohol Use Not Currently     Social History     Tobacco Use   Smoking Status Former Smoker    Packs/day: 1 00    Last attempt to quit: 2018    Years since quittin 3   Smokeless Tobacco Current User    Types: [place] Last attempt to quit: 5/3/2018     Social History     Substance and Sexual Activity   Drug Use No       Family History:    Family History   Problem Relation Age of Onset    Cancer Mother     Heart attack Father     Stroke Father        Physical Exam:     Vitals:   Blood Pressure: 133/75 (19 1548)  Pulse: 61 (19 1548)  Temperature: 97 9 °F (36 6 °C) (19 1548)  Temp Source: Oral (19 1548)  Respirations: 18 (19 1548)  Height: 5' 7" (170 2 cm) (19 1548)  Weight - Scale: 81 6 kg (180 lb) (19 1548)  SpO2: 97 % (19 154)    Physical Exam   Constitutional: He is oriented to person, place, and time  He appears well-developed and well-nourished  HENT:   Head: Normocephalic and atraumatic  Neck: Normal range of motion  Neck supple  No JVD present  No tracheal deviation present  No thyromegaly present  Cardiovascular: Normal rate, regular rhythm, normal heart sounds and intact distal pulses  No murmur heard  Pulmonary/Chest: Effort normal and breath sounds normal  No respiratory distress  He has no wheezes   He has no rales    Abdominal: Soft  He exhibits distension  There is tenderness  There is no rebound and no guarding  Mild distension and tenderness to abdomen  Bowel sounds diminished  Musculoskeletal: Normal range of motion  He exhibits no edema, tenderness or deformity  Neurological: He is alert and oriented to person, place, and time  Skin: Skin is warm and dry  Psychiatric: He has a normal mood and affect  Judgment normal    Nursing note and vitals reviewed  Additional Data:     Lab Results: I have personally reviewed pertinent reports  Results from last 7 days   Lab Units 09/11/19  1309   WBC Thousand/uL 19 73*   HEMOGLOBIN g/dL 16 1   HEMATOCRIT % 48 2   PLATELETS Thousands/uL 245   NEUTROS PCT % 82*   LYMPHS PCT % 10*   MONOS PCT % 6   EOS PCT % 1     Results from last 7 days   Lab Units 09/11/19  1459 09/11/19  1309   POTASSIUM mmol/L 4 6 5 9*   CHLORIDE mmol/L 101 100   CO2 mmol/L 29 30   BUN mg/dL 15 17   CREATININE mg/dL 0 84 0 79   CALCIUM mg/dL 8 9 9 0   ALK PHOS U/L  --  101   ALT U/L  --  36   AST U/L  --  41           Imaging: I have personally reviewed pertinent reports  Ct Abdomen Pelvis With Contrast    Result Date: 9/11/2019  Narrative: CT ABDOMEN AND PELVIS WITH IV CONTRAST INDICATION:   abdominal pain, diarrhea  COMPARISON:  None  TECHNIQUE:  CT examination of the abdomen and pelvis was performed  Axial, sagittal, and coronal 2D reformatted images were created from the source data and submitted for interpretation  Radiation dose length product (DLP) for this visit:  468 68 mGy-cm   This examination, like all CT scans performed in the [facility], was performed utilizing techniques to minimize radiation dose exposure, including the use of iterative  reconstruction and automated exposure control  IV Contrast:  100 mL of iohexol (OMNIPAQUE) Enteric Contrast:  Enteric contrast was not administered  FINDINGS: ABDOMEN LOWER CHEST:  Mild bibasilar atelectasis    No pleural effusion  Normal cardiac size  Mild coronary artery calcifications  Status post median sternotomy  LIVER/BILIARY TREE:  Unremarkable  GALLBLADDER:  No calcified gallstones  No pericholecystic inflammatory change  SPLEEN:  Unremarkable  PANCREAS:  There is mild peripancreatic edema and stranding adjacent to the uncinate process and pancreatic head, in keeping with mild pancreatitis (series 2 images 29- 35)  There is normal enhancement of the pancreatic parenchyma without evidence of pancreatic necrosis or abscess  No main pancreatic ductal dilation  ADRENAL GLANDS:  Unremarkable  KIDNEYS/URETERS:  2 mm nonobstructing calculus in the lower pole of right kidney  No hydronephrosis  No suspicious mass  STOMACH AND BOWEL:  Unremarkable  APPENDIX:  Surgically absent  ABDOMINOPELVIC CAVITY:  No ascites or free intraperitoneal air  No lymphadenopathy  VESSELS:  Atherosclerotic calcifications of the abdominal aorta and its branches  No aneurysm  PELVIS REPRODUCTIVE ORGANS:  Normal size prostate with dystrophic calcifications  The seminal vesicles are symmetric  URINARY BLADDER:  Unremarkable  ABDOMINAL WALL/INGUINAL REGIONS:  Unremarkable  OSSEOUS STRUCTURES:  No acute fracture or destructive osseous lesion  Mild degenerative changes of the lumbar spine  Impression: Findings in keeping with mild acute pancreatitis in the uncinate process and pancreatic head  No pancreatic necrosis or peripancreatic fluid collection [employer]  Workstation performed: [id number]       EKG, Pathology, and Other Studies Reviewed on Admission:   · EKG: nsr    [employer] Records Reviewed: Yes     ** Please Note: Dragon 360 Dictation voice to text software may have been used in the creation of this document   **

## 2019-09-11 NOTE — PLAN OF CARE
Problem: Potential for Falls  Goal: Patient will remain free of falls  Description  INTERVENTIONS:  - Assess patient frequently for physical needs  -  Identify cognitive and physical deficits and behaviors that affect risk of falls    -  [place] fall precautions as indicated by assessment   - Educate patient/family on patient safety including physical limitations  - Instruct patient to call for assistance with activity based on assessment  - Modify environment to reduce risk of injury  - Consider OT/PT consult to assist with strengthening/mobility  Outcome: Progressing     Problem: GASTROINTESTINAL - ADULT  Goal: Minimal or absence of nausea and/or vomiting  Description  INTERVENTIONS:  - Administer IV fluids if ordered to ensure adequate hydration  - Maintain NPO status until nausea and vomiting are resolved  - Administer ordered antiemetic medications as needed  - Provide nonpharmacologic comfort measures as appropriate  - Advance diet as tolerated, if ordered  - Consider nutrition services referral to assist patient with adequate nutrition and appropriate food choices   Outcome: Progressing     Problem: PAIN - ADULT  Goal: Verbalizes/displays adequate comfort level or baseline comfort level  Description  Interventions:  - Encourage patient to monitor pain and request assistance  - Assess pain using appropriate pain scale 0-10  - Administer analgesics based on type and severity of pain and evaluate response  - Implement non-pharmacological measures as appropriate and evaluate response  - Consider cultural and social influences on pain and pain management  - Notify physician/advanced practitioner if interventions unsuccessful or patient reports new pain   Outcome: Progressing

## 2019-09-11 NOTE — ASSESSMENT & PLAN NOTE
S/P CABG X 3 in 7/2018  Continue aspirin, Plavix, Lipitor, metoprolol  EKG showed NSR,non specific ST/T-wave changes  Troponin x 1 negative, repeat x2 in view of abdomen pain/ possible chest pain per ED note  Patient denies substernal chest pain to me

## 2019-09-11 NOTE — LETTER
[address] [facility] [address]  [place]in [place]  [place] [address]  Dept: [phone]    September 13, 2019     Patient: Pastora Hernandez  YOB: 1955   Date of Visit: 9/11/2019       To Whom it May Concern:    Anahi Weiss is under my professional care  He was seen in the hospital from 9/11/2019   to 09/13/19  He may return to work on Monday 9/16th as tolerated  If you have any questions or concerns, please don't hesitate to call           Sincerely,          WILLIS Penn

## 2019-09-11 NOTE — ASSESSMENT & PLAN NOTE
WBC 19 73  Likely reactive  Patient afebrile  Mild bibasilar atelectasis on CT scan  Check UA  Incentive spirometer  Check procalcitonin  Repeat lab in a m Georgette Ormond

## 2019-09-11 NOTE — ED PROVIDER NOTES
History  Chief Complaint   Patient presents with    Chest Pain     pt c/o abdominal pain since 1130 last night, chest pain x 2 hours   Abdominal Pain       60-year-old male presents with diffuse epigastric abdominal pain non radiating currently an 8/10 nothing makes it better or worse  His sharp and continuous  The symptoms started last night when he was sleeping woke him up from his sleep however he thought he could get it under control however the pain persisted so he came to the ED  1 episode of nausea and non bilious vomiting  Denies any diarrhea constipation fevers chills dysuria urgency frequency or any others symptoms  No chest pain or shortness of breath  Does not use alcohol on a regular basis  Does have a history of appendectomy      History provided by:  Patient   used: No        Prior to Admission Medications   Prescriptions Last Dose Informant Patient Reported?  Taking?   aspirin (ECOTRIN LOW STRENGTH) 81 mg EC tablet 9/11/2019 at Unknown time Self No Yes   Sig: Take 1 tablet (81 mg total) by mouth daily   atorvastatin (LIPITOR) 80 mg tablet 9/11/2019 at Unknown time Self No Yes   Sig: Take 1 tablet (80 mg total) by mouth daily with dinner   clopidogrel (PLAVIX) 75 mg tablet 9/11/2019 at Unknown time  No Yes   Sig: TAKE 1 TABLET BY MOUTH EVERY DAY   losartan (COZAAR) 50 mg tablet 9/11/2019 at Unknown time Self No Yes   Sig: Take 1 tablet (50 mg total) by mouth daily      Facility-Administered Medications: None       Past Medical History:   Diagnosis Date    Carotid stenosis, left     s/p IR stent    Coronary artery disease     Former tobacco use     Hard of hearing     Hyperlipidemia     Hypertension     Parkinson disease ([facility] [address] [facility]     SVT (supraventricular tachycardia) ([facility] [address] )        Past Surgical History:   Procedure Laterality Date    AMPUTATION OF REPLICATED FINGERS       rt 2nd and 3rd fingers traumatically amputated at work    APPENDECTOMY      CORONARY ARTERY BYPASS GRAFT  07/2018    x 3    MANDIBLE FRACTURE SURGERY      upper and lower    ORBITAL FRACTURE SURGERY Right     [place] CABG, VEIN, FOUR N/A 2018    Procedure: CORONARY ARTERY BYPASS GRAFT (CABG) 3 VESSELS ; NATE to LAD, SVG--> OM and Distal right;  Surgeon: Glen Coleman DO;  [facility];  Service: Cardiac Surgery    [place] ECHO TRANSESOPHAG MONTR CARDIAC PUMP FUNCTJ N/A 2018    Procedure: TRANSESOPHAGEAL ECHOCARDIOGRAM (HECTOR); Surgeon: Glen Coleman DO;  [facility];  Service: Cardiac Surgery    [place] TCAT IV STENT CRV CRTD ART EMBOLIC PROTECJ Left [id number]    Procedure: TRANSCAROTID ARTERY REVASCULARIZATION;  Surgeon: Shon Damon MD;  [facility];  Service: Vascular       Family History   Problem Relation Age of Onset    Cancer Mother     Heart attack Father     Stroke Father      I have reviewed and agree with the history as documented  Social History     Tobacco Use    Smoking status: Former Smoker     Packs/day: 1 00     Last attempt to quit: 2018     Years since quittin 3    Smokeless tobacco: Current User     Types: Chew     Last attempt to quit: 5/3/2018   Substance Use Topics    Alcohol use: Not Currently    Drug use: No        Review of Systems   All other systems reviewed and are negative  Physical Exam  Physical Exam   Constitutional: He is oriented to person, place, and time  He appears well-developed and well-nourished  HENT:   Head: Normocephalic and atraumatic  Eyes: Pupils are equal, round, and reactive to light  EOM are normal    Neck: Normal range of motion  Neck supple  Cardiovascular: Normal rate and regular rhythm  Pulmonary/Chest: Effort normal and breath sounds normal    Abdominal: Soft  Bowel sounds are normal    Diffuse abdominal tenderness noted no guarding major down tenderness negative Perdomo sign negative right upper quadrant abdominal tenderness noted  Musculoskeletal: Normal range of motion  Neurological: He is alert and oriented to person, place, and time  Skin: Skin is warm and dry  Psychiatric: He has a normal mood and affect  Nursing note and vitals reviewed        Vital Signs  ED Triage Vitals [09/11/19 1244]   Temperature Pulse Respirations Blood Pressure SpO2   97 5 °F (36 4 °C) 57 18 170/78 100 %      Temp Source Heart Rate Source Patient Position - Orthostatic VS BP Location FiO2 (%)   Oral Monitor Lying Left arm --      Pain Score       8           Vitals:    09/12/19 2120 09/12/19 2336 09/13/19 0730 09/13/19 1100   BP: 132/70 149/73 148/77 147/76   Pulse: 60  59 57   Patient Position - Orthostatic VS: Lying Lying Lying Lying         Visual Acuity      ED Medications  Medications   morphine (PF) 4 mg/mL injection 4 mg (4 mg Intravenous Given 9/11/19 1311)   ondansetron (ZOFRAN) injection 4 mg (4 mg Intravenous Given 9/11/19 1310)   iohexol (OMNIPAQUE) 350 MG/ML injection (MULTI-DOSE) 100 mL (100 mL Intravenous Given 9/11/19 1401)       Diagnostic Studies  Results Reviewed     Procedure Component Value Units Date/Time    Urinalysis with microscopic [[id number]]  (Abnormal) Collected:  09/11/19 1906    Lab Status:  Final result Specimen:  Urine, Clean Catch Updated:  09/11/19 1928     Clarity, UA Clear     Color, UA Light Yellow     Specific Gravity, UA 1 020     pH, UA 5 0     Glucose, UA Negative mg/dl      Ketones, UA Negative mg/dl      Blood, UA Small     Protein, UA Negative mg/dl      Nitrite, UA Negative     Bilirubin, UA Negative     Urobilinogen, UA 0 2 E U /dl      Leukocytes, UA Negative     WBC, UA 0-1 /hpf      RBC, UA 0-1 /hpf      Bacteria, UA None Seen /hpf      Epithelial Cells None Seen /hpf     Basic metabolic panel [[id number]] Collected:  09/11/19 1459    Lab Status:  Final result Specimen:  Blood from Arm, Left Updated:  09/11/19 1515     Sodium 136 mmol/L      Potassium 4 6 mmol/L      Chloride 101 mmol/L      CO2 29 mmol/L      ANION GAP 6 mmol/L      BUN 15 mg/dL Creatinine 0 84 mg/dL      Glucose 100 mg/dL      Calcium 8 9 mg/dL      eGFR 93 ml/min/1 73sq m     Narrative:       [place] guidelines for Chronic Kidney Disease (CKD):     Stage 1 with normal or high GFR (GFR > 90 mL/min/1 73 square meters)    Stage 2 Mild CKD (GFR = 60-89 mL/min/1 73 square meters)    Stage 3A Moderate CKD (GFR = 45-59 mL/min/1 73 square meters)    Stage 3B Moderate CKD (GFR = 30-44 mL/min/1 73 square meters)    Stage 4 Severe CKD (GFR = 15-29 mL/min/1 73 square meters)    Stage 5 End Stage CKD (GFR <15 mL/min/1 73 square meters)  Note: GFR calculation is accurate only with a steady state creatinine    Lipid panel [[id number]]  (Abnormal) Collected:  09/11/19 1309    Lab Status:  Final result Specimen:  Blood from Arm, Left Updated:  09/11/19 1459     Cholesterol 172 mg/dL      Triglycerides 181 mg/dL      HDL, Direct 41 mg/dL      LDL Calculated 95 mg/dL      Non-HDL-Chol (CHOL-HDL) 131 mg/dl     Troponin I [[id number]]  (Normal) Collected:  09/11/19 1309    Lab Status:  Final result Specimen:  Blood from Arm, Left Updated:  09/11/19 1343     Troponin I <0 02 ng/mL     Lactic acid, plasma [[id number]]  (Normal) Collected:  09/11/19 1309    Lab Status:  Final result Specimen:  Blood from Arm, Left Updated:  09/11/19 1341     LACTIC ACID 1 2 mmol/L     Narrative:       Result may be elevated if tourniquet was used during collection      Lipase [[id number]]  (Abnormal) Collected:  09/11/19 1309    Lab Status:  Final result Specimen:  Blood from Arm, Left Updated:  09/11/19 1338     Lipase 540 u/L     Comprehensive metabolic panel [[id number]]  (Abnormal) Collected:  09/11/19 1309    Lab Status:  Final result Specimen:  Blood from Arm, Left Updated:  09/11/19 1338     Sodium 135 mmol/L      Potassium 5 9 mmol/L      Chloride 100 mmol/L      CO2 30 mmol/L      ANION GAP 5 mmol/L      BUN 17 mg/dL      Creatinine 0 79 mg/dL      Glucose 98 mg/dL      Calcium 9 0 mg/dL AST 41 U/L      ALT 36 U/L      Alkaline Phosphatase 101 U/L      Total Protein 8 4 g/dL      Albumin 3 7 g/dL      Total Bilirubin 0 80 mg/dL      eGFR 96 ml/min/1 73sq m     Narrative:       [place] guidelines for Chronic Kidney Disease (CKD):     Stage 1 with normal or high GFR (GFR > 90 mL/min/1 73 square meters)    Stage 2 Mild CKD (GFR = 60-89 mL/min/1 73 square meters)    Stage 3A Moderate CKD (GFR = 45-59 mL/min/1 73 square meters)    Stage 3B Moderate CKD (GFR = 30-44 mL/min/1 73 square meters)    Stage 4 Severe CKD (GFR = 15-29 mL/min/1 73 square meters)    Stage 5 End Stage CKD (GFR <15 mL/min/1 73 square meters)  Note: GFR calculation is accurate only with a steady state creatinine    Magnesium [[id number]]  (Normal) Collected:  09/11/19 1309    Lab Status:  Final result Specimen:  Blood from Arm, Left Updated:  09/11/19 1338     Magnesium 2 0 mg/dL     CBC and differential [[id number]]  (Abnormal) Collected:  09/11/19 1309    Lab Status:  Final result Specimen:  Blood from Arm, Left Updated:  09/11/19 1321     WBC 19 73 Thousand/uL      RBC 5 34 Million/uL      Hemoglobin 16 1 g/dL      Hematocrit 48 2 %      MCV 90 fL      MCH 30 1 pg      MCHC 33 4 g/dL      RDW 12 4 %      MPV 10 3 fL      Platelets 657 Thousands/uL      nRBC 0 /100 WBCs      Neutrophils Relative 82 %      Immat GRANS % 0 %      Lymphocytes Relative 10 %      Monocytes Relative 6 %      Eosinophils Relative 1 %      Basophils Relative 1 %      Neutrophils Absolute 16 21 Thousands/µL      Immature Grans Absolute 0 08 Thousand/uL      Lymphocytes Absolute 2 03 Thousands/µL      Monocytes Absolute 1 10 Thousand/µL      Eosinophils Absolute 0 19 Thousand/µL      Basophils Absolute 0 12 Thousands/µL                  US right upper quadrant   Final Result by Ciera Perry MD (09/12 8846)      Small amount of gallbladder sludge noted        Workstation performed: [id number]         CT abdomen pelvis with contrast   Final Result by Nuria Rivera MD (09/11 5977)         Findings in keeping with mild acute pancreatitis in the uncinate process and pancreatic head  No pancreatic necrosis or peripancreatic fluid collection Garrett Randolph  Workstation performed: [id number]                    Procedures  ECG 12 Lead Documentation Only  Performed by: Lalitha Humphreys DO  Authorized by: Lalitha Humphreys DO     ECG reviewed by me, the ED Provider: yes    Patient location:  ED  Previous ECG:     Previous ECG:  Unavailable    Comparison to cardiac monitor: Yes    Interpretation:     Interpretation: non-specific    Rate:     ECG rate assessment: normal    Rhythm:     Rhythm: sinus rhythm    Ectopy:     Ectopy: none    QRS:     QRS axis:  Normal  Conduction:     Conduction: normal    ST segments:     ST segments:  Non-specific  T waves:     T waves: non-specific             ED Course                               MDM  Number of Diagnoses or Management Options  Abdominal pain:   Pancreatitis:   Diagnosis management comments: Patient evaluated with labs, imaging, UA  Reviewed results discussed with patient  Patient given IV fluids, pain, nausea medications  Patient improved with symptoms, admitted to hospitalist service for further evaluation and management  Patient verbalized understanding of results and agreed with the plan         Amount and/or Complexity of Data Reviewed  Clinical lab tests: ordered and reviewed  Tests in the radiology section of CPT®: ordered and reviewed  Tests in the medicine section of CPT®: ordered and reviewed    Patient Progress  Patient progress: stable      Disposition  Final diagnoses:   Abdominal pain   Pancreatitis     Time reflects when diagnosis was documented in both MDM as applicable and the Disposition within this note     Time User Action Codes Description Comment    9/11/2019  2:54 PM Chiara Menard [R10 9] Abdominal pain     9/11/2019  2:54 PM Chiara Menard [K85 90] Pancreatitis     9/11/2019  5:04 PM Luiz Yost [K85 [id number]] Pancreatitis       ED Disposition     None      Follow-up Information     Follow up With Specialties Details Why Contact Info    Victoriano Dunlap MD Gastroenterology Schedule an appointment as soon as possible for a visit in 4 week(s)  [address] [address]  [phone]      Omer Mejia MD  Schedule an appointment as soon as possible for a visit in 1 week(s)  [address] [facility] [address]  [phone]            Discharge Medication List as of 9/13/2019  3:20 PM      CONTINUE these medications which have NOT CHANGED    Details   aspirin (ECOTRIN LOW STRENGTH) 81 mg EC tablet Take 1 tablet (81 mg total) by mouth daily, Starting Tue 7/31/2018, Normal      atorvastatin (LIPITOR) 80 mg tablet Take 1 tablet (80 mg total) by mouth daily with dinner, Starting Tue 8/14/2018, Normal      clopidogrel (PLAVIX) 75 mg tablet TAKE 1 TABLET BY MOUTH EVERY DAY, Normal      losartan (COZAAR) 50 mg tablet Take 1 tablet (50 mg total) by mouth daily, Starting Wed 10/10/2018, Normal      metoprolol tartrate (LOPRESSOR) 50 mg tablet TAKE 1 TABLET BY MOUTH EVERY 12 HOURS, Normal           Outpatient Discharge Orders   Discharge Diet     No strenuous exercise     Call provider for:  severe uncontrolled pain     Call provider for:  persistent nausea or vomiting       ED Provider  Electronically Signed by           Zuleika Martínez DO  09/17/19 4434

## 2019-09-11 NOTE — ASSESSMENT & PLAN NOTE
· CT scan showed mild acute pancreatitis in the uncinate process and pancreatic head, no pancreatic necrosis or peripancreatic fluid collection /abscess  · Etiology is unclear  No gallstone on CT  Liver/biliary tree unremarkable on CT  Triglycerides 181  Patient denies alcohol abuse, denies history of pancreatitis  Reports family history of pancreatic cancer  · Lipase 540  · NPO  · Aggressive IV hydration  · Antiemetic  p r n  Pain control  · Consult GI

## 2019-09-12 ENCOUNTER — APPOINTMENT (INPATIENT)
Dept: RADIOLOGY | Facility: HOSPITAL | Age: 64
DRG: 439 | End: 2019-09-12
Payer: COMMERCIAL

## 2019-09-12 LAB
ALBUMIN SERPL BCP-MCNC: 3 G/DL (ref 3.5–5)
ALP SERPL-CCNC: 83 U/L (ref 46–116)
ALT SERPL W P-5'-P-CCNC: 22 U/L (ref 12–78)
ANION GAP SERPL CALCULATED.3IONS-SCNC: 7 MMOL/L (ref 4–13)
AST SERPL W P-5'-P-CCNC: 10 U/L (ref 5–45)
BASOPHILS # BLD AUTO: 0.08 THOUSANDS/ΜL (ref 0–0.1)
BASOPHILS NFR BLD AUTO: 1 % (ref 0–1)
BILIRUB SERPL-MCNC: 0.6 MG/DL (ref 0.2–1)
BUN SERPL-MCNC: 12 MG/DL (ref 5–25)
CALCIUM SERPL-MCNC: 8.3 MG/DL (ref 8.3–10.1)
CHLORIDE SERPL-SCNC: 102 MMOL/L (ref 100–108)
CO2 SERPL-SCNC: 27 MMOL/L (ref 21–32)
CREAT SERPL-MCNC: 0.88 MG/DL (ref 0.6–1.3)
EOSINOPHIL # BLD AUTO: 0.17 THOUSAND/ΜL (ref 0–0.61)
EOSINOPHIL NFR BLD AUTO: 1 % (ref 0–6)
ERYTHROCYTE [DISTWIDTH] IN BLOOD BY AUTOMATED COUNT: 12.5 % (ref 11.6–15.1)
GFR SERPL CREATININE-BSD FRML MDRD: 91 ML/MIN/1.73SQ M
GLUCOSE SERPL-MCNC: 101 MG/DL (ref 65–140)
HCT VFR BLD AUTO: 41.5 % (ref 36.5–49.3)
HGB BLD-MCNC: 13.6 G/DL (ref 12–17)
IMM GRANULOCYTES # BLD AUTO: 0.06 THOUSAND/UL (ref 0–0.2)
IMM GRANULOCYTES NFR BLD AUTO: 0 % (ref 0–2)
LIPASE SERPL-CCNC: 130 U/L (ref 73–393)
LYMPHOCYTES # BLD AUTO: 1.83 THOUSANDS/ΜL (ref 0.6–4.47)
LYMPHOCYTES NFR BLD AUTO: 11 % (ref 14–44)
MAGNESIUM SERPL-MCNC: 1.8 MG/DL (ref 1.6–2.6)
MCH RBC QN AUTO: 29.6 PG (ref 26.8–34.3)
MCHC RBC AUTO-ENTMCNC: 32.8 G/DL (ref 31.4–37.4)
MCV RBC AUTO: 90 FL (ref 82–98)
MONOCYTES # BLD AUTO: 1.39 THOUSAND/ΜL (ref 0.17–1.22)
MONOCYTES NFR BLD AUTO: 8 % (ref 4–12)
NEUTROPHILS # BLD AUTO: 13.92 THOUSANDS/ΜL (ref 1.85–7.62)
NEUTS SEG NFR BLD AUTO: 79 % (ref 43–75)
NRBC BLD AUTO-RTO: 0 /100 WBCS
PLATELET # BLD AUTO: 196 THOUSANDS/UL (ref 149–390)
PMV BLD AUTO: 10.2 FL (ref 8.9–12.7)
POTASSIUM SERPL-SCNC: 4 MMOL/L (ref 3.5–5.3)
PROT SERPL-MCNC: 6.8 G/DL (ref 6.4–8.2)
RBC # BLD AUTO: 4.59 MILLION/UL (ref 3.88–5.62)
SODIUM SERPL-SCNC: 136 MMOL/L (ref 136–145)
WBC # BLD AUTO: 17.45 THOUSAND/UL (ref 4.31–10.16)

## 2019-09-12 PROCEDURE — 83735 ASSAY OF MAGNESIUM: CPT | Performed by: NURSE PRACTITIONER

## 2019-09-12 PROCEDURE — 83690 ASSAY OF LIPASE: CPT | Performed by: NURSE PRACTITIONER

## 2019-09-12 PROCEDURE — 76705 ECHO EXAM OF ABDOMEN: CPT

## 2019-09-12 PROCEDURE — 85025 COMPLETE CBC W/AUTO DIFF WBC: CPT | Performed by: NURSE PRACTITIONER

## 2019-09-12 PROCEDURE — 80053 COMPREHEN METABOLIC PANEL: CPT | Performed by: NURSE PRACTITIONER

## 2019-09-12 PROCEDURE — 99222 1ST HOSP IP/OBS MODERATE 55: CPT | Performed by: INTERNAL MEDICINE

## 2019-09-12 PROCEDURE — 99232 SBSQ HOSP IP/OBS MODERATE 35: CPT | Performed by: NURSE PRACTITIONER

## 2019-09-12 RX ADMIN — FAMOTIDINE 20 MG: 10 INJECTION, SOLUTION INTRAVENOUS at 08:49

## 2019-09-12 RX ADMIN — CLOPIDOGREL BISULFATE 75 MG: 75 TABLET ORAL at 08:49

## 2019-09-12 RX ADMIN — LOSARTAN POTASSIUM 50 MG: 50 TABLET ORAL at 08:49

## 2019-09-12 RX ADMIN — ATORVASTATIN CALCIUM 80 MG: 80 TABLET ORAL at 15:39

## 2019-09-12 RX ADMIN — SODIUM CHLORIDE 100 ML/HR: 0.9 INJECTION, SOLUTION INTRAVENOUS at 14:03

## 2019-09-12 RX ADMIN — SODIUM CHLORIDE 100 ML/HR: 0.9 INJECTION, SOLUTION INTRAVENOUS at 23:41

## 2019-09-12 RX ADMIN — METOPROLOL TARTRATE 50 MG: 50 TABLET, FILM COATED ORAL at 21:23

## 2019-09-12 RX ADMIN — FAMOTIDINE 20 MG: 10 INJECTION, SOLUTION INTRAVENOUS at 17:41

## 2019-09-12 RX ADMIN — METOPROLOL TARTRATE 50 MG: 50 TABLET, FILM COATED ORAL at 08:49

## 2019-09-12 RX ADMIN — ASPIRIN 81 MG: 81 TABLET, COATED ORAL at 08:48

## 2019-09-12 RX ADMIN — HYDROMORPHONE HYDROCHLORIDE 0.2 MG: 1 INJECTION, SOLUTION INTRAMUSCULAR; INTRAVENOUS; SUBCUTANEOUS at 05:39

## 2019-09-12 RX ADMIN — ENOXAPARIN SODIUM 40 MG: 40 INJECTION SUBCUTANEOUS at 08:48

## 2019-09-12 RX ADMIN — SODIUM CHLORIDE 150 ML/HR: 0.9 INJECTION, SOLUTION INTRAVENOUS at 05:42

## 2019-09-12 RX ADMIN — HYDROMORPHONE HYDROCHLORIDE 0.2 MG: 1 INJECTION, SOLUTION INTRAMUSCULAR; INTRAVENOUS; SUBCUTANEOUS at 11:26

## 2019-09-12 NOTE — PROGRESS NOTES
Progress Note - Cori Linares 1955, 62 y o  male MRN: [id number]    Unit/Bed#: [address] [address] Encounter: [id number]    Primary Care Provider: Sonia Sin MD   Date and time admitted to hospital: 9/11/2019 12:41 PM        * Pancreatitis  Assessment & Plan  · CT scan showed mild acute pancreatitis in the uncinate process and pancreatic head, no pancreatic necrosis or peripancreatic fluid collection Shannan Chard  · Etiology is unclear  No gallstone on CT  Liver/biliary tree unremarkable on CT  Triglycerides 181  Patient denies alcohol abuse, denies history of pancreatitis  Reports family history of pancreatic cancer  · Lipase 540-> 130 this morning  · Start clear liquids per GI  · Check right upper quadrant ultrasound  · Decrease IV hydration  · Antiemetic  p r n  Pain control  · GI following, appreciate input  Leukocytosis  Assessment & Plan  WBC 19 73->17 45  Likely reactive  Patient afebrile  Mild bibasilar atelectasis on CT scan  UA unremarkable  Procalcitonin negative  Incentive spirometer  Repeat lab in a m         Electrolyte imbalance  Assessment & Plan  Sodium 136, potassium 4 0 today  Monitor  S/P CABG x 3  Assessment & Plan  S/P CABG X 3 in 7/2018  Continue aspirin, Plavix, Lipitor, metoprolol  EKG showed NSR,non specific ST/T-wave changes  Troponin x 3 negative  Dyslipidemia  Assessment & Plan  Continue Lipitor  LDL 85, triglyceride 181  Essential hypertension  Assessment & Plan  BP labile  Continue losartan, metoprolol  Monitor    Left carotid artery stenosis  Assessment & Plan  Status post stenting  Continue aspirin, Plavix, Lipitor  Follow-up vascular as outpatient  VTE Pharmacologic Prophylaxis:   Pharmacologic: Enoxaparin (Lovenox)  Mechanical VTE Prophylaxis in Place: No    Patient Centered Rounds: I have performed bedside rounds with nursing staff today      Discussions with Specialists or Other Care Team Provider: yes    Education and Discussions with Family / Patient: yes    Time Spent for Care: 20 minutes  More than 50% of total time spent on counseling and coordination of care as described above  Current Length of Stay: 1 day(s)    Current Patient Status: Inpatient   Certification Statement: The patient will continue to require additional inpatient hospital stay due to acute mild pancreatitis    Discharge Plan:  Home once medically cleared    Code Status: Level 1 - Full Code      Subjective:   Patient reports abdomen pain is better  Pain is in upper abdomen  Denies nausea, vomiting, diarrhea, constipation, fever or chills  Denies chest pain, headaches, dizziness, SOB  Objective:     Vitals:   Temp (24hrs), Av 2 °F (36 8 °C), Min:97 5 °F (36 4 °C), Max:98 9 °F (37 2 °C)    Temp:  [97 5 °F (36 4 °C)-98 9 °F (37 2 °C)] 98 9 °F (37 2 °C)  HR:  [54-75] 75  Resp:  [16-21] 17  BP: (130-192)/(62-86) 149/75  SpO2:  [91 %-100 %] 96 %  Body mass index is 28 19 kg/m²  Input and Output Summary (last 24 hours): Intake/Output Summary (Last 24 hours) at 2019 1034  Last data filed at 2019 0542  Gross per 24 hour   Intake 3026 25 ml   Output 600 ml   Net 2426 25 ml       Physical Exam:     Physical Exam   Constitutional: He is oriented to person, place, and time  He appears well-developed and well-nourished  HENT:   Head: Normocephalic and atraumatic  Neck: Normal range of motion  Neck supple  No JVD present  No tracheal deviation present  No thyromegaly present  Cardiovascular: Normal rate, regular rhythm, normal heart sounds and intact distal pulses  Pulmonary/Chest: Effort normal and breath sounds normal  No respiratory distress  He has no wheezes  He has no rales  Abdominal: Soft  Bowel sounds are normal  He exhibits no distension  There is tenderness  There is no guarding  Upper abdomen slight tender  Musculoskeletal: Normal range of motion  He exhibits no edema, tenderness or deformity     Neurological: He is alert and oriented to person, place, and time  Skin: Skin is warm and dry  Psychiatric: He has a normal mood and affect  Judgment normal    Nursing note and vitals reviewed  Additional Data:     Labs:    Results from last 7 days   Lab Units 09/12/19  0448   WBC Thousand/uL 17 45*   HEMOGLOBIN g/dL 13 6   HEMATOCRIT % 41 5   PLATELETS Thousands/uL 196   NEUTROS PCT % 79*   LYMPHS PCT % 11*   MONOS PCT % 8   EOS PCT % 1     Results from last 7 days   Lab Units 09/12/19  0448   POTASSIUM mmol/L 4 0   CHLORIDE mmol/L 102   CO2 mmol/L 27   BUN mg/dL 12   CREATININE mg/dL 0 88   CALCIUM mg/dL 8 3   ALK PHOS U/L 83   ALT U/L 22   AST U/L 10           * I Have Reviewed All Lab Data Listed Above  * Additional Pertinent Lab Tests Reviewed: Fredy 66 Admission Reviewed    Imaging:    Imaging Reports Reviewed Today Include:  None  Imaging Personally Reviewed by Myself Includes:  None    Recent Cultures (last 7 days):           Last 24 Hours Medication List:     Current Facility-Administered Medications:  acetaminophen 650 mg Oral Q6H PRN Cuiyin Yurik, CRNP    aspirin 81 mg Oral Daily Cuiyin Yurik, CRNP    atorvastatin 80 mg Oral Daily With Bridger Tate, CRNP    clopidogrel 75 mg Oral Daily Cuiyin Yurik, CRNP    enoxaparin 40 mg Subcutaneous Daily Cuiyin Yurik, CRNP    famotidine 20 mg Intravenous BID Cuiyin Yurik, CRNP    HYDROmorphone 0 2 mg Intravenous Q3H PRN Cuiyin Yurik, CRNP    HYDROmorphone 0 5 mg Intravenous Q3H PRN Cuiyin Yurik, CRNP    losartan 50 mg Oral Daily Cuiyin Yurik, CRNP    metoprolol tartrate 50 mg Oral Q12H Cuiyin Yurik, CRNP    ondansetron 4 mg Intravenous Q6H PRN Cuiyin Yurik, CRNP    sodium chloride 100 mL/hr Intravenous Continuous Cuiyin Yurik, CRNP Last Rate: 150 mL/hr (09/12/19 0542)        Today, Patient Was Seen By: WILLIS Ibarra    ** Please Note: Dragon 360 Dictation voice to text software may have been used in the creation of this document   **

## 2019-09-12 NOTE — CONSULTS
Consultation - 126 [facility]  Helen Diaz  62 y o  male MRN: [id number]  Unit/Bed#: 2 [place] [address] Encounter: [id number]        [address]    Reason for Consult / Principal Problem: acute pancreatitis    ASSESSMENT and PLAN:    Principal Problem:    Pancreatitis  Active Problems:    Left carotid artery stenosis    Essential hypertension    Dyslipidemia    S/P CABG x 3    Electrolyte imbalance    Leukocytosis    #1  Acute pancreatitis:  Unclear etiology  No new medications  No alcohol use  No gallstones noted on CT scan and LFTs are normal   WBC is elevated 17 45 which improved from 19  Afebrile  Does have a family history of pancreatic cancer  CT scan showed edema around the pancreas  Lipase was only mildly elevated in the 500s which has now returned to normal   Patient symptomatically is improved  -will check an ultrasound to rule out noncalcified gallstones  -continue IV fluid hydration  -start patient on clear liquid diet and slowly advanced as tolerated  -discussed about complete avoidance of alcohol and also following a low-fat diet for several weeks after having pancreatitis  -due to family history of pancreatic cancer in his mother and aunt, would recommend an outpatient MRI/MRCP in 4-6 weeks to confirm there is no underlying lesions after the inflammation has resolved  This was discussed with the patient   -antiemetics and pain control as needed  -------------------------------------------------------------------------------------------------------------------    HPI:  This is a 23-year-old male with a history of Parkinson's, hypertension, hyperlipidemia, coronary artery disease on Plavix and aspirin who presented to the emergency room due to abdominal pain  He reports that woke him from sleep around 11:30 p m  2 nights ago  He reports that it persisted into yesterday  The patient tried to go to work but reported that the pain was still severe so he came to the emergency room  He denies any nausea or vomiting associated with it  He denies any diarrhea, constipation, or blood in the stool  He denies eating anything yesterday  He denies ever having pain like this prior  He denies any family history or personal history of pancreatitis  He denies excessive alcohol use  He reports he drank about 2 cans of beer on Saturday but has not had anything since that time  He denies any new medications, herbal supplements, or over-the-counter supplements or vitamins  He has never had pancreatitis before  He reports that he does have a family history of pancreatic cancer in both his mother and his aunt  He denies any fevers or chills at home    REVIEW OF SYSTEMS:    CONSTITUTIONAL: Denies any fever, chills, or rigors  Good appetite, and no recent weight loss  HEENT: No earache or tinnitus  Denies hearing loss or visual disturbances  CARDIOVASCULAR: No chest pain or palpitations  RESPIRATORY: Denies any cough, hemoptysis, shortness of breath or dyspnea on exertion  GASTROINTESTINAL: As noted in the History of Present Illness  GENITOURINARY: No problems with urination  Denies any hematuria or dysuria  NEUROLOGIC: No dizziness or vertigo, denies headaches  MUSCULOSKELETAL: Denies any muscle or joint pain  SKIN: Denies skin rashes or itching  ENDOCRINE: Denies excessive thirst  Denies intolerance to heat or cold  PSYCHOSOCIAL: Denies depression or anxiety  Denies any recent memory loss         Historical Information   Past Medical History:   Diagnosis Date    Carotid stenosis, left     s/p IR stent    Coronary artery disease     Former tobacco use     Hard of hearing     Hyperlipidemia     Hypertension     [facility] [address] [facility]     [facility]      Past Surgical History:   Procedure Laterality Date    AMPUTATION OF REPLICATED FINGERS       rt 2nd and 3rd fingers traumatically amputated at work   [address] 07/2018    x 3    MANDIBLE FRACTURE SURGERY      upper and lower    ORBITAL FRACTURE SURGERY Right     GA CABG, VEIN, FOUR N/A 2018    Procedure: CORONARY ARTERY BYPASS GRAFT (CABG) 3 VESSELS ; NATE to LAD, SVG--> OM and Distal right;  Surgeon: Bennie Bland DO;  [facility];  Service: Cardiac Surgery    GA ECHO TRANSESOPHAG MONTR CARDIAC PUMP FUNCTJ N/A 2018    Procedure: TRANSESOPHAGEAL ECHOCARDIOGRAM (HECTOR);   Surgeon: Bennie Bland DO;  [facility];  Service: Cardiac Surgery    GA TCAT IV STENT CRV CRTD ART EMBOLIC PROTECJ Left 8907    Procedure: TRANSCAROTID ARTERY REVASCULARIZATION;  Surgeon: Obdulio Florian MD;  [facility];  Service: Vascular     Social History   Social History     Substance and Sexual Activity   Alcohol Use Not Currently     Social History     Substance and Sexual Activity   Drug Use No     Social History     Tobacco Use   Smoking Status Former Smoker    Packs/day: 1 00    Last attempt to quit: 2018    Years since quittin 3   Smokeless Tobacco Current User    Types: Nate Little Last attempt to quit: 5/3/2018     Family History   Problem Relation Age of Onset    Cancer Mother     Heart attack Father     Stroke Father        Meds/Allergies     Medications Prior to Admission   Medication    aspirin (ECOTRIN LOW STRENGTH) 81 mg EC tablet    atorvastatin (LIPITOR) 80 mg tablet    clopidogrel (PLAVIX) 75 mg tablet    losartan (COZAAR) 50 mg tablet    metoprolol tartrate (LOPRESSOR) 50 mg tablet     Current Facility-Administered Medications   Medication Dose Route Frequency    acetaminophen (TYLENOL) tablet 650 mg  650 mg Oral Q6H PRN    aspirin (ECOTRIN LOW STRENGTH) EC tablet 81 mg  81 mg Oral Daily    atorvastatin (LIPITOR) tablet 80 mg  80 mg Oral Daily With Dinner    clopidogrel (PLAVIX) tablet 75 mg  75 mg Oral Daily    enoxaparin (LOVENOX) subcutaneous injection 40 mg  40 mg Subcutaneous Daily    famotidine (PEPCID) injection 20 mg  20 mg Intravenous BID    HYDROmorphone (DILAUDID) injection 0 2 mg  0 2 mg Intravenous Q3H PRN    HYDROmorphone (DILAUDID) injection 0 5 mg  0 5 mg Intravenous Q3H PRN    losartan (COZAAR) tablet 50 mg  50 mg Oral Daily    metoprolol tartrate (LOPRESSOR) tablet 50 mg  50 mg Oral Q12H    ondansetron (ZOFRAN) injection 4 mg  4 mg Intravenous Q6H PRN    sodium chloride 0 9 % infusion  150 mL/hr Intravenous Continuous       Allergies   Allergen Reactions    Percocet [Oxycodone-Acetaminophen] Itching     Trouble sleeping            Objective     Blood pressure 149/75, pulse 75, temperature 98 9 °F (37 2 °C), temperature source Oral, resp  rate 17, height 5' 7" (1 702 m), weight 81 6 kg (180 lb), SpO2 96 %  Intake/Output Summary (Last 24 hours) at 9/12/2019 0930  Last data filed at 9/12/2019 0542  Gross per 24 hour   Intake 3026 25 ml   Output 600 ml   Net 2426 25 ml         PHYSICAL EXAM:      General Appearance:   Alert, cooperative, no distress, appears stated age    HEENT:   Normocephalic, atraumatic, anicteric, no oropharyngeal thrush present      Neck:  Supple, symmetrical, trachea midline, no adenopathy;    thyroid: no enlargement/tenderness/nodules; no carotid  bruit or JVD    Lungs:   Clear to auscultation bilaterally; no rales, rhonchi or wheezing; respirations unlabored    Heart[de-identified]   S1 and S2 normal; regular rate and rhythm; no murmur, rub, or gallop     Abdomen:   Soft, mild LLQ pain to palpation, non-distended; normal bowel sounds; no masses, no organomegaly    Genitalia:   Deferred    Rectal:   Deferred    Extremities:  No cyanosis, clubbing or edema    Pulses:  2+ and symmetric all extremities    Skin:  Skin color, texture, turgor normal, no rashes or lesions    Lymph nodes:  No palpable cervical, axillary or inguinal lymphadenopathy        Lab Results:   Results from last 7 days   Lab Units 09/12/19  0448   WBC Thousand/uL 17 45*   HEMOGLOBIN g/dL 13 6   HEMATOCRIT % 41 5 PLATELETS Thousands/uL 196   NEUTROS PCT % 79*   LYMPHS PCT % 11*   MONOS PCT % 8   EOS PCT % 1     Results from last 7 days   Lab Units 09/12/19  0448   POTASSIUM mmol/L 4 0   CHLORIDE mmol/L 102   CO2 mmol/L 27   BUN mg/dL 12   CREATININE mg/dL 0 88   CALCIUM mg/dL 8 3   ALK PHOS U/L 83   ALT U/L 22   AST U/L 10         Results from last 7 days   Lab Units 09/12/19  0448   LIPASE u/L 130       Imaging Studies: I have personally reviewed pertinent imaging studies  Ct Abdomen Pelvis With Contrast    Result Date: 9/11/2019  Impression: Findings in keeping with mild acute pancreatitis in the uncinate process and pancreatic head  No pancreatic necrosis or peripancreatic fluid collection [facility]  Workstation performed: [id number]           Patient was seen and examined by Dr Yayo Tobias  All taylor medical decisions were made by Dr Yayo Tobias  Thank you for allowing us to participate in the care of this present patient  We will follow-up with you closely

## 2019-09-12 NOTE — ASSESSMENT & PLAN NOTE
· CT scan showed mild acute pancreatitis in the uncinate process and pancreatic head, no pancreatic necrosis or peripancreatic fluid collection /abscess  · Etiology is unclear  No gallstone on CT  Liver/biliary tree unremarkable on CT  Triglycerides 181  Patient denies alcohol abuse, denies history of pancreatitis  Reports family history of pancreatic cancer  · Lipase 540-> 130 this morning  · Start clear liquids per GI  · Check right upper quadrant ultrasound  · Decrease IV hydration  · Antiemetic  p r n  Pain control  · GI following, appreciate input

## 2019-09-12 NOTE — UTILIZATION REVIEW
Initial Clinical Review    Admission: Date/Time/Statement: Inpatient Admission Orders (From admission, onward)     Ordered        09/11/19 1453  Inpatient Admission (expected length of stay for this patient Order details is greater than two midnights)  Once                   Orders Placed This Encounter   Procedures    Inpatient Admission (expected length of stay for this patient Order details is greater than two midnights)     Standing Status:   Standing     Number of Occurrences:   1     Order Specific Question:   Admitting Physician     Answer:   Hipolito Ryan [[id number]]     Order Specific Question:   Level of Care     Answer:   Med Surg [16]     Order Specific Question:   Estimated length of stay     Answer:   More than 2 Midnights     Order Specific Question:   Certification     Answer:   I certify that inpatient services are medically necessary for this patient for a duration of greater than two midnights  See H&P and MD Progress Notes for additional information about the patient's course of treatment  ED Arrival Information     Expected Arrival Acuity Means of Arrival Escorted By Service Admission Type    - 9/11/2019 12:38 Emergent Walk-In Family Member Hospitalist Emergency    Arrival Complaint    Chest and abdominal pain        Chief Complaint   Patient presents with    Chest Pain     pt c/o abdominal pain since 1130 last night, chest pain x 2 hours   Abdominal Pain     Assessment/Plan:   62y Male to ED presents with worsening pain under his bilateral rib cages now diffuse to his entire abdomen and radiating to his back  Pain assoc  With nausea and cold sweats  He drank 2 beers past Sat  PMH of CAD, Left carotid artery stenosis, hypertension, hyperlipidemia, SVT and S/P CABG x3  EKG showed NSR,non specific ST/T-wave changes  Admit Inpatient level of care for Pancreatitis, Leukocytosis, Electrolyte imbalnce and S/P CABG X 3 in 7/2018   Lipase 540, NPO, aggressive Iv Fluids, antiemetic/ pain control prn, Gastroenterology consult, check procalcitonin and UA, repeat AM labs  Na - 135 - monitor   Continue home meds    ED Triage Vitals [09/11/19 1244]   Temperature Pulse Respirations Blood Pressure SpO2   97 5 °F (36 4 °C) 57 18 170/78 100 %      Temp Source Heart Rate Source Patient Position - Orthostatic VS BP Location FiO2 (%)   Oral Monitor Lying Left arm --      Pain Score       8        Wt Readings from Last 1 Encounters:   09/11/19 81 6 kg (180 lb)     Additional Vital Signs:   09/11/19 2023  98 3 °F (36 8 °C)  61  16  132/69  91 %  None (Room air)    09/11/19 1548  97 9 °F (36 6 °C)  61  18  133/75  97 %  None (Room air)    09/11/19 1400    54  Abnormal   17    95 %      09/11/19 1345    54  Abnormal   16  130/62  96 %      09/11/19 1315    56  21  192/86  Abnormal           Pertinent Labs/Diagnostic Test Results:   9/11 CT Abd/ Pelvis - Findings in keeping with mild acute pancreatitis in the uncinate process and pancreatic head        Results from last 7 days   Lab Units 09/11/19  1309   WBC Thousand/uL 19 73*   HEMOGLOBIN g/dL 16 1   HEMATOCRIT % 48 2   PLATELETS Thousands/uL 245   NEUTROS ABS Thousands/µL 16 21*         Results from last 7 days   Lab Units 09/11/19  1459 09/11/19  1309   SODIUM mmol/L 136 135*   POTASSIUM mmol/L 4 6 5 9*   CHLORIDE mmol/L 101 100   CO2 mmol/L 29 30   ANION GAP mmol/L 6 5   BUN mg/dL 15 17   CREATININE mg/dL 0 84 0 79   EGFR ml/min/1 73sq m 93 96   CALCIUM mg/dL 8 9 9 0   MAGNESIUM mg/dL  --  2 0     Results from last 7 days   Lab Units 09/11/19  1309   AST U/L 41   ALT U/L 36   ALK PHOS U/L 101   TOTAL PROTEIN g/dL 8 4*   ALBUMIN g/dL 3 7   TOTAL BILIRUBIN mg/dL 0 80         Results from last 7 days   Lab Units 09/11/19  1459 09/11/19  1309   GLUCOSE RANDOM mg/dL 100 98     Results from last 7 days   Lab Units 09/11/19  2045 09/11/19  1736 09/11/19  1309   TROPONIN I ng/mL <0 02 <0 02 <0 02     Results from last 7 days   Lab Units 09/11/19  1886   PROCALCITONIN ng/ml <0 05     Results from last 7 days   Lab Units 09/11/19  1309   LACTIC ACID mmol/L 1 2     Results from last 7 days   Lab Units 09/11/19  1309   LIPASE u/L 540*     Results from last 7 days   Lab Units 09/11/19  1906   CLARITY UA  Clear   COLOR UA  Light Yellow   SPEC GRAV UA  1 020   PH UA  5 0   GLUCOSE UA mg/dl Negative   KETONES UA mg/dl Negative   BLOOD UA  Small*   PROTEIN UA mg/dl Negative   NITRITE UA  Negative   BILIRUBIN UA  Negative   UROBILINOGEN UA E U /dl 0 2   LEUKOCYTES UA  Negative   WBC UA /hpf 0-1*   RBC UA /hpf 0-1*   BACTERIA UA /hpf None Seen   EPITHELIAL CELLS WET PREP /hpf None Seen     ED Treatment:   Medication Administration from 09/11/2019 1237 to 09/11/2019 1547       Date/Time Order Dose Route Action     09/11/2019 1311 morphine (PF) 4 mg/mL injection 4 mg 4 mg Intravenous Given     09/11/2019 1310 ondansetron (ZOFRAN) injection 4 mg 4 mg Intravenous Given     09/11/2019 1401 iohexol (OMNIPAQUE) 350 MG/ML injection (MULTI-DOSE) 100 mL 100 mL Intravenous Given        Past Medical History:   Diagnosis Date    Carotid stenosis, left     s/p IR stent    Coronary artery disease     Former tobacco use     Hard of hearing     Hyperlipidemia     Hypertension     [facility] [address] [facility]     SVT (supraventricular tachycardia) ([facility] [address] [facility]      Present on Admission:   Essential hypertension   Dyslipidemia   Left carotid artery stenosis    Admitting Diagnosis: Pancreatitis [K85 90]  Chest pain [R07 9]  Abdominal pain [R10 9]     Age/Sex: 62 y o  male     Admission Orders:  9/11 IP CONSULT TO GASTROENTEROLOGY  9/12  right RUQ  9/11 NPO    Current Facility-Administered Medications:  acetaminophen 650 mg Oral Q6H PRN   aspirin 81 mg Oral Daily   atorvastatin 80 mg Oral Daily With Dinner   clopidogrel 75 mg Oral Daily   enoxaparin 40 mg Subcutaneous Daily   famotidine 20 mg Intravenous BID   HYDROmorphone 0 2 mg Intravenous Q3H PRN  9/11 x2   HYDROmorphone 0 5 mg Intravenous Q3H PRN losartan 50 mg Oral Daily   metoprolol tartrate 50 mg Oral Q12H   ondansetron 4 mg Intravenous Q6H PRN   sodium chloride 150 mL/hr Intravenous Continuous     Network Utilization Review Department  Phone: [phone]; Fax [phone]  [email or web address]  ATTENTION: Please call with any questions or concerns to [phone]  and carefully listen to the prompts so that you are directed to the right person  Send all requests for admission clinical reviews, approved or denied determinations and any other requests to fax [phone]   All voicemails are confidential

## 2019-09-12 NOTE — ASSESSMENT & PLAN NOTE
S/P CABG X 3 in 7/2018  Continue aspirin, Plavix, Lipitor, metoprolol  EKG showed NSR,non specific ST/T-wave changes  Troponin x 3 negative

## 2019-09-12 NOTE — ASSESSMENT & PLAN NOTE
WBC 19 73->17 45  Likely reactive  Patient afebrile  Mild bibasilar atelectasis on CT scan  UA unremarkable  Procalcitonin negative  Incentive spirometer  Repeat lab in gabriel [place]

## 2019-09-13 ENCOUNTER — TELEPHONE (OUTPATIENT)
Dept: GASTROENTEROLOGY | Facility: AMBULARY SURGERY CENTER | Age: 64
End: 2019-09-13

## 2019-09-13 VITALS
HEART RATE: 57 BPM | RESPIRATION RATE: 18 BRPM | WEIGHT: 180 LBS | DIASTOLIC BLOOD PRESSURE: 76 MMHG | SYSTOLIC BLOOD PRESSURE: 147 MMHG | OXYGEN SATURATION: 97 % | HEIGHT: 67 IN | TEMPERATURE: 96 F | BODY MASS INDEX: 28.25 KG/M2

## 2019-09-13 DIAGNOSIS — I47.1 SVT (SUPRAVENTRICULAR TACHYCARDIA) (HCC): ICD-10-CM

## 2019-09-13 PROBLEM — E87.8 ELECTROLYTE IMBALANCE: Status: RESOLVED | Noted: 2019-09-11 | Resolved: 2019-09-13

## 2019-09-13 LAB
ANION GAP SERPL CALCULATED.3IONS-SCNC: 6 MMOL/L (ref 4–13)
BUN SERPL-MCNC: 7 MG/DL (ref 5–25)
CALCIUM SERPL-MCNC: 8.2 MG/DL (ref 8.3–10.1)
CHLORIDE SERPL-SCNC: 105 MMOL/L (ref 100–108)
CO2 SERPL-SCNC: 26 MMOL/L (ref 21–32)
CREAT SERPL-MCNC: 0.8 MG/DL (ref 0.6–1.3)
ERYTHROCYTE [DISTWIDTH] IN BLOOD BY AUTOMATED COUNT: 12.3 % (ref 11.6–15.1)
GFR SERPL CREATININE-BSD FRML MDRD: 95 ML/MIN/1.73SQ M
GLUCOSE SERPL-MCNC: 102 MG/DL (ref 65–140)
HCT VFR BLD AUTO: 39.2 % (ref 36.5–49.3)
HGB BLD-MCNC: 12.9 G/DL (ref 12–17)
LIPASE SERPL-CCNC: 143 U/L (ref 73–393)
MAGNESIUM SERPL-MCNC: 1.8 MG/DL (ref 1.6–2.6)
MCH RBC QN AUTO: 29.7 PG (ref 26.8–34.3)
MCHC RBC AUTO-ENTMCNC: 32.9 G/DL (ref 31.4–37.4)
MCV RBC AUTO: 90 FL (ref 82–98)
MRSA NOSE QL CULT: NORMAL
PLATELET # BLD AUTO: 178 THOUSANDS/UL (ref 149–390)
PMV BLD AUTO: 10.2 FL (ref 8.9–12.7)
POTASSIUM SERPL-SCNC: 3.7 MMOL/L (ref 3.5–5.3)
RBC # BLD AUTO: 4.34 MILLION/UL (ref 3.88–5.62)
SODIUM SERPL-SCNC: 137 MMOL/L (ref 136–145)
WBC # BLD AUTO: 13.42 THOUSAND/UL (ref 4.31–10.16)

## 2019-09-13 PROCEDURE — 80048 BASIC METABOLIC PNL TOTAL CA: CPT | Performed by: NURSE PRACTITIONER

## 2019-09-13 PROCEDURE — 85027 COMPLETE CBC AUTOMATED: CPT | Performed by: NURSE PRACTITIONER

## 2019-09-13 PROCEDURE — 99232 SBSQ HOSP IP/OBS MODERATE 35: CPT | Performed by: INTERNAL MEDICINE

## 2019-09-13 PROCEDURE — 83735 ASSAY OF MAGNESIUM: CPT | Performed by: NURSE PRACTITIONER

## 2019-09-13 PROCEDURE — 83690 ASSAY OF LIPASE: CPT | Performed by: NURSE PRACTITIONER

## 2019-09-13 PROCEDURE — 99238 HOSP IP/OBS DSCHRG MGMT 30/<: CPT | Performed by: NURSE PRACTITIONER

## 2019-09-13 RX ORDER — METOPROLOL TARTRATE 50 MG/1
TABLET, FILM COATED ORAL
Qty: 60 TABLET | Refills: 5 | Status: SHIPPED | OUTPATIENT
Start: 2019-09-13 | End: 2020-02-24

## 2019-09-13 RX ORDER — FAMOTIDINE 20 MG/1
20 TABLET, FILM COATED ORAL 2 TIMES DAILY
Status: DISCONTINUED | OUTPATIENT
Start: 2019-09-13 | End: 2019-09-13 | Stop reason: HOSPADM

## 2019-09-13 RX ADMIN — LOSARTAN POTASSIUM 50 MG: 50 TABLET ORAL at 09:09

## 2019-09-13 RX ADMIN — ASPIRIN 81 MG: 81 TABLET, COATED ORAL at 09:10

## 2019-09-13 RX ADMIN — ENOXAPARIN SODIUM 40 MG: 40 INJECTION SUBCUTANEOUS at 09:09

## 2019-09-13 RX ADMIN — CLOPIDOGREL BISULFATE 75 MG: 75 TABLET ORAL at 09:09

## 2019-09-13 RX ADMIN — HYDROMORPHONE HYDROCHLORIDE 0.2 MG: 1 INJECTION, SOLUTION INTRAMUSCULAR; INTRAVENOUS; SUBCUTANEOUS at 05:12

## 2019-09-13 RX ADMIN — SODIUM CHLORIDE 100 ML/HR: 0.9 INJECTION, SOLUTION INTRAVENOUS at 12:40

## 2019-09-13 RX ADMIN — METOPROLOL TARTRATE 50 MG: 50 TABLET, FILM COATED ORAL at 09:09

## 2019-09-13 RX ADMIN — FAMOTIDINE 20 MG: 10 INJECTION, SOLUTION INTRAVENOUS at 09:09

## 2019-09-13 NOTE — ASSESSMENT & PLAN NOTE
· Unclear etiology  No gallstone on CT  Liver/biliary tree unremarkable on CT  Triglycerides 181  Patient denies alcohol abuse, denies history of pancreatitis  Reports family history of pancreatic cancer  No new medications  · CT scan showed mild acute pancreatitis in the uncinate process and pancreatic head, no pancreatic necrosis or peripancreatic fluid collection /abscess  · Lipase 540-> 130->143 this morning  · Right upper quadrant ultrasound showed  small amount of gallbladder sludge, no gallstones  · Patient tolerating low-fat diet  · Complete avoidance of alcohol and follow low-fat diet for several weeks per GI   · GI recommended outpatient MRI/MRCP in 4-6 weeks due to family history of pancreatic cancer  · Patient cleared for discharge by GI  Follow-up GI in 4 weeks as outpatient  · Follow-up PCP in 1 week

## 2019-09-13 NOTE — NURSING NOTE
IV removed and all belongings accounted for  After visit summary reviewed and given to patient  Patient left unit, on foot, per patient request accompanied by wife

## 2019-09-13 NOTE — DISCHARGE SUMMARY
Discharge- Sonia Arguello  1955, 62 y o  male MRN: [id number]    Unit/Bed#: [address] [address] Encounter: [id number]    Primary Care Provider: Michael Ashley MD   Date and time admitted to hospital: 9/11/2019 12:41 PM        * Pancreatitis  Assessment & Plan  · Unclear etiology  No gallstone on CT  Liver/biliary tree unremarkable on CT  Triglycerides 181  Patient denies alcohol abuse, denies history of pancreatitis  Reports family history of pancreatic cancer  No new medications  · CT scan showed mild acute pancreatitis in the uncinate process and pancreatic head, no pancreatic necrosis or peripancreatic fluid collection /abscess  · Lipase 540-> 130->143 this morning  · Right upper quadrant ultrasound showed  small amount of gallbladder sludge, no gallstones  · Patient tolerating low-fat diet  · Complete avoidance of alcohol and follow low-fat diet for several weeks per GI   · GI recommended outpatient MRI/MRCP in 4-6 weeks due to family history of pancreatic cancer  · Patient cleared for discharge by GI  Follow-up GI in 4 weeks as outpatient  · Follow-up PCP in 1 week  Leukocytosis  Assessment & Plan  WBC 19 73->17 45-> 13 42 today  Likely reactive  Patient afebrile  Mild bibasilar atelectasis on CT scan  UA unremarkable  Procalcitonin negative  Incentive spirometer  Electrolyte imbalanceresolved as of 9/13/2019  Assessment & Plan  Sodium 137, potassium 3 7 today  Monitor  S/P CABG x 3  Assessment & Plan  S/P CABG X 3 in 7/2018  Continue aspirin, Plavix, Lipitor, metoprolol  EKG showed NSR,non specific ST/T-wave changes  Troponin x 3 negative  Dyslipidemia  Assessment & Plan  Continue Lipitor  LDL 85, triglyceride 181  Essential hypertension  Assessment & Plan  BP labile  Continue losartan, metoprolol  Monitor BP at home  Goal BP < 150/90  Left carotid artery stenosis  Assessment & Plan  Status post stenting  Continue aspirin, Plavix, Lipitor    Follow-up vascular as outpatient  Discharging Physician / Practitioner: Quiana Lou  PCP: Celeste Salazar MD  Admission Date: 9/11/2019  Discharge Date: 09/13/19    Reason for Admission: Chest Pain (pt c/o abdominal pain since 1130 last night, chest pain x 2 hours  ) and Abdominal Pain        Resolved Problems  Date Reviewed: 9/13/2019          Resolved    Electrolyte imbalance 9/13/2019     Resolved by  Joshua Lou [facility] Stay:  IP CONSULT TO GASTROENTEROLOGY    Procedures Performed:     · None    Significant Findings / Test Results:     · As below  Results from last 7 days   Lab Units 09/13/19  0509 09/12/19  0448 09/11/19  1309   WBC Thousand/uL 13 42* 17 45* 19 73*   HEMOGLOBIN g/dL 12 9 13 6 16 1   PLATELETS Thousands/uL 178 196 245     Results from last 7 days   Lab Units 09/13/19  0509 09/12/19  0448 09/11/19  1459 09/11/19  1309   SODIUM mmol/L 137 136 136 135*   POTASSIUM mmol/L 3 7 4 0 4 6 5 9*   CHLORIDE mmol/L 105 102 101 100   CO2 mmol/L 26 27 29 30   BUN mg/dL 7 12 15 17   CREATININE mg/dL 0 80 0 88 0 84 0 79   CALCIUM mg/dL 8 2* 8 3 8 9 9 0   TOTAL BILIRUBIN mg/dL  --  0 60  --  0 80   ALK PHOS U/L  --  83  --  101   ALT U/L  --  22  --  36   AST U/L  --  10  --  41         Results from last 7 days   Lab Units 09/11/19  2045 09/11/19  1736 09/11/19  1309   TROPONIN I ng/mL <0 02 <0 02 <0 02     Lab Results   Component Value Date/Time    HGBA1C 6 5 (H) 07/03/2018 09:21 AM         Results from last 7 days   Lab Units 09/11/19  1736 09/11/19  1309   LACTIC ACID mmol/L  --  1 2   PROCALCITONIN ng/ml <0 05  --      Blood Culture: No results found for: BLOODCX  Urine Culture: No results found for: URINECX  Sputum Culture: No components found for: SPUTUMCX  Wound Culture: No results found for: WOUNDCULT     US right upper quadrant   Final Result by Tavon Kinsey MD (09/12 4996)      Small amount of gallbladder sludge noted        Workstation performed: [id number]         CT abdomen pelvis with contrast   Final Result by Estuardo Kiser MD (09/11 1417)         Findings in keeping with mild acute pancreatitis in the uncinate process and pancreatic head  No pancreatic necrosis or peripancreatic fluid collection Narvis Rubinstein  Workstation performed: [id number]                Incidental Findings:   · none     Test Results Pending at Discharge (will require follow up):   · none     Outpatient Tests Requested:  · none    Complications:  none    Reason for Admission:   Chief Complaint   Patient presents with    Chest Pain     pt c/o abdominal pain since 1130 last night, chest pain x 2 hours   Abdominal Pain       Hospital Course:     Andreina Cuellar  is a 62 y o  male patient with a PMH of  CAD, hypertension, hyperlipidemia, carotid stenosis, SVTs who originally presented to the hospital on 9/11/2019 due to acute mild pancreatitis  Unclear etiology  Patient responded well with IV hydration, pain control, antiemetic p r n  Patient tolerated low-fat diet, will be discharged home today  GI recommended MRI MRCP in 4-6 weeks due to family history of pancreatic cancer  Follow-up GI in 4 weeks  Follow-up PCP 1 week  Please see above list of diagnoses and related plan for additional information  Condition at Discharge: good       Discharge Day Visit / Exam:     Subjective:  Patient denies abdominal pain, nausea, vomiting, diarrhea, constipation  Denies chest pain, headaches, dizziness, SOB, fever, chills  Vitals: Blood Pressure: 147/76 (09/13/19 1100)  Pulse: 57 (09/13/19 1100)  Temperature: (!) 96 °F (35 6 °C) (09/13/19 1100)  Temp Source: Oral (09/13/19 1100)  Respirations: 18 (09/13/19 1100)  Height: 5' 7" (170 2 cm) (09/11/19 1548)  Weight - Scale: 81 6 kg (180 lb) (09/11/19 1548)  SpO2: 97 % (09/13/19 1100)  Exam:   Physical Exam   Constitutional: He is oriented to person, place, and time  He appears well-developed and well-nourished     HENT:   Head: Normocephalic and atraumatic  Neck: Normal range of motion  Neck supple  No JVD present  No tracheal deviation present  No thyromegaly present  Cardiovascular: Normal rate, regular rhythm, normal heart sounds and intact distal pulses  No murmur heard  Pulmonary/Chest: Effort normal and breath sounds normal  No respiratory distress  He has no wheezes  He has no rales  Abdominal: Soft  Bowel sounds are normal  He exhibits no distension  There is no tenderness  There is no guarding  Musculoskeletal: Normal range of motion  He exhibits no tenderness or deformity  Neurological: He is alert and oriented to person, place, and time  Skin: Skin is warm and dry  Psychiatric: He has a normal mood and affect  Judgment normal    Nursing note and vitals reviewed  Discharge instructions/Information to patient and family:   See after visit summary for information provided to patient and family  Provisions for Follow-Up Care:  See after visit summary for information related to follow-up care and any pertinent home health orders  Disposition:     Home    Planned Readmission: no     Discharge Statement:  I spent 30 minutes discharging the patient  This time was spent on the day of discharge  I had direct contact with the patient on the day of discharge  Greater than 50% of the total time was spent examining patient, answering all patient questions, arranging and discussing plan of care with patient as well as directly providing post-discharge instructions  Additional time then spent on discharge activities  Discharge Medications:  See after visit summary for reconciled discharge medications provided to patient and family        ** Please Note: This note has been constructed using a voice recognition system **

## 2019-09-13 NOTE — DISCHARGE INSTRUCTIONS
Low fat diet and complete avoidance of alcohol for several weeks  Need outpatient MRI/MRCP in 4-6 weeks  Follow-up GI in 4 weeks

## 2019-09-13 NOTE — ASSESSMENT & PLAN NOTE
WBC 19 73->17 45-> 13 42 today  Likely reactive  Patient afebrile  Mild bibasilar atelectasis on CT scan  UA unremarkable  Procalcitonin negative  Incentive spirometer

## 2019-09-13 NOTE — PROGRESS NOTES
The famotidine has / have been converted to Oral per [facility] for Adults as approved by the Pharmacy and Therapeutics Committee  The patient met all eligible criteria:  3 Age = 25years old   2) Received at least one dose of the IV form   3) Receiving at least one other scheduled oral/enteral medication   4) Tolerating an oral/enteral diet   and did not have any exclusions:   1) Critical care patient   2) Active GI bleed (IF assessing H2RAs or PPIs)   3) Continuous tube feeding (IF assessing cipro, doxycycline, levofloxacin, minocycline, rifampin, or voriconazole)   4) Receiving PO vancomycin (IF assessing metronidazole)   5) Persistent nausea and/or vomiting   6) Ileus or gastrointestinal obstruction   7) Sixto/nasogastric tube set for continuous suction   8) Specific order not to automatically convert to PO (in the order's comments or if discussed in the most recent Infectious Disease or primary team's progress notes)

## 2019-09-13 NOTE — PROGRESS NOTES
Progress Note - Martell Davis  62 y o  male MRN: [id number]    Unit/Bed#: [address] [address] Encounter: [id number]    Assessment and Plan:   Principal Problem:    Pancreatitis  Active Problems:    Left carotid artery stenosis    Essential hypertension    Dyslipidemia    S/P CABG x 3    Electrolyte imbalance    Leukocytosis    #1  Acute pancreatitis:  Unclear etiology  No new medications  No alcohol use  No gallstones noted on CT scan and LFTs are normal  US with only small amount of sludge, doubt cause  WBC improved to 13  Afebrile   -will check an   -advance to lo fat diet  -discussed about complete avoidance of alcohol and also following a low-fat diet for several weeks after having pancreatitis  -due to family history of pancreatic cancer in his mother and aunt, would recommend an outpatient MRI/MRCP in 4-6 weeks to confirm there is no underlying lesions after the inflammation has resolved  This was discussed with the patient   -antiemetics and pain control as needed    ----------------------------------------------------------------------------------------------------------------    Subjective:     Doing well  No abdominal pain  No vomiting  Has some back pain but improving  Objective:     Vitals: Blood pressure 148/77, pulse 59, temperature 98 °F (36 7 °C), temperature source Oral, resp  rate 17, height 5' 7" (1 702 m), weight 81 6 kg (180 lb), SpO2 98 %  ,Body mass index is 28 19 kg/m²        Intake/Output Summary (Last 24 hours) at 9/13/2019 0902  Last data filed at 9/12/2019 2342  Gross per 24 hour   Intake 960 ml   Output 1400 ml   Net -440 ml       Physical Exam:     General Appearance: Alert, appears stated age and cooperative  Lungs: Clear to auscultation bilaterally, no rales or rhonchi, no labored breathing/accessory muscle use  Heart: Regular rate and rhythm, S1, S2 normal, no murmur, click, rub or gallop  Abdomen: Soft, non-tender, non-distended; bowel sounds normal; no masses or no organomegaly  Extremities: No cyanosis, clubbing, or edema    Invasive Devices     Peripheral Intravenous Line            Peripheral IV 09/11/19 Left Antecubital 1 day                Lab Results:  Results from last 7 days   Lab Units 09/13/19  0509 09/12/19  0448   WBC Thousand/uL 13 42* 17 45*   HEMOGLOBIN g/dL 12 9 13 6   HEMATOCRIT % 39 2 41 5   PLATELETS Thousands/uL 178 196   NEUTROS PCT %  --  79*   LYMPHS PCT %  --  11*   MONOS PCT %  --  8   EOS PCT %  --  1     Results from last 7 days   Lab Units 09/13/19  0509 09/12/19  0448   POTASSIUM mmol/L 3 7 4 0   CHLORIDE mmol/L 105 102   CO2 mmol/L 26 27   BUN mg/dL 7 12   CREATININE mg/dL 0 80 0 88   CALCIUM mg/dL 8 2* 8 3   ALK PHOS U/L  --  83   ALT U/L  --  22   AST U/L  --  10         Results from last 7 days   Lab Units 09/13/19  0509   LIPASE u/L 143       Imaging Studies: I have personally reviewed pertinent imaging studies  Us Right Upper Quadrant    Result Date: 9/12/2019  Impression: Small amount of gallbladder sludge noted  Workstation performed: [id number]     Ct Abdomen Pelvis With Contrast    Result Date: 9/11/2019  Impression: Findings in keeping with mild acute pancreatitis in the uncinate process and pancreatic head  No pancreatic necrosis or peripancreatic fluid collection Jermaine Cunningham   Workstation performed: [id number]

## 2019-09-16 LAB
ATRIAL RATE: 56 BPM
P AXIS: 28 DEGREES
PR INTERVAL: 138 MS
QRS AXIS: -2 DEGREES
QRSD INTERVAL: 94 MS
QT INTERVAL: 448 MS
QTC INTERVAL: 432 MS
T WAVE AXIS: 50 DEGREES
VENTRICULAR RATE: 56 BPM

## 2019-09-16 PROCEDURE — 93010 ELECTROCARDIOGRAM REPORT: CPT | Performed by: INTERNAL MEDICINE

## 2019-09-16 NOTE — UTILIZATION REVIEW
Notification of Discharge  This is a Notification of Discharge from our facility [address]  Please be advised that this patient has been discharge from our facility  Below you will find the admission and discharge date and time including the patients disposition  PRESENTATION DATE: 9/11/2019 12:41 PM  OBS ADMISSION DATE:   IP ADMISSION DATE: 9/11/19 1453   DISCHARGE DATE: 9/13/2019  3:51 PM  DISPOSITION: Home/Self Care Home/Self Care   Admission Orders listed below:  Admission Orders (From admission, onward)     Ordered        09/11/19 1453  Inpatient Admission (expected length of stay for this patient Order details is greater than two midnights)  Once                   Please contact the UR Department if additional information is required to close this patient's authorization/case  [address]  Utilization Review Department  Phone: [phone]; Fax [phone]  [email or web address]  ATTENTION: Please call with any questions or concerns to [phone]  and carefully listen to the prompts so that you are directed to the right person  Send all requests for admission clinical reviews, approved or denied determinations and any other requests to fax [phone]   All voicemails are confidential

## 2019-09-22 NOTE — DISCHARGE SUMMARY
Addendum to discharge summary  Hyponatremia  Mild  Na 135 in ED  Resolved with IV hydration  Na 137 on day of discharge  Hyperkalemia  Mild  K 5 9 in ED  Resolved with IV hydration  Potassium 3 7 on day of discharge

## 2019-10-07 ENCOUNTER — TELEPHONE (OUTPATIENT)
Dept: VASCULAR SURGERY | Facility: CLINIC | Age: 64
End: 2019-10-07

## 2019-10-07 NOTE — TELEPHONE ENCOUNTER
Recall instructions for RESULTS REVIEW PG [[id number]]    Return in about 1 year (around 9/7/2019) for Recheck in 1 year     Left message to schedule OV

## 2019-10-13 DIAGNOSIS — I10 ESSENTIAL HYPERTENSION: ICD-10-CM

## 2019-10-15 RX ORDER — LOSARTAN POTASSIUM 50 MG/1
TABLET ORAL
Qty: 30 TABLET | Refills: 11 | Status: SHIPPED | OUTPATIENT
Start: 2019-10-15 | End: 2020-03-05 | Stop reason: SDUPTHER

## 2019-10-21 ENCOUNTER — TELEPHONE (OUTPATIENT)
Dept: ADMINISTRATIVE | Facility: HOSPITAL | Age: 64
End: 2019-10-21

## 2019-10-21 DIAGNOSIS — I65.23 CAROTID STENOSIS, BILATERAL: Primary | ICD-10-CM

## 2019-10-21 NOTE — TELEPHONE ENCOUNTER
Contacting patient to schedule CV for March 2020  Writer left voicemail requesting patient contact our office at [phone] to schedule his appointment

## 2019-11-10 DIAGNOSIS — Z95.1 S/P CABG X 3: ICD-10-CM

## 2019-11-11 RX ORDER — ATORVASTATIN CALCIUM 80 MG/1
80 TABLET, FILM COATED ORAL
Qty: 30 TABLET | Refills: 2 | Status: SHIPPED | OUTPATIENT
Start: 2019-11-11 | End: 2020-02-14

## 2019-12-31 NOTE — ED PROVIDER NOTES
History  Chief Complaint   Patient presents with    Evaluation of Abnormal Diagnostic Test     sent from [address] with abnormal carotid study-sent  by Dr Phillip Jeffries     58 y/o male, h/o TBI w/ Parkinsonian like symptoms, presenting for evaluation of an abnormal carotid doppler showing left sided ICA stenosis of 79%  Was seen by ophtho for left sided changes in vision and sent here immediately after his doppler  Experienced left eye vision changes where the upper half of his left visual field was darkened and blurry occurring once 1 month ago and then again 1 week ago each episode lasting approx 5 minutes  Has not had any other episodes and is generally feeling very well  Sent in for evaluation of amaurosis fugax  Due to TBI has had ongoing dizziness, abnormal gait and generalized weakness which is not new  Sees neurology  No new symptoms  Denies chest pain, nausea, vomiting, diaphoresis, palpitations, SOB, wheezing, abdominal pain  Prior to Admission Medications   Prescriptions Last Dose Informant Patient Reported? Taking?   aspirin (ECOTRIN LOW STRENGTH) 81 mg EC tablet 5/14/2018 at 1700  Yes Yes   Sig: Take 81 mg by mouth daily   losartan (COZAAR) 50 mg tablet 5/14/2018 at 1700  Yes Yes   Sig: Take 25 mg by mouth daily at bedtime   simvastatin (ZOCOR) 20 mg tablet 5/14/2018 at 1700  Yes Yes   Sig: Take 20 mg by mouth daily at bedtime      Facility-Administered Medications: None       Past Medical History:   Diagnosis Date    Hyperlipidemia     Hypertension     Parkinson disease ([address] )        Past Surgical History:   Procedure Laterality Date    AMPUTATION OF REPLICATED FINGERS       rt 2nd and 3rd fingers traumatically amputated at work    APPENDECTOMY      MANDIBLE FRACTURE SURGERY      upper and lower    ORBITAL FRACTURE SURGERY Right        History reviewed  No pertinent family history  I have reviewed and agree with the history as documented      Social History   Substance Use Topics    Smoking Stop taking metoprolol. Will need to take propranolol 60 mg twice daily. Additionally will need to increase dosage of methimazole to 30 mg daily. Schedule close follow-up with endocrinology. Return to emergency department if symptoms worsen or progress in any way. Hyperthyroidism: Care Instructions  Your Care Instructions  Hyperthyroidism occurs when the thyroid gland makes too much thyroid hormone. This speeds up your metabolism--how your body uses energy. This condition can cause you to be very active, lose weight, and have sleep problems, eye problems, and a fast heart rate. It can also cause a goiter. A goiter is an enlarged thyroid gland that you can see at the front of the neck. Hyperthyroidism is often caused by Graves' disease. In Graves' disease, the body's defense (immune) system attacks the thyroid gland. Your doctor may prescribe a beta-blocker medicine to slow your pulse and calm you down. But this is not a treatment for hyperthyroidism. It is given for your fast heart rate. Your doctor may also give you antithyroid medicine. This medicine keeps excess thyroid hormone in check. In some cases, doctors recommend radioactive iodine or surgery to remove the thyroid. After either of these treatments, you may need to take medicine to replace thyroid hormone for the rest of your life. Follow-up care is a key part of your treatment and safety. Be sure to make and go to all appointments, and call your doctor if you are having problems. It's also a good idea to know your test results and keep a list of the medicines you take. How can you care for yourself at home? · Take your medicines exactly as prescribed. You need to take the thyroid medicine at the same time each day. Call your doctor if you think you are having a problem with your medicine. · Graves' disease can make your eyes sore. Use artificial tears, eye drops, and sunglasses to protect your eyes from dryness, wind, and sun.  Raise your head status: Current Every Day Smoker     Packs/day: 1 00    Smokeless tobacco: Former User     Quit date: 5/14/2018    Alcohol use No        Review of Systems   HENT: Negative  Eyes: Positive for visual disturbance  Negative for photophobia, pain, discharge, redness and itching  Respiratory: Negative  Cardiovascular: Negative  Gastrointestinal: Negative  Genitourinary: Negative  Musculoskeletal: Positive for gait problem  Negative for arthralgias, back pain, joint swelling, myalgias, neck pain and neck stiffness  Skin: Negative  Neurological: Positive for dizziness and weakness  Negative for tremors, seizures, syncope, facial asymmetry, speech difficulty, light-headedness, numbness and headaches  All other systems reviewed and are negative  Physical Exam  ED Triage Vitals [05/15/18 1521]   Temperature Pulse Respirations Blood Pressure SpO2   97 9 °F (36 6 °C) 94 18 157/84 95 %      Temp Source Heart Rate Source Patient Position - Orthostatic VS BP Location FiO2 (%)   Tympanic Monitor Sitting Right arm --      Pain Score       No Pain           Orthostatic Vital Signs  Vitals:    05/15/18 1815 05/15/18 1830 05/15/18 1845 05/15/18 1908   BP:    168/70   Pulse: 74 74 74 77   Patient Position - Orthostatic VS:    Sitting       Physical Exam   Constitutional: He is oriented to person, place, and time  He appears well-developed and well-nourished  HENT:   Head: Normocephalic and atraumatic  Right Ear: External ear normal    Left Ear: External ear normal    Nose: Nose normal    Mouth/Throat: Oropharynx is clear and moist    Eyes: Conjunctivae and EOM are normal  Pupils are equal, round, and reactive to light  Neck: Normal range of motion  Neck supple  Cardiovascular: Normal rate, regular rhythm, normal heart sounds and intact distal pulses  Exam reveals no gallop and no friction rub  No murmur heard  Pulmonary/Chest: Effort normal and breath sounds normal  No respiratory distress   He with pillows at night to prevent your eyes from swelling. In some cases, taping your eyelids shut at night will keep your eyes from being dry in the morning. · Make sure you get enough calcium. Foods that are rich in calcium include milk, yogurt, cheese, and dark green vegetables. · If you need to gain weight, ask your doctor about special diets. · Do not eat kelp. Hugo Kingus is high in iodine, which can make hyperthyroidism worse. Hugo Duke is commonly used in [employer] and other [place] foods. You can use iodized salt and eat bread and seafood. Try to eat a balanced diet. · Do not use caffeine and other stimulants. These can make symptoms worse, such as a fast heartbeat, nervousness, and problems focusing. · Do not smoke. Smoking can make your condition worse and may lead to more serious eye problems. If you need help quitting, talk to your doctor about stop-smoking programs and medicines. These can increase your chances of quitting for good. · Lower your stress. Learn to use biofeedback, guided imagery, meditation, or other methods to relax. · Use creams or ointments for irritated skin. Ask your doctor which type to use. · Tell all your doctors about your condition. They need to know because some medicines contain iodine. When should you call for help? Call your doctor now or seek immediate medical care if:    · You have symptoms of a sudden, very high thyroid level (thyroid storm). These include:  ? Being nauseated, vomiting, and having diarrhea. ? Sweating a lot. ? Feeling extremely restless and confused. ? Having a high fever. ?  Having a fast heartbeat.     · You have sudden vision changes or eye pain.     · You have a fever or severe sore throat and are taking antithyroid medicines, such as PTU or methimazole.    Watch closely for changes in your health, and be sure to contact your doctor if:    · You have a sore throat or have problems swallowing.     · You have swollen, itchy, or red eyes or your other eye has no wheezes  He has no rales  He exhibits no tenderness  Abdominal: Soft  Bowel sounds are normal  He exhibits no distension and no mass  There is no tenderness  There is no rebound and no guarding  No hernia  Neurological: He is alert and oriented to person, place, and time  He has normal strength and normal reflexes  He displays no atrophy and no tremor  No cranial nerve deficit or sensory deficit  He exhibits normal muscle tone  He displays a negative Romberg sign  He displays no seizure activity  Coordination and gait normal  GCS eye subscore is 4  GCS verbal subscore is 5  GCS motor subscore is 6  Good heel to toe test  Normal romberg  Unable to perform point to point as right hand shows multiple prior finger amputations  Skin: Skin is warm and dry  Capillary refill takes less than 2 seconds  Nursing note and vitals reviewed  ED Medications  Medications   sodium chloride 0 9 % bolus 1,000 mL (0 mL Intravenous Stopped 5/15/18 1909)   iohexol (OMNIPAQUE) 350 MG/ML injection (MULTI-DOSE) 85 mL (85 mL Intravenous Given 5/15/18 1703)       Diagnostic Studies  Results Reviewed     Procedure Component Value Units Date/Time    Troponin I [[id number]]  (Normal) Collected:  05/15/18 1622    Lab Status:  Final result Specimen:  Blood from Arm, Right Updated:  05/15/18 1649     Troponin I <0 02 ng/mL     Narrative:         S[employer] Chemistry analyzer 99% cutoff is > 0 04 ng/mL in network labs    o cTnI 99% cutoff is useful only when applied to patients in the clinical setting of myocardial ischemia  o cTnI 99% cutoff should be interpreted in the context of clinical history, ECG findings and possibly cardiac imaging to establish correct diagnosis  o cTnI 99% cutoff may be suggestive but clearly not indicative of a coronary event without the clinical setting of myocardial ischemia      Brandie Haji [[id number]]  (Normal) Collected:  05/15/18 1623    Lab Status:  Final result Specimen:  Blood from Arm, Right symptoms get worse, or you have new vision problems.     · You have signs of a low thyroid level (hypothyroidism). You may feel very tired, confused, or weak. Where can you learn more? Go to http://alma-petty.info/. Enter Y879 in the search box to learn more about \"Hyperthyroidism: Care Instructions. \"  Current as of: November 6, 2018  Content Version: 12.2  © 2663-7002 Content Syndicate: [employer]. Care instructions adapted under license by [employer] (which disclaims liability or warranty for this information). If you have questions about a medical condition or this instruction, always ask your healthcare professional. [address] any warranty or liability for your use of this information. Updated:  05/15/18 1645     Protime 10 9 seconds      INR 1 04    APTT [[id number]]  (Normal) Collected:  05/15/18 1623    Lab Status:  Final result Specimen:  Blood from Arm, Right Updated:  05/15/18 1645     PTT 33 seconds     Basic metabolic panel [[id number]] Collected:  05/15/18 1622    Lab Status:  Final result Specimen:  Blood from Arm, Right Updated:  05/15/18 1644     Sodium 139 mmol/L      Potassium 3 9 mmol/L      Chloride 103 mmol/L      CO2 30 mmol/L      Anion Gap 6 mmol/L      BUN 18 mg/dL      Creatinine 0 99 mg/dL      Glucose 115 mg/dL      Calcium 9 3 mg/dL      eGFR 81 ml/min/1 73sq m     Narrative:         National Kidney Disease Education Program recommendations are as follows:  GFR calculation is accurate only with a steady state creatinine  Chronic Kidney disease less than 60 ml/min/1 73 sq  meters  Kidney failure less than 15 ml/min/1 73 sq  meters  CBC and differential [[id number]]  (Abnormal) Collected:  05/15/18 1622    Lab Status:  Final result Specimen:  Blood from Arm, Right Updated:  05/15/18 1630     WBC 10 22 (H) Thousand/uL      RBC 4 90 Million/uL      Hemoglobin 14 4 g/dL      Hematocrit 43 3 %      MCV 88 fL      MCH 29 4 pg      MCHC 33 3 g/dL      RDW 12 9 %      MPV 9 5 fL      Platelets 900 Thousands/uL      nRBC 0 /100 WBCs      Neutrophils Relative 51 %      Immat GRANS % 0 %      Lymphocytes Relative 37 %      Monocytes Relative 8 %      Eosinophils Relative 3 %      Basophils Relative 1 %      Neutrophils Absolute 5 09 Thousands/µL      Immature Grans Absolute 0 03 Thousand/uL      Lymphocytes Absolute 3 82 Thousands/µL      Monocytes Absolute 0 81 Thousand/µL      Eosinophils Absolute 0 35 Thousand/µL      Basophils Absolute 0 12 (H) Thousands/µL                  CTA head and neck with and without contrast   Final Result by Kamila Johnson MD (05/15 1848)         1  Critical stenosis left ICA origin by mixed plaque  Distal left ICA diminished flow from the critical stenosis  Limited evaluation of the left ophthalmic artery origin which could be narrowed  2   Moderately narrowed right external carotid artery origin  3   Left maxillary sinusitis  4   5 mm left upper lobe anterior subpleural lung nodule  Based on current Fleischner Society 2017 Guidelines on incidental pulmonary nodule, because the patient is considered high risk for lung cancer, 12 month follow-up non-contrast chest CT is    recommended  Workstation performed: [id number]                    Procedures  Procedures       Phone Contacts  ED Phone Contact    ED Course  ED Course as of May 16 1406   Tue May 15, 2018   [address] Calling vascular surgery     1751 Called for reading     [address] again for reading     1848 Got read over the phone                                 MDM  Number of Diagnoses or Management Options  Amaurosis fugax of left eye:   Stenosis of left carotid artery:   Diagnosis management comments: Spoke with Chemo Whitmore PA-C from [address] [place] [address] who would like patient to follow up with her outpatient office tomorrow  I discussed with patient and wife results the CT scans and lab work including the lung nodule need for follow-up  The patient he is unable to make an appointment tomorrow to call me directly emergency  Strict return precautions for any worsening symptoms  Amaurosis Fugax likely secondary to stenosis of left ICA  Patient is informed to return to the emergency department for worsening of symptoms and was given proper education regarding their diagnosis and symptoms  Otherwise the patient is informed to follow up with their primary care doctor for re-evaluation  The patient verbalizes understanding and agrees with above assessment and plan  All questions were answered  Please Note: Fluency Direct voice recognition software may have been used in the creation of this document   Wrong words or sound a like substitutions may have occurred due to the inherent limitations of the voice software  Amount and/or Complexity of Data Reviewed  Clinical lab tests: ordered and reviewed  Tests in the radiology section of CPT®: ordered and reviewed  Review and summarize past medical records: yes  Discuss the patient with other providers: yes (Discussed case with Dr Janelle Link)  Independent visualization of images, tracings, or specimens: yes      CritCare Time    Disposition  Final diagnoses:   Amaurosis fugax of left eye   Stenosis of left carotid artery     Time reflects when diagnosis was documented in both MDM as applicable and the Disposition within this note     Time User Action Codes Description Comment    5/15/2018  6:56 PM Veola Brawn Add [G45 3] Amaurosis fugax of left eye     5/15/2018  6:58 PM Veola Brawn Add [I65 22] Stenosis of left carotid artery       ED Disposition     ED Disposition Condition Comment    Discharge  Barbara Oconnell  discharge to home/self care  Condition at discharge: Good        Follow-up Information     Follow up With Specialties Details Why Contact Info Additional [address] Emergency Department Emergency Medicine Go to If symptoms worsen such as stroke like symptoms or changes in vision etc  [address] [facility] [address] [facility], [facility], [facility]    The [address] [place] [address] Vascular Surgery Schedule an appointment as soon as possible for a visit in 1 day if you have any problems making an appointment please call the ED    [address] [facility]  [phone]         Discharge Medication List as of 5/15/2018  6:58 PM      CONTINUE these medications which have NOT CHANGED    Details   aspirin (ECOTRIN LOW STRENGTH) 81 mg EC tablet Take 81 mg by mouth daily, Historical Med      losartan (COZAAR) 50 mg tablet Take 25 mg by mouth daily at bedtime, Historical Med      simvastatin (ZOCOR) 20 mg tablet Take 20 mg by mouth daily at bedtime, Historical Med           No discharge procedures on file      ED Provider  Electronically Signed by           Jesus Ayala PA-C  05/16/18 8921

## 2020-01-17 DIAGNOSIS — I65.22 LEFT CAROTID STENOSIS: ICD-10-CM

## 2020-01-17 RX ORDER — CLOPIDOGREL BISULFATE 75 MG/1
75 TABLET ORAL DAILY
Qty: 30 TABLET | Refills: 0 | Status: SHIPPED | OUTPATIENT
Start: 2020-01-17 | End: 2020-01-20 | Stop reason: SDUPTHER

## 2020-01-17 NOTE — TELEPHONE ENCOUNTER
Why is the patient on Plavix? Is he also on ASA? The patient is 1 1/2 years s/p carotid stent  I will order but with no refills until reevaluated by Elisa    If there is no active vascular indication for Plavix, plavix refills should be managed by his PCP or cardiologist

## 2020-01-17 NOTE — TELEPHONE ENCOUNTER
Patient is scheduled to see ROSITA Ortega on 1/31/20 and is out of Plavix    Requesting it be sent to [facility] [place]

## 2020-01-20 DIAGNOSIS — I65.22 LEFT CAROTID STENOSIS: ICD-10-CM

## 2020-01-21 RX ORDER — CLOPIDOGREL BISULFATE 75 MG/1
75 TABLET ORAL DAILY
Qty: 30 TABLET | Refills: 0 | Status: SHIPPED | OUTPATIENT
Start: 2020-01-21 | End: 2020-01-22 | Stop reason: SDUPTHER

## 2020-01-22 DIAGNOSIS — I65.22 LEFT CAROTID STENOSIS: ICD-10-CM

## 2020-01-22 RX ORDER — CLOPIDOGREL BISULFATE 75 MG/1
75 TABLET ORAL DAILY
Qty: 30 TABLET | Refills: 5 | Status: SHIPPED | OUTPATIENT
Start: 2020-01-22 | End: 2020-03-05 | Stop reason: SDUPTHER

## 2020-01-24 ENCOUNTER — HOSPITAL ENCOUNTER (OUTPATIENT)
Dept: RADIOLOGY | Facility: HOSPITAL | Age: 65
Discharge: HOME | End: 2020-01-24
Attending: SURGERY
Payer: COMMERCIAL

## 2020-01-24 ENCOUNTER — TRANSCRIBE ORDERS (OUTPATIENT)
Dept: ADMINISTRATIVE | Facility: HOSPITAL | Age: 65
End: 2020-01-24

## 2020-01-24 DIAGNOSIS — I65.23 CAROTID STENOSIS, BILATERAL: ICD-10-CM

## 2020-01-24 PROCEDURE — 93880 EXTRACRANIAL BILAT STUDY: CPT | Performed by: SURGERY

## 2020-01-24 PROCEDURE — 93880 EXTRACRANIAL BILAT STUDY: CPT

## 2020-01-31 ENCOUNTER — OFFICE VISIT (OUTPATIENT)
Dept: VASCULAR SURGERY | Facility: CLINIC | Age: 65
End: 2020-01-31
Payer: COMMERCIAL

## 2020-01-31 VITALS
DIASTOLIC BLOOD PRESSURE: 78 MMHG | HEIGHT: 67 IN | HEART RATE: 76 BPM | TEMPERATURE: 96 F | RESPIRATION RATE: 16 BRPM | WEIGHT: 184 LBS | SYSTOLIC BLOOD PRESSURE: 142 MMHG | BODY MASS INDEX: 28.88 KG/M2

## 2020-01-31 DIAGNOSIS — I10 ESSENTIAL HYPERTENSION: ICD-10-CM

## 2020-01-31 DIAGNOSIS — I65.21 ASYMPTOMATIC STENOSIS OF RIGHT CAROTID ARTERY: ICD-10-CM

## 2020-01-31 DIAGNOSIS — I65.22 SYMPTOMATIC STENOSIS OF LEFT CAROTID ARTERY WITHOUT INFARCTION: Primary | ICD-10-CM

## 2020-01-31 DIAGNOSIS — E78.5 DYSLIPIDEMIA: ICD-10-CM

## 2020-01-31 PROCEDURE — 99214 OFFICE O/P EST MOD 30 MIN: CPT | Performed by: PHYSICIAN ASSISTANT

## 2020-01-31 NOTE — PROGRESS NOTES
Assessment/Plan:    Carotid artery stenosis  S/P LEFT carotid artery stents (1/9/18)  Moderate RIGHT carotid artery stenosis      -No sx of TIA/stroke; no further amaurosis  -walks dog for 1-2 miles on most days   -projects at home  -tachycardia/hx of SVT; follow up with cardiology    VAS Carotid du 1/17/2020  R 50-69% 140/39; 2 0  L patent stents distal CCA and ICA 91/31/1 3    Plan:  -Doing well  No sx TIA/stroke  -The LEFT carotid artery stents are open  There is moderate RIGHT carotid artery stenosis which we will follow twice a year by ultrasound    -Cholesterol LDL goal is less than 70 (LDL is 95)  Continue with high dose statin therapy    -Work on good heart healthy diet, continue to stay active and increase walking    -Continue with optimal medical therapy on aspirin 81, clopidogrel 75, losartan 50 and atorvastatin 80  -In terms of the carotid stenosis, he is does not definitely require clopidogrel     -He should follow up with cardiology regarding continued clopidogrel, as well as sx of tachycardia and racing heart   -Call cardiology for any episodes of racing heart  -Carotid duplex 6 months  -OV in 1 year or sooner, if needed  Diagnoses and all orders for this visit:    Symptomatic stenosis of left carotid artery without infarction    Essential hypertension    Dyslipidemia    Asymptomatic stenosis of right carotid artery        Subjective:      Patient ID: Saumya Garza  is a 60 y o  male  Patient had a CV on 1/23/20  Pt denies TIA or stroke symptoms  Pt is on ASA 81 mg, Plavix, and Atorvastatin  HPI    Saumya Garza  61 y/o M Htn, HLD, CAD s/p CAB x 3 (7/27/18, [facility]), prior smoking, carotid artery stenosis, S/P LEFT carotid artery stent (via groin by Dr Arabella Gray and Annel Torres, 1/9/18) for near occlusive prox LICA lesion with multiple left amaurosis fugax events  There was a mild residual stenosis at the end of the procedure  He woke neurologically intact   Following the carotid stenting, he underwent open cardiac revascularization  Patient presents today for routine clinical surveillance after LEFT carotid artery stenting  Overall he feels well  He gets exercise by walking his dog for about 1-2 miles on most days and he stays busy doing projects at home  He has no sx of stoke/TIA  He has had no further visual changes or amaurosis  No difficulty speaking, facial numbness/weakness and arm/leg weakness  We reviewed sx of stroke for which he should call 911  He has had no chest pain, tightness or SOB  He does report episodes of tachycardia/racing  Current medical therapy includes: aspirin 81, clopidogrel 75, losartan 50 and atorvastatin 80  In terms of the carotid stent, he received benefit from DAPT  There is no definite need to continue DAPT, but I would like him to see cardiology who he hasn't seen in over a year  VAS Carotid du 1/17/2020  R 50-69% 140/39; 2 0  L patent stents distal CCA and ICA 91/31/1 3       The following portions of the patient's history were reviewed and updated as appropriate: allergies, current medications, past family history, past medical history, past social history, past surgical history and problem list     Review of Systems   Constitutional: Negative  HENT: Negative  Eyes: Positive for visual disturbance (blurry vision in the morning)  Respiratory: Negative  Cardiovascular: Negative  Gastrointestinal: Negative  Endocrine: Negative  Genitourinary: Negative  Musculoskeletal: Positive for arthralgias, joint swelling and myalgias  Skin: Negative  Allergic/Immunologic: Negative  Neurological: Negative for dizziness, facial asymmetry, speech difficulty, weakness, numbness and headaches  Hematological: Negative  Psychiatric/Behavioral: Negative            Objective:      /78 (BP Location: Left arm, Patient Position: Sitting)   Pulse 76   Temp (!) 96 °F (35 6 °C) (Tympanic)   Resp 16   Ht 5' 7" (1 702 m)   Wt 83 5 kg (184 lb)   BMI 28 82 kg/m²       VAS Carotid du 1/17/2020  CLINICAL:  Indications:  9 month surveillance for progression of disease  The patient is  asymptomatic  Operative History:  2018-07-09 Left Transluminal placement of ICA stent, percutaneous  Risk Factors: The patient has history of HTN, HLD, CAD and previous smoking (quit <1yr ago)  The patient current BMI is 28 19, Weight is 180 lb and height is 67 in  Clinical:  Right Pressure:  124/68 mm Hg, Left Pressure:  122/68 mm Hg  FINDINGS:     Segment            Right                               Left                                    Impression  PSV  EDV (cm/s)  Ratio  PSV  EDV (cm/s)  Ratio    Dist  ICA                       90          28   1 29   59          26   0 84    Mid  ICA                       110          31   1 57   96          37   1 37    Prox  ICA          50 - 69%    140          39   2 00                            Prox  ICA - Stent                                       91          31   1 30    Dist CCA                        72          21                                   Dist CCA - Stent                                        69          20           Mid CCA                         70          24   0 91   70          19   1 03    Prox CCA                        77          19   1 04   68          21           Ext Carotid                    230          33   3 29  195          20   2 79    Prox Vert                       72          20          57          18           Subclavian                     116                     244                       Innominate                      74                                                        CONCLUSION:     Impression  RIGHT:  There is 50-69% stenosis noted in the internal carotid artery  Plaque is  heterogenous and irregular  Vertebral artery flow is antegrade  There is no significant subclavian artery  disease       LEFT:  Patent stent noted in the distal common carotid artery and internal carotid  artery  Vertebral artery flow is antegrade  Elevated velocities noted in the subclavian  Artery  Physical Exam   Constitutional: He is oriented to person, place, and time  He appears well-developed and well-nourished  He is cooperative  HENT:   Head: Normocephalic and atraumatic  Eyes: Pupils are equal, round, and reactive to light  EOM are normal    Neck: Trachea normal  Neck supple  No JVD present  LEFT carotid artery bruit   Cardiovascular: Normal rate, regular rhythm, S1 normal, S2 normal and normal heart sounds  Exam reveals no gallop and no friction rub  No murmur heard  Pulses:       Carotid pulses are 2+ on the right side, and 2+ on the left side  Radial pulses are 2+ on the right side, and 2+ on the left side  Pulmonary/Chest: Effort normal and breath sounds normal  No accessory muscle usage  No respiratory distress  He has no wheezes  He has no rales  Abdominal: Soft  Bowel sounds are normal  He exhibits no distension  There is no hepatosplenomegaly  There is no tenderness  Musculoskeletal: Normal range of motion  He exhibits no edema or deformity  R hand - missing 2 digits (remote work trauma)   Neurological: He is alert and oriented to person, place, and time  Grossly normal    Skin: Skin is warm and dry  No lesion and no rash noted  No cyanosis  Nails show no clubbing  Psychiatric: He has a normal mood and affect  Nursing note and vitals reviewed  I have reviewed and made appropriate changes to the review of systems input by the medical assistant      Vitals:    01/31/20 0831 01/31/20 0835   BP: 134/80 142/78   BP Location: Right arm Left arm   Patient Position: Sitting Sitting   Pulse: 76    Resp: 16    Temp: (!) 96 °F (35 6 °C)    TempSrc: Tympanic    Weight: 83 5 kg (184 lb)    Height: 5' 7" (1 702 m)        Patient Active Problem List   Diagnosis    Left carotid artery stenosis    Essential hypertension    Dyslipidemia    Symptomatic stenosis of left carotid artery without infarction    Coronary artery disease of native artery of native heart with stable angina pectoris (HCC)    S/P CABG x 3    Acute blood loss as cause of postoperative anemia    Hypokalemia    Tachycardia    Encounter for postoperative care    [facility])    Pancreatitis    Leukocytosis    Asymptomatic stenosis of right carotid artery       Past Surgical History:   Procedure Laterality Date    AMPUTATION OF REPLICATED FINGERS       rt 2nd and 3rd fingers traumatically amputated at work   [address] GRAFT  07/2018    x 3    MANDIBLE FRACTURE SURGERY      upper and lower    ORBITAL FRACTURE SURGERY Right     NC CABG, VEIN, FOUR N/A 7/24/2018    Procedure: CORONARY ARTERY BYPASS GRAFT (CABG) 3 VESSELS ; NATE to LAD, SVG--> OM and Distal right;  Surgeon: Zaida Iyer DO;  [facility];  Service: Cardiac Surgery    NC ECHO TRANSESOPHAG MONTR CARDIAC PUMP FUNCTJ N/A 7/24/2018    Procedure: TRANSESOPHAGEAL ECHOCARDIOGRAM (HECTOR);   Surgeon: Zaida Iyer DO;  [facility];  Service: Cardiac Surgery    NC TCAT IV STENT CRV CRTD ART EMBOLIC PROTECJ Left 7/6/2540    Procedure: Edwinna Harada;  Surgeon: Efra Astorga MD;  [facility];  Service: Vascular       Family History   Problem Relation Age of Onset    Cancer Mother     Heart attack Father     Stroke Father        Social History     Socioeconomic History    Marital status: /Civil Union     Spouse name: Not on file    Number of children: Not on file    Years of education: Not on file    Highest education level: Not on file   Occupational History    Not on file   Social Needs    Financial resource strain: Not on file    Food insecurity:     Worry: Not on file     Inability: Not on file    Transportation needs:     Medical: Not on file     Non-medical: Not on file   Tobacco Use    Smoking status: Former Smoker     Packs/day: 1 00     Last attempt to quit: 2018     Years since quittin 7    Smokeless tobacco: Current User     Types: Chew     Last attempt to quit: 5/3/2018   Substance and Sexual Activity    Alcohol use: Not Currently    Drug use: No    Sexual activity: Not on file   Lifestyle    Physical activity:     Days per week: Not on file     Minutes per session: Not on file    Stress: Not on file   Relationships    Social connections:     Talks on phone: Not on file     Gets together: Not on file     Attends Yarsani service: Not on file     Active member of club or organization: Not on file     Attends meetings of clubs or organizations: Not on file     Relationship status: Not on file    Intimate partner violence:     Fear of current or ex partner: Not on file     Emotionally abused: Not on file     Physically abused: Not on file     Forced sexual activity: Not on file   Other Topics Concern    Not on file   Social History Narrative    Not on file       Allergies   Allergen Reactions    Percocet [Oxycodone-Acetaminophen] Itching     Trouble sleeping          Current Outpatient Medications:     aspirin (ECOTRIN LOW STRENGTH) 81 mg EC tablet, Take 1 tablet (81 mg total) by mouth daily, Disp: 90 tablet, Rfl: 0    atorvastatin (LIPITOR) 80 mg tablet, TAKE 1 TABLET (80 MG TOTAL) BY MOUTH DAILY WITH DINNER, Disp: 30 tablet, Rfl: 2    clopidogrel (PLAVIX) 75 mg tablet, Take 1 tablet (75 mg total) by mouth daily, Disp: 30 tablet, Rfl: 5    losartan (COZAAR) 50 mg tablet, TAKE 1 TABLET BY MOUTH EVERY DAY, Disp: 30 tablet, Rfl: 11    metoprolol tartrate (LOPRESSOR) 50 mg tablet, TAKE 1 TABLET BY MOUTH EVERY 12 HOURS, Disp: 60 tablet, Rfl: 5

## 2020-01-31 NOTE — PATIENT INSTRUCTIONS
Carotid artery stenosis  S/P LEFT carotid artery stents  Moderate RIGHT carotid artery stenosis  The LEFT carotid artery stent is open  There is moderate RIGHT carotid artery stenosis which we will follow twice a year by ultrasound  Cholesterol LDL goal is less than 70 (you LDL is 95)  Work on good heart healthy diet, continue to stay active and increase walking  Call cardiology for any episodes of racing heart        Symptoms of stroke:  - Unable to speak or understand speech  - Unable to move one side of the body (arm or leg)  - Visual changes  - Call 911 for any symptoms of stroke    Healthy Lifestyle changes  - Stay active with regular activity  - Maintain a healthy weight  - Eat low fat, low cholesterol foods, low-fat dairy; recommend diet high in high in fruits and vegetables  - Maintain good blood pressure  - Optimal medical therapy aspirin 81 and atorvastatin 80        Heart Healthy Diet   AMBULATORY CARE:   A heart healthy diet  is an eating plan low in total fat, unhealthy fats, and sodium (salt)  A heart healthy diet helps decrease your risk for heart disease and stroke  Limit the amount of fat you eat to 25% to 35% of your total daily calories  Limit sodium to less than 2,300 mg each day  Healthy fats:  Healthy fats can help improve cholesterol levels  The risk for heart disease is decreased when cholesterol levels are normal  Choose healthy fats, such as the following:  · Unsaturated fat  is found in foods such as soybean, canola, olive, corn, and safflower oils  It is also found in soft tub margarine that is made with liquid vegetable oil  · Omega-3 fat  is found in certain fish, such as salmon, tuna, and trout, and in walnuts and flaxseed  Unhealthy fats:  Unhealthy fats can cause unhealthy cholesterol levels in your blood and increase your risk of heart disease   Limit unhealthy fats, such as the following:  · Cholesterol  is found in animal foods, such as eggs and lobster, and in dairy products made from whole milk  Limit cholesterol to less than 300 milligrams (mg) each day  You may need to limit cholesterol to 200 mg each day if you have heart disease  · Saturated fat  is found in meats, such as batres and hamburger  It is also found in chicken or turkey skin, whole milk, and butter  Limit saturated fat to less than 7% of your total daily calories  Limit saturated fat to less than 6% if you have heart disease or are at increased risk for it  · Trans fat  is found in packaged foods, such as potato chips and cookies  It is also in hard margarine, some fried foods, and shortening  Avoid trans fats as much as possible    Heart healthy foods and drinks to include:  Ask your dietitian or healthcare provider how many servings to have from each of the following food groups:  · Grains:      ¨ Whole-wheat breads, cereals, and pastas, and brown rice    ¨ Low-fat, low-sodium crackers and chips    · Vegetables:      ¨ Broccoli, green beans, green peas, and spinach    ¨ Collards, kale, and lima beans    ¨ Carrots, sweet potatoes, tomatoes, and peppers    ¨ Canned vegetables with no salt added    · Fruits:      ¨ Bananas, peaches, pears, and pineapple    ¨ Grapes, raisins, and dates    ¨ Oranges, tangerines, grapefruit, orange juice, and grapefruit juice    ¨ Apricots, mangoes, melons, and papaya    ¨ Raspberries and strawberries    ¨ Canned fruit with no added sugar    · Low-fat dairy products:      ¨ Nonfat (skim) milk, 1% milk, and low-fat almond, cashew, or soy milks fortified with calcium    ¨ Low-fat cheese, regular or frozen yogurt, and cottage cheese    · Meats and proteins , such as lean cuts of beef and pork (loin, leg, round), skinless chicken and turkey, legumes, soy products, egg whites, and nuts  Foods and drinks to limit or avoid:  Ask your dietitian or healthcare provider about these and other foods that are high in unhealthy fat, sodium, and sugar:  · Snack or packaged foods , such as frozen dinners, cookies, macaroni and cheese, and cereals with more than 300 mg of sodium per serving    · Canned or dry mixes  for cakes, soups, sauces, or gravies    · Vegetables with added sodium , such as instant potatoes, vegetables with added sauces, or regular canned vegetables    · Other foods high in sodium , such as ketchup, barbecue sauce, salad dressing, pickles, olives, soy sauce, and miso    · High-fat dairy foods  such as whole or 2% milk, cream cheese, or sour cream, and cheeses     · High-fat protein foods  such as high-fat cuts of beef (T-bone steaks, ribs), chicken or turkey with skin, and organ meats, such as liver    · Cured or smoked meats , such as hot dogs, batres, and sausage    · Unhealthy fats and oils , such as butter, stick margarine, shortening, and cooking oils such as coconut or palm oil    · Food and drinks high in sugar , such as soft drinks (soda), sports drinks, sweetened tea, candy, cake, cookies, pies, and doughnuts  Other diet guidelines to follow:   · Eat more foods containing omega-3 fats  Eat fish high in omega-3 fats at least 2 times a week  · Limit alcohol  Too much alcohol can damage your heart and raise your blood pressure  Women should limit alcohol to 1 drink a day  Men should limit alcohol to 2 drinks a day  A drink of alcohol is 12 ounces of beer, 5 ounces of wine, or 1½ ounces of liquor  · Choose low-sodium foods  High-sodium foods can lead to high blood pressure  Add little or no salt to food you prepare  Use herbs and spices in place of salt  · Eat more fiber  to help lower cholesterol levels  Eat at least 5 servings of fruits and vegetables each day  Eat 3 ounces of whole-grain foods each day  Legumes (beans) are also a good source of fiber  Lifestyle guidelines:   · Do not smoke  Nicotine and other chemicals in cigarettes and cigars can cause lung and heart damage   Ask your healthcare provider for information if you currently smoke and need help to quit  E-cigarettes or smokeless tobacco still contain nicotine  Talk to your healthcare provider before you use these products  · Exercise regularly  to help you maintain a healthy weight and improve your blood pressure and cholesterol levels  Ask your healthcare provider about the best exercise plan for you  Do not start an exercise program without asking your healthcare provider  Follow up with your healthcare provider as directed:  Write down your questions so you remember to ask them during your visits  © 2017 [facility] Information is for End User's use only and may not be sold, redistributed or otherwise used for commercial purposes  All illustrations and images included in CareNotes® are the copyrighted property of A D A M [employer] Inc  or Steve Davis  The above information is an  only  It is not intended as medical advice for individual conditions or treatments  Talk to your doctor, nurse or pharmacist before following any medical regimen to see if it is safe and effective for you

## 2020-01-31 NOTE — LETTER
February 2, 2020     David Beltran, [address] [address]    Patient: Tor Door  YOB: 1955   Date of Visit: 1/31/2020     Dear Dr Kaylyn Hurst      Thank you for referring Gregory Mcleod to me for evaluation  Below are the relevant portions of my assessment and plan of care  If you have questions, please do not hesitate to call me  I look forward to following [place] along with you  Sincerely,        Sara Cruz PA-C        CC: No Recipients    Progress Notes:    Assessment/Plan:    Carotid artery stenosis  S/P LEFT carotid artery stents (1/9/18)  Moderate RIGHT carotid artery stenosis      -No sx of TIA/stroke; no further amaurosis  -walks dog for 1-2 miles on most days   -projects at home  -tachycardia/hx of SVT; follow up with cardiology    VAS Carotid du 1/17/2020  R 50-69% 140/39; 2 0  L patent stents distal CCA and ICA 91/31/1 3    Plan:  -Doing well  No sx TIA/stroke  -The LEFT carotid artery stents are open  There is moderate RIGHT carotid artery stenosis which we will follow twice a year by ultrasound    -Cholesterol LDL goal is less than 70 (LDL is 95)  Continue with high dose statin therapy    -Work on good heart healthy diet, continue to stay active and increase walking    -Continue with optimal medical therapy on aspirin 81, clopidogrel 75, losartan 50 and atorvastatin 80  -In terms of the carotid stenosis, he is does not definitely require clopidogrel     -He should follow up with cardiology regarding continued clopidogrel, as well as sx of tachycardia and racing heart   -Call cardiology for any episodes of racing heart  -Carotid duplex 6 months  -OV in 1 year or sooner, if needed

## 2020-02-02 PROBLEM — I65.21 ASYMPTOMATIC STENOSIS OF RIGHT CAROTID ARTERY: Status: ACTIVE | Noted: 2020-02-02

## 2020-02-12 DIAGNOSIS — Z95.1 S/P CABG X 3: ICD-10-CM

## 2020-02-14 RX ORDER — ATORVASTATIN CALCIUM 80 MG/1
80 TABLET, FILM COATED ORAL
Qty: 30 TABLET | Refills: 2 | Status: SHIPPED | OUTPATIENT
Start: 2020-02-14 | End: 2020-03-05 | Stop reason: SDUPTHER

## 2020-02-24 ENCOUNTER — OFFICE VISIT (OUTPATIENT)
Dept: CARDIOLOGY CLINIC | Facility: CLINIC | Age: 65
End: 2020-02-24
Payer: COMMERCIAL

## 2020-02-24 VITALS
SYSTOLIC BLOOD PRESSURE: 126 MMHG | WEIGHT: 182 LBS | DIASTOLIC BLOOD PRESSURE: 72 MMHG | HEART RATE: 58 BPM | OXYGEN SATURATION: 98 % | HEIGHT: 67 IN | BODY MASS INDEX: 28.56 KG/M2

## 2020-02-24 DIAGNOSIS — I25.118 CORONARY ARTERY DISEASE OF NATIVE ARTERY OF NATIVE HEART WITH STABLE ANGINA PECTORIS (HCC): Primary | ICD-10-CM

## 2020-02-24 DIAGNOSIS — R06.02 SHORT OF BREATH ON EXERTION: ICD-10-CM

## 2020-02-24 DIAGNOSIS — R00.0 TACHYCARDIA: ICD-10-CM

## 2020-02-24 DIAGNOSIS — I47.1 SVT (SUPRAVENTRICULAR TACHYCARDIA) (HCC): ICD-10-CM

## 2020-02-24 DIAGNOSIS — Z95.1 S/P CABG X 3: ICD-10-CM

## 2020-02-24 DIAGNOSIS — E78.5 DYSLIPIDEMIA: ICD-10-CM

## 2020-02-24 DIAGNOSIS — I10 ESSENTIAL HYPERTENSION: ICD-10-CM

## 2020-02-24 DIAGNOSIS — I65.22 LEFT CAROTID STENOSIS: ICD-10-CM

## 2020-02-24 PROCEDURE — 99214 OFFICE O/P EST MOD 30 MIN: CPT | Performed by: INTERNAL MEDICINE

## 2020-02-24 PROCEDURE — 93000 ELECTROCARDIOGRAM COMPLETE: CPT | Performed by: INTERNAL MEDICINE

## 2020-02-24 RX ORDER — METOPROLOL SUCCINATE 50 MG/1
50 TABLET, EXTENDED RELEASE ORAL DAILY
Qty: 30 TABLET | Refills: 2 | Status: SHIPPED | OUTPATIENT
Start: 2020-02-24 | End: 2020-03-05 | Stop reason: SDUPTHER

## 2020-02-24 RX ORDER — EZETIMIBE 10 MG/1
10 TABLET ORAL DAILY
Qty: 90 TABLET | Refills: 3 | Status: SHIPPED | OUTPATIENT
Start: 2020-02-24 | End: 2020-03-05 | Stop reason: SDUPTHER

## 2020-02-24 NOTE — PATIENT INSTRUCTIONS
Switched metoprolol twice a day version to once a day version (toprol XL)  Added a new cholesterol medication - Zetia  Echocardiogram   Repeat lipid panel in 3 months  Quit smoking - stop Plavix once off cigarettes

## 2020-02-24 NOTE — PROGRESS NOTES
Cardiology Follow Up    Janit Guest  1955  [id number]  [address] [facility] [address] [address]  [phone]    1  Coronary artery disease of native artery of native heart with stable angina pectoris (HCC)  POCT ECG   2  S/P CABG x 3  POCT ECG   3  Left carotid stenosis  POCT ECG   4  Essential hypertension  POCT ECG   5  SVT (supraventricular tachycardia) (HCC)  POCT ECG   6  Dyslipidemia  POCT ECG   7  Short of breath on exertion  POCT ECG   8  Tachycardia  POCT ECG       Interval History: MR Waleska Elizondo is a 60year old male here for followup of CAD and SVT  On 7/24/18 had 3 vessel CABG with LIMA-LAD, SVG-OM1, SVG- RCA because of dyspnea and abnormal stress test       Had carotid artery stent on 7/9/2018  Patient also has a history of supraventricular tachycardia and feels intermittent palpitations  He has not had any prolonged episodes since initiation of metoprolol  Was evaluated by EP in the past   He has intermittent episodes of palpitations with last episode lasting 10 minutes 3 weeks ago  He denies any syncope or near-syncope  He denies any chest pain shortness of breath or lower extremity edema           Patient Active Problem List   Diagnosis    Left carotid artery stenosis    Essential hypertension    Dyslipidemia    Symptomatic stenosis of left carotid artery without infarction    Coronary artery disease of native artery of native heart with stable angina pectoris (HCC)    S/P CABG x 3    Acute blood loss as cause of postoperative anemia    Hypokalemia    Tachycardia    Encounter for postoperative care    [facility]    Pancreatitis    Leukocytosis    Asymptomatic stenosis of right carotid artery     Past Medical History:   Diagnosis Date    Carotid stenosis, left     s/p IR stent    Coronary artery disease     Former tobacco use     Hard of hearing     Hyperlipidemia     Hypertension     Parkinson disease ([facility] [address] )     SV[facility]ricular tachycardia) ([facility])      Social History     Socioeconomic History    Marital status: /Civil Union     Spouse name: Not on file    Number of children: Not on file    Years of education: Not on file    Highest education level: Not on file   Occupational History    Not on file   Social Needs    Financial resource strain: Not on file    Food insecurity:     Worry: Not on file     Inability: Not on file    Transportation needs:     Medical: Not on file     Non-medical: Not on file   Tobacco Use    Smoking status: Former Smoker     Packs/day: 1 00     Last attempt to quit: 2018     Years since quittin 7    Smokeless tobacco: Current User     Types: Chew     Last attempt to quit: 5/3/2018   Substance and Sexual Activity    Alcohol use: Not Currently    Drug use: No    Sexual activity: Not on file   Lifestyle    Physical activity:     Days per week: Not on file     Minutes per session: Not on file    Stress: Not on file   Relationships    Social connections:     Talks on phone: Not on file     Gets together: Not on file     Attends Restorationism service: Not on file     Active member of club or organization: Not on file     Attends meetings of clubs or organizations: Not on file     Relationship status: Not on file    Intimate partner violence:     Fear of current or ex partner: Not on file     Emotionally abused: Not on file     Physically abused: Not on file     Forced sexual activity: Not on file   Other Topics Concern    Not on file   Social History Narrative    Not on file      Family History   Problem Relation Age of Onset    Cancer Mother     Heart attack Father     Stroke Father      Past Surgical History:   Procedure Laterality Date    AMPUTATION OF REPLICATED FINGERS       rt 2nd and 3rd fingers traumatically amputated at work    APPENDECTOMY      CORONARY ARTERY BYPASS GRAFT  07/2018    x 3    MANDIBLE FRACTURE SURGERY      upper and lower    ORBITAL FRACTURE SURGERY Right     AR CABG, VEIN, FOUR N/A 7/24/2018    Procedure: CORONARY ARTERY BYPASS GRAFT (CABG) 3 VESSELS ; NATE to LAD, SVG--> OM and Distal right;  Surgeon: Lashae Calvillo DO;  [facility];  Service: Cardiac Surgery    AR ECHO TRANSESOPHAG MONTR CARDIAC PUMP FUNCTJ N/A 7/24/2018    Procedure: TRANSESOPHAGEAL ECHOCARDIOGRAM (HECTOR);   Surgeon: Lashae Calvillo DO;  [facility];  Service: Cardiac Surgery    AR TCAT IV STENT CRV CRTD ART EMBOLIC PROTECJ Left 2/9/9460    Procedure: TRANSCAROTID ARTERY REVASCULARIZATION;  Surgeon: Manpreet Jones MD;  [facility];  Service: Vascular       Current Outpatient Medications:     aspirin (ECOTRIN LOW STRENGTH) 81 mg EC tablet, Take 1 tablet (81 mg total) by mouth daily, Disp: 90 tablet, Rfl: 0    atorvastatin (LIPITOR) 80 mg tablet, TAKE 1 TABLET (80 MG TOTAL) BY MOUTH DAILY WITH DINNER, Disp: 30 tablet, Rfl: 2    clopidogrel (PLAVIX) 75 mg tablet, Take 1 tablet (75 mg total) by mouth daily, Disp: 30 tablet, Rfl: 5    losartan (COZAAR) 50 mg tablet, TAKE 1 TABLET BY MOUTH EVERY DAY, Disp: 30 tablet, Rfl: 11    metoprolol tartrate (LOPRESSOR) 50 mg tablet, TAKE 1 TABLET BY MOUTH EVERY 12 HOURS, Disp: 60 tablet, Rfl: 5  Allergies   Allergen Reactions    Percocet [Oxycodone-Acetaminophen] Itching     Trouble sleeping        Labs:  Lab Results   Component Value Date    K 3 7 09/13/2019     09/13/2019    CO2 26 09/13/2019    CO2 30 07/24/2018    BUN 7 09/13/2019    CREATININE 0 80 09/13/2019    GLUCOSE 109 07/24/2018    CALCIUM 8 2 (L) 09/13/2019     Lab Results   Component Value Date    WBC 13 42 (H) 09/13/2019    HGB 12 9 09/13/2019    HCT 39 2 09/13/2019    MCV 90 09/13/2019     09/13/2019     Lab Results   Component Value Date    TRIG 181 (H) 09/11/2019    HDL 41 09/11/2019     Review of Systems:  Review of Systems   Constitutional: Negative for chills, fatigue and fever  HENT: Negative for congestion, nosebleeds and postnasal drip  Respiratory: Negative for cough, chest tightness and shortness of breath  Cardiovascular: Positive for palpitations  Negative for chest pain and leg swelling  Gastrointestinal: Negative for abdominal distention, abdominal pain, diarrhea, nausea and vomiting  Endocrine: Negative for polydipsia, polyphagia and polyuria  Musculoskeletal: Positive for arthralgias  Negative for gait problem and myalgias  Skin: Negative for color change, pallor and rash  Allergic/Immunologic: Negative for environmental allergies, food allergies and immunocompromised state  Neurological: Negative for dizziness, seizures, syncope and light-headedness  Hematological: Negative for adenopathy  Does not bruise/bleed easily  Psychiatric/Behavioral: Negative for dysphoric mood  The patient is not nervous/anxious  Physical Exam:  /72 (BP Location: Right arm, Patient Position: Sitting, Cuff Size: Standard)   Pulse 58 Comment: apical  Ht 5' 7" (1 702 m)   Wt 82 6 kg (182 lb)   SpO2 98%   BMI 28 51 kg/m²     Physical Exam   Constitutional: He is oriented to person, place, and time  He appears well-developed  No distress  HENT:   Head: Normocephalic and atraumatic  Eyes: Pupils are equal, round, and reactive to light  Conjunctivae and EOM are normal    Neck: Neck supple  No JVD present  No thyromegaly present  Cardiovascular: Normal rate and regular rhythm  Exam reveals no gallop and no friction rub  Murmur heard  Pulmonary/Chest: Effort normal and breath sounds normal    Abdominal: Soft  He exhibits no distension  There is no tenderness  Musculoskeletal: He exhibits no edema  Neurological: He is alert and oriented to person, place, and time  No cranial nerve deficit  Skin: Skin is warm and dry  No rash noted  He is not diaphoretic  No erythema     Psychiatric: He has a normal mood and affect  His behavior is normal  Judgment and thought content normal        Discussion/Summary:  1  Coronary artery disease of native artery of native heart with stable angina pectoris ([facility] [address] )    2  S/P CABG x 3    3  Left carotid stenosis    4  Essential hypertension    5  [facility]ra[facility]ricular tachycardia) ([facility] )    6  Dyslipidemia    7  Short of breath on exertion    8  Tachycardia      - Continue metoprolol    - ASA and Plavix - may discontinue Plavix if he is able to quit smoking   - Atorvastatin 80 mg daily  - Check lipid profile     - Discussed risk factor reduction including refraining from smoking, eating a diet high in fruits and vegetables, maintaining a healthy weight, limiting screen time along with controlling BP and cholesterol   - Switched metoprolol twice a day version to once a day version (toprol XL)  Added a new cholesterol medication - Zetia  Echocardiogram   Repeat lipid panel in 3 months  Quit smoking - stop Plavix once off cigarettes

## 2020-02-28 ENCOUNTER — TELEPHONE (OUTPATIENT)
Dept: CARDIOLOGY CLINIC | Facility: CLINIC | Age: 65
End: 2020-02-28

## 2020-03-03 DIAGNOSIS — I10 ESSENTIAL HYPERTENSION: ICD-10-CM

## 2020-03-03 DIAGNOSIS — I65.22 LEFT CAROTID STENOSIS: ICD-10-CM

## 2020-03-03 DIAGNOSIS — I47.1 SVT (SUPRAVENTRICULAR TACHYCARDIA) (HCC): ICD-10-CM

## 2020-03-03 DIAGNOSIS — Z95.1 S/P CABG X 3: ICD-10-CM

## 2020-03-03 DIAGNOSIS — E78.5 DYSLIPIDEMIA: ICD-10-CM

## 2020-03-05 RX ORDER — CLOPIDOGREL BISULFATE 75 MG/1
75 TABLET ORAL DAILY
Qty: 90 TABLET | Refills: 3 | Status: SHIPPED | OUTPATIENT
Start: 2020-03-05 | End: 2021-02-12

## 2020-03-05 RX ORDER — LOSARTAN POTASSIUM 50 MG/1
50 TABLET ORAL DAILY
Qty: 90 TABLET | Refills: 3 | Status: SHIPPED | OUTPATIENT
Start: 2020-03-05 | End: 2021-03-01

## 2020-03-05 RX ORDER — ATORVASTATIN CALCIUM 80 MG/1
80 TABLET, FILM COATED ORAL
Qty: 90 TABLET | Refills: 3 | Status: SHIPPED | OUTPATIENT
Start: 2020-03-05 | End: 2021-02-12

## 2020-03-05 RX ORDER — METOPROLOL SUCCINATE 50 MG/1
50 TABLET, EXTENDED RELEASE ORAL DAILY
Qty: 90 TABLET | Refills: 3 | Status: SHIPPED | OUTPATIENT
Start: 2020-03-05 | End: 2021-02-12

## 2020-03-05 RX ORDER — EZETIMIBE 10 MG/1
10 TABLET ORAL DAILY
Qty: 90 TABLET | Refills: 3 | Status: SHIPPED | OUTPATIENT
Start: 2020-03-05 | End: 2021-02-12

## 2020-03-10 ENCOUNTER — TELEPHONE (OUTPATIENT)
Dept: CARDIOLOGY CLINIC | Facility: CLINIC | Age: 65
End: 2020-03-10

## 2020-03-10 ENCOUNTER — HOSPITAL ENCOUNTER (OUTPATIENT)
Dept: NON INVASIVE DIAGNOSTICS | Facility: HOSPITAL | Age: 65
Discharge: HOME | End: 2020-03-10
Attending: INTERNAL MEDICINE
Payer: COMMERCIAL

## 2020-03-10 DIAGNOSIS — I25.118 CORONARY ARTERY DISEASE OF NATIVE ARTERY OF NATIVE HEART WITH STABLE ANGINA PECTORIS (HCC): ICD-10-CM

## 2020-03-10 DIAGNOSIS — Z95.1 S/P CABG X 3: ICD-10-CM

## 2020-03-10 PROCEDURE — 93306 TTE W/DOPPLER COMPLETE: CPT | Performed by: INTERNAL MEDICINE

## 2020-03-10 PROCEDURE — 93306 TTE W/DOPPLER COMPLETE: CPT

## 2020-03-10 NOTE — TELEPHONE ENCOUNTER
----- Message from Keyla Kim DO sent at 3/10/2020  4:12 PM EDT -----  Can you please let the patient know echo looked good including function and valves

## 2020-05-18 ENCOUNTER — OFFICE VISIT (OUTPATIENT)
Dept: CARDIOLOGY CLINIC | Facility: CLINIC | Age: 65
End: 2020-05-18
Payer: COMMERCIAL

## 2020-05-18 VITALS
WEIGHT: 190 LBS | OXYGEN SATURATION: 98 % | TEMPERATURE: 95 F | DIASTOLIC BLOOD PRESSURE: 70 MMHG | BODY MASS INDEX: 29.82 KG/M2 | HEIGHT: 67 IN | HEART RATE: 65 BPM | SYSTOLIC BLOOD PRESSURE: 120 MMHG

## 2020-05-18 DIAGNOSIS — I47.1 SVT (SUPRAVENTRICULAR TACHYCARDIA) (HCC): ICD-10-CM

## 2020-05-18 DIAGNOSIS — R00.0 TACHYCARDIA: ICD-10-CM

## 2020-05-18 DIAGNOSIS — I10 ESSENTIAL HYPERTENSION: ICD-10-CM

## 2020-05-18 DIAGNOSIS — E78.5 DYSLIPIDEMIA: ICD-10-CM

## 2020-05-18 DIAGNOSIS — I65.22 LEFT CAROTID ARTERY STENOSIS: Chronic | ICD-10-CM

## 2020-05-18 DIAGNOSIS — I25.118 CORONARY ARTERY DISEASE OF NATIVE ARTERY OF NATIVE HEART WITH STABLE ANGINA PECTORIS (HCC): Primary | ICD-10-CM

## 2020-05-18 DIAGNOSIS — Z95.1 S/P CABG X 3: ICD-10-CM

## 2020-05-18 PROCEDURE — 99214 OFFICE O/P EST MOD 30 MIN: CPT | Performed by: INTERNAL MEDICINE

## 2020-12-08 ENCOUNTER — TRANSCRIBE ORDERS (OUTPATIENT)
Dept: ADMINISTRATIVE | Facility: HOSPITAL | Age: 65
End: 2020-12-08

## 2020-12-08 ENCOUNTER — LAB (OUTPATIENT)
Dept: LAB | Facility: HOSPITAL | Age: 65
End: 2020-12-08
Attending: INTERNAL MEDICINE
Payer: COMMERCIAL

## 2020-12-08 DIAGNOSIS — E78.5 DYSLIPIDEMIA: ICD-10-CM

## 2020-12-08 LAB
ALBUMIN SERPL BCP-MCNC: 3.7 G/DL (ref 3.5–5)
ALP SERPL-CCNC: 98 U/L (ref 46–116)
ALT SERPL W P-5'-P-CCNC: 38 U/L (ref 12–78)
ANION GAP SERPL CALCULATED.3IONS-SCNC: 7 MMOL/L (ref 4–13)
AST SERPL W P-5'-P-CCNC: 19 U/L (ref 5–45)
BILIRUB SERPL-MCNC: 0.6 MG/DL (ref 0.2–1)
BUN SERPL-MCNC: 14 MG/DL (ref 5–25)
CALCIUM SERPL-MCNC: 9.3 MG/DL (ref 8.3–10.1)
CHLORIDE SERPL-SCNC: 104 MMOL/L (ref 100–108)
CHOLEST SERPL-MCNC: 116 MG/DL (ref 50–200)
CO2 SERPL-SCNC: 29 MMOL/L (ref 21–32)
CREAT SERPL-MCNC: 1.04 MG/DL (ref 0.6–1.3)
GFR SERPL CREATININE-BSD FRML MDRD: 76 ML/MIN/1.73SQ M
GLUCOSE SERPL-MCNC: 97 MG/DL (ref 65–140)
HDLC SERPL-MCNC: 36 MG/DL
LDLC SERPL CALC-MCNC: 42 MG/DL (ref 0–100)
NONHDLC SERPL-MCNC: 80 MG/DL
POTASSIUM SERPL-SCNC: 4 MMOL/L (ref 3.5–5.3)
PROT SERPL-MCNC: 7.7 G/DL (ref 6.4–8.2)
SODIUM SERPL-SCNC: 140 MMOL/L (ref 136–145)
TRIGL SERPL-MCNC: 188 MG/DL

## 2020-12-08 PROCEDURE — 36415 COLL VENOUS BLD VENIPUNCTURE: CPT | Performed by: INTERNAL MEDICINE

## 2020-12-08 PROCEDURE — 80061 LIPID PANEL: CPT | Performed by: INTERNAL MEDICINE

## 2020-12-08 PROCEDURE — 80053 COMPREHEN METABOLIC PANEL: CPT | Performed by: INTERNAL MEDICINE

## 2020-12-09 ENCOUNTER — OFFICE VISIT (OUTPATIENT)
Dept: CARDIOLOGY CLINIC | Facility: CLINIC | Age: 65
End: 2020-12-09
Payer: COMMERCIAL

## 2020-12-09 VITALS
HEIGHT: 67 IN | BODY MASS INDEX: 30.32 KG/M2 | WEIGHT: 193.2 LBS | HEART RATE: 71 BPM | SYSTOLIC BLOOD PRESSURE: 122 MMHG | DIASTOLIC BLOOD PRESSURE: 78 MMHG | OXYGEN SATURATION: 98 % | TEMPERATURE: 98.8 F

## 2020-12-09 DIAGNOSIS — I10 ESSENTIAL HYPERTENSION: ICD-10-CM

## 2020-12-09 DIAGNOSIS — Z95.1 S/P CABG X 3: ICD-10-CM

## 2020-12-09 DIAGNOSIS — I65.22 LEFT CAROTID ARTERY STENOSIS: Chronic | ICD-10-CM

## 2020-12-09 DIAGNOSIS — I25.118 CORONARY ARTERY DISEASE OF NATIVE ARTERY OF NATIVE HEART WITH STABLE ANGINA PECTORIS (HCC): Primary | ICD-10-CM

## 2020-12-09 PROCEDURE — 99214 OFFICE O/P EST MOD 30 MIN: CPT | Performed by: INTERNAL MEDICINE

## 2020-12-09 PROCEDURE — 93000 ELECTROCARDIOGRAM COMPLETE: CPT | Performed by: INTERNAL MEDICINE

## 2021-02-11 DIAGNOSIS — E78.5 DYSLIPIDEMIA: ICD-10-CM

## 2021-02-11 DIAGNOSIS — Z95.1 S/P CABG X 3: ICD-10-CM

## 2021-02-11 DIAGNOSIS — I65.22 LEFT CAROTID STENOSIS: ICD-10-CM

## 2021-02-11 DIAGNOSIS — I47.1 SVT (SUPRAVENTRICULAR TACHYCARDIA) (HCC): ICD-10-CM

## 2021-02-12 RX ORDER — METOPROLOL SUCCINATE 50 MG/1
TABLET, EXTENDED RELEASE ORAL
Qty: 90 TABLET | Refills: 3 | Status: SHIPPED | OUTPATIENT
Start: 2021-02-12 | End: 2022-02-08

## 2021-02-12 RX ORDER — ATORVASTATIN CALCIUM 80 MG/1
TABLET, FILM COATED ORAL
Qty: 90 TABLET | Refills: 3 | Status: SHIPPED | OUTPATIENT
Start: 2021-02-12 | End: 2022-02-08

## 2021-02-12 RX ORDER — CLOPIDOGREL BISULFATE 75 MG/1
TABLET ORAL
Qty: 90 TABLET | Refills: 3 | Status: SHIPPED | OUTPATIENT
Start: 2021-02-12 | End: 2022-02-08

## 2021-02-12 RX ORDER — EZETIMIBE 10 MG/1
TABLET ORAL
Qty: 90 TABLET | Refills: 3 | Status: SHIPPED | OUTPATIENT
Start: 2021-02-12 | End: 2022-02-08

## 2021-02-28 DIAGNOSIS — I10 ESSENTIAL HYPERTENSION: ICD-10-CM

## 2021-03-01 RX ORDER — LOSARTAN POTASSIUM 50 MG/1
TABLET ORAL
Qty: 90 TABLET | Refills: 3 | Status: SHIPPED | OUTPATIENT
Start: 2021-03-01 | End: 2022-02-28

## 2021-06-08 ENCOUNTER — HOSPITAL ENCOUNTER (EMERGENCY)
Facility: HOSPITAL | Age: 66
Discharge: HOME | End: 2021-06-08
Attending: EMERGENCY MEDICINE | Admitting: EMERGENCY MEDICINE
Payer: COMMERCIAL

## 2021-06-08 ENCOUNTER — APPOINTMENT (EMERGENCY)
Dept: RADIOLOGY | Facility: HOSPITAL | Age: 66
End: 2021-06-08
Payer: COMMERCIAL

## 2021-06-08 VITALS
TEMPERATURE: 98.2 F | HEART RATE: 72 BPM | DIASTOLIC BLOOD PRESSURE: 67 MMHG | RESPIRATION RATE: 18 BRPM | SYSTOLIC BLOOD PRESSURE: 144 MMHG | OXYGEN SATURATION: 98 %

## 2021-06-08 DIAGNOSIS — M25.511 ACUTE PAIN OF RIGHT SHOULDER: Primary | ICD-10-CM

## 2021-06-08 PROCEDURE — 99283 EMERGENCY DEPT VISIT LOW MDM: CPT

## 2021-06-08 PROCEDURE — 73030 X-RAY EXAM OF SHOULDER: CPT

## 2021-06-08 PROCEDURE — 99284 EMERGENCY DEPT VISIT MOD MDM: CPT | Performed by: PHYSICIAN ASSISTANT

## 2021-06-08 RX ORDER — PREDNISONE 20 MG/1
40 TABLET ORAL DAILY
Qty: 8 TABLET | Refills: 0 | Status: SHIPPED | OUTPATIENT
Start: 2021-06-08 | End: 2021-06-12

## 2021-06-08 RX ORDER — CYCLOBENZAPRINE HCL 10 MG
10 TABLET ORAL 3 TIMES DAILY PRN
Qty: 9 TABLET | Refills: 0 | Status: SHIPPED | OUTPATIENT
Start: 2021-06-08 | End: 2021-09-21

## 2021-06-08 RX ORDER — LIDOCAINE 50 MG/G
1 PATCH TOPICAL ONCE
Status: DISCONTINUED | OUTPATIENT
Start: 2021-06-08 | End: 2021-06-08 | Stop reason: HOSPADM

## 2021-06-08 RX ORDER — PREDNISONE 20 MG/1
60 TABLET ORAL ONCE
Status: COMPLETED | OUTPATIENT
Start: 2021-06-08 | End: 2021-06-08

## 2021-06-08 RX ORDER — CYCLOBENZAPRINE HCL 10 MG
10 TABLET ORAL ONCE
Status: COMPLETED | OUTPATIENT
Start: 2021-06-08 | End: 2021-06-08

## 2021-06-08 RX ADMIN — PREDNISONE 60 MG: 20 TABLET ORAL at 09:37

## 2021-06-08 RX ADMIN — LIDOCAINE 5% 1 PATCH: 700 PATCH TOPICAL at 09:39

## 2021-06-08 RX ADMIN — CYCLOBENZAPRINE HYDROCHLORIDE 10 MG: 10 TABLET, FILM COATED ORAL at 09:38

## 2021-06-08 NOTE — DISCHARGE INSTRUCTIONS
PLEASE DO NOT DRIVE OR OPERATE HEAVY MACHINERY WHILE TAKING FLEXERIL AS THIS MEDICATION MAY MAKE YOU SLEEPY AND AT RISK FOR FALLS OR INJURY  PLEASE USE ARM SLING SPARINGLY TO AVOID A FURTHER STIFF JOINT  PLEASE ONLY USE FOR TWO DAYS AND TAKE OUT OF SLING AND PERFORM SMALL [place] RANGE OF MOTION EXERCISES

## 2021-06-08 NOTE — ED PROVIDER NOTES
History  Chief Complaint   Patient presents with    Shoulder Pain     Pt states he woke up saturday morning and couldnt move his right shoulder  Denies injury or trauma  No numbness or tingling      72 y/o male coming today with right shoulder pain x 4 days after waking up and having some stiffness in the morning  States that the pain is along the anterior aspect of the right shoulder  Has not had any falls or injuries however does perform repetitive external rotation motions at work over the past many years  Has not had any increase in physical activity  Pain is generally constant, taking Tylenol with some relief however he is on blood thinners and has not taken any other medication  Last dosage of Tylenol was at 4:00 a m  This morning  Patient is right-hand dominant  Has begun to have some right-sided neck pain as well  Has not had any limb swelling, numbness, paresthesias  Chronically does not have any weakness of this extremity  States that as time has gone on his pain and range of motion have worsened  Denies any other joint pain  Denies shortness of breath, nausea, vomiting, chest pain, headaches, numbness, paresthesias, falls or injuries, limb swelling or discoloration  Prior to Admission Medications   Prescriptions Last Dose Informant Patient Reported?  Taking?   aspirin (ECOTRIN LOW STRENGTH) 81 mg EC tablet  Self No No   Sig: Take 1 tablet (81 mg total) by mouth daily   atorvastatin (LIPITOR) 80 mg tablet   No No   Sig: TAKE 1 TABLET DAILY WITH DINNER   clopidogrel (PLAVIX) 75 mg tablet   No No   Sig: TAKE 1 TABLET DAILY   ezetimibe (ZETIA) 10 mg tablet   No No   Sig: TAKE 1 TABLET DAILY   losartan (COZAAR) 50 mg tablet   No No   Sig: TAKE 1 TABLET DAILY   metoprolol succinate (TOPROL-XL) 50 mg 24 hr tablet   No No   Sig: TAKE 1 TABLET DAILY      Facility-Administered Medications: None       Past Medical History:   Diagnosis Date    Carotid stenosis, left     s/p IR stent    Coronary artery disease     Former tobacco use     Hard of hearing     Hyperlipidemia     Hypertension     [facility] disease [facility] [address] )     SVT (supraventricular tachycardia) ([facility] [address] )        Past Surgical History:   Procedure Laterality Date    AMPUTATION OF REPLICATED FINGERS       rt 2nd and 3rd fingers traumatically amputated at work   [address] GRAFT  07/2018    x 3    MANDIBLE FRACTURE SURGERY      upper and lower    ORBITAL FRACTURE SURGERY Right     GA CABG, VEIN, FOUR N/A 7/24/2018    Procedure: CORONARY ARTERY BYPASS GRAFT (CABG) 3 VESSELS ; NATE to LAD, SVG--> OM and Distal right;  Surgeon: Heavenly Zhu DO;  [facility];  Service: Cardiac Surgery    GA ECHO TRANSESOPHAG MONTR CARDIAC PUMP FUNCTJ N/A 7/24/2018    Procedure: TRANSESOPHAGEAL ECHOCARDIOGRAM (HECTOR); Surgeon: Heavenly hZu DO;  [facility];  Service: Cardiac Surgery    GA TCAT IV STENT CRV CRTD ART EMBOLIC PROTECJ Left 6/4/3692    Procedure: TRANSCAROTID ARTERY REVASCULARIZATION;  Surgeon: Aileen Nixon MD;  [facility];  Service: Vascular       Family History   Problem Relation Age of Onset    Cancer Mother     Heart attack Father     Stroke Father      I have reviewed and agree with the history as documented  E-Cigarette/Vaping    E-Cigarette Use Never User      E-Cigarette/Vaping Substances    Nicotine No     THC No     CBD No      Social History     Tobacco Use    Smoking status: Former Smoker     Packs/day: 1 00     Quit date: 5/14/2018     Years since quitting: 3 0    Smokeless tobacco: Current User     Types: Chew     Last attempt to quit: 5/3/2018   Substance Use Topics    Alcohol use: Not Currently    Drug use: No       Review of Systems   Constitutional: Negative  HENT: Negative  Eyes: Negative  Respiratory: Negative  Cardiovascular: Negative  Gastrointestinal: Negative  Genitourinary: Negative      Musculoskeletal: Positive for arthralgias  Negative for back pain, gait problem, joint swelling, myalgias, neck pain and neck stiffness  Skin: Negative  Neurological: Negative  All other systems reviewed and are negative  Physical Exam  Physical Exam  Vitals signs and nursing note reviewed  Constitutional:       Appearance: Normal appearance  HENT:      Head: Normocephalic and atraumatic  Right Ear: External ear normal       Left Ear: External ear normal       Nose: Nose normal    Eyes:      Conjunctiva/sclera: Conjunctivae normal    Neck:      Musculoskeletal: Normal range of motion  Cardiovascular:      Rate and Rhythm: Normal rate  Pulses: Normal pulses  Heart sounds: Normal heart sounds  No murmur  No friction rub  No gallop  Pulmonary:      Effort: Pulmonary effort is normal  No respiratory distress  Breath sounds: Normal breath sounds  No stridor  No wheezing, rhonchi or rales  Comments: S PO2 is 98% indicating adequate oxygenation  Chest:      Chest wall: No tenderness  Abdominal:      General: There is no distension  Musculoskeletal: Normal range of motion  General: No deformity  Arms:    Skin:     General: Skin is warm and dry  Capillary Refill: Capillary refill takes less than 2 seconds  Findings: No rash  Neurological:      General: No focal deficit present  Mental Status: He is alert and oriented to person, place, and time  Mental status is at baseline  Psychiatric:         Mood and Affect: Mood normal          Behavior: Behavior normal          Thought Content:  Thought content normal          Judgment: Judgment normal          Vital Signs  ED Triage Vitals [06/08/21 0849]   Temperature Pulse Respirations Blood Pressure SpO2   98 2 °F (36 8 °C) 72 18 144/67 98 %      Temp Source Heart Rate Source Patient Position - Orthostatic VS BP Location FiO2 (%)   Temporal Monitor Sitting Left arm --      Pain Score       9           Vitals:    06/08/21 0849 BP: 144/67   Pulse: 72   Patient Position - Orthostatic VS: Sitting         Visual Acuity      ED Medications  Medications   lidocaine (LIDODERM) 5 % patch 1 patch (1 patch Topical Medication Applied 6/8/21 9719)   predniSONE tablet 60 mg (60 mg Oral Given 6/8/21 5375)   cyclobenzaprine (FLEXERIL) tablet 10 mg (10 mg Oral Given 6/8/21 2425)       Diagnostic Studies  Results Reviewed     None                 XR shoulder 2+ views RIGHT    (Results Pending)              Procedures  Procedures         ED Course                                           MDM  Number of Diagnoses or Management Options  Acute pain of right shoulder:   Diagnosis management comments: Concern for rotator cuff pathology as well as adhesive capsulitis, bicipital tenditis  Will have patient f/u with orthopedics  Informed not to drive or operate heavy machinery while taking flexeril, patient tolerated very well in the ED, given education regarding this medication and side effects  Discussed with patient sling use and ways to avoid further stiff joint and to perform small ROM exercises  Patient is informed to return to the emergency department for worsening of symptoms and was given proper education regarding their diagnosis and symptoms  Otherwise the patient is informed to follow up with their primary care doctor and orthopedics for re-evaluation  The patient verbalizes understanding and agrees with above assessment and plan  All questions were answered  Please Note: Fluency Direct voice recognition software may have been used in the creation of this document  Wrong words or sound a like substitutions may have occurred due to the inherent limitations of the voice software             Amount and/or Complexity of Data Reviewed  Tests in the radiology section of CPT®: reviewed and ordered  Review and summarize past medical records: yes  Independent visualization of images, tracings, or specimens: yes        Disposition  Final diagnoses: Acute pain of right shoulder     Time reflects when diagnosis was documented in both MDM as applicable and the Disposition within this note     Time User Action Codes Description Comment    6/8/2021 10:16 AM Erika Shaikh [[id number]] Acute pain of right shoulder       ED Disposition     ED Disposition Condition Date/Time Comment    Discharge Stable Tue Jun 8, 2021 10:16 AM Ashley Salazar  discharge to home/self care  Follow-up Information     Follow up With Specialties Details Why Contact Info Additional [address] Emergency Department Emergency Medicine Go to  If symptoms worsen such as chest pain, difficulty breathing, limb swelling etc [address] [address] [address] [address] Emergency Department, [facility], On license of [facility]    Michael Morton MD Orthopedic Surgery Schedule an appointment as soon as possible for a visit in 1 day  [address]  [phone]             Patient's Medications   Discharge Prescriptions    CYCLOBENZAPRINE (FLEXERIL) 10 MG TABLET    Take 1 tablet (10 mg total) by mouth 3 (three) times a day as needed for muscle spasms for up to 3 days       Start Date: 6/8/2021  End Date: 6/11/2021       Order Dose: 10 mg       Quantity: 9 tablet    Refills: 0    PREDNISONE 20 MG TABLET    Take 2 tablets (40 mg total) by mouth daily for 4 days       Start Date: 6/8/2021  End Date: 6/12/2021       Order Dose: 40 mg       Quantity: 8 tablet    Refills: 0     No discharge procedures on file      PDMP Review     None          ED Provider  Electronically Signed by           Alina Clark PA-C  06/08/21 7818

## 2021-06-08 NOTE — Clinical Note
Ishan Ornelas was seen and treated in our emergency department on 6/8/2021  No work until cleared by Family Doctor/Orthopedics        Diagnosis:     Daniel Sandhu    He may return on this date: If you have any questions or concerns, please don't hesitate to call        Mao Darby PA-C    ______________________________           _______________          _______________  Hospital Representative                              Date                                Time

## 2021-06-09 ENCOUNTER — TRANSCRIBE ORDERS (OUTPATIENT)
Dept: ADMINISTRATIVE | Facility: HOSPITAL | Age: 66
End: 2021-06-09

## 2021-06-15 ENCOUNTER — OFFICE VISIT (OUTPATIENT)
Dept: CARDIOLOGY CLINIC | Facility: CLINIC | Age: 66
End: 2021-06-15
Payer: COMMERCIAL

## 2021-06-15 ENCOUNTER — APPOINTMENT (OUTPATIENT)
Dept: LAB | Facility: CLINIC | Age: 66
End: 2021-06-15
Payer: COMMERCIAL

## 2021-06-15 VITALS
BODY MASS INDEX: 27.31 KG/M2 | DIASTOLIC BLOOD PRESSURE: 76 MMHG | WEIGHT: 174 LBS | HEART RATE: 73 BPM | SYSTOLIC BLOOD PRESSURE: 104 MMHG | HEIGHT: 67 IN | OXYGEN SATURATION: 96 % | TEMPERATURE: 97.4 F

## 2021-06-15 DIAGNOSIS — I10 ESSENTIAL HYPERTENSION: Primary | ICD-10-CM

## 2021-06-15 DIAGNOSIS — I47.1 SVT (SUPRAVENTRICULAR TACHYCARDIA) (HCC): ICD-10-CM

## 2021-06-15 DIAGNOSIS — I65.22 LEFT CAROTID ARTERY STENOSIS: ICD-10-CM

## 2021-06-15 DIAGNOSIS — Z95.1 S/P CABG X 3: ICD-10-CM

## 2021-06-15 DIAGNOSIS — I25.118 CORONARY ARTERY DISEASE OF NATIVE ARTERY OF NATIVE HEART WITH STABLE ANGINA PECTORIS (HCC): ICD-10-CM

## 2021-06-15 DIAGNOSIS — R00.0 TACHYCARDIA: ICD-10-CM

## 2021-06-15 LAB
ALBUMIN SERPL BCP-MCNC: 3.7 G/DL (ref 3.5–5)
ALP SERPL-CCNC: 94 U/L (ref 46–116)
ALT SERPL W P-5'-P-CCNC: 36 U/L (ref 12–78)
ANION GAP SERPL CALCULATED.3IONS-SCNC: 2 MMOL/L (ref 4–13)
AST SERPL W P-5'-P-CCNC: 14 U/L (ref 5–45)
BILIRUB SERPL-MCNC: 0.48 MG/DL (ref 0.2–1)
BUN SERPL-MCNC: 23 MG/DL (ref 5–25)
CALCIUM SERPL-MCNC: 9.5 MG/DL (ref 8.3–10.1)
CHLORIDE SERPL-SCNC: 102 MMOL/L (ref 100–108)
CHOLEST SERPL-MCNC: 125 MG/DL (ref 50–200)
CO2 SERPL-SCNC: 32 MMOL/L (ref 21–32)
CREAT SERPL-MCNC: 0.99 MG/DL (ref 0.6–1.3)
GFR SERPL CREATININE-BSD FRML MDRD: 80 ML/MIN/1.73SQ M
GLUCOSE P FAST SERPL-MCNC: 101 MG/DL (ref 65–99)
HDLC SERPL-MCNC: 48 MG/DL
LDLC SERPL CALC-MCNC: 54 MG/DL (ref 0–100)
NONHDLC SERPL-MCNC: 77 MG/DL
POTASSIUM SERPL-SCNC: 4.9 MMOL/L (ref 3.5–5.3)
PROT SERPL-MCNC: 8 G/DL (ref 6.4–8.2)
SODIUM SERPL-SCNC: 136 MMOL/L (ref 136–145)
TRIGL SERPL-MCNC: 115 MG/DL

## 2021-06-15 PROCEDURE — 80061 LIPID PANEL: CPT | Performed by: INTERNAL MEDICINE

## 2021-06-15 PROCEDURE — 36415 COLL VENOUS BLD VENIPUNCTURE: CPT | Performed by: INTERNAL MEDICINE

## 2021-06-15 PROCEDURE — 93000 ELECTROCARDIOGRAM COMPLETE: CPT | Performed by: INTERNAL MEDICINE

## 2021-06-15 PROCEDURE — 80053 COMPREHEN METABOLIC PANEL: CPT | Performed by: INTERNAL MEDICINE

## 2021-06-15 PROCEDURE — 99214 OFFICE O/P EST MOD 30 MIN: CPT | Performed by: INTERNAL MEDICINE

## 2021-06-15 NOTE — PROGRESS NOTES
Cardiology Follow Up    Beatrice Rose  1955  [id number]  [address] [facility] [address] [address]  [phone]    1  Essential hypertension  POCT ECG   2  SVT (supraventricular tachycardia) (HCC)  POCT ECG   3  Tachycardia  POCT ECG   4  Left carotid artery stenosis  VAS carotid complete study   5  S/P CABG x 3     6  Coronary artery disease of native artery of native heart with stable angina pectoris [facility])         Interval History: Mr Burt Mcleod is a 60year old male here for followup of CAD and SVT  On 7/24/18 had 3 vessel CABG with LIMA-LAD, SVG-OM1, SVG- RCA because of dyspnea and abnormal stress test       Had carotid artery stent on 7/9/2018  Patient also has a history of supraventricular tachycardia  He has not had any prolonged episodes since initiation of metoprolol  Was evaluated by EP in the past       Since his last visit, he has been feeling well   he denies any palpitations, chest pain, shortness of breath, LE edema, orthopnea or PND  He has lost 20 pounds with diet changes            Patient Active Problem List   Diagnosis    Left carotid artery stenosis    Essential hypertension    Dyslipidemia    Symptomatic stenosis of left carotid artery without infarction    Coronary artery disease of native artery of native heart with stable angina pectoris (HCC)    S/P CABG x 3    Acute blood loss as cause of postoperative anemia    Hypokalemia    Tachycardia    Encounter for postoperative care    SVT (supraventricular tachycardia) (HCC)    Pancreatitis    Leukocytosis    Asymptomatic stenosis of right carotid artery     Past Medical History:   Diagnosis Date    Carotid stenosis, left     s/p IR stent    Coronary artery disease     Former tobacco use     Hard of hearing     Hyperlipidemia     Hypertension     Parkinson disease ([address] )     [facility]      Social History     Socioeconomic History    Marital status: /Civil Union     Spouse name: Not on file    Number of children: Not on file    Years of education: Not on file    Highest education level: Not on file   Occupational History    Not on file   Tobacco Use    Smoking status: Former Smoker     Packs/day: 1 00     Quit date: 5/14/2018     Years since quitting: 3 0    Smokeless tobacco: Current User     Types: Chew     Last attempt to quit: 5/3/2018   Vaping Use    Vaping Use: Never used   Substance and Sexual Activity    Alcohol use: Not Currently    Drug use: No    Sexual activity: Not on file   Other Topics Concern    Not on file   Social History Narrative    Not on file     Social Determinants of Health     Financial Resource Strain:     Difficulty of Paying Living Expenses:    Food Insecurity:     Worried About Running Out of Food in the Last Year:     [address] in the Last Year:    Transportation Needs:     Lack of Transportation (Medical):      Lack of Transportation (Non-Medical):    Physical Activity:     Days of Exercise per Week:     Minutes of Exercise per Session:    Stress:     Feeling of Stress :    Social Connections:     Frequency of Communication with Friends and Family:     Frequency of Social Gatherings with Friends and Family:     Attends Mu-ism Services:     Active Member of Clubs or Organizations:     Attends Club or Organization Meetings:     Marital Status:    Intimate Partner Violence:     Fear of Current or Ex-Partner:     Emotionally Abused:     Physically Abused:     Sexually Abused:       Family History   Problem Relation Age of Onset    Cancer Mother     Heart attack Father     Stroke Father      Past Surgical History:   Procedure Laterality Date    AMPUTATION OF REPLICATED FINGERS       rt 2nd and 3rd fingers traumatically amputated at work   [address] GRAFT  07/2018    x 3    MANDIBLE FRACTURE SURGERY      upper and lower    ORBITAL FRACTURE SURGERY Right     [place] CABG, VEIN, FOUR N/A 7/24/2018    Procedure: CORONARY ARTERY BYPASS GRAFT (CABG) 3 VESSELS ; NATE to LAD, SVG--> OM and Distal right;  Surgeon: Melania Gonsales DO;  [facility];  Service: Cardiac Surgery    OK ECHO TRANSESOPHAG MONTR CARDIAC PUMP FUNCTJ N/A 7/24/2018    Procedure: TRANSESOPHAGEAL ECHOCARDIOGRAM (HECTOR);   Surgeon: Melania Gonsales DO;  [facility];  Service: Cardiac Surgery    OK TCAT IV STENT CRV CRTD ART EMBOLIC PROTECJ Left 6/4/2698    Procedure: TRANSCAROTID ARTERY REVASCULARIZATION;  Surgeon: Ciera Solis MD;  [facility];  Service: Vascular       Current Outpatient Medications:     atorvastatin (LIPITOR) 80 mg tablet, TAKE 1 TABLET DAILY WITH DINNER, Disp: 90 tablet, Rfl: 3    clopidogrel (PLAVIX) 75 mg tablet, TAKE 1 TABLET DAILY, Disp: 90 tablet, Rfl: 3    ezetimibe (ZETIA) 10 mg tablet, TAKE 1 TABLET DAILY, Disp: 90 tablet, Rfl: 3    losartan (COZAAR) 50 mg tablet, TAKE 1 TABLET DAILY, Disp: 90 tablet, Rfl: 3    metoprolol succinate (TOPROL-XL) 50 mg 24 hr tablet, TAKE 1 TABLET DAILY, Disp: 90 tablet, Rfl: 3    cyclobenzaprine (FLEXERIL) 10 mg tablet, Take 1 tablet (10 mg total) by mouth 3 (three) times a day as needed for muscle spasms for up to 3 days, Disp: 9 tablet, Rfl: 0  Allergies   Allergen Reactions    Percocet [Oxycodone-Acetaminophen] Itching     Trouble sleeping        Labs:  Lab Results   Component Value Date    K 4 0 12/08/2020     12/08/2020    CO2 29 12/08/2020    CO2 30 07/24/2018    BUN 14 12/08/2020    CREATININE 1 04 12/08/2020    GLUCOSE 109 07/24/2018    CALCIUM 9 3 12/08/2020     Lab Results   Component Value Date    WBC 13 42 (H) 09/13/2019    HGB 12 9 09/13/2019    HCT 39 2 09/13/2019    MCV 90 09/13/2019     09/13/2019     Lab Results   Component Value Date    TRIG 188 (H) 12/08/2020    HDL 36 (L) 12/08/2020     Review of Systems:  Review of Systems   Constitutional: Negative for chills and fever  HENT: Negative for ear pain and sore throat  Eyes: Negative for pain and visual disturbance  Respiratory: Negative for cough and shortness of breath  Cardiovascular: Negative for chest pain and palpitations  Gastrointestinal: Negative for abdominal pain and vomiting  Genitourinary: Negative for dysuria and hematuria  Musculoskeletal: Positive for arthralgias  Negative for back pain  Skin: Negative for color change and rash  Neurological: Negative for seizures and syncope  All other systems reviewed and are negative  Physical Exam:  /76 (BP Location: Right arm, Patient Position: Sitting, Cuff Size: Standard)   Pulse 73 Comment: apical  Temp (!) 97 4 °F (36 3 °C) (Temporal)   Ht 5' 7" (1 702 m)   Wt 78 9 kg (174 lb)   SpO2 96% Comment: RA  BMI 27 25 kg/m²     Physical Exam  Constitutional:       General: He is not in acute distress  Appearance: He is well-developed  He is not diaphoretic  HENT:      Head: Normocephalic and atraumatic  Eyes:      Conjunctiva/sclera: Conjunctivae normal       Pupils: Pupils are equal, round, and reactive to light  Neck:      Thyroid: No thyromegaly  Vascular: No JVD  Cardiovascular:      Rate and Rhythm: Normal rate and regular rhythm  Heart sounds: Normal heart sounds  No murmur heard  No friction rub  No gallop  Pulmonary:      Effort: Pulmonary effort is normal       Breath sounds: Normal breath sounds  Musculoskeletal:      Cervical back: Neck supple  Skin:     General: Skin is warm and dry  Findings: No erythema or rash  Neurological:      Mental Status: He is alert and oriented to person, place, and time  Cranial Nerves: No cranial nerve deficit  Psychiatric:         Mood and Affect: Mood normal          Behavior: Behavior normal          Thought Content:  Thought content normal          Judgment: Judgment normal          Discussion/Summary:  1  Essential hypertension    2  SVT (supraventricular tachycardia) ([address] )    3  Tachycardia    4  Left carotid artery stenosis    5  S/P CABG x 3    6  Coronary artery disease of native artery of native heart with stable angina pectoris (HCC)      - Continue metoprolol    - Continue Plavix  May stop ASA     - Atorvastatin 80 mg daily  - Check lipid profile     - Repeat lipid panel reviewed  LDL at goal   - BP is low today - may reduce losartan  He will confirm if he is currently taking it once or twice a day  - Check carotid ultrasound

## 2021-06-18 ENCOUNTER — TELEPHONE (OUTPATIENT)
Dept: CARDIOLOGY CLINIC | Facility: CLINIC | Age: 66
End: 2021-06-18

## 2021-06-18 NOTE — TELEPHONE ENCOUNTER
----- Message from Alex Neri, DO sent at 6/17/2021  1:07 PM EDT -----  Can you please let the patient know tblood work looked good LDL was 54

## 2021-07-12 ENCOUNTER — TELEPHONE (OUTPATIENT)
Dept: VASCULAR SURGERY | Facility: CLINIC | Age: 66
End: 2021-07-12

## 2021-07-12 NOTE — TELEPHONE ENCOUNTER
Attempted to contact patient to schedule appointment(s) listed below  Requested patient call [phone] option 3 to schedule appointment(s)  Patient's appointment(s) are past due, expected ASAP      Dopplers  [] Abdominal Aorta Iliac (AOIL)  [x] Carotid (CV)   [] Celiac and/or Mesenteric  [] Endovascular Aortic Repair (EVAR)   [] Hemodialysis Access (HD)   [] Lower Limb Arterial (SALIMA)  [] Lower Limb Venous Duplex with Reflux (LEVDR)  [] Renal Artery  [] Upper Limb (UEA)    Advanced Imaging   [] CTA abdomen    [] CTA abdomen & pelvis    [] CT abdomen with/ without contrast  [] CT abdomen with contrast  [] CT abdomen without contrast    [] CT abdomen & pelvis with/ without contrast  [] CT abdomen & pelvis with contrast  [] CT abdomen & pelvis without contrast    Office Visit   [] New patient, patient last seen over 3 years ago  [x] Risk factor modification

## 2021-09-21 ENCOUNTER — OFFICE VISIT (OUTPATIENT)
Dept: URGENT CARE | Facility: CLINIC | Age: 66
End: 2021-09-21
Payer: COMMERCIAL

## 2021-09-21 VITALS
WEIGHT: 180 LBS | BODY MASS INDEX: 26.66 KG/M2 | SYSTOLIC BLOOD PRESSURE: 153 MMHG | TEMPERATURE: 97.7 F | HEIGHT: 69 IN | DIASTOLIC BLOOD PRESSURE: 80 MMHG | RESPIRATION RATE: 16 BRPM | OXYGEN SATURATION: 96 % | HEART RATE: 62 BPM

## 2021-09-21 DIAGNOSIS — L30.9 DERMATITIS: Primary | ICD-10-CM

## 2021-09-21 DIAGNOSIS — L43.9 LICHEN PLANUS: ICD-10-CM

## 2021-09-21 PROCEDURE — 99213 OFFICE O/P EST LOW 20 MIN: CPT | Performed by: FAMILY MEDICINE

## 2021-09-21 RX ORDER — PREDNISONE 50 MG/1
50 TABLET ORAL DAILY
Qty: 5 TABLET | Refills: 0 | Status: SHIPPED | OUTPATIENT
Start: 2021-09-21 | End: 2021-09-26

## 2021-09-21 NOTE — PATIENT INSTRUCTIONS
No ibuprofen or Aleve while taking prednisone  Follow up with PCP in 3-5 days  Proceed to  ER if symptoms worsen

## 2021-09-21 NOTE — PROGRESS NOTES
[facility]        NAME: Angelina Escalante  is a 73 y o  male  : 1955    MRN: [id number]  DATE: 2021  TIME: 4:27 PM    Assessment and Plan   Dermatitis [L30 9]  1  Dermatitis     2  Lichen planus  predniSONE 50 mg tablet         Patient Instructions     No ibuprofen or Aleve while taking prednisone  Follow up with PCP in 3-5 days  Proceed to  ER if symptoms worsen  Chief Complaint     Chief Complaint   Patient presents with    Rash     Rash on L hand  Had this kind of rash before No meds  Had for a few months now  History of Present Illness       Rash (Rash on L hand  Had this kind of rash before No meds  Had for a few months now  )    Patient has a history of lichen planus a few years ago  He was prescribed oral steroids and it cleared up nicely  He states that this is exactly the same rash that he had then  Rash  This is a new problem  The current episode started more than 1 month ago  The problem has been gradually worsening since onset  The affected locations include the right hand and left hand  The rash is characterized by dryness, redness, scaling and itchiness  Review of Systems   Review of Systems   Constitutional: Negative  Respiratory: Negative  Cardiovascular: Negative  Skin: Positive for rash           Current Medications       Current Outpatient Medications:     atorvastatin (LIPITOR) 80 mg tablet, TAKE 1 TABLET DAILY WITH DINNER, Disp: 90 tablet, Rfl: 3    clopidogrel (PLAVIX) 75 mg tablet, TAKE 1 TABLET DAILY, Disp: 90 tablet, Rfl: 3    ezetimibe (ZETIA) 10 mg tablet, TAKE 1 TABLET DAILY, Disp: 90 tablet, Rfl: 3    losartan (COZAAR) 50 mg tablet, TAKE 1 TABLET DAILY, Disp: 90 tablet, Rfl: 3    metoprolol succinate (TOPROL-XL) 50 mg 24 hr tablet, TAKE 1 TABLET DAILY, Disp: 90 tablet, Rfl: 3    predniSONE 50 mg tablet, Take 1 tablet (50 mg total) by mouth daily for 5 days, Disp: 5 tablet, Rfl: 0    Current Allergies     Allergies · X ray finding of left comminuted distal humeral fracture, s/p fall; no need for aggressive tika-operative IVF which patient reports led to iatrogenic volume overload in past   · Pain control regimen  · DVT prophylaxis during hospitalization, not required at discharge per discussion with ortho  · Orthopedic evaluation appreciated; they will follow-up in 2 weeks  · POD #2 s/p ORIF  · OT evaluation appreciated, inpatient rehab recommended but patient was denied for acute rehab and does not wish to go to skilled nursing facility rehab  · Noted other minor injuries from her fall  · Add incentive spirometry for mild rise in temperature and mild drop in oxygen levels noted  · Mild drop in hemoglobin noted since surgery, no additional workup or treatment required as of 09/21/2021 - Reviewed 09/21/2021   Allergen Reaction Noted    Percocet [oxycodone-acetaminophen] Itching 07/17/2018            The following portions of the patient's history were reviewed and updated as appropriate: allergies, current medications, past family history, past medical history, past social history, past surgical history and problem list      Past Medical History:   Diagnosis Date    Carotid stenosis, left     s/p IR stent    Coronary artery disease     Former tobacco use     Hard of hearing     Hyperlipidemia     Hypertension     [facility]     SVT (supraventricular tachycardia) ([facility]        Past Surgical History:   Procedure Laterality Date    AMPUTATION OF REPLICATED FINGERS       rt 2nd and 3rd fingers traumatically amputated at work    APPENDECTOMY      CORONARY ARTERY BYPASS GRAFT  07/2018    x 3    MANDIBLE FRACTURE SURGERY      upper and lower    ORBITAL FRACTURE SURGERY Right     MT CABG, VEIN, FOUR N/A 7/24/2018    Procedure: CORONARY ARTERY BYPASS GRAFT (CABG) 3 VESSELS ; NATE to LAD, SVG--> OM and Distal right;  Surgeon: Trang Barrios DO;  [facility];  Service: Cardiac Surgery    MT ECHO TRANSESOPHAG MONTR CARDIAC PUMP FUNCTJ N/A 7/24/2018    Procedure: TRANSESOPHAGEAL ECHOCARDIOGRAM (HECTOR); Surgeon: Trang Barrios DO;  [facility];  Service: Cardiac Surgery    MT TCAT IV STENT CRV CRTD ART EMBOLIC PROTECJ Left 5/4/3503    Procedure: TRANSCAROTID ARTERY REVASCULARIZATION;  Surgeon: Sapphire Andrea MD;  [facility];  Service: Vascular       Family History   Problem Relation Age of Onset    Cancer Mother     Heart attack Father     Stroke Father          Medications have been verified  Objective   /80   Pulse 62   Temp 97 7 °F (36 5 °C)   Resp 16   Ht 5' 9" (1 753 m)   Wt 81 6 kg (180 lb)   SpO2 96%   BMI 26 58 kg/m²   No LMP for male patient         Physical Exam     Physical Exam  Vitals and nursing note reviewed  Constitutional:       Appearance: Normal appearance  He is well-developed  Cardiovascular:      Rate and Rhythm: Normal rate and regular rhythm  Pulmonary:      Effort: Pulmonary effort is normal       Breath sounds: Normal breath sounds  Skin:         Neurological:      Mental Status: He is alert

## 2021-11-21 NOTE — PERIOPERATIVE NURSING NOTE
Remaining pressure released from right groin fem-stop-dressing dry and intact no indication of bleeding-pulses remain positive-HOB elevated for comfort  5

## 2021-12-06 ENCOUNTER — OFFICE VISIT (OUTPATIENT)
Dept: URGENT CARE | Facility: CLINIC | Age: 66
End: 2021-12-06
Payer: COMMERCIAL

## 2021-12-06 VITALS
HEIGHT: 69 IN | BODY MASS INDEX: 26.66 KG/M2 | HEART RATE: 67 BPM | RESPIRATION RATE: 16 BRPM | SYSTOLIC BLOOD PRESSURE: 140 MMHG | TEMPERATURE: 97.3 F | WEIGHT: 180 LBS | OXYGEN SATURATION: 97 % | DIASTOLIC BLOOD PRESSURE: 70 MMHG

## 2021-12-06 DIAGNOSIS — L43.9 LICHEN PLANUS: ICD-10-CM

## 2021-12-06 DIAGNOSIS — L03.114 CELLULITIS OF LEFT ELBOW: ICD-10-CM

## 2021-12-06 DIAGNOSIS — M25.422 ELBOW EFFUSION, LEFT: Primary | ICD-10-CM

## 2021-12-06 PROCEDURE — 99214 OFFICE O/P EST MOD 30 MIN: CPT | Performed by: PHYSICIAN ASSISTANT

## 2021-12-06 RX ORDER — PREDNISONE 10 MG/1
TABLET ORAL
Qty: 42 TABLET | Refills: 0 | Status: SHIPPED | OUTPATIENT
Start: 2021-12-06

## 2021-12-06 RX ORDER — CEPHALEXIN 500 MG/1
500 CAPSULE ORAL EVERY 8 HOURS SCHEDULED
Qty: 21 CAPSULE | Refills: 0 | Status: SHIPPED | OUTPATIENT
Start: 2021-12-06 | End: 2021-12-13

## 2021-12-06 RX ORDER — CLOBETASOL PROPIONATE 0.5 MG/G
OINTMENT TOPICAL 2 TIMES DAILY
Qty: 60 G | Refills: 0 | Status: SHIPPED | OUTPATIENT
Start: 2021-12-06 | End: 2021-12-20

## 2022-01-07 ENCOUNTER — IMMUNIZATIONS (OUTPATIENT)
Dept: FAMILY MEDICINE CLINIC | Facility: HOSPITAL | Age: 67
End: 2022-01-07

## 2022-01-07 DIAGNOSIS — Z23 ENCOUNTER FOR IMMUNIZATION: Primary | ICD-10-CM

## 2022-01-07 PROCEDURE — 0064A COVID-19 MODERNA VACC 0.25 ML BOOSTER: CPT

## 2022-01-07 PROCEDURE — 91306 COVID-19 MODERNA VACC 0.25 ML BOOSTER: CPT

## 2022-02-07 DIAGNOSIS — I65.22 LEFT CAROTID STENOSIS: ICD-10-CM

## 2022-02-07 DIAGNOSIS — E78.5 DYSLIPIDEMIA: ICD-10-CM

## 2022-02-07 DIAGNOSIS — I47.1 SVT (SUPRAVENTRICULAR TACHYCARDIA) (HCC): ICD-10-CM

## 2022-02-07 DIAGNOSIS — Z95.1 S/P CABG X 3: ICD-10-CM

## 2022-02-08 RX ORDER — EZETIMIBE 10 MG/1
TABLET ORAL
Qty: 90 TABLET | Refills: 3 | Status: SHIPPED | OUTPATIENT
Start: 2022-02-08 | End: 2022-05-11 | Stop reason: SDUPTHER

## 2022-02-08 RX ORDER — METOPROLOL SUCCINATE 50 MG/1
TABLET, EXTENDED RELEASE ORAL
Qty: 90 TABLET | Refills: 3 | Status: SHIPPED | OUTPATIENT
Start: 2022-02-08 | End: 2022-05-11 | Stop reason: SDUPTHER

## 2022-02-08 RX ORDER — CLOPIDOGREL BISULFATE 75 MG/1
TABLET ORAL
Qty: 90 TABLET | Refills: 3 | Status: SHIPPED | OUTPATIENT
Start: 2022-02-08 | End: 2022-05-11 | Stop reason: SDUPTHER

## 2022-02-08 RX ORDER — ATORVASTATIN CALCIUM 80 MG/1
TABLET, FILM COATED ORAL
Qty: 90 TABLET | Refills: 3 | Status: SHIPPED | OUTPATIENT
Start: 2022-02-08 | End: 2022-05-11 | Stop reason: SDUPTHER

## 2022-02-23 DIAGNOSIS — I10 ESSENTIAL HYPERTENSION: ICD-10-CM

## 2022-02-28 RX ORDER — LOSARTAN POTASSIUM 50 MG/1
TABLET ORAL
Qty: 90 TABLET | Refills: 3 | Status: SHIPPED | OUTPATIENT
Start: 2022-02-28 | End: 2022-05-11 | Stop reason: SDUPTHER

## 2022-03-25 ENCOUNTER — OFFICE VISIT (OUTPATIENT)
Dept: CARDIOLOGY CLINIC | Facility: CLINIC | Age: 67
End: 2022-03-25
Payer: COMMERCIAL

## 2022-03-25 VITALS
WEIGHT: 181 LBS | BODY MASS INDEX: 26.81 KG/M2 | SYSTOLIC BLOOD PRESSURE: 140 MMHG | OXYGEN SATURATION: 99 % | HEART RATE: 61 BPM | HEIGHT: 69 IN | DIASTOLIC BLOOD PRESSURE: 82 MMHG | TEMPERATURE: 97.8 F

## 2022-03-25 DIAGNOSIS — Z72.0 NICOTINE ABUSE: ICD-10-CM

## 2022-03-25 DIAGNOSIS — I47.1 SVT (SUPRAVENTRICULAR TACHYCARDIA) (HCC): Primary | ICD-10-CM

## 2022-03-25 DIAGNOSIS — I65.22 LEFT CAROTID ARTERY STENOSIS: ICD-10-CM

## 2022-03-25 DIAGNOSIS — I10 ESSENTIAL HYPERTENSION: ICD-10-CM

## 2022-03-25 DIAGNOSIS — I25.118 CORONARY ARTERY DISEASE OF NATIVE ARTERY OF NATIVE HEART WITH STABLE ANGINA PECTORIS (HCC): ICD-10-CM

## 2022-03-25 DIAGNOSIS — E78.5 DYSLIPIDEMIA: ICD-10-CM

## 2022-03-25 PROCEDURE — 99214 OFFICE O/P EST MOD 30 MIN: CPT | Performed by: INTERNAL MEDICINE

## 2022-03-25 PROCEDURE — 93000 ELECTROCARDIOGRAM COMPLETE: CPT | Performed by: INTERNAL MEDICINE

## 2022-03-25 RX ORDER — NICOTINE 21 MG/24HR
1 PATCH, TRANSDERMAL 24 HOURS TRANSDERMAL EVERY 24 HOURS
Qty: 14 PATCH | Refills: 0 | Status: SHIPPED | OUTPATIENT
Start: 2022-03-25

## 2022-03-25 NOTE — PROGRESS NOTES
Cardiology Follow Up    Beatrice Rose  1955  [id number]  [address] [facility] [address] [id number] [phone]    1  SVT (supraventricular tachycardia) (HCC)  POCT ECG   2  Essential hypertension  POCT ECG   3  Dyslipidemia  POCT ECG   4  Left carotid artery stenosis  POCT ECG   5  Coronary artery disease of native artery of native heart with stable angina pectoris [facility])  POCT ECG       Interval History: Mr Leroy Lambert is a 60year old male here for followup of CAD and SVT  On 7/24/18 had 3 vessel CABG with LIMA-LAD, SVG-OM1, SVG- RCA because of dyspnea and abnormal stress test       Had carotid artery stent on 7/9/2018  Patient also has a history of supraventricular tachycardia  He has not had any prolonged episodes since initiation of metoprolol  Was evaluated by EP in the past      Since his last visit, he has been feeling well   he denies any palpitations, chest pain, shortness of breath, LE edema, orthopnea or PND  He has gained 5 lb with poor diet  Overall, he is down 15 lb  He has started smoking again  He reports good adherence with medications          Patient Active Problem List   Diagnosis    Left carotid artery stenosis    Essential hypertension    Dyslipidemia    Symptomatic stenosis of left carotid artery without infarction    Coronary artery disease of native artery of native heart with stable angina pectoris (HCC)    S/P CABG x 3    Acute blood loss as cause of postoperative anemia    Hypokalemia    Tachycardia    Encounter for postoperative care    SVT (supraventricular tachycardia) (HCC)    Pancreatitis    Leukocytosis    Asymptomatic stenosis of right carotid artery     Past Medical History:   Diagnosis Date    Carotid stenosis, left     s/p IR stent    Coronary artery disease     Former tobacco use     Hard of hearing     Hyperlipidemia     Hypertension     [facility]     [facility]      Social History     Socioeconomic History    Marital status: /Civil Union     Spouse name: Not on file    Number of children: Not on file    Years of education: Not on file    Highest education level: Not on file   Occupational History    Not on file   Tobacco Use    Smoking status: Former Smoker     Packs/day: 1 00     Quit date: 5/14/2018     Years since quitting: 3 8    Smokeless tobacco: Current User     Types: Chew     Last attempt to quit: 5/3/2018   Vaping Use    Vaping Use: Never used   Substance and Sexual Activity    Alcohol use: Not Currently    Drug use: No    Sexual activity: Not on file   Other Topics Concern    Not on file   Social History Narrative    Not on file     Social Determinants of Health     Financial Resource Strain: Not on file   Food Insecurity: Not on file   Transportation Needs: Not on file   Physical Activity: Not on file   Stress: Not on file   Social Connections: Not on file   Intimate Partner Violence: Not on file   Housing Stability: Not on file      Family History   Problem Relation Age of Onset    Cancer Mother     Heart attack Father     Stroke Father      Past Surgical History:   Procedure Laterality Date    AMPUTATION OF REPLICATED FINGERS       rt 2nd and 3rd fingers traumatically amputated at work   [address]  07/2018    x 3   [address]      upper and lower    ORBITAL FRACTURE SURGERY Right     [place] CABG, VEIN, FOUR N/A 7/24/2018    Procedure: CORONARY ARTERY BYPASS GRAFT (CABG) 3 VESSELS ; NATE to LAD, SVG--> OM and Distal right;  Surgeon: Melania Gonsales DO;  [facility];  Service: Cardiac Surgery    [place] ECHO TRANSESOPHAG [address] N/A 7/24/2018    Procedure: TRANSESOPHAGEAL ECHOCARDIOGRAM (HECTOR);   Surgeon: Melania Gonsales DO;  [facility];  Service: Cardiac Surgery    [place] TCAT IV STENT CRV CRTD ART EMBOLIC PROTECJ Left 3/8/4755    Procedure: TRANSCAROTID ARTERY REVASCULARIZATION;  Surgeon: Mendel Angel, MD;  [facility];  Service: Vascular       Current Outpatient Medications:     atorvastatin (LIPITOR) 80 mg tablet, TAKE 1 TABLET DAILY WITH DINNER, Disp: 90 tablet, Rfl: 3    clopidogrel (PLAVIX) 75 mg tablet, TAKE 1 TABLET DAILY, Disp: 90 tablet, Rfl: 3    ezetimibe (ZETIA) 10 mg tablet, TAKE 1 TABLET DAILY, Disp: 90 tablet, Rfl: 3    losartan (COZAAR) 50 mg tablet, TAKE 1 TABLET DAILY, Disp: 90 tablet, Rfl: 3    metoprolol succinate (TOPROL-XL) 50 mg 24 hr tablet, TAKE 1 TABLET DAILY, Disp: 90 tablet, Rfl: 3    clobetasol (TEMOVATE) 0 05 % ointment, Apply topically 2 (two) times a day for 14 days Avoid the face, armpits and groin with ointment, Disp: 60 g, Rfl: 0    predniSONE 10 mg tablet, Take 6 pills x2 days, 5 pills x2 days, 4 pills x2 days, 3 pills x2 days, 2 pills x2 days,1 pill x2 day, (Patient not taking: Reported on 3/25/2022 ), Disp: 42 tablet, Rfl: 0  Allergies   Allergen Reactions    Percocet [Oxycodone-Acetaminophen] Itching     Trouble sleeping        Labs:  Lab Results   Component Value Date    K 4 9 06/15/2021     06/15/2021    CO2 32 06/15/2021    CO2 30 07/24/2018    BUN 23 06/15/2021    CREATININE 0 99 06/15/2021    GLUCOSE 109 07/24/2018    CALCIUM 9 5 06/15/2021     Lab Results   Component Value Date    WBC 13 42 (H) 09/13/2019    HGB 12 9 09/13/2019    HCT 39 2 09/13/2019    MCV 90 09/13/2019     09/13/2019     Lab Results   Component Value Date    TRIG 115 06/15/2021    HDL 48 06/15/2021     Review of Systems:  Review of Systems   Respiratory: Negative for shortness of breath  Cardiovascular: Negative for chest pain, palpitations and leg swelling  Musculoskeletal: Positive for arthralgias  All other systems reviewed and are negative        Physical Exam:  /82 (BP Location: Right arm, Patient Position: Sitting, Cuff Size: Standard)   Pulse 61   Temp 97 8 °F (36 6 °C)   Ht 5' 9" (1 753 m)   Wt 82 1 kg (181 lb)   SpO2 99%   BMI 26 73 kg/m²     Physical Exam  Constitutional:       General: He is not in acute distress  Appearance: He is well-developed  He is not diaphoretic  HENT:      Head: Normocephalic and atraumatic  Eyes:      Conjunctiva/sclera: Conjunctivae normal       Pupils: Pupils are equal, round, and reactive to light  Neck:      Thyroid: No thyromegaly  Vascular: No JVD  Cardiovascular:      Rate and Rhythm: Normal rate and regular rhythm  Heart sounds: Normal heart sounds  No murmur heard  No friction rub  No gallop  Pulmonary:      Effort: Pulmonary effort is normal       Breath sounds: Normal breath sounds  Musculoskeletal:      Cervical back: Neck supple  Skin:     General: Skin is warm and dry  Findings: No erythema or rash  Neurological:      General: No focal deficit present  Mental Status: He is alert and oriented to person, place, and time  Cranial Nerves: No cranial nerve deficit  Psychiatric:         Mood and Affect: Mood normal          Behavior: Behavior normal          Thought Content: Thought content normal          Judgment: Judgment normal          Discussion/Summary:  1  SVT (supraventricular tachycardia) ([facility] )    2  Essential hypertension    3  Dyslipidemia    4  Left carotid artery stenosis    5  Coronary artery disease of native artery of native heart with stable angina pectoris (HCC)      - Continue metoprolol    - Continue Plavix  - Atorvastatin 80 mg daily  - Check lipid profile and CMP     - Check carotid ultrasound  - It is very important that he quit smoking  There are various alternatives available to help with this difficult task, but first and foremost, he must make a firm commitment and decision to quit  The nature of nicotine addiction is discussed   The usefulness of behavioral therapy is discussed and suggested  The correct use, cost and side effects of nicotine replacement therapy such as gum or patches is discussed  The quit rates are discussed  I recommend he not allow potential costs of treatment to deter him from using nicotine replacement therapy or bupropion, as the long term economic and health benefits are obvious  Nicotine patch prescribed  Total time spent discussing smoking cessation eduction was 5 minutes

## 2022-03-30 ENCOUNTER — APPOINTMENT (OUTPATIENT)
Dept: LAB | Facility: HOSPITAL | Age: 67
End: 2022-03-30
Attending: INTERNAL MEDICINE
Payer: COMMERCIAL

## 2022-03-30 ENCOUNTER — HOSPITAL ENCOUNTER (OUTPATIENT)
Dept: RADIOLOGY | Facility: HOSPITAL | Age: 67
Discharge: HOME | End: 2022-03-30
Attending: INTERNAL MEDICINE
Payer: COMMERCIAL

## 2022-03-30 DIAGNOSIS — I65.22 LEFT CAROTID ARTERY STENOSIS: ICD-10-CM

## 2022-03-30 LAB
ALBUMIN SERPL BCP-MCNC: 3.9 G/DL (ref 3.5–5)
ALP SERPL-CCNC: 108 U/L (ref 46–116)
ALT SERPL W P-5'-P-CCNC: 30 U/L (ref 12–78)
ANION GAP SERPL CALCULATED.3IONS-SCNC: 8 MMOL/L (ref 4–13)
AST SERPL W P-5'-P-CCNC: 21 U/L (ref 5–45)
BILIRUB SERPL-MCNC: 0.48 MG/DL (ref 0.2–1)
BUN SERPL-MCNC: 17 MG/DL (ref 5–25)
CALCIUM SERPL-MCNC: 8.9 MG/DL (ref 8.3–10.1)
CHLORIDE SERPL-SCNC: 106 MMOL/L (ref 100–108)
CHOLEST SERPL-MCNC: 140 MG/DL
CO2 SERPL-SCNC: 27 MMOL/L (ref 21–32)
CREAT SERPL-MCNC: 0.89 MG/DL (ref 0.6–1.3)
ERYTHROCYTE [DISTWIDTH] IN BLOOD BY AUTOMATED COUNT: 12.8 % (ref 11.6–15.1)
GFR SERPL CREATININE-BSD FRML MDRD: 89 ML/MIN/1.73SQ M
GLUCOSE P FAST SERPL-MCNC: 104 MG/DL (ref 65–99)
HCT VFR BLD AUTO: 46.3 % (ref 36.5–49.3)
HDLC SERPL-MCNC: 49 MG/DL
HGB BLD-MCNC: 15 G/DL (ref 12–17)
LDLC SERPL CALC-MCNC: 69 MG/DL (ref 0–100)
MCH RBC QN AUTO: 29.4 PG (ref 26.8–34.3)
MCHC RBC AUTO-ENTMCNC: 32.4 G/DL (ref 31.4–37.4)
MCV RBC AUTO: 91 FL (ref 82–98)
NONHDLC SERPL-MCNC: 91 MG/DL
PLATELET # BLD AUTO: 252 THOUSANDS/UL (ref 149–390)
PMV BLD AUTO: 9.5 FL (ref 8.9–12.7)
POTASSIUM SERPL-SCNC: 4.2 MMOL/L (ref 3.5–5.3)
PROT SERPL-MCNC: 7.9 G/DL (ref 6.4–8.2)
RBC # BLD AUTO: 5.11 MILLION/UL (ref 3.88–5.62)
SODIUM SERPL-SCNC: 141 MMOL/L (ref 136–145)
TRIGL SERPL-MCNC: 112 MG/DL
WBC # BLD AUTO: 9.58 THOUSAND/UL (ref 4.31–10.16)

## 2022-03-30 PROCEDURE — 93880 EXTRACRANIAL BILAT STUDY: CPT

## 2022-03-30 PROCEDURE — 80061 LIPID PANEL: CPT | Performed by: INTERNAL MEDICINE

## 2022-03-30 PROCEDURE — 85027 COMPLETE CBC AUTOMATED: CPT | Performed by: INTERNAL MEDICINE

## 2022-03-30 PROCEDURE — 80053 COMPREHEN METABOLIC PANEL: CPT | Performed by: INTERNAL MEDICINE

## 2022-03-30 PROCEDURE — 93880 EXTRACRANIAL BILAT STUDY: CPT | Performed by: SURGERY

## 2022-03-30 PROCEDURE — 36415 COLL VENOUS BLD VENIPUNCTURE: CPT | Performed by: INTERNAL MEDICINE

## 2022-04-01 ENCOUNTER — TELEPHONE (OUTPATIENT)
Dept: CARDIOLOGY CLINIC | Facility: CLINIC | Age: 67
End: 2022-04-01

## 2022-04-01 NOTE — TELEPHONE ENCOUNTER
----- Message from Adnia Nam, DO sent at 3/31/2022  2:43 PM EDT -----  Can you please let the patient know carotid artery scan was normal - stents looked good    Mild disease on right  Repeat in 1 year

## 2022-04-04 ENCOUNTER — TELEPHONE (OUTPATIENT)
Dept: CARDIOLOGY CLINIC | Facility: CLINIC | Age: 67
End: 2022-04-04

## 2022-04-04 NOTE — TELEPHONE ENCOUNTER
----- Message from Lizbeth Vega DO sent at 4/1/2022  1:15 PM EDT -----  Can you please let the patient know blood work was normal

## 2022-04-19 ENCOUNTER — TELEPHONE (OUTPATIENT)
Dept: VASCULAR SURGERY | Facility: CLINIC | Age: 67
End: 2022-04-19

## 2022-05-11 DIAGNOSIS — I65.22 LEFT CAROTID STENOSIS: ICD-10-CM

## 2022-05-11 DIAGNOSIS — E78.5 DYSLIPIDEMIA: ICD-10-CM

## 2022-05-11 DIAGNOSIS — I10 ESSENTIAL HYPERTENSION: ICD-10-CM

## 2022-05-11 DIAGNOSIS — I47.1 SVT (SUPRAVENTRICULAR TACHYCARDIA) (HCC): ICD-10-CM

## 2022-05-11 DIAGNOSIS — Z95.1 S/P CABG X 3: ICD-10-CM

## 2022-05-11 RX ORDER — LOSARTAN POTASSIUM 50 MG/1
50 TABLET ORAL DAILY
Qty: 90 TABLET | Refills: 3 | Status: SHIPPED | OUTPATIENT
Start: 2022-05-11

## 2022-05-11 RX ORDER — ATORVASTATIN CALCIUM 80 MG/1
80 TABLET, FILM COATED ORAL
Qty: 90 TABLET | Refills: 3 | Status: SHIPPED | OUTPATIENT
Start: 2022-05-11

## 2022-05-11 RX ORDER — METOPROLOL SUCCINATE 50 MG/1
50 TABLET, EXTENDED RELEASE ORAL DAILY
Qty: 90 TABLET | Refills: 3 | Status: SHIPPED | OUTPATIENT
Start: 2022-05-11

## 2022-05-11 RX ORDER — CLOPIDOGREL BISULFATE 75 MG/1
75 TABLET ORAL DAILY
Qty: 90 TABLET | Refills: 3 | Status: SHIPPED | OUTPATIENT
Start: 2022-05-11

## 2022-05-11 RX ORDER — EZETIMIBE 10 MG/1
10 TABLET ORAL DAILY
Qty: 90 TABLET | Refills: 3 | Status: SHIPPED | OUTPATIENT
Start: 2022-05-11

## 2022-05-11 NOTE — TELEPHONE ENCOUNTER
Pt 's insurance has changed  Now with Medicare  No longer using mail order pharmacy  Requested meds to be sent to [facility] [employer]

## 2022-09-11 ENCOUNTER — TELEPHONE (OUTPATIENT)
Dept: OTHER | Facility: OTHER | Age: 67
End: 2022-09-11

## 2022-09-11 NOTE — TELEPHONE ENCOUNTER
Patient is calling regarding cancelling an appointment      Date/Time:09-12-22 @ 0800    Patient was rescheduled: YES [] NO [x]    Patient requesting call back to reschedule: YES [x] NO []

## 2022-09-14 ENCOUNTER — OFFICE VISIT (OUTPATIENT)
Dept: CARDIOLOGY CLINIC | Facility: CLINIC | Age: 67
End: 2022-09-14
Payer: MEDICARE

## 2022-09-14 VITALS
WEIGHT: 179 LBS | HEART RATE: 60 BPM | HEIGHT: 69 IN | SYSTOLIC BLOOD PRESSURE: 124 MMHG | OXYGEN SATURATION: 99 % | DIASTOLIC BLOOD PRESSURE: 82 MMHG | TEMPERATURE: 97 F | BODY MASS INDEX: 26.51 KG/M2

## 2022-09-14 DIAGNOSIS — I47.1 SVT (SUPRAVENTRICULAR TACHYCARDIA) (HCC): ICD-10-CM

## 2022-09-14 DIAGNOSIS — Z95.1 S/P CABG X 3: ICD-10-CM

## 2022-09-14 DIAGNOSIS — I10 ESSENTIAL HYPERTENSION: Primary | ICD-10-CM

## 2022-09-14 DIAGNOSIS — E78.5 DYSLIPIDEMIA: ICD-10-CM

## 2022-09-14 DIAGNOSIS — R00.0 TACHYCARDIA: ICD-10-CM

## 2022-09-14 PROCEDURE — 93000 ELECTROCARDIOGRAM COMPLETE: CPT | Performed by: INTERNAL MEDICINE

## 2022-09-14 PROCEDURE — 99214 OFFICE O/P EST MOD 30 MIN: CPT | Performed by: INTERNAL MEDICINE

## 2022-09-14 NOTE — PROGRESS NOTES
Cardiology Follow Up    Carol Jenkins  1955  [id number]  [facility] [address] Yadiel Kidd [address] [place] [address] [address] [id number] [phone]    1  Essential hypertension  POCT ECG   2  SVT (supraventricular tachycardia) (HCC)  POCT ECG   3  Dyslipidemia  POCT ECG   4  S/P CABG x 3  POCT ECG   5  Tachycardia  POCT ECG       Interval History: Mr Burt Mcleod is a 60year old male here for followup of CAD and SVT  On 7/24/18 had 3 vessel CABG with LIMA-LAD, SVG-OM1, SVG- RCA because of dyspnea and abnormal stress test       Had carotid artery stent on 7/9/2018  Patient also has a history of supraventricular tachycardia  He has not had any prolonged episodes since initiation of metoprolol  Was evaluated by EP in the past      Since his last visit, he has been feeling well   he denies any palpitations, chest pain, shortness of breath, LE edema, orthopnea or PND  Diet is overall unchanged  There has not been a significant change in weight  He has quit smoking with patches             Patient Active Problem List   Diagnosis    Left carotid artery stenosis    Essential hypertension    Dyslipidemia    Symptomatic stenosis of left carotid artery without infarction    Coronary artery disease of native artery of native heart with stable angina pectoris (HCC)    S/P CABG x 3    Acute blood loss as cause of postoperative anemia    Hypokalemia    Tachycardia    Encounter for postoperative care    SVT (supraventricular tachycardia) (HCC)    Pancreatitis    Leukocytosis    Asymptomatic stenosis of right carotid artery     Past Medical History:   Diagnosis Date    Carotid stenosis, left     s/p IR stent    Coronary artery disease     Former tobacco use     Hard of hearing     Hyperlipidemia     Hypertension     Parkinson disease ([address] )     SVT [facility]supraventricular tachycardia) ([facility] )      Social History Socioeconomic History    Marital status: /Civil Union     Spouse name: Not on file    Number of children: Not on file    Years of education: Not on file    Highest education level: Not on file   Occupational History    Not on file   Tobacco Use    Smoking status: Former Smoker     Packs/day: 1 00     Quit date: 2018     Years since quittin 3    Smokeless tobacco: Current User     Types: Chew     Last attempt to quit: 5/3/2018   Vaping Use    Vaping Use: Never used   Substance and Sexual Activity    Alcohol use: Not Currently    Drug use: No    Sexual activity: Not on file   Other Topics Concern    Not on file   Social History Narrative    Not on file     Social Determinants of Health     Financial Resource Strain: Not on file   Food Insecurity: Not on file   Transportation Needs: Not on file   Physical Activity: Not on file   Stress: Not on file   Social Connections: Not on file   Intimate Partner Violence: Not on file   Housing Stability: Not on file      Family History   Problem Relation Age of Onset    Cancer Mother     Heart attack Father     Stroke Father      Past Surgical History:   Procedure Laterality Date    AMPUTATION OF REPLICATED FINGERS       rt 2nd and 3rd fingers traumatically amputated at work   [address]  07/2018    x 3   [address]      upper and lower    ORBITAL FRACTURE SURGERY Right     OR CABG, VEIN, FOUR N/A 2018    Procedure: CORONARY ARTERY BYPASS GRAFT (CABG) 3 VESSELS ; NATE to LAD, SVG--> OM and Distal right;  Surgeon: Angelina Hahn DO;  [facility];  Service: Cardiac Surgery    OR ECHO TRANSESOPHAG [address] N/A 2018    Procedure: TRANSESOPHAGEAL ECHOCARDIOGRAM (HECTOR);   Surgeon: Angelina Hahn DO;  [facility];  Service: Cardiac Surgery    OR TCAT IV STENT CRV CRTD ART EMBOLIC PROTECJ Left 5920    Procedure: TRANSCAROTID ARTERY REVASCULARIZATION;  Surgeon: Anastasiya Yadav MD;  [facility];  Service: Vascular       Current Outpatient Medications:     atorvastatin (LIPITOR) 80 mg tablet, Take 1 tablet (80 mg total) by mouth daily with dinner, Disp: 90 tablet, Rfl: 3    clopidogrel (PLAVIX) 75 mg tablet, Take 1 tablet (75 mg total) by mouth in the morning , Disp: 90 tablet, Rfl: 3    ezetimibe (ZETIA) 10 mg tablet, Take 1 tablet (10 mg total) by mouth in the morning , Disp: 90 tablet, Rfl: 3    losartan (COZAAR) 50 mg tablet, Take 1 tablet (50 mg total) by mouth in the morning , Disp: 90 tablet, Rfl: 3    metoprolol succinate (TOPROL-XL) 50 mg 24 hr tablet, Take 1 tablet (50 mg total) by mouth in the morning , Disp: 90 tablet, Rfl: 3    clobetasol (TEMOVATE) 0 05 % ointment, Apply topically 2 (two) times a day for 14 days Avoid the face, armpits and groin with ointment, Disp: 60 g, Rfl: 0    nicotine (NICODERM CQ) 14 mg/24hr TD 24 hr patch, Place 1 patch on the skin every 24 hours (Patient not taking: Reported on 9/14/2022), Disp: 14 patch, Rfl: 0    nicotine (NICODERM CQ) 21 mg/24 hr TD 24 hr patch, Place 1 patch on the skin every 24 hours (Patient not taking: Reported on 9/14/2022), Disp: 14 patch, Rfl: 0    nicotine (NICODERM CQ) 7 mg/24hr TD 24 hr patch, Place 1 patch on the skin every 24 hours (Patient not taking: Reported on 9/14/2022), Disp: 14 patch, Rfl: 3    predniSONE 10 mg tablet, Take 6 pills x2 days, 5 pills x2 days, 4 pills x2 days, 3 pills x2 days, 2 pills x2 days,1 pill x2 day, (Patient not taking: Reported on 9/14/2022), Disp: 42 tablet, Rfl: 0  Allergies   Allergen Reactions    Percocet [Oxycodone-Acetaminophen] Itching     Trouble sleeping        Labs:  Lab Results   Component Value Date    K 4 2 03/30/2022     03/30/2022    CO2 27 03/30/2022    CO2 30 07/24/2018    BUN 17 03/30/2022    CREATININE 0 89 03/30/2022    GLUCOSE 109 07/24/2018    CALCIUM 8 9 03/30/2022     Lab Results   Component Value Date    WBC 9 58 03/30/2022    HGB 15 0 03/30/2022    HCT 46 3 03/30/2022    MCV 91 03/30/2022     03/30/2022     Lab Results   Component Value Date    TRIG 112 03/30/2022    HDL 49 03/30/2022     Review of Systems:  Review of Systems   Respiratory: Negative for shortness of breath  Cardiovascular: Negative for chest pain, palpitations and leg swelling  Musculoskeletal: Positive for arthralgias  All other systems reviewed and are negative  Physical Exam:  /82 (BP Location: Right arm, Patient Position: Sitting, Cuff Size: Large)   Pulse 60   Temp (!) 97 °F (36 1 °C)   Ht 5' 9" (1 753 m)   Wt 81 2 kg (179 lb)   SpO2 99%   BMI 26 43 kg/m²     Physical Exam  Constitutional:       General: He is not in acute distress  Appearance: He is well-developed  He is not diaphoretic  HENT:      Head: Normocephalic and atraumatic  Eyes:      Conjunctiva/sclera: Conjunctivae normal       Pupils: Pupils are equal, round, and reactive to light  Neck:      Thyroid: No thyromegaly  Vascular: No JVD  Cardiovascular:      Rate and Rhythm: Normal rate and regular rhythm  Heart sounds: Normal heart sounds  No murmur heard  No friction rub  No gallop  Pulmonary:      Effort: Pulmonary effort is normal       Breath sounds: Normal breath sounds  Musculoskeletal:      Cervical back: Neck supple  Right lower leg: No edema  Left lower leg: No edema  Skin:     General: Skin is warm and dry  Findings: No erythema or rash  Neurological:      General: No focal deficit present  Mental Status: He is alert and oriented to person, place, and time  Cranial Nerves: No cranial nerve deficit  Psychiatric:         Mood and Affect: Mood normal          Behavior: Behavior normal          Thought Content: Thought content normal          Judgment: Judgment normal          Discussion/Summary:  1  Essential hypertension    2  SVT (supraventricular tachycardia) ([facility] [address] )    3  Dyslipidemia    4  S/P CABG x 3    5  Tachycardia      - Continue metoprolol    - Continue Plavix  - Atorvastatin 80 mg daily  - Check lipid profile and CMP  - Follow up with vascular surgery because of history of carotid stent

## 2023-08-04 DIAGNOSIS — I65.22 LEFT CAROTID STENOSIS: ICD-10-CM

## 2023-08-04 DIAGNOSIS — Z95.1 S/P CABG X 3: ICD-10-CM

## 2023-08-04 RX ORDER — ATORVASTATIN CALCIUM 80 MG/1
80 TABLET, FILM COATED ORAL
Qty: 90 TABLET | Refills: 3 | Status: SHIPPED | OUTPATIENT
Start: 2023-08-04

## 2023-08-04 RX ORDER — CLOPIDOGREL BISULFATE 75 MG/1
75 TABLET ORAL DAILY
Qty: 90 TABLET | Refills: 3 | Status: SHIPPED | OUTPATIENT
Start: 2023-08-04

## 2023-08-06 DIAGNOSIS — E78.5 DYSLIPIDEMIA: ICD-10-CM

## 2023-08-07 ENCOUNTER — OFFICE VISIT (OUTPATIENT)
Dept: FAMILY MEDICINE CLINIC | Facility: CLINIC | Age: 68
End: 2023-08-07
Payer: MEDICARE

## 2023-08-07 VITALS
WEIGHT: 185 LBS | OXYGEN SATURATION: 98 % | RESPIRATION RATE: 18 BRPM | SYSTOLIC BLOOD PRESSURE: 126 MMHG | HEART RATE: 58 BPM | TEMPERATURE: 97.4 F | DIASTOLIC BLOOD PRESSURE: 78 MMHG | BODY MASS INDEX: 27.4 KG/M2 | HEIGHT: 69 IN

## 2023-08-07 DIAGNOSIS — I77.1 STRICTURE OF ARTERY (HCC): ICD-10-CM

## 2023-08-07 DIAGNOSIS — I65.22 LEFT CAROTID ARTERY STENOSIS: Chronic | ICD-10-CM

## 2023-08-07 DIAGNOSIS — Z11.59 NEED FOR HEPATITIS C SCREENING TEST: ICD-10-CM

## 2023-08-07 DIAGNOSIS — Z23 NEED FOR VACCINATION: ICD-10-CM

## 2023-08-07 DIAGNOSIS — Z12.11 SCREENING FOR COLON CANCER: ICD-10-CM

## 2023-08-07 DIAGNOSIS — Z00.00 MEDICARE ANNUAL WELLNESS VISIT, SUBSEQUENT: ICD-10-CM

## 2023-08-07 DIAGNOSIS — E78.5 DYSLIPIDEMIA: ICD-10-CM

## 2023-08-07 DIAGNOSIS — I47.1 SVT (SUPRAVENTRICULAR TACHYCARDIA) (HCC): ICD-10-CM

## 2023-08-07 DIAGNOSIS — I10 ESSENTIAL HYPERTENSION: ICD-10-CM

## 2023-08-07 DIAGNOSIS — Z12.5 SCREENING FOR PROSTATE CANCER: ICD-10-CM

## 2023-08-07 DIAGNOSIS — I25.118 CORONARY ARTERY DISEASE OF NATIVE ARTERY OF NATIVE HEART WITH STABLE ANGINA PECTORIS (HCC): ICD-10-CM

## 2023-08-07 PROBLEM — Z48.89 ENCOUNTER FOR POSTOPERATIVE CARE: Status: RESOLVED | Noted: 2018-08-21 | Resolved: 2023-08-07

## 2023-08-07 PROBLEM — D62 ACUTE BLOOD LOSS AS CAUSE OF POSTOPERATIVE ANEMIA: Status: RESOLVED | Noted: 2018-07-26 | Resolved: 2023-08-07

## 2023-08-07 PROBLEM — R00.0 TACHYCARDIA: Status: RESOLVED | Noted: 2018-08-15 | Resolved: 2023-08-07

## 2023-08-07 PROBLEM — K85.90 PANCREATITIS: Status: RESOLVED | Noted: 2019-09-11 | Resolved: 2023-08-07

## 2023-08-07 PROBLEM — E87.6 HYPOKALEMIA: Status: RESOLVED | Noted: 2018-07-28 | Resolved: 2023-08-07

## 2023-08-07 PROBLEM — D72.829 LEUKOCYTOSIS: Status: RESOLVED | Noted: 2019-09-11 | Resolved: 2023-08-07

## 2023-08-07 PROCEDURE — G0438 PPPS, INITIAL VISIT: HCPCS | Performed by: FAMILY MEDICINE

## 2023-08-07 PROCEDURE — 99204 OFFICE O/P NEW MOD 45 MIN: CPT | Performed by: FAMILY MEDICINE

## 2023-08-07 PROCEDURE — 90677 PCV20 VACCINE IM: CPT

## 2023-08-07 PROCEDURE — G0009 ADMIN PNEUMOCOCCAL VACCINE: HCPCS

## 2023-08-07 RX ORDER — MAGNESIUM 30 MG
30 TABLET ORAL 2 TIMES DAILY
COMMUNITY

## 2023-08-07 RX ORDER — EZETIMIBE 10 MG/1
10 TABLET ORAL DAILY
Qty: 90 TABLET | Refills: 3 | Status: SHIPPED | OUTPATIENT
Start: 2023-08-07

## 2023-08-07 RX ORDER — MULTIVITAMIN
1 TABLET ORAL DAILY
COMMUNITY

## 2023-08-07 NOTE — PROGRESS NOTES
Assessment and Plan:     Problem List Items Addressed This Visit        Cardiovascular and Mediastinum    Left carotid artery stenosis (Chronic)     Advised to follow up with vascular surgery. Has not followed up in over a year. Essential hypertension     Controlled on metoprolol, losartan         Coronary artery disease of native artery of native heart with stable angina pectoris [facility])     Follows cardiologist Dr. Cuong Hernandez. SVT (supraventricular tachycardia) [facility])     Follows cardiologist Dr Cuong Hernandez. On metoprolol. Stricture of artery ([address])       Other    Dyslipidemia     Blood work pending. Continue Zetia and Lipitor. Other Visit Diagnoses     Need for hepatitis C screening test        Relevant Orders    Hepatitis C Antibody    Screening for prostate cancer        Relevant Orders    PSA, Total Screen    Medicare annual wellness visit, subsequent        Screening for colon cancer        Relevant Orders    Cologuard    Need for vaccination        Relevant Orders    Pneumococcal Conjugate Vaccine 20-valent (Pcv20)        BMI Counseling: Body mass index is 27.32 kg/m². The BMI is above normal. Nutrition recommendations include decreasing portion sizes, encouraging healthy choices of fruits and vegetables, decreasing fast food intake, consuming healthier snacks, limiting drinks that contain sugar and moderation in carbohydrate intake. Exercise recommendations include moderate physical activity 150 minutes/week. Rationale for BMI follow-up plan is due to patient being overweight or obese. Depression Screening and Follow-up Plan: Patient was screened for depression during today's encounter. They screened negative with a PHQ-2 score of 0. Tobacco Cessation Counseling: Tobacco cessation counseling was provided. The patient is sincerely urged to quit consumption of tobacco. He is ready to quit tobacco. Medication options discussed. Side effects of medication not discussed.  Patient refused medication. Preventive health issues were discussed with patient, and age appropriate screening tests were ordered as noted in patient's After Visit Summary. Personalized health advice and appropriate referrals for health education or preventive services given if needed, as noted in patient's After Visit Summary. History of Present Illness:     Patient presents for a Medicare Wellness Visit    HPI   Patient Care Team:  Shobha King MD as PCP - Ulysses Caller, MD (Neurology)  Romero Wilson MD (Ophthalmology)  Hattie Garzon MD (Vascular Surgery)  Kendrick Li DO (Cardiology)  Criselda Huang DO (Cardiothoracic Surgery)  Palomo James PA-C (Vascular Surgery)     Review of Systems:     Review of Systems   Constitutional: Negative. HENT: Negative. Eyes: Negative. Respiratory: Negative. Cardiovascular: Negative. Gastrointestinal: Negative. Endocrine: Negative. Genitourinary: Negative. Musculoskeletal: Negative. Neurological: Negative. Hematological: Negative. Psychiatric/Behavioral: Negative.          Problem List:     Patient Active Problem List   Diagnosis   • Left carotid artery stenosis   • Essential hypertension   • Dyslipidemia   • Coronary artery disease of native artery of native heart with stable angina pectoris (HCC)   • S/P CABG x 3   • SVT (supraventricular tachycardia) ([facility])   • Asymptomatic stenosis of right carotid artery   • Stricture of artery [facility])      Past Medical and Surgical History:     Past Medical History:   Diagnosis Date   • Acute blood loss as cause of postoperative anemia 7/26/2018   • Carotid stenosis, left     s/p IR stent   • Coronary artery disease    • Former tobacco use    • Hard of hearing    • Hyperlipidemia    • Hypertension    • Leukocytosis 9/11/2019   • Pancreatitis 9/11/2019   • Parkinson disease ([address])    • SVT (supraventricular tachycardia) ([address])      Past Surgical History:   Procedure Laterality Date   • AMPUTATION OF REPLICATED FINGERS       rt 2nd and 3rd fingers traumatically amputated at work   • APPENDECTOMY     • CORONARY ARTERY BYPASS GRAFT  07/2018    x 3   • MANDIBLE FRACTURE SURGERY      upper and lower   • ORBITAL FRACTURE SURGERY Right    • [place] CORONARY ARTERY BYPASS 4 CORONARY VENOUS GRAFTS N/A 2018    Procedure: CORONARY ARTERY BYPASS GRAFT (CABG) 3 VESSELS ; NATE to LAD, SVG--> OM and Distal right;  Surgeon: Guillaume Zavala DO;  [facility];  Service: Cardiac Surgery   • [place] ECHO TRANSESOPHAG MONTR CARDIAC PUMP FUNCTJ N/A 2018    Procedure: TRANSESOPHAGEAL ECHOCARDIOGRAM (HECTOR);   Surgeon: Guillaume Zavala DO;  [facility];  Service: Cardiac Surgery   • [place] TCAT IV STENT CRV CRTD ART EMBOLIC PROTECJ Left 1/3/3622    Procedure: TRANSCAROTID ARTERY REVASCULARIZATION;  Surgeon: Lo Pineda MD;  [facility];  Service: Vascular      Family History:     Family History   Problem Relation Age of Onset   • Cancer Mother    • Heart attack Father    • Stroke Father       Social History:     Social History     Socioeconomic History   • Marital status: /Civil Union     Spouse name: None   • Number of children: None   • Years of education: None   • Highest education level: None   Occupational History   • None   Tobacco Use   • Smoking status: Former     Types: Cigarettes     Start date:      Quit date: 2018     Years since quittin.2     Passive exposure: Current   • Smokeless tobacco: Current     Types: Chew     Last attempt to quit: 5/3/2018   Vaping Use   • Vaping Use: Never used   Substance and Sexual Activity   • Alcohol use: Not Currently   • Drug use: No   • Sexual activity: None   Other Topics Concern   • None   Social History Narrative   • None     Social Determinants of Health     Financial Resource Strain: Low Risk  (2023)    Overall Financial Resource Strain (CARDIA)    • Difficulty of Paying Living Expenses: Not hard at all   Food Insecurity: Not on file Transportation Needs: No Transportation Needs (8/7/2023)    PRAPARE - Transportation    • Lack of Transportation (Medical): No    • Lack of Transportation (Non-Medical): No   Physical Activity: Not on file   Stress: Not on file   Social Connections: Not on file   Intimate Partner Violence: Not on file   Housing Stability: Not on file      Medications and Allergies:     Current Outpatient Medications   Medication Sig Dispense Refill   • atorvastatin (LIPITOR) 80 mg tablet TAKE 1 TABLET BY MOUTH DAILY WITH DINNER 90 tablet 3   • clopidogrel (PLAVIX) 75 mg tablet TAKE 1 TABLET (75 MG TOTAL) BY MOUTH IN THE MORNING. 90 tablet 3   • ezetimibe (ZETIA) 10 mg tablet TAKE 1 TABLET (10 MG TOTAL) BY MOUTH IN THE MORNING. 90 tablet 3   • losartan (COZAAR) 50 mg tablet Take 1 tablet (50 mg total) by mouth in the morning. 90 tablet 3   • magnesium 30 MG tablet Take 30 mg by mouth 2 (two) times a day     • metoprolol succinate (TOPROL-XL) 50 mg 24 hr tablet Take 1 tablet (50 mg total) by mouth in the morning. 90 tablet 3   • Multiple Vitamin (multivitamin) tablet Take 1 tablet by mouth daily     • clobetasol (TEMOVATE) 0.05 % ointment Apply topically 2 (two) times a day for 14 days Avoid the face, armpits and groin with ointment 60 g 0     No current facility-administered medications for this visit. Allergies   Allergen Reactions   • Percocet [Oxycodone-Acetaminophen] Itching     Trouble sleeping       Immunizations:     Immunization History   Administered Date(s) Administered   • COVID-19 MODERNA VACC 0.25 ML IM BOOSTER 01/07/2022      Health Maintenance:         Topic Date Due   • Hepatitis C Screening  Never done   • Colorectal Cancer Screening  Never done         Topic Date Due   • Pneumococcal Vaccine: 65+ Years (1 - PCV) Never done   • COVID-19 Vaccine (1) 01/07/2022   • Influenza Vaccine (1) 09/01/2023      Medicare Screening Tests and Risk Assessments:     Rebekah Ingram is here for his Initial Wellness visit.      [facility] Risk Assessment:   Patient rates overall health as good. Patient feels that their physical health rating is same. Patient is satisfied with their life. Eyesight was rated as same. Hearing was rated as same. Patient feels that their emotional and mental health rating is same. Patients states they are never, rarely angry. Patient states they are never, rarely unusually tired/fatigued. Pain experienced in the last 7 days has been some. Patient's pain rating has been 4/10. Patient states that he has experienced no weight loss or gain in last 6 months. Depression Screening:   PHQ-2 Score: 0      Fall Risk Screening: In the past year, patient has experienced: no history of falling in past year      Home Safety:  Patient does not have trouble with stairs inside or outside of their home. Patient has working smoke alarms and has working carbon monoxide detector. Home safety hazards include: none. Nutrition:   Current diet is Regular. Medications:   Patient is not currently taking any over-the-counter supplements. Patient is able to manage medications. Activities of Daily Living (ADLs)/Instrumental Activities of Daily Living (IADLs):   Walk and transfer into and out of bed and chair?: Yes  Dress and groom yourself?: Yes    Bathe or shower yourself?: Yes    Feed yourself? Yes  Do your laundry/housekeeping?: Yes  Manage your money, pay your bills and track your expenses?: Yes  Make your own meals?: Yes    Do your own shopping?: Yes    Previous Hospitalizations:   Any hospitalizations or ED visits within the last 12 months?: No      Advance Care Planning:   Living will: Yes    Durable POA for healthcare:  Yes    Advanced directive: Yes      PREVENTIVE SCREENINGS      Cardiovascular Screening:    General: Screening Current      Diabetes Screening:     General: Risks and Benefits Discussed    Due for: Blood Glucose      Colorectal Cancer Screening:     General: Risks and Benefits Discussed    Due for: Cologuard      Prostate Cancer Screening:    General: Risks and Benefits Discussed    Due for: PSA      Osteoporosis Screening:    General: Screening Not Indicated      Abdominal Aortic Aneurysm (AAA) Screening:    Risk factors include: age between 70-75 yo and tobacco use        General: Screening Not Indicated      Lung Cancer Screening:     General: Screening Not Indicated      Hepatitis C Screening:    General: Risks and Benefits Discussed    Hep C Screening Accepted: Yes      Screening, Brief Intervention, and Referral to Treatment (SBIRT)    Screening  Typical number of drinks in a day: 0  Typical number of drinks in a week: 0  Interpretation: Low risk drinking behavior. Single Item Drug Screening:  How often have you used an illegal drug (including marijuana) or a prescription medication for non-medical reasons in the past year? never    Single Item Drug Screen Score: 0  Interpretation: Negative screen for possible drug use disorder    Brief Intervention  Alcohol & drug use screenings were reviewed. No concerns regarding substance use disorder identified. No results found. Physical Exam:     /78   Pulse 58   Temp (!) 97.4 °F (36.3 °C)   Resp 18   Ht 5' 9" (1.753 m)   Wt 83.9 kg (185 lb)   SpO2 98%   BMI 27.32 kg/m²     Physical Exam  Constitutional:       General: He is not in acute distress. Appearance: Normal appearance. He is normal weight. He is not ill-appearing, toxic-appearing or diaphoretic. HENT:      Head: Normocephalic and atraumatic. Right Ear: Tympanic membrane, ear canal and external ear normal. There is no impacted cerumen. Left Ear: Tympanic membrane, ear canal and external ear normal. There is no impacted cerumen. Nose: Nose normal.      Mouth/Throat:      Mouth: Mucous membranes are moist.      Pharynx: Oropharynx is clear. No oropharyngeal exudate or posterior oropharyngeal erythema. Eyes:      General: No scleral icterus.         Right eye: No discharge. Left eye: No discharge. Extraocular Movements: Extraocular movements intact. Conjunctiva/sclera: Conjunctivae normal.      Pupils: Pupils are equal, round, and reactive to light. Cardiovascular:      Rate and Rhythm: Normal rate and regular rhythm. Pulses: Normal pulses. Heart sounds: Normal heart sounds. No murmur heard. No friction rub. No gallop. Pulmonary:      Effort: Pulmonary effort is normal. No respiratory distress. Breath sounds: Normal breath sounds. No stridor. No wheezing, rhonchi or rales. Chest:      Chest wall: No tenderness. Abdominal:      General: Abdomen is flat. Bowel sounds are normal. There is no distension. Palpations: Abdomen is soft. There is no mass. Tenderness: There is no abdominal tenderness. There is no guarding or rebound. Hernia: No hernia is present. Musculoskeletal:         General: Normal range of motion. Skin:     General: Skin is warm and dry. Neurological:      General: No focal deficit present. Mental Status: He is alert and oriented to person, place, and time. Psychiatric:         Mood and Affect: Mood normal.         Behavior: Behavior normal.         Thought Content:  Thought content normal.         Judgment: Judgment normal.          Zachary Velasquez MD

## 2023-08-07 NOTE — PATIENT INSTRUCTIONS
Medicare Preventive Visit Patient Instructions  Thank you for completing your Welcome to Medicare Visit or Medicare Annual Wellness Visit today. Your next wellness visit will be due in one year (8/7/2024). The screening/preventive services that you may require over the next 5-10 years are detailed below. Some tests may not apply to you based off risk factors and/or age. Screening tests ordered at today's visit but not completed yet may show as past due. Also, please note that scanned in results may not display below. Preventive Screenings:  Service Recommendations Previous Testing/Comments   Colorectal Cancer Screening  · Colonoscopy    · Fecal Occult Blood Test (FOBT)/Fecal Immunochemical Test (FIT)  · Fecal DNA/Cologuard Test  · Flexible Sigmoidoscopy Age: 43-73 years old   Colonoscopy: every 10 years (May be performed more frequently if at higher risk)  OR  FOBT/FIT: every 1 year  OR  Cologuard: every 3 years  OR  Sigmoidoscopy: every 5 years  Screening may be recommended earlier than age 39 if at higher risk for colorectal cancer. Also, an individualized decision between you and your healthcare provider will decide whether screening between the ages of 77-80 would be appropriate.  Colonoscopy: Not on file  FOBT/FIT: Not on file  Cologuard: Not on file  Sigmoidoscopy: Not on file          Prostate Cancer Screening Individualized decision between patient and health care provider in men between ages of 53-66   Medicare will cover every 12 months beginning on the day after your 50th birthday PSA: No results in last 5 years           Hepatitis C Screening Once for adults born between [address] [place] [address]  More frequently in patients at high risk for Hepatitis C Hep C Antibody: Not on file        Diabetes Screening 1-2 times per year if you're at risk for diabetes or have pre-diabetes Fasting glucose: 104 mg/dL (3/30/2022)  A1C: No results in last 5 years (No results in last 5 years)      Cholesterol Screening Once every 5 years if you don't have a lipid disorder. May order more often based on risk factors. Lipid panel: 03/30/2022  Screening Current      Other Preventive Screenings Covered by Medicare:  1. Abdominal Aortic Aneurysm (AAA) Screening: covered once if your at risk. You're considered to be at risk if you have a family history of AAA or a male between the age of 70-76 who smoking at least 100 cigarettes in your lifetime. 2. Lung Cancer Screening: covers low dose CT scan once per year if you meet all of the following conditions: (1) Age 48-67; (2) No signs or symptoms of lung cancer; (3) Current smoker or have quit smoking within the last 15 years; (4) You have a tobacco smoking history of at least 20 pack years (packs per day x number of years you smoked); (5) You get a written order from a healthcare provider. 3. Glaucoma Screening: covered annually if you're considered high risk: (1) You have diabetes OR (2) Family history of glaucoma OR (3)  aged 48 and older OR (3)  American aged 72 and older  3. Osteoporosis Screening: covered every 2 years if you meet one of the following conditions: (1) Have a vertebral abnormality; (2) On glucocorticoid therapy for more than 3 months; (3) Have primary hyperparathyroidism; (4) On osteoporosis medications and need to assess response to drug therapy. 5. HIV Screening: covered annually if you're between the age of 14-79. Also covered annually if you are younger than 13 and older than 72 with risk factors for HIV infection. For pregnant patients, it is covered up to 3 times per pregnancy.     Immunizations:  Immunization Recommendations   Influenza Vaccine Annual influenza vaccination during flu season is recommended for all persons aged >= 6 months who do not have contraindications   Pneumococcal Vaccine   * Pneumococcal conjugate vaccine = PCV13 (Prevnar 13), PCV15 (Vaxneuvance), PCV20 (Prevnar 20)  * Pneumococcal polysaccharide vaccine = PPSV23 (Pneumovax) Adults 20-63 years old: 1-3 doses may be recommended based on certain risk factors  Adults 72 years old: 1-2 doses may be recommended based off what pneumonia vaccine you previously received   Hepatitis B Vaccine 3 dose series if at intermediate or high risk (ex: diabetes, end stage renal disease, liver disease)   Tetanus (Td) Vaccine - COST NOT COVERED BY MEDICARE PART B Following completion of primary series, a booster dose should be given every 10 years to maintain immunity against tetanus. Td may also be given as tetanus wound prophylaxis. Tdap Vaccine - COST NOT COVERED BY MEDICARE PART B Recommended at least once for all adults. For pregnant patients, recommended with each pregnancy. Shingles Vaccine (Shingrix) - COST NOT COVERED BY MEDICARE PART B  2 shot series recommended in those aged 48 and above     Health Maintenance Due:      Topic Date Due   • Hepatitis C Screening  Never done   • Colorectal Cancer Screening  Never done     Immunizations Due:      Topic Date Due   • Pneumococcal Vaccine: 65+ Years (1 - PCV) Never done   • COVID-19 Vaccine (1) 01/07/2022   • Influenza Vaccine (1) 09/01/2023     Advance Directives   What are advance directives? Advance directives are legal documents that state your wishes and plans for medical care. These plans are made ahead of time in case you lose your ability to make decisions for yourself. Advance directives can apply to any medical decision, such as the treatments you want, and if you want to donate organs. What are the types of advance directives? There are many types of advance directives, and each state has rules about how to use them. You may choose a combination of any of the following:  · Living will: This is a written record of the treatment you want. You can also choose which treatments you do not want, which to limit, and which to stop at a certain time. This includes surgery, medicine, IV fluid, and tube feedings.    · Durable power of  [facility]): This is a written record that states who you want to make healthcare choices for you when you are unable to make them for yourself. This person, called a proxy, is usually a family member or a friend. You may choose more than 1 proxy. · Do not resuscitate (DNR) order:  A DNR order is used in case your heart stops beating or you stop breathing. It is a request not to have certain forms of treatment, such as CPR. A DNR order may be included in other types of advance directives. · Medical directive: This covers the care that you want if you are in a coma, near death, or unable to make decisions for yourself. You can list the treatments you want for each condition. Treatment may include pain medicine, surgery, blood transfusions, dialysis, IV or tube feedings, and a ventilator (breathing machine). · Values history: This document has questions about your views, beliefs, and how you feel and think about life. This information can help others choose the care that you would choose. Why are advance directives important? An advance directive helps you control your care. Although spoken wishes may be used, it is better to have your wishes written down. Spoken wishes can be misunderstood, or not followed. Treatments may be given even if you do not want them. An advance directive may make it easier for your family to make difficult choices about your care. How to Quit Using Smokeless Tobacco   Why it is important to stop using smokeless tobacco:  Smokeless tobacco comes in many forms. Examples include chew, snuff, dip, dissolvable tobacco, and snus. All smokeless tobacco products contain nicotine and may contain as much nicotine as 3 cigarettes. You may be physically dependent on nicotine. You may also be emotionally addicted to it.  The cravings can be strong, but it is important to quit using smokeless tobacco. You will improve your health and decrease your cancer, stroke, and heart attack risk. Mouth sores and tooth problems will also improve when you quit. You can benefit from quitting no matter how long you have used smokeless tobacco.   Prepare to stop using smokeless tobacco:  Nicotine is a highly addictive drug. Withdrawal symptoms can happen when you stop and make it hard to quit. The following can help keep you on track:  · Set a quit date. · Tell friends, family, and coworkers that you plan to quit. · Remove all smokeless tobacco products from your home, car, and workplace. Manage weight gain after you quit:  Nicotine can affect your metabolism. You may gain a few pounds after you quit. The following can help you control your weight:  · Eat healthy foods. · Drink water before, during, and between meals. · Exercise as directed. Weight Management   Why it is important to manage your weight:  Being overweight increases your risk of health conditions such as heart disease, high blood pressure, type 2 diabetes, and certain types of cancer. It can also increase your risk for osteoarthritis, sleep apnea, and other respiratory problems. Aim for a slow, steady weight loss. Even a small amount of weight loss can lower your risk of health problems. How to lose weight safely:  A safe and healthy way to lose weight is to eat fewer calories and get regular exercise. You can lose up about 1 pound a week by decreasing the number of calories you eat by 500 calories each day. Healthy meal plan for weight management:  A healthy meal plan includes a variety of foods, contains fewer calories, and helps you stay healthy. A healthy meal plan includes the following:  · Eat whole-grain foods more often. A healthy meal plan should contain fiber. Fiber is the part of grains, fruits, and vegetables that is not broken down by your body. Whole-grain foods are healthy and provide extra fiber in your diet.  Some examples of whole-grain foods are whole-wheat breads and pastas, oatmeal, brown rice, and bulgur. · Eat a variety of vegetables every day. Include dark, leafy greens such as spinach, kale, cari greens, and mustard greens. Eat yellow and orange vegetables such as carrots, sweet potatoes, and winter squash. · Eat a variety of fruits every day. Choose fresh or canned fruit (canned in its own juice or light syrup) instead of juice. Fruit juice has very little or no fiber. · Eat low-fat dairy foods. Drink fat-free (skim) milk or 1% milk. Eat fat-free yogurt and low-fat cottage cheese. Try low-fat cheeses such as mozzarella and other reduced-fat cheeses. · Choose meat and other protein foods that are low in fat. Choose beans or other legumes such as split peas or lentils. Choose fish, skinless poultry (chicken or turkey), or lean cuts of red meat (beef or pork). Before you cook meat or poultry, cut off any visible fat. · Use less fat and oil. Try baking foods instead of frying them. Add less fat, such as margarine, sour cream, regular salad dressing and mayonnaise to foods. Eat fewer high-fat foods. Some examples of high-fat foods include french fries, doughnuts, ice cream, and cakes. · Eat fewer sweets. Limit foods and drinks that are high in sugar. This includes candy, cookies, regular soda, and sweetened drinks. Exercise:  Exercise at least 30 minutes per day on most days of the week. Some examples of exercise include walking, biking, dancing, and swimming. You can also fit in more physical activity by taking the stairs instead of the elevator or parking farther away from stores. Ask your healthcare provider about the best exercise plan for you. © Copyright [employer] 2018 Information is for End User's use only and may not be sold, redistributed or otherwise used for commercial purposes.  All illustrations and images included in CareNotes® are the copyrighted property of [employer].A[employer] Inc. or [address] [place] [address]

## 2023-08-25 ENCOUNTER — APPOINTMENT (OUTPATIENT)
Dept: LAB | Facility: HOSPITAL | Age: 68
End: 2023-08-25
Attending: INTERNAL MEDICINE
Payer: MEDICARE

## 2023-08-25 DIAGNOSIS — Z12.5 SCREENING FOR PROSTATE CANCER: ICD-10-CM

## 2023-08-25 DIAGNOSIS — Z11.59 NEED FOR HEPATITIS C SCREENING TEST: ICD-10-CM

## 2023-08-25 LAB
ALBUMIN SERPL BCP-MCNC: 4.2 G/DL (ref 3.5–5)
ALP SERPL-CCNC: 80 U/L (ref 34–104)
ALT SERPL W P-5'-P-CCNC: 17 U/L (ref 7–52)
ANION GAP SERPL CALCULATED.3IONS-SCNC: 4 MMOL/L
AST SERPL W P-5'-P-CCNC: 18 U/L (ref 13–39)
BILIRUB SERPL-MCNC: 0.65 MG/DL (ref 0.2–1)
BUN SERPL-MCNC: 16 MG/DL (ref 5–25)
CALCIUM SERPL-MCNC: 9.6 MG/DL (ref 8.4–10.2)
CHLORIDE SERPL-SCNC: 104 MMOL/L (ref 96–108)
CHOLEST SERPL-MCNC: 118 MG/DL
CO2 SERPL-SCNC: 30 MMOL/L (ref 21–32)
CREAT SERPL-MCNC: 0.96 MG/DL (ref 0.6–1.3)
GFR SERPL CREATININE-BSD FRML MDRD: 81 ML/MIN/1.73SQ M
GLUCOSE P FAST SERPL-MCNC: 95 MG/DL (ref 65–99)
HCV AB SER QL: NORMAL
HDLC SERPL-MCNC: 37 MG/DL
LDLC SERPL CALC-MCNC: 51 MG/DL (ref 0–100)
NONHDLC SERPL-MCNC: 81 MG/DL
POTASSIUM SERPL-SCNC: 4.4 MMOL/L (ref 3.5–5.3)
PROT SERPL-MCNC: 7.8 G/DL (ref 6.4–8.4)
PSA SERPL-MCNC: 2.86 NG/ML (ref 0–4)
SODIUM SERPL-SCNC: 138 MMOL/L (ref 135–147)
TRIGL SERPL-MCNC: 150 MG/DL

## 2023-08-25 PROCEDURE — 86803 HEPATITIS C AB TEST: CPT

## 2023-08-25 PROCEDURE — 36415 COLL VENOUS BLD VENIPUNCTURE: CPT

## 2023-08-25 PROCEDURE — G0103 PSA SCREENING: HCPCS

## 2023-08-30 ENCOUNTER — TELEPHONE (OUTPATIENT)
Dept: CARDIOLOGY CLINIC | Facility: CLINIC | Age: 68
End: 2023-08-30

## 2023-08-30 NOTE — TELEPHONE ENCOUNTER
----- Message from Gavin Brown DO sent at 8/29/2023  4:52 PM EDT -----  Can you please let the patient know blood work good - cholesterol levels are at goal.  Kidney function was normal.

## 2023-09-02 LAB — COLOGUARD RESULT REPORTABLE: POSITIVE

## 2023-09-05 DIAGNOSIS — R19.5 POSITIVE COLORECTAL CANCER SCREENING USING COLOGUARD TEST: Primary | ICD-10-CM

## 2023-09-14 ENCOUNTER — OFFICE VISIT (OUTPATIENT)
Dept: CARDIOLOGY CLINIC | Facility: CLINIC | Age: 68
End: 2023-09-14
Payer: MEDICARE

## 2023-09-14 VITALS
HEART RATE: 56 BPM | HEIGHT: 69 IN | DIASTOLIC BLOOD PRESSURE: 82 MMHG | BODY MASS INDEX: 26.66 KG/M2 | SYSTOLIC BLOOD PRESSURE: 124 MMHG | WEIGHT: 180 LBS | OXYGEN SATURATION: 98 %

## 2023-09-14 DIAGNOSIS — I65.22 LEFT CAROTID ARTERY STENOSIS: ICD-10-CM

## 2023-09-14 DIAGNOSIS — Z95.1 S/P CABG X 3: ICD-10-CM

## 2023-09-14 DIAGNOSIS — R19.5 POSITIVE COLORECTAL CANCER SCREENING USING COLOGUARD TEST: ICD-10-CM

## 2023-09-14 DIAGNOSIS — I25.118 CORONARY ARTERY DISEASE OF NATIVE ARTERY OF NATIVE HEART WITH STABLE ANGINA PECTORIS (HCC): Primary | ICD-10-CM

## 2023-09-14 DIAGNOSIS — I10 ESSENTIAL HYPERTENSION: ICD-10-CM

## 2023-09-14 PROCEDURE — 99214 OFFICE O/P EST MOD 30 MIN: CPT | Performed by: INTERNAL MEDICINE

## 2023-09-14 PROCEDURE — 93000 ELECTROCARDIOGRAM COMPLETE: CPT | Performed by: INTERNAL MEDICINE

## 2023-09-14 RX ORDER — LANOLIN ALCOHOL/MO/W.PET/CERES
1000 CREAM (GRAM) TOPICAL DAILY
COMMUNITY

## 2023-09-14 NOTE — PROGRESS NOTES
Cardiology Follow Up    Speedy Juarez  1955  [id number]  [address] [place] [address] [facility] [place] [id number] [phone]    1. Coronary artery disease of native artery of native heart with stable angina pectoris ([address])        2. S/P CABG x 3  POCT ECG      3. Essential hypertension  POCT ECG      4. Left carotid artery stenosis  VAS carotid complete study      5. Positive colorectal cancer screening using Cologuard test  Ambulatory referral to Gastroenterology          Interval History: Mr. Burt Mcleod is a 80 y.o. male here for followup of CAD and SVT. On 7/24/18 had 3 vessel CABG with LIMA-LAD, SVG-OM1, SVG- RCA because of dyspnea and abnormal stress test.      Had carotid artery stent on 7/9/2018. Carotid ultrasound done last year showed patent stent with mild disease on right. Did not follow regularly with vascular surgery. Patient also has a history of supraventricular tachycardia. He has not had any prolonged episodes since initiation of metoprolol. Was evaluated by EP in the past.     Since his last visit, he has been feeling well.  he denies any palpitations, chest pain, shortness of breath, LE edema, orthopnea or PND. Diet is overall unchanged. There has not been a significant change in weight.         Patient Active Problem List   Diagnosis   • Left carotid artery stenosis   • Essential hypertension   • Dyslipidemia   • Coronary artery disease of native artery of native heart with stable angina pectoris (HCC)   • S/P CABG x 3   • SVT (supraventricular tachycardia) ([facility])   • Asymptomatic stenosis of right carotid artery   • Stricture of artery [facility]     Past Medical History:   Diagnosis Date   • Acute blood loss as cause of postoperative anemia 7/26/2018   • Carotid stenosis, left     s/p IR stent   • Coronary artery disease    • Former tobacco use    • Hard of hearing    • Hyperlipidemia    • Hypertension    • Leukocytosis 2019   • Pancreatitis 2019   • Parkinson disease ([address])    • SVT (supraventricular tachycardia) (HCC)      Social History     Socioeconomic History   • Marital status: /Civil Union     Spouse name: Not on file   • Number of children: Not on file   • Years of education: Not on file   • Highest education level: Not on file   Occupational History   • Not on file   Tobacco Use   • Smoking status: Former     Types: Cigarettes     Start date:      Quit date: 2018     Years since quittin.3     Passive exposure: Current   • Smokeless tobacco: Current     Types: Chew     Last attempt to quit: 5/3/2018   Vaping Use   • Vaping Use: Never used   Substance and Sexual Activity   • Alcohol use: Not Currently   • Drug use: No   • Sexual activity: Not on file   Other Topics Concern   • Not on file   Social History Narrative   • Not on file     Social Determinants of Health     Financial Resource Strain: Low Risk  (2023)    Overall Financial Resource Strain (CARDIA)    • Difficulty of Paying Living Expenses: Not hard at all   Food Insecurity: Not on file   Transportation Needs: No Transportation Needs (2023)    PRAPARE - Transportation    • Lack of Transportation (Medical): No    • Lack of Transportation (Non-Medical):  No   Physical Activity: Not on file   Stress: Not on file   Social Connections: Not on file   Intimate Partner Violence: Not on file   Housing Stability: Not on file      Family History   Problem Relation Age of Onset   • Cancer Mother    • Heart attack Father    • Stroke Father      Past Surgical History:   Procedure Laterality Date   • AMPUTATION OF REPLICATED FINGERS       rt 2nd and 3rd fingers traumatically amputated at work   • APPENDECTOMY     • CORONARY ARTERY BYPASS GRAFT  07/2018    x 3   • MANDIBLE FRACTURE SURGERY      upper and lower   • ORBITAL FRACTURE SURGERY Right    • ID CORONARY ARTERY BYPASS 4 CORONARY VENOUS GRAFTS N/A 7/24/2018    Procedure: CORONARY ARTERY BYPASS GRAFT (CABG) 3 VESSELS ; NATE to LAD, SVG--> OM and Distal right;  Surgeon: Jackie Vu DO;  [facility];  Service: Cardiac Surgery   • [place] ECHO TRANSESOPHAG MONTR CARDIAC PUMP FUNCTJ N/A 7/24/2018    Procedure: TRANSESOPHAGEAL ECHOCARDIOGRAM (HECTOR);   Surgeon: Jackie Vu DO;  [facility];  Service: Cardiac Surgery   • [place] TCAT IV STENT CRV CRTD ART EMBOLIC PROTECJ Left 0/6/3171    Procedure: TRANSCAROTID ARTERY REVASCULARIZATION;  Surgeon: Ambreen Lazaro MD;  Location[facility];  Service: Vascular       Current Outpatient Medications:   •  atorvastatin (LIPITOR) 80 mg tablet, TAKE 1 TABLET BY MOUTH DAILY WITH DINNER, Disp: 90 tablet, Rfl: 3  •  clobetasol (TEMOVATE) 0.05 % ointment, Apply topically 2 (two) times a day for 14 days Avoid the face, armpits and groin with ointment, Disp: 60 g, Rfl: 0  •  clopidogrel (PLAVIX) 75 mg tablet, TAKE 1 TABLET (75 MG TOTAL) BY MOUTH IN THE MORNING., Disp: 90 tablet, Rfl: 3  •  ezetimibe (ZETIA) 10 mg tablet, TAKE 1 TABLET (10 MG TOTAL) BY MOUTH IN THE MORNING., Disp: 90 tablet, Rfl: 3  •  losartan (COZAAR) 50 mg tablet, Take 1 tablet (50 mg total) by mouth in the morning., Disp: 90 tablet, Rfl: 3  •  magnesium 30 MG tablet, Take 30 mg by mouth 2 (two) times a day, Disp: , Rfl:   •  Magnesium Citrate 200 MG TABS, Take 400 mg by mouth in the morning, Disp: , Rfl:   •  metoprolol succinate (TOPROL-XL) 50 mg 24 hr tablet, Take 1 tablet (50 mg total) by mouth in the morning., Disp: 90 tablet, Rfl: 3  •  Multiple Vitamin (multivitamin) tablet, Take 1 tablet by mouth daily, Disp: , Rfl:   •  vitamin B-12 (VITAMIN B-12) 1,000 mcg tablet, Take 1,000 mcg by mouth daily, Disp: , Rfl:   Allergies   Allergen Reactions   • Percocet [Oxycodone-Acetaminophen] Itching     Trouble sleeping        Labs:  Lab Results   Component Value Date    K 4.4 08/25/2023     08/25/2023    CO2 30 08/25/2023    CO2 30 07/24/2018    BUN 16 08/25/2023    CREATININE 0.96 08/25/2023    GLUCOSE 109 07/24/2018    CALCIUM 9.6 08/25/2023     Lab Results   Component Value Date    WBC 9.58 03/30/2022    HGB 15.0 03/30/2022    HCT 46.3 03/30/2022    MCV 91 03/30/2022     03/30/2022     Lab Results   Component Value Date    TRIG 150 (H) 08/25/2023    HDL 37 (L) 08/25/2023     Review of Systems:  Review of Systems   Respiratory: Negative for shortness of breath. Cardiovascular: Negative for chest pain, palpitations and leg swelling. Musculoskeletal: Positive for arthralgias. All other systems reviewed and are negative. Physical Exam:  /82 (BP Location: Right arm, Patient Position: Sitting, Cuff Size: Standard)   Pulse 56   Ht 5' 9" (1.753 m)   Wt 81.6 kg (180 lb)   SpO2 98%   BMI 26.58 kg/m²     Physical Exam  Constitutional:       General: He is not in acute distress. Appearance: He is well-developed. He is not diaphoretic. HENT:      Head: Normocephalic and atraumatic. Eyes:      Conjunctiva/sclera: Conjunctivae normal.      Pupils: Pupils are equal, round, and reactive to light. Neck:      Thyroid: No thyromegaly. Vascular: No JVD. Cardiovascular:      Rate and Rhythm: Normal rate and regular rhythm. Heart sounds: Normal heart sounds. No murmur heard. No friction rub. No gallop. Pulmonary:      Effort: Pulmonary effort is normal.      Breath sounds: Normal breath sounds. Musculoskeletal:      Cervical back: Neck supple. Right lower leg: No edema. Left lower leg: No edema. Skin:     General: Skin is warm and dry. Findings: No erythema or rash. Neurological:      General: No focal deficit present. Mental Status: He is alert and oriented to person, place, and time. Cranial Nerves: No cranial nerve deficit. Psychiatric:         Mood and Affect: Mood normal.         Behavior: Behavior normal.         Thought Content:  Thought content normal. Judgment: Judgment normal.         Discussion/Summary:  1. Coronary artery disease of native artery of native heart with stable angina pectoris ([address])    2. S/P CABG x 3    3. Essential hypertension    4. Left carotid artery stenosis    5. Positive colorectal cancer screening using Cologuard test      - Continue metoprolol.   - Continue Plavix. - Atorvastatin 80 mg daily.    -Lipid levels and blood pressure are at goal.  Most recent LDL was 51 mg/dL. -He had positive Cologuard and was referred to gastroenterologist by his primary medical doctor. - Follow up with vascular surgery because of history of carotid stent. Carotid ultrasound ordered.

## 2023-09-18 ENCOUNTER — TELEPHONE (OUTPATIENT)
Dept: CARDIOLOGY CLINIC | Facility: CLINIC | Age: 68
End: 2023-09-18

## 2023-09-18 DIAGNOSIS — E78.5 DYSLIPIDEMIA: ICD-10-CM

## 2023-09-18 RX ORDER — EZETIMIBE 10 MG/1
10 TABLET ORAL DAILY
Qty: 90 TABLET | Refills: 3 | Status: CANCELLED | OUTPATIENT
Start: 2023-09-18

## 2023-09-18 NOTE — TELEPHONE ENCOUNTER
Pt's wife called reception desk. She requested a paper prescription that she could  and take to different pharmacies for price checks. So far, it is expensive everywhere they have checked.

## 2023-09-20 ENCOUNTER — HOSPITAL ENCOUNTER (OUTPATIENT)
Dept: RADIOLOGY | Facility: HOSPITAL | Age: 68
Discharge: HOME | End: 2023-09-20
Attending: INTERNAL MEDICINE
Payer: MEDICARE

## 2023-09-20 DIAGNOSIS — I65.22 LEFT CAROTID ARTERY STENOSIS: ICD-10-CM

## 2023-09-20 PROCEDURE — 93880 EXTRACRANIAL BILAT STUDY: CPT | Performed by: SURGERY

## 2023-09-20 PROCEDURE — 93880 EXTRACRANIAL BILAT STUDY: CPT

## 2023-09-21 ENCOUNTER — TELEPHONE (OUTPATIENT)
Dept: CARDIOLOGY CLINIC | Facility: CLINIC | Age: 68
End: 2023-09-21

## 2023-09-21 NOTE — TELEPHONE ENCOUNTER
----- Message from Afshan Delgado DO sent at 9/20/2023  4:44 PM EDT -----  Can you please let the patient know his carotid stent looks good. He had mild disease on the other side.   I would still like him to follow-up with vascular surgery

## 2023-10-31 ENCOUNTER — TELEPHONE (OUTPATIENT)
Dept: GASTROENTEROLOGY | Facility: CLINIC | Age: 68
End: 2023-10-31

## 2023-10-31 ENCOUNTER — CONSULT (OUTPATIENT)
Dept: GASTROENTEROLOGY | Facility: CLINIC | Age: 68
End: 2023-10-31
Payer: MEDICARE

## 2023-10-31 VITALS
HEART RATE: 54 BPM | BODY MASS INDEX: 26.54 KG/M2 | WEIGHT: 179.2 LBS | SYSTOLIC BLOOD PRESSURE: 144 MMHG | DIASTOLIC BLOOD PRESSURE: 84 MMHG | HEIGHT: 69 IN

## 2023-10-31 DIAGNOSIS — R10.31 RIGHT LOWER QUADRANT ABDOMINAL PAIN: ICD-10-CM

## 2023-10-31 DIAGNOSIS — R19.5 POSITIVE COLORECTAL CANCER SCREENING USING COLOGUARD TEST: Primary | ICD-10-CM

## 2023-10-31 PROCEDURE — 99204 OFFICE O/P NEW MOD 45 MIN: CPT | Performed by: INTERNAL MEDICINE

## 2023-10-31 NOTE — TELEPHONE ENCOUNTER
Our mutual patient is scheduled for procedure: colonoscopy    On: November 13 , 2023     With: Dr. Zohaib Burks.  Vicente Mahmood MD    He/She is taking the following blood thinner: Plavix (Clopidogrel)    Can this be stopped  5 days prior to the procedure    Physician Approving clearance: Dr. Vicky Brown

## 2023-10-31 NOTE — PATIENT INSTRUCTIONS
Scheduled date of colonoscopy (as of today): 11/15/23   Physician performing colonoscopy: Bahman  Location of colonoscopy: [facility]  Bowel prep reviewed with patient: Miralax   Instructions reviewed with patient by: Nasir BAZAN  Clearances: Plavix Dr Leta Linares

## 2023-10-31 NOTE — H&P (VIEW-ONLY)
Consultation - [facility]) [facility]  Mateo Schultz. 80 y.o. male MRN: [id number]  Unit/Bed#:  Encounter: [id number]        Consults    ASSESSMENT/PLAN:     1. Positive Cologuard test: Likely due to either adenomatous polyps versus false positive vs. malignancy. -Recommend scheduling diagnostic colonoscopy. - Would recommend MiraLAX/Dulcolax bowel prep. - Patient was explained about the risks and benefits of the procedure. Risks including but not limited to bleeding, infection, perforation were explained in detail. Also explained about less than 100% sensitivity with the exam and other alternatives.  -Plavix will to be held 5 days prior to procedure, will obtain okay from cardiology. 2.  History of pancreatitis: No recurrent episodes. Was previously hospitalized in 2019 for this. Ultrasound previously was notable for sludge. 3.  Right lower pelvic pain: Likely musculoskeletal, associated with hip pain. No relationship to food or bowel movements. No palpable hernia noted on exam.  -Recommend following up with the PCP in regards to this.    ______________________________________________________________________    Reason for Consult / Principal Problem: [unfilled]    HPI: Mateo Schultz. is a 80y.o. year old male with history of CAD s/p CABG, hyperlipidemia, hypertension,SVT on BB,  remote history of pancreatitis, presents for colon cancer screening evaluation. He recently underwent Cologuard testing which came back positive. Patient reports that he has never had a colonoscopy. He denies any family history of colon cancer. No change in bowel habits or hematochezia. He denies any GERD, dysphagia, loss of appetite or early satiety. He reports occasional right pelvic pain, typically with movement and also reports pain in the right hip. No association with bowel movements or with eating.     Labs were reviewed and are notable for normal liver enzymes, normal CBC.    Review of Systems: The remainder of the review of systems was negative except for the pertinent positives noted in HPI. Historical Information   Past Medical History:   Diagnosis Date    Acute blood loss as cause of postoperative anemia 2018    Carotid stenosis, left     s/p IR stent    Coronary artery disease     Former tobacco use     Hard of hearing     Hyperlipidemia     Hypertension     Leukocytosis 2019    Pancreatitis 2019    Parkinson disease ([address])     SVT (supraventricular tachycardia) ([address])      Past Surgical History:   Procedure Laterality Date    AMPUTATION OF REPLICATED FINGERS       rt 2nd and 3rd fingers traumatically amputated at work    [email or web address] GRAFT  07/2018    x 3    MANDIBLE FRACTURE SURGERY      upper and lower    ORBITAL FRACTURE SURGERY Right     [place] CORONARY ARTERY BYPASS 4 CORONARY VENOUS GRAFTS N/A 2018    Procedure: CORONARY ARTERY BYPASS GRAFT (CABG) 3 VESSELS ; NATE to LAD, SVG--> OM and Distal right;  Surgeon: Michelle Alcocer DO;  [facility];  Service: Cardiac Surgery    [place] ECHO TRANSESOPHAG [facility] N/A 2018    Procedure: TRANSESOPHAGEAL ECHOCARDIOGRAM (HECTOR);   Surgeon: Michelle Alcocer DO;  [facility];  Service: Cardiac Surgery    MT TCAT IV STENT CRV CRTD ART EMBOLIC PROTECJ Left 3/5/6486    Procedure: Herman Rodriguez;  Surgeon: Tiki Hernandez MD;  [facility];  Service: Vascular     Social History   Social History     Substance and Sexual Activity   Alcohol Use Not Currently     Social History     Substance and Sexual Activity   Drug Use No     Social History     Tobacco Use   Smoking Status Former    Types: Cigarettes    Start date:     Quit date: 2018    Years since quittin.4    Passive exposure: Current   Smokeless Tobacco Current    Types: Jacquiline Pounds    Last attempt to quit: 5/3/2018     Family History   Problem Relation Age of Onset Cancer Mother     Heart attack Father     Stroke Father        Meds/Allergies     (Not in a hospital admission)    No current facility-administered medications for this visit. Allergies   Allergen Reactions    Percocet [Oxycodone-Acetaminophen] Itching     Trouble sleeping        Objective     There were no vitals taken for this visit. [unfilled]    PHYSICAL EXAM     GEN: well nourished, well developed, no acute distress  HEENT: anicteric, MMM, no cervical or supraclavicular lymphadenopathy  CV: RRR, no m/r/g  CHEST: CTA b/l, no WRR  ABD: +BS, soft, NT/ND, no hepatosplenomegaly  EXT: no c/c/e  SKIN: no rashes,  NEURO: aaox3    Lab Results:   No visits with results within 1 Day(s) from this visit.    Latest known visit with results is:   Appointment on 08/25/2023   Component Date Value    Hepatitis C Ab 08/25/2023 Non-reactive     PSA 08/25/2023 2.86      Imaging Studies: I have personally reviewed pertinent films in PACS

## 2023-10-31 NOTE — PROGRESS NOTES
Consultation - [address] [place] [address] [facility]  Ruiz Cano. 80 y.o. male MRN: [id number]  Unit/Bed#:  Encounter: [id number]        Consults    ASSESSMENT/PLAN:     1. Positive Cologuard test: Likely due to either adenomatous polyps versus false positive vs. malignancy. -Recommend scheduling diagnostic colonoscopy. - Would recommend MiraLAX/Dulcolax bowel prep. - Patient was explained about the risks and benefits of the procedure. Risks including but not limited to bleeding, infection, perforation were explained in detail. Also explained about less than 100% sensitivity with the exam and other alternatives.  -Plavix will to be held 5 days prior to procedure, will obtain okay from cardiology. 2.  History of pancreatitis: No recurrent episodes. Was previously hospitalized in 2019 for this. Ultrasound previously was notable for sludge. 3.  Right lower pelvic pain: Likely musculoskeletal, associated with hip pain. No relationship to food or bowel movements. No palpable hernia noted on exam.  -Recommend following up with the PCP in regards to this.    ______________________________________________________________________    Reason for Consult / Principal Problem: [unfilled]    HPI: Ruiz Cano. is a 80y.o. year old male with history of CAD s/p CABG, hyperlipidemia, hypertension,SVT on BB,  remote history of pancreatitis, presents for colon cancer screening evaluation. He recently underwent Cologuard testing which came back positive. Patient reports that he has never had a colonoscopy. He denies any family history of colon cancer. No change in bowel habits or hematochezia. He denies any GERD, dysphagia, loss of appetite or early satiety. He reports occasional right pelvic pain, typically with movement and also reports pain in the right hip. No association with bowel movements or with eating.     Labs were reviewed and are notable for normal liver enzymes, normal CBC.    Review of Systems: The remainder of the review of systems was negative except for the pertinent positives noted in HPI. Historical Information   Past Medical History:   Diagnosis Date    Acute blood loss as cause of postoperative anemia 2018    Carotid stenosis, left     s/p IR stent    Coronary artery disease     Former tobacco use     Hard of hearing     Hyperlipidemia     Hypertension     Leukocytosis 2019    Pancreatitis 2019    Parkinson disease ([address])     SVT (supraventricular tachycardia) ([address])      Past Surgical History:   Procedure Laterality Date    AMPUTATION OF REPLICATED FINGERS       rt 2nd and 3rd fingers traumatically amputated at work    [address] GRAFT  07/2018    x 3    MANDIBLE FRACTURE SURGERY      upper and lower    ORBITAL FRACTURE SURGERY Right     [place] CORONARY ARTERY BYPASS 4 CORONARY VENOUS GRAFTS N/A 2018    Procedure: CORONARY ARTERY BYPASS GRAFT (CABG) 3 VESSELS ; NATE to LAD, SVG--> OM and Distal right;  Surgeon: Asha Blas DO;  [facility];  Service: Cardiac Surgery    [place] ECHO TRANSESOPHAG [facility] N/A 2018    Procedure: TRANSESOPHAGEAL ECHOCARDIOGRAM (HECTOR);   Surgeon: Asha Blas DO;  [facility];  Service: Cardiac Surgery    [place] TCAT IV STENT CRV CRTD ART EMBOLIC PROTECJ Left 1675    Procedure: Kitty Calvert;  Surgeon: Jassi Hamilton MD;  [facility];  Service: Vascular     Social History   Social History     Substance and Sexual Activity   Alcohol Use Not Currently     Social History     Substance and Sexual Activity   Drug Use No     Social History     Tobacco Use   Smoking Status Former    Types: Cigarettes    Start date:     Quit date: 2018    Years since quittin.4    Passive exposure: Current   Smokeless Tobacco Current    Types: Saji Antoine    Last attempt to quit: 5/3/2018     Family History   Problem Relation Age of Onset Cancer Mother     Heart attack Father     Stroke Father        Meds/Allergies     (Not in a hospital admission)    No current facility-administered medications for this visit. Allergies   Allergen Reactions    Percocet [Oxycodone-Acetaminophen] Itching     Trouble sleeping        Objective     There were no vitals taken for this visit. [unfilled]    PHYSICAL EXAM     GEN: well nourished, well developed, no acute distress  HEENT: anicteric, MMM, no cervical or supraclavicular lymphadenopathy  CV: RRR, no m/r/g  CHEST: CTA b/l, no WRR  ABD: +BS, soft, NT/ND, no hepatosplenomegaly  EXT: no c/c/e  SKIN: no rashes,  NEURO: aaox3    Lab Results:   No visits with results within 1 Day(s) from this visit.    Latest known visit with results is:   Appointment on 08/25/2023   Component Date Value    Hepatitis C Ab 08/25/2023 Non-reactive     PSA 08/25/2023 2.86      Imaging Studies: I have personally reviewed pertinent films in PACS

## 2023-11-10 ENCOUNTER — ANESTHESIA EVENT (OUTPATIENT)
Dept: ANESTHESIOLOGY | Facility: HOSPITAL | Age: 68
End: 2023-11-10

## 2023-11-10 ENCOUNTER — ANESTHESIA (OUTPATIENT)
Dept: ANESTHESIOLOGY | Facility: HOSPITAL | Age: 68
End: 2023-11-10

## 2023-11-13 ENCOUNTER — ANESTHESIA EVENT (OUTPATIENT)
Dept: GASTROENTEROLOGY | Facility: AMBULARY SURGERY CENTER | Age: 68
End: 2023-11-13

## 2023-11-13 ENCOUNTER — HOSPITAL ENCOUNTER (OUTPATIENT)
Dept: GASTROENTEROLOGY | Facility: AMBULARY SURGERY CENTER | Age: 68
Setting detail: OUTPATIENT SURGERY
Discharge: HOME | End: 2023-11-13
Attending: INTERNAL MEDICINE | Admitting: INTERNAL MEDICINE
Payer: MEDICARE

## 2023-11-13 ENCOUNTER — ANESTHESIA (OUTPATIENT)
Dept: GASTROENTEROLOGY | Facility: AMBULARY SURGERY CENTER | Age: 68
End: 2023-11-13

## 2023-11-13 VITALS
DIASTOLIC BLOOD PRESSURE: 77 MMHG | HEIGHT: 69 IN | TEMPERATURE: 96.4 F | OXYGEN SATURATION: 100 % | RESPIRATION RATE: 16 BRPM | HEART RATE: 82 BPM | WEIGHT: 173 LBS | BODY MASS INDEX: 25.62 KG/M2 | SYSTOLIC BLOOD PRESSURE: 168 MMHG

## 2023-11-13 DIAGNOSIS — R19.5 POSITIVE COLORECTAL CANCER SCREENING USING COLOGUARD TEST: ICD-10-CM

## 2023-11-13 PROCEDURE — 88305 TISSUE EXAM BY PATHOLOGIST: CPT | Performed by: PATHOLOGY

## 2023-11-13 PROCEDURE — 45385 COLONOSCOPY W/LESION REMOVAL: CPT | Performed by: INTERNAL MEDICINE

## 2023-11-13 RX ORDER — PROPOFOL 10 MG/ML
INJECTION, EMULSION INTRAVENOUS AS NEEDED
Status: DISCONTINUED | OUTPATIENT
Start: 2023-11-13 | End: 2023-11-13

## 2023-11-13 RX ORDER — PROPOFOL 10 MG/ML
INJECTION, EMULSION INTRAVENOUS CONTINUOUS PRN
Status: DISCONTINUED | OUTPATIENT
Start: 2023-11-13 | End: 2023-11-13

## 2023-11-13 RX ORDER — SODIUM CHLORIDE, SODIUM LACTATE, POTASSIUM CHLORIDE, CALCIUM CHLORIDE 600; 310; 30; 20 MG/100ML; MG/100ML; MG/100ML; MG/100ML
INJECTION, SOLUTION INTRAVENOUS CONTINUOUS PRN
Status: DISCONTINUED | OUTPATIENT
Start: 2023-11-13 | End: 2023-11-13

## 2023-11-13 RX ORDER — LIDOCAINE HYDROCHLORIDE 10 MG/ML
INJECTION, SOLUTION EPIDURAL; INFILTRATION; INTRACAUDAL; PERINEURAL AS NEEDED
Status: DISCONTINUED | OUTPATIENT
Start: 2023-11-13 | End: 2023-11-13

## 2023-11-13 RX ADMIN — SODIUM CHLORIDE, SODIUM LACTATE, POTASSIUM CHLORIDE, AND CALCIUM CHLORIDE: .6; .31; .03; .02 INJECTION, SOLUTION INTRAVENOUS at 12:31

## 2023-11-13 RX ADMIN — PROPOFOL 100 MG: 10 INJECTION, EMULSION INTRAVENOUS at 12:33

## 2023-11-13 RX ADMIN — PROPOFOL 50 MG: 10 INJECTION, EMULSION INTRAVENOUS at 12:34

## 2023-11-13 RX ADMIN — PROPOFOL 140 MCG/KG/MIN: 10 INJECTION, EMULSION INTRAVENOUS at 12:34

## 2023-11-13 RX ADMIN — LIDOCAINE HYDROCHLORIDE 50 MG: 10 INJECTION, SOLUTION EPIDURAL; INFILTRATION; INTRACAUDAL; PERINEURAL at 12:33

## 2023-11-13 NOTE — ANESTHESIA PREPROCEDURE EVALUATION
Procedure:  COLONOSCOPY    Relevant Problems   CARDIO   (+) Coronary artery disease of native artery of native heart with stable angina pectoris (HCC)   (+) Essential hypertension   (+) SVT (supraventricular tachycardia)        Physical Exam    Airway    Mallampati score: II  TM Distance: >3 FB  Neck ROM: full     Dental   No notable dental hx lower dentures and upper dentures    Cardiovascular  Cardiovascular exam normal    Pulmonary  Pulmonary exam normal     Other Findings        Anesthesia Plan  ASA Score- 2     Anesthesia Type- IV sedation with anesthesia with ASA Monitors. Additional Monitors:     Airway Plan:            Plan Factors-Exercise tolerance (METS): >4 METS. Chart reviewed. Existing labs reviewed. Patient summary reviewed. Patient is not a current smoker. Induction-     Postoperative Plan-     Informed Consent- Anesthetic plan and risks discussed with patient. I personally reviewed this patient with the CRNA. Discussed and agreed on the Anesthesia Plan with the CRNA. Tanna Pinto

## 2023-11-13 NOTE — ANESTHESIA POSTPROCEDURE EVALUATION
Post-Op Assessment Note    CV Status:  Stable  Pain Score: 0    Pain management: adequate     Mental Status:  Sleepy   Hydration Status:  Stable   PONV Controlled:  None   Airway Patency:  Patent      Post Op Vitals Reviewed: Yes      Staff: Anesthesiologist, CRNA         No notable events documented.     BP   110/67   Temp     Pulse  70   Resp   14   SpO2   98

## 2023-11-13 NOTE — INTERVAL H&P NOTE
H&P reviewed. After examining the patient I find no changes in the patients condition since the H&P had been written.     Vitals:    11/13/23 1145   BP: 154/79   Pulse: 81   Resp: 18   Temp: (!) 96.4 °F (35.8 °C)   SpO2: 98%

## 2023-11-16 PROCEDURE — 88305 TISSUE EXAM BY PATHOLOGIST: CPT | Performed by: PATHOLOGY

## 2023-11-29 ENCOUNTER — NURSE TRIAGE (OUTPATIENT)
Age: 68
End: 2023-11-29

## 2023-11-29 NOTE — TELEPHONE ENCOUNTER
----- Message from Karma Sanchez sent at 11/29/2023  2:35 PM EST -----  Pt.s wife called in stating that the pt.had a colonoscopy on 11/13/23. She said that they have received 2 messages to call our office and she is assuming it is for results. I did not see any record of a message in the chart but they stated they would like a call back regarding the test results.  Please reach out to pt's wife Carol Reynolds

## 2023-12-20 DIAGNOSIS — I10 ESSENTIAL HYPERTENSION: ICD-10-CM

## 2023-12-20 DIAGNOSIS — E78.5 DYSLIPIDEMIA: ICD-10-CM

## 2023-12-20 DIAGNOSIS — Z95.1 S/P CABG X 3: ICD-10-CM

## 2023-12-20 DIAGNOSIS — I47.10 SVT (SUPRAVENTRICULAR TACHYCARDIA): ICD-10-CM

## 2023-12-20 RX ORDER — METOPROLOL SUCCINATE 50 MG/1
50 TABLET, EXTENDED RELEASE ORAL DAILY
Qty: 90 TABLET | Refills: 3 | Status: SHIPPED | OUTPATIENT
Start: 2023-12-20 | End: 2023-12-21 | Stop reason: SDUPTHER

## 2023-12-20 RX ORDER — LOSARTAN POTASSIUM 50 MG/1
50 TABLET ORAL DAILY
Qty: 90 TABLET | Refills: 3 | Status: SHIPPED | OUTPATIENT
Start: 2023-12-20 | End: 2023-12-21 | Stop reason: SDUPTHER

## 2023-12-20 RX ORDER — EZETIMIBE 10 MG/1
10 TABLET ORAL DAILY
Qty: 90 TABLET | Refills: 3 | Status: SHIPPED | OUTPATIENT
Start: 2023-12-20 | End: 2023-12-21 | Stop reason: SDUPTHER

## 2023-12-20 RX ORDER — ATORVASTATIN CALCIUM 80 MG/1
80 TABLET, FILM COATED ORAL
Qty: 90 TABLET | Refills: 3 | Status: SHIPPED | OUTPATIENT
Start: 2023-12-20 | End: 2023-12-21 | Stop reason: SDUPTHER

## 2023-12-21 RX ORDER — LOSARTAN POTASSIUM 50 MG/1
50 TABLET ORAL DAILY
Qty: 90 TABLET | Refills: 3 | Status: SHIPPED | OUTPATIENT
Start: 2023-12-21

## 2023-12-21 RX ORDER — METOPROLOL SUCCINATE 50 MG/1
50 TABLET, EXTENDED RELEASE ORAL DAILY
Qty: 90 TABLET | Refills: 3 | Status: SHIPPED | OUTPATIENT
Start: 2023-12-21

## 2023-12-21 RX ORDER — EZETIMIBE 10 MG/1
10 TABLET ORAL DAILY
Qty: 90 TABLET | Refills: 3 | Status: SHIPPED | OUTPATIENT
Start: 2023-12-21

## 2023-12-21 RX ORDER — ATORVASTATIN CALCIUM 80 MG/1
80 TABLET, FILM COATED ORAL
Qty: 90 TABLET | Refills: 3 | Status: SHIPPED | OUTPATIENT
Start: 2023-12-21

## 2024-02-26 DIAGNOSIS — E78.5 DYSLIPIDEMIA: ICD-10-CM

## 2024-02-26 DIAGNOSIS — I47.10 SVT (SUPRAVENTRICULAR TACHYCARDIA): ICD-10-CM

## 2024-02-26 DIAGNOSIS — I65.22 LEFT CAROTID STENOSIS: ICD-10-CM

## 2024-02-26 DIAGNOSIS — Z95.1 S/P CABG X 3: ICD-10-CM

## 2024-02-26 DIAGNOSIS — I10 ESSENTIAL HYPERTENSION: ICD-10-CM

## 2024-02-26 RX ORDER — METOPROLOL SUCCINATE 50 MG/1
50 TABLET, EXTENDED RELEASE ORAL DAILY
Qty: 90 TABLET | Refills: 3 | Status: SHIPPED | OUTPATIENT
Start: 2024-02-26

## 2024-02-26 RX ORDER — CLOPIDOGREL BISULFATE 75 MG/1
75 TABLET ORAL DAILY
Qty: 90 TABLET | Refills: 3 | Status: SHIPPED | OUTPATIENT
Start: 2024-02-26

## 2024-02-26 RX ORDER — EZETIMIBE 10 MG/1
10 TABLET ORAL DAILY
Qty: 90 TABLET | Refills: 3 | Status: SHIPPED | OUTPATIENT
Start: 2024-02-26

## 2024-02-26 RX ORDER — LOSARTAN POTASSIUM 50 MG/1
50 TABLET ORAL DAILY
Qty: 90 TABLET | Refills: 3 | Status: SHIPPED | OUTPATIENT
Start: 2024-02-26

## 2024-02-26 RX ORDER — ATORVASTATIN CALCIUM 80 MG/1
80 TABLET, FILM COATED ORAL
Qty: 90 TABLET | Refills: 3 | Status: SHIPPED | OUTPATIENT
Start: 2024-02-26

## 2024-07-17 NOTE — PROGRESS NOTES
Assessment/Plan:      Diagnoses and all orders for this visit:    Asymptomatic stenosis of right carotid artery  -     VAS carotid complete study; Future    Left carotid artery stenosis        Left carotid artery stenosis  Hx left TFCAS 2018 for symptomatic lesion and CAD  Lost to follow up   Most recent duplex 9/2023 shows mild bilateral ICA stenosis  No recent neurologic symptoms  On plavix/ statin per his cardiologist  Duplex results reviewed with him  Cont single anti-platelet and statin  Carotid duplex 6 months  OV with AP 1 year      Subjective:     Patient ID: Michael Austin Jr. is a 69 y.o. male.    Patient presents today to re-establish care for carotid stenosis. Most recent carotid duplex done 9/20/23. H/o L carotid artery stents in 2018 ([place]). Denies any s/s of CVA.     HPI    Review of Systems   Constitutional: Negative.    HENT: Negative.     Eyes: Negative.    Respiratory: Negative.     Cardiovascular: Negative.    Gastrointestinal: Negative.    Endocrine: Negative.    Genitourinary: Negative.    Musculoskeletal: Negative.    Skin: Negative.    Allergic/Immunologic: Negative.    Neurological: Negative.    Hematological: Negative.    Psychiatric/Behavioral: Negative.         I have reviewed the ROS above and made changes as needed.      Objective:     Physical Exam      General  Exam: alert, awake, oriented, no distress, consistent with stated age    Integumentary  Exam: warm, dry, no gross lesions, no bruises and normal color    Head and Neck  Exam: supple, no bruits, trachea midline, no JVD, no mass or palpable nodes    Chest and Lung  Exam: chest normal without deformity, bilaterally expansive, clear to auscultation    Cardiovascular  Exam: regular rate, regular rhythm, no murmurs, no rubs or gallops    Adbomen  Exam: soft, non-tender, non-distended, no pulsatile abdominal masses, no abdominal bruit    Peripheral Vascular  Exam: no clubbing of the digits of the upper extremity, no cyanosis, no  edema, both feet are warm, radial pulses 2+ bilaterally, skin well perfused, without and no varicosities.    No widened popliteal pulse noted bilaterally    Upper Extremity:  Palpation: Radial pulse- Bilateral 2+    Lower Extremity:  Palpation: Femoral pulse- Bilateral 2+         Popliteal pulse - Bilateral 2+        Dorsalis Pedis - Bilateral 2+           Neurologic  Exam:alert, non-focal, oriented x 3, cranial nerves II-XII grossly intact

## 2024-07-19 ENCOUNTER — OFFICE VISIT (OUTPATIENT)
Dept: VASCULAR SURGERY | Facility: CLINIC | Age: 69
End: 2024-07-19
Payer: COMMERCIAL

## 2024-07-19 VITALS
DIASTOLIC BLOOD PRESSURE: 92 MMHG | WEIGHT: 176 LBS | HEIGHT: 69 IN | BODY MASS INDEX: 26.07 KG/M2 | HEART RATE: 76 BPM | SYSTOLIC BLOOD PRESSURE: 156 MMHG

## 2024-07-19 DIAGNOSIS — I65.21 ASYMPTOMATIC STENOSIS OF RIGHT CAROTID ARTERY: Primary | ICD-10-CM

## 2024-07-19 DIAGNOSIS — I65.22 LEFT CAROTID ARTERY STENOSIS: Chronic | ICD-10-CM

## 2024-07-19 PROCEDURE — 99205 OFFICE O/P NEW HI 60 MIN: CPT | Performed by: SURGERY

## 2024-07-19 NOTE — ASSESSMENT & PLAN NOTE
Hx left TFCAS 2018 for symptomatic lesion and CAD  Lost to follow up   Most recent duplex 9/2023 shows mild bilateral ICA stenosis  No recent neurologic symptoms  On plavix/ statin per his cardiologist  Duplex results reviewed with him  Cont single anti-platelet and statin  Carotid duplex 6 months  OV with AP 1 year

## 2024-09-04 ENCOUNTER — APPOINTMENT (OUTPATIENT)
Dept: LAB | Facility: HOSPITAL | Age: 69
End: 2024-09-04
Attending: INTERNAL MEDICINE
Payer: COMMERCIAL

## 2024-09-04 ENCOUNTER — OFFICE VISIT (OUTPATIENT)
Dept: FAMILY MEDICINE CLINIC | Facility: CLINIC | Age: 69
End: 2024-09-04
Payer: COMMERCIAL

## 2024-09-04 VITALS
RESPIRATION RATE: 16 BRPM | WEIGHT: 177.2 LBS | SYSTOLIC BLOOD PRESSURE: 152 MMHG | BODY MASS INDEX: 26.25 KG/M2 | HEART RATE: 96 BPM | TEMPERATURE: 97.2 F | DIASTOLIC BLOOD PRESSURE: 88 MMHG | HEIGHT: 69 IN

## 2024-09-04 DIAGNOSIS — I77.1 STRICTURE OF ARTERY (HCC): ICD-10-CM

## 2024-09-04 DIAGNOSIS — I47.10 SVT (SUPRAVENTRICULAR TACHYCARDIA): ICD-10-CM

## 2024-09-04 DIAGNOSIS — L43.9 LICHEN PLANUS: ICD-10-CM

## 2024-09-04 DIAGNOSIS — G93.39 OTHER POST INFECTION AND RELATED FATIGUE SYNDROMES: ICD-10-CM

## 2024-09-04 DIAGNOSIS — G93.39 OTHER POST INFECTION AND RELATED FATIGUE SYNDROMES: Primary | ICD-10-CM

## 2024-09-04 DIAGNOSIS — I25.118 CORONARY ARTERY DISEASE OF NATIVE ARTERY OF NATIVE HEART WITH STABLE ANGINA PECTORIS (HCC): ICD-10-CM

## 2024-09-04 LAB
ALBUMIN SERPL BCG-MCNC: 4.1 G/DL (ref 3.5–5)
ALP SERPL-CCNC: 76 U/L (ref 34–104)
ALT SERPL W P-5'-P-CCNC: 19 U/L (ref 7–52)
ANION GAP SERPL CALCULATED.3IONS-SCNC: 8 MMOL/L (ref 4–13)
AST SERPL W P-5'-P-CCNC: 18 U/L (ref 13–39)
B BURGDOR IGG+IGM SER QL IA: NEGATIVE
BILIRUB SERPL-MCNC: 0.56 MG/DL (ref 0.2–1)
BUN SERPL-MCNC: 18 MG/DL (ref 5–25)
CALCIUM SERPL-MCNC: 9.5 MG/DL (ref 8.4–10.2)
CHLORIDE SERPL-SCNC: 102 MMOL/L (ref 96–108)
CO2 SERPL-SCNC: 28 MMOL/L (ref 21–32)
CREAT SERPL-MCNC: 0.85 MG/DL (ref 0.6–1.3)
ERYTHROCYTE [DISTWIDTH] IN BLOOD BY AUTOMATED COUNT: 12.5 % (ref 11.6–15.1)
GFR SERPL CREATININE-BSD FRML MDRD: 89 ML/MIN/1.73SQ M
GLUCOSE SERPL-MCNC: 143 MG/DL (ref 65–140)
HCT VFR BLD AUTO: 41.6 % (ref 36.5–49.3)
HGB BLD-MCNC: 13.9 G/DL (ref 12–17)
MCH RBC QN AUTO: 29.8 PG (ref 26.8–34.3)
MCHC RBC AUTO-ENTMCNC: 33.4 G/DL (ref 31.4–37.4)
MCV RBC AUTO: 89 FL (ref 82–98)
PLATELET # BLD AUTO: 294 THOUSANDS/UL (ref 149–390)
PMV BLD AUTO: 9.3 FL (ref 8.9–12.7)
POTASSIUM SERPL-SCNC: 4.1 MMOL/L (ref 3.5–5.3)
PROT SERPL-MCNC: 7.5 G/DL (ref 6.4–8.4)
RBC # BLD AUTO: 4.66 MILLION/UL (ref 3.88–5.62)
SODIUM SERPL-SCNC: 138 MMOL/L (ref 135–147)
TSH SERPL DL<=0.05 MIU/L-ACNC: 1.51 UIU/ML (ref 0.45–4.5)
WBC # BLD AUTO: 9.67 THOUSAND/UL (ref 4.31–10.16)

## 2024-09-04 PROCEDURE — 80053 COMPREHEN METABOLIC PANEL: CPT

## 2024-09-04 PROCEDURE — 85027 COMPLETE CBC AUTOMATED: CPT

## 2024-09-04 PROCEDURE — 3288F FALL RISK ASSESSMENT DOCD: CPT | Performed by: INTERNAL MEDICINE

## 2024-09-04 PROCEDURE — 99213 OFFICE O/P EST LOW 20 MIN: CPT | Performed by: INTERNAL MEDICINE

## 2024-09-04 PROCEDURE — 1160F RVW MEDS BY RX/DR IN RCRD: CPT | Performed by: INTERNAL MEDICINE

## 2024-09-04 PROCEDURE — 36415 COLL VENOUS BLD VENIPUNCTURE: CPT

## 2024-09-04 PROCEDURE — 84443 ASSAY THYROID STIM HORMONE: CPT

## 2024-09-04 PROCEDURE — 86618 LYME DISEASE ANTIBODY: CPT

## 2024-09-04 PROCEDURE — 1101F PT FALLS ASSESS-DOCD LE1/YR: CPT | Performed by: INTERNAL MEDICINE

## 2024-09-04 PROCEDURE — 3725F SCREEN DEPRESSION PERFORMED: CPT | Performed by: INTERNAL MEDICINE

## 2024-09-04 PROCEDURE — G2211 COMPLEX E/M VISIT ADD ON: HCPCS | Performed by: INTERNAL MEDICINE

## 2024-09-04 PROCEDURE — 1159F MED LIST DOCD IN RCRD: CPT | Performed by: INTERNAL MEDICINE

## 2024-09-04 RX ORDER — DESOXIMETASONE 2.5 MG/G
CREAM TOPICAL 2 TIMES DAILY
Qty: 60 G | Refills: 1 | Status: SHIPPED | OUTPATIENT
Start: 2024-09-04 | End: 2024-09-05

## 2024-09-04 NOTE — PROGRESS NOTES
"Ambulatory Visit  Name: Michael Austin Jr.      : 1955      MRN: [id number]  Encounter Provider: Lillian Aldana MD  Encounter Date: 2024   Encounter department: [facility]    Assessment & Plan   1. Other post infection and related fatigue syndromes  Comments:  will check labs including lyme titer, advised on need for adequate rest, healthy diet, increased fluids.  Orders:  -     CBC; Future  -     Comprehensive metabolic panel; Future  -     TSH, 3rd generation with Free T4 reflex; Future  -     Lyme Total AB W Reflex to IGM/IGG; Future  2. Lichen planus  -     desoximetasone (TOPICORT) 0.25 % cream; Apply topically 2 (two) times a day  3. Stricture of artery (HCC)  Assessment & Plan:  Managed by cardiology.  4. SVT (supraventricular tachycardia)  Assessment & Plan:  Managed by cardiology.  5. Coronary artery disease of native artery of native heart with stable angina pectoris (HCC)  Assessment & Plan:  Managed by cardiology.     History of Present Illness     Two weeks ago started with diarrhea and a fever to 102 for about 4 days, he improved but continues to feel very fatigued for the past week.  He feels a little off balance.  There are no further fevers, no further diarrhea, denies abdominal pain. He feels his fatigue is out of proportion to his recent illness. Denies rash or muscle pain.  Denies chest pain or dyspnea.   Also has a rash previously diagnosed as lichen planus, it is itchy and he would like a cream.        Review of Systems   Constitutional:  Positive for fatigue. Negative for fever.   Respiratory: Negative.     Cardiovascular: Negative.    Gastrointestinal:  Negative for abdominal pain and diarrhea.       Objective     /88   Pulse 96   Temp (!) 97.2 °F (36.2 °C)   Resp 16   Ht 5' 9\" (1.753 m)   Wt 80.4 kg (177 lb 3.2 oz)   BMI 26.17 kg/m²     Physical Exam  Constitutional:       Appearance: Normal appearance.   HENT:      Head: Normocephalic and atraumatic. "      Mouth/Throat:      Mouth: Mucous membranes are moist.   Eyes:      Pupils: Pupils are equal, round, and reactive to light.   Cardiovascular:      Rate and Rhythm: Normal rate and regular rhythm.      Heart sounds: No murmur heard.     No friction rub. No gallop.   Pulmonary:      Effort: Pulmonary effort is normal.      Breath sounds: Normal breath sounds. No wheezing, rhonchi or rales.   Abdominal:      General: Abdomen is flat. Bowel sounds are normal.      Palpations: Abdomen is soft.      Tenderness: There is no abdominal tenderness. There is no guarding.   Musculoskeletal:         General: No swelling.   Neurological:      Mental Status: He is alert.       Administrative Statements

## 2024-09-04 NOTE — TELEPHONE ENCOUNTER
Patients spouse called [facility] to confirm pharmacy desoximetasone cream was sent to so she can pick it up as per patient request. [facility] [phone] confirmed with Odilia.

## 2024-09-05 RX ORDER — TRIAMCINOLONE ACETONIDE 5 MG/G
CREAM TOPICAL 2 TIMES DAILY
Qty: 454 G | Refills: 0 | Status: SHIPPED | OUTPATIENT
Start: 2024-09-05

## 2024-12-23 ENCOUNTER — OFFICE VISIT (OUTPATIENT)
Dept: CARDIOLOGY CLINIC | Facility: CLINIC | Age: 69
End: 2024-12-23
Payer: COMMERCIAL

## 2024-12-23 VITALS
HEIGHT: 69 IN | HEART RATE: 66 BPM | BODY MASS INDEX: 27.55 KG/M2 | OXYGEN SATURATION: 95 % | DIASTOLIC BLOOD PRESSURE: 88 MMHG | WEIGHT: 186 LBS | SYSTOLIC BLOOD PRESSURE: 132 MMHG

## 2024-12-23 DIAGNOSIS — I10 ESSENTIAL HYPERTENSION: ICD-10-CM

## 2024-12-23 DIAGNOSIS — I65.22 LEFT CAROTID STENOSIS: ICD-10-CM

## 2024-12-23 DIAGNOSIS — E78.5 DYSLIPIDEMIA: ICD-10-CM

## 2024-12-23 DIAGNOSIS — I47.10 SVT (SUPRAVENTRICULAR TACHYCARDIA) (HCC): ICD-10-CM

## 2024-12-23 DIAGNOSIS — I25.118 CORONARY ARTERY DISEASE OF NATIVE ARTERY OF NATIVE HEART WITH STABLE ANGINA PECTORIS (HCC): Primary | ICD-10-CM

## 2024-12-23 PROCEDURE — 99214 OFFICE O/P EST MOD 30 MIN: CPT | Performed by: INTERNAL MEDICINE

## 2024-12-23 PROCEDURE — 93000 ELECTROCARDIOGRAM COMPLETE: CPT | Performed by: INTERNAL MEDICINE

## 2024-12-23 NOTE — ASSESSMENT & PLAN NOTE
-Patient is currently free of angina.  -Continue clopidogrel 75 mg daily.  -Continue atorvastatin 80 mg daily.  -Lipid panel and CMP ordered

## 2024-12-23 NOTE — PROGRESS NOTES
Cardiology Follow Up    Michael Austin Jr.  1955  [id number]  [facility]  [address], [address]  [facility] [id number] [phone]        Interval History: Mr. Austin is a 69 y.o. male here for followup of CAD and SVT.  On 7/24/18 had 3 vessel CABG with LIMA-LAD, SVG-OM1, SVG- RCA because of dyspnea and abnormal stress test.      Had carotid artery stent on 7/9/2018.  Carotid ultrasound showed patent stent with mild disease on right.  He had follow up visit with vascular surgery who recommended routine surveillance with antiplatelet and statin therapy.  Patient also has a history of supraventricular tachycardia.  He has not had any prolonged episodes since initiation of metoprolol.  Was evaluated by EP in the past.     Since his last visit, he has been feeling well.  he denies any palpitations, chest pain, shortness of breath, LE edema, orthopnea or PND.   He has gained weight with diet modification.  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      Patient Active Problem List   Diagnosis    Left carotid artery stenosis    Essential hypertension    Dyslipidemia    Coronary artery disease of native artery of native heart with stable angina pectoris (HCC)    S/P CABG x 3    SVT (supraventricular tachycardia) ([facility])    Asymptomatic stenosis of right carotid artery    Stricture of artery ([facility])    Positive colorectal cancer screening using Cologuard test     Past Medical History:   Diagnosis Date    Acute blood loss as cause of postoperative anemia 7/26/2018    Carotid stenosis, left     s/p IR stent    Coronary artery disease     Former tobacco use     Hard of hearing     Hyperlipidemia     Hypertension     Leukocytosis 9/11/2019    Pancreatitis 9/11/2019    Parkinson disease (HCC)     SVT (supraventricular tachycardia) ([facility])      Social History     Socioeconomic History    Marital status:  /Civil Union     Spouse name: Not on file    Number of children: Not on file    Years of education: Not on file    Highest education level: Not on file   Occupational History    Not on file   Tobacco Use    Smoking status: Former     Current packs/day: 0.00     Types: Cigarettes     Start date:      Quit date: 2018     Years since quittin.6     Passive exposure: Current    Smokeless tobacco: Current     Types: Chew     Last attempt to quit: 5/3/2018   Vaping Use    Vaping status: Never Used   Substance and Sexual Activity    Alcohol use: Not Currently    Drug use: No    Sexual activity: Not on file   Other Topics Concern    Not on file   Social History Narrative    Not on file     Social Drivers of Health     Financial Resource Strain: Low Risk  (2023)    Overall Financial Resource Strain (CARDIA)     Difficulty of Paying Living Expenses: Not hard at all   Food Insecurity: Not on file   Transportation Needs: No Transportation Needs (2023)    PRAPARE - Transportation     Lack of Transportation (Medical): No     Lack of Transportation (Non-Medical): No   Physical Activity: Not on file   Stress: Not on file   Social Connections: Not on file   Intimate Partner Violence: Not on file   Housing Stability: Not on file      Family History   Problem Relation Age of Onset    Cancer Mother     Heart attack Father     Stroke Father      Past Surgical History:   Procedure Laterality Date    AMPUTATION OF REPLICATED FINGERS       rt 2nd and 3rd fingers traumatically amputated at work    APPENDECTOMY      CORONARY ARTERY BYPASS GRAFT  07/2018    x 3    MANDIBLE FRACTURE SURGERY      upper and lower    ORBITAL FRACTURE SURGERY Right     [facility] CORONARY ARTERY BYPASS 4 CORONARY VENOUS GRAFTS N/A 2018    Procedure: CORONARY ARTERY BYPASS GRAFT (CABG) 3 VESSELS ; NATE to LAD, SVG--> OM and Distal right;  Surgeon: Moe Sorensen DO;  Location: [facility];  Service: Cardiac Surgery    NH ECHO TRANSESOPHAG  MONTR CARDIAC PUMP FUNCTJ N/A 7/24/2018    Procedure: TRANSESOPHAGEAL ECHOCARDIOGRAM (HECTOR);  Surgeon: Moe Sorensen DO;  Location: [facility];  Service: Cardiac Surgery    RI TCAT IV STENT CRV CRTD ART EMBOLIC PROTECJ Left 7/9/2018    Procedure: TRANSCAROTID ARTERY REVASCULARIZATION;  Surgeon: Zack Corral MD;  Location: [facility];  Service: Vascular       Current Outpatient Medications:     atorvastatin (LIPITOR) 80 mg tablet, Take 1 tablet (80 mg total) by mouth daily with dinner, Disp: 90 tablet, Rfl: 3    clopidogrel (PLAVIX) 75 mg tablet, Take 1 tablet (75 mg total) by mouth daily, Disp: 90 tablet, Rfl: 3    ezetimibe (ZETIA) 10 mg tablet, Take 1 tablet (10 mg total) by mouth daily, Disp: 90 tablet, Rfl: 3    losartan (COZAAR) 50 mg tablet, Take 1 tablet (50 mg total) by mouth daily, Disp: 90 tablet, Rfl: 3    Magnesium Citrate 200 MG TABS, Take 400 mg by mouth in the morning, Disp: , Rfl:     metoprolol succinate (TOPROL-XL) 50 mg 24 hr tablet, Take 1 tablet (50 mg total) by mouth daily, Disp: 90 tablet, Rfl: 3    Multiple Vitamin (multivitamin) tablet, Take 1 tablet by mouth daily, Disp: , Rfl:     triamcinolone (KENALOG) 0.5 % cream, Apply topically 2 (two) times a day, Disp: 454 g, Rfl: 0    vitamin B-12 (VITAMIN B-12) 1,000 mcg tablet, Take 1,000 mcg by mouth daily, Disp: , Rfl:   Allergies   Allergen Reactions    Percocet [Oxycodone-Acetaminophen] Itching     Trouble sleeping        Labs:  Lab Results   Component Value Date    K 4.1 09/04/2024     09/04/2024    CO2 28 09/04/2024    CO2 30 07/24/2018    BUN 18 09/04/2024    CREATININE 0.85 09/04/2024    GLUCOSE 109 07/24/2018    CALCIUM 9.5 09/04/2024     Lab Results   Component Value Date    WBC 9.67 09/04/2024    HGB 13.9 09/04/2024    HCT 41.6 09/04/2024    MCV 89 09/04/2024     09/04/2024     Lab Results   Component Value Date    TRIG 150 (H) 08/25/2023    HDL 37 (L) 08/25/2023     Review of Systems:  Review of Systems  "  Respiratory:  Negative for shortness of breath.    Cardiovascular:  Negative for chest pain, palpitations and leg swelling.   Musculoskeletal:  Positive for arthralgias.   All other systems reviewed and are negative.      Physical Exam:  /88 (BP Location: Left arm, Patient Position: Sitting, Cuff Size: Standard)   Pulse 66   Ht 5' 9\" (1.753 m)   Wt 84.4 kg (186 lb)   SpO2 95%   BMI 27.47 kg/m²     Physical Exam  Constitutional:       General: He is not in acute distress.     Appearance: Normal appearance. He is well-developed. He is not diaphoretic.   HENT:      Head: Normocephalic and atraumatic.   Eyes:      General: No scleral icterus.     Conjunctiva/sclera: Conjunctivae normal.      Pupils: Pupils are equal, round, and reactive to light.   Neck:      Thyroid: No thyromegaly.      Vascular: No JVD.   Cardiovascular:      Rate and Rhythm: Normal rate and regular rhythm.      Heart sounds: Murmur heard.      No friction rub. No gallop.   Pulmonary:      Effort: Pulmonary effort is normal.      Breath sounds: Normal breath sounds.   Musculoskeletal:      Cervical back: Neck supple.      Right lower leg: No edema.      Left lower leg: No edema.   Skin:     General: Skin is warm and dry.      Findings: No erythema or rash.   Neurological:      General: No focal deficit present.      Mental Status: He is alert and oriented to person, place, and time. Mental status is at baseline.      Cranial Nerves: No cranial nerve deficit.   Psychiatric:         Mood and Affect: Mood normal.         Behavior: Behavior normal.         Thought Content: Thought content normal.         Judgment: Judgment normal.         Discussion/Summary:  Assessment & Plan  Coronary artery disease of native artery of native heart with stable angina pectoris (HCC)  -Patient is currently free of angina.  -Continue clopidogrel 75 mg daily.  -Continue atorvastatin 80 mg daily.  -Lipid panel and CMP ordered  Essential hypertension  -Blood " pressure controlled with losartan and metoprolol.  SVT (supraventricular tachycardia) (HCC)  -Continue metoprolol  Dyslipidemia  -Continue atorvastatin and ezetimibe  -Lipid panel and CMP ordered  Left carotid stenosis  -Continue Plavix.  Follow-up with vascular surgery

## 2025-01-13 DIAGNOSIS — I10 ESSENTIAL HYPERTENSION: ICD-10-CM

## 2025-01-13 DIAGNOSIS — Z95.1 S/P CABG X 3: ICD-10-CM

## 2025-01-13 DIAGNOSIS — E78.5 DYSLIPIDEMIA: ICD-10-CM

## 2025-01-13 DIAGNOSIS — I65.22 LEFT CAROTID STENOSIS: ICD-10-CM

## 2025-01-13 DIAGNOSIS — I47.10 SVT (SUPRAVENTRICULAR TACHYCARDIA) (HCC): ICD-10-CM

## 2025-01-15 RX ORDER — CLOPIDOGREL BISULFATE 75 MG/1
75 TABLET ORAL DAILY
Qty: 90 TABLET | Refills: 1 | Status: SHIPPED | OUTPATIENT
Start: 2025-01-15

## 2025-01-15 RX ORDER — METOPROLOL SUCCINATE 50 MG/1
50 TABLET, EXTENDED RELEASE ORAL DAILY
Qty: 90 TABLET | Refills: 1 | Status: SHIPPED | OUTPATIENT
Start: 2025-01-15

## 2025-01-15 RX ORDER — EZETIMIBE 10 MG/1
10 TABLET ORAL DAILY
Qty: 90 TABLET | Refills: 0 | Status: SHIPPED | OUTPATIENT
Start: 2025-01-15

## 2025-01-15 RX ORDER — ATORVASTATIN CALCIUM 80 MG/1
TABLET, FILM COATED ORAL
Qty: 90 TABLET | Refills: 0 | Status: SHIPPED | OUTPATIENT
Start: 2025-01-15

## 2025-01-15 RX ORDER — LOSARTAN POTASSIUM 50 MG/1
50 TABLET ORAL DAILY
Qty: 90 TABLET | Refills: 1 | Status: SHIPPED | OUTPATIENT
Start: 2025-01-15

## 2025-03-31 DIAGNOSIS — Z95.1 S/P CABG X 3: ICD-10-CM

## 2025-03-31 DIAGNOSIS — E78.5 DYSLIPIDEMIA: ICD-10-CM

## 2025-04-01 RX ORDER — ATORVASTATIN CALCIUM 80 MG/1
TABLET, FILM COATED ORAL
Qty: 90 TABLET | Refills: 3 | Status: SHIPPED | OUTPATIENT
Start: 2025-04-01

## 2025-04-01 RX ORDER — EZETIMIBE 10 MG/1
10 TABLET ORAL DAILY
Qty: 90 TABLET | Refills: 3 | Status: SHIPPED | OUTPATIENT
Start: 2025-04-01

## 2025-04-01 NOTE — TELEPHONE ENCOUNTER
Refill must be reviewed and completed by the office or provider. The refill is unable to be approved or denied by the medication management team.      Courtesy refill previously given 01.2025. Patient need updated blood work and have ordered 12.2024- Please review to see if the refill is appropriate.

## 2025-04-03 ENCOUNTER — TELEPHONE (OUTPATIENT)
Dept: ADMINISTRATIVE | Facility: OTHER | Age: 70
End: 2025-04-03

## 2025-04-03 NOTE — TELEPHONE ENCOUNTER
Patient contacted to schedule Annual Wellness Visit.   A message was left for the patient to return the call.    Thank you.  Poonam Bell MA  [facility]

## 2025-05-15 ENCOUNTER — APPOINTMENT (EMERGENCY)
Dept: RADIOLOGY | Facility: HOSPITAL | Age: 70
End: 2025-05-15
Payer: COMMERCIAL

## 2025-05-15 ENCOUNTER — NURSE TRIAGE (OUTPATIENT)
Age: 70
End: 2025-05-15

## 2025-05-15 ENCOUNTER — HOSPITAL ENCOUNTER (EMERGENCY)
Facility: HOSPITAL | Age: 70
Discharge: HOME | End: 2025-05-15
Attending: EMERGENCY MEDICINE
Payer: COMMERCIAL

## 2025-05-15 VITALS
HEIGHT: 69 IN | SYSTOLIC BLOOD PRESSURE: 137 MMHG | RESPIRATION RATE: 20 BRPM | WEIGHT: 177.8 LBS | TEMPERATURE: 99.5 F | DIASTOLIC BLOOD PRESSURE: 77 MMHG | BODY MASS INDEX: 26.33 KG/M2 | HEART RATE: 73 BPM | OXYGEN SATURATION: 96 %

## 2025-05-15 DIAGNOSIS — R06.02 SHORTNESS OF BREATH: Primary | ICD-10-CM

## 2025-05-15 DIAGNOSIS — R53.83 FATIGUE: ICD-10-CM

## 2025-05-15 LAB
2HR DELTA HS TROPONIN: 2 NG/L
ALBUMIN SERPL BCG-MCNC: 4.4 G/DL (ref 3.5–5)
ALP SERPL-CCNC: 91 U/L (ref 34–104)
ALT SERPL W P-5'-P-CCNC: 21 U/L (ref 7–52)
ANION GAP SERPL CALCULATED.3IONS-SCNC: 14 MMOL/L (ref 4–13)
AST SERPL W P-5'-P-CCNC: 19 U/L (ref 13–39)
BASOPHILS # BLD AUTO: 0.1 THOUSANDS/ÂΜL (ref 0–0.1)
BASOPHILS NFR BLD AUTO: 1 % (ref 0–1)
BILIRUB SERPL-MCNC: 0.74 MG/DL (ref 0.2–1)
BNP SERPL-MCNC: 31 PG/ML (ref 0–100)
BUN SERPL-MCNC: 15 MG/DL (ref 5–25)
CALCIUM SERPL-MCNC: 9.5 MG/DL (ref 8.4–10.2)
CARDIAC TROPONIN I PNL SERPL HS: 10 NG/L (ref ?–50)
CARDIAC TROPONIN I PNL SERPL HS: 8 NG/L (ref ?–50)
CHLORIDE SERPL-SCNC: 100 MMOL/L (ref 96–108)
CO2 SERPL-SCNC: 22 MMOL/L (ref 21–32)
CREAT SERPL-MCNC: 0.98 MG/DL (ref 0.6–1.3)
D DIMER PPP FEU-MCNC: 0.42 UG/ML FEU
EOSINOPHIL # BLD AUTO: 0.24 THOUSAND/ÂΜL (ref 0–0.61)
EOSINOPHIL NFR BLD AUTO: 2 % (ref 0–6)
ERYTHROCYTE [DISTWIDTH] IN BLOOD BY AUTOMATED COUNT: 12.6 % (ref 11.6–15.1)
GFR SERPL CREATININE-BSD FRML MDRD: 78 ML/MIN/1.73SQ M
GLUCOSE SERPL-MCNC: 98 MG/DL (ref 65–140)
HCT VFR BLD AUTO: 44.5 % (ref 36.5–49.3)
HGB BLD-MCNC: 15 G/DL (ref 12–17)
IMM GRANULOCYTES # BLD AUTO: 0.03 THOUSAND/UL (ref 0–0.2)
IMM GRANULOCYTES NFR BLD AUTO: 0 % (ref 0–2)
LYMPHOCYTES # BLD AUTO: 1.45 THOUSANDS/ÂΜL (ref 0.6–4.47)
LYMPHOCYTES NFR BLD AUTO: 14 % (ref 14–44)
MCH RBC QN AUTO: 29.8 PG (ref 26.8–34.3)
MCHC RBC AUTO-ENTMCNC: 33.7 G/DL (ref 31.4–37.4)
MCV RBC AUTO: 89 FL (ref 82–98)
MONOCYTES # BLD AUTO: 1.01 THOUSAND/ÂΜL (ref 0.17–1.22)
MONOCYTES NFR BLD AUTO: 10 % (ref 4–12)
NEUTROPHILS # BLD AUTO: 7.31 THOUSANDS/ÂΜL (ref 1.85–7.62)
NEUTS SEG NFR BLD AUTO: 73 % (ref 43–75)
NRBC BLD AUTO-RTO: 0 /100 WBCS
PLATELET # BLD AUTO: 206 THOUSANDS/UL (ref 149–390)
PMV BLD AUTO: 9.7 FL (ref 8.9–12.7)
POTASSIUM SERPL-SCNC: 3.8 MMOL/L (ref 3.5–5.3)
PROT SERPL-MCNC: 8.1 G/DL (ref 6.4–8.4)
RBC # BLD AUTO: 5.03 MILLION/UL (ref 3.88–5.62)
SODIUM SERPL-SCNC: 136 MMOL/L (ref 135–147)
WBC # BLD AUTO: 10.14 THOUSAND/UL (ref 4.31–10.16)

## 2025-05-15 PROCEDURE — 83880 ASSAY OF NATRIURETIC PEPTIDE: CPT | Performed by: EMERGENCY MEDICINE

## 2025-05-15 PROCEDURE — 99285 EMERGENCY DEPT VISIT HI MDM: CPT

## 2025-05-15 PROCEDURE — 80053 COMPREHEN METABOLIC PANEL: CPT | Performed by: EMERGENCY MEDICINE

## 2025-05-15 PROCEDURE — 99285 EMERGENCY DEPT VISIT HI MDM: CPT | Performed by: EMERGENCY MEDICINE

## 2025-05-15 PROCEDURE — 85379 FIBRIN DEGRADATION QUANT: CPT | Performed by: EMERGENCY MEDICINE

## 2025-05-15 PROCEDURE — 85025 COMPLETE CBC W/AUTO DIFF WBC: CPT | Performed by: EMERGENCY MEDICINE

## 2025-05-15 PROCEDURE — 93005 ELECTROCARDIOGRAM TRACING: CPT

## 2025-05-15 PROCEDURE — 71045 X-RAY EXAM CHEST 1 VIEW: CPT

## 2025-05-15 PROCEDURE — 36415 COLL VENOUS BLD VENIPUNCTURE: CPT | Performed by: EMERGENCY MEDICINE

## 2025-05-15 PROCEDURE — 84484 ASSAY OF TROPONIN QUANT: CPT | Performed by: EMERGENCY MEDICINE

## 2025-05-15 PROCEDURE — 96360 HYDRATION IV INFUSION INIT: CPT

## 2025-05-15 RX ORDER — METOPROLOL TARTRATE 50 MG
50 TABLET ORAL ONCE
Status: COMPLETED | OUTPATIENT
Start: 2025-05-15 | End: 2025-05-15

## 2025-05-15 RX ADMIN — SODIUM CHLORIDE 500 ML: 0.9 INJECTION, SOLUTION INTRAVENOUS at 18:58

## 2025-05-15 RX ADMIN — METOPROLOL TARTRATE 50 MG: 50 TABLET, FILM COATED ORAL at 17:03

## 2025-05-15 NOTE — TELEPHONE ENCOUNTER
"FOLLOW UP: advised EMERGENCY DEPARTMENT for cardiac/respiratory work up . Wife asked for appointment that was scheduled by clerical to stay but will call and cancel depending on what the EMERGENCY DEPARTMENT says     REASON FOR CONVERSATION: Shortness of Breath    SYMPTOMS: chest congested with no cold symptoms, shortness of breath , elevated heart rate 122,     OTHER: strong cardiac history     DISPOSITION: Go to ED Now  Reason for Disposition   MODERATE difficulty breathing (e.g., speaks in phrases, SOB even at rest, pulse 100-120) of new-onset or worse than normal    Answer Assessment - Initial Assessment Questions  1. RESPIRATORY STATUS: \"Describe your breathing?\" (e.g., wheezing, shortness of breath, unable to speak, severe coughing)       \" Chest congestion\"   2. ONSET: \"When did this breathing problem begin?\"       A few days   3. PATTERN \"Does the difficult breathing come and go, or has it been constant since it started?\"       Constant   4. SEVERITY: \"How bad is your breathing?\" (e.g., mild, moderate, severe)       Even at rest   5. RECURRENT SYMPTOM: \"Have you had difficulty breathing before?\" If Yes, ask: \"When was the last time?\" and \"What happened that time?\"       Yes, heart surgery   6. CARDIAC HISTORY: \"Do you have any history of heart disease?\" (e.g., heart attack, angina, bypass surgery, angioplasty)       Open heart surgery, stent , CAD  7. LUNG HISTORY: \"Do you have any history of lung disease?\"  (e.g., pulmonary embolus, asthma, emphysema)      Former smoker quit 2018  8. CAUSE: \"What do you think is causing the breathing problem?\"       Unknown   9. OTHER SYMPTOMS: \"Do you have any other symptoms?\" (e.g., chest pain, cough, dizziness, fever, runny nose)      Heart rate 122  10. O2 SATURATION MONITOR:  \"Do you use an oxygen saturation monitor (pulse oximeter) at home?\" If Yes, ask: \"What is your reading (oxygen level) today?\" \"What is your usual oxygen saturation reading?\" (e.g., 95%)        " "95%  12. TRAVEL: \"Have you traveled out of the country in the last month?\" (e.g., travel history, exposures)        Denies    Protocols used: Breathing Difficulty-Adult-[place]    "

## 2025-05-15 NOTE — DISCHARGE INSTRUCTIONS
Your lab work today was normal.  Given your significant cardiac history and your shortness of breath, we do recommend that you follow-up with cardiology.  We have given you referral to follow-up with cardiology in the next few weeks.  Call the provided number to schedule an appointment.  If you have any severe worsening of symptoms, return to the emergency department.

## 2025-05-15 NOTE — ED PROVIDER NOTES
Time reflects when diagnosis was documented in both MDM as applicable and the Disposition within this note       Time User Action Codes Description Comment    5/15/2025  7:36 PM Yuan Ly Add [R06.02] Shortness of breath     5/15/2025  7:36 PM Yuan Ly Add [R53.83] Fatigue           ED Disposition       ED Disposition   Discharge    Condition   Stable    Date/Time   u May 15, 2025  7:36 PM    Comment   Michael Austin Jr. discharge to home/self care.                   Assessment & Plan       Medical Decision Making  Differential diagnosis includes but is not limited to atypical presentation of ACS, arrhythmia, anemia, bronchitis, COPD/asthma, new onset CHF, pneumonia, pleural effusion.  I ordered and reviewed lab work including CBC, CMP, troponin, D-dimer, BNP.  Patient with grossly unremarkable lab evaluation.  I ordered and independently interpreted an EKG which demonstrates sinus tachycardia without additional changes.  I treated patient with home metoprolol, IV fluids.  Patient with improvement of tachycardia.  Patient without significant lab abnormality.  Heart score of 5.  Provided with referral to cardiology, discharged with return precautions.    Amount and/or Complexity of Data Reviewed  Labs: ordered.  Radiology: ordered.    Risk  Prescription drug management.             Medications   metoprolol tartrate (LOPRESSOR) tablet 50 mg (50 mg Oral Given 5/15/25 1703)   sodium chloride 0.9 % bolus 500 mL (0 mL Intravenous Stopped 5/15/25 1948)       ED Risk Strat Scores   HEART Risk Score      Flowsheet Row Most Recent Value   Heart Score Risk Calculator    History 1 Filed at: 05/15/2025 1935   ECG 0 Filed at: 05/15/2025 1935   Age 2 Filed at: 05/15/2025 1935   Risk Factors 2 Filed at: 05/15/2025 1935   Troponin 0 Filed at: 05/15/2025 1935   HEART Score 5 Filed at: 05/15/2025 1935          HEART Risk Score      Flowsheet Row Most Recent Value   Heart Score Risk Calculator    History 1  Filed at: 05/15/2025 1935   ECG 0 Filed at: 05/15/2025 1935   Age 2 Filed at: 05/15/2025 1935   Risk Factors 2 Filed at: 05/15/2025 1935   Troponin 0 Filed at: 05/15/2025 1935   HEART Score 5 Filed at: 05/15/2025 1935                      No data recorded        SBIRT 22yo+      Flowsheet Row Most Recent Value   Initial Alcohol Screen: US AUDIT-C     1. How often do you have a drink containing alcohol? 0 Filed at: 05/15/2025 1631   2. How many drinks containing alcohol do you have on a typical day you are drinking?  0 Filed at: 05/15/2025 1631   3a. Male UNDER 65: How often do you have five or more drinks on one occasion? 0 Filed at: 05/15/2025 1631   3b. FEMALE Any Age, or MALE 65+: How often do you have 4 or more drinks on one occassion? 0 Filed at: 05/15/2025 1631   Audit-C Score 0 Filed at: 05/15/2025 1631   MATILDE: How many times in the past year have you...    Used an illegal drug or used a prescription medication for non-medical reasons? Never Filed at: 05/15/2025 1631                            History of Present Illness       Chief Complaint   Patient presents with    Shortness of Breath     Patient reports SOB starting a week ago w/ chest pressure upon inhalation. Patient report unbalanced for about a month. Patient reports recently starting with congestion.        Past Medical History:   Diagnosis Date    Acute blood loss as cause of postoperative anemia 7/26/2018    Carotid stenosis, left     s/p IR stent    Coronary artery disease     Former tobacco use     Hard of hearing     Hyperlipidemia     Hypertension     Leukocytosis 9/11/2019    Pancreatitis 9/11/2019    Parkinson disease (HCC)     SVT (supraventricular tachycardia) (HCC)       Past Surgical History:   Procedure Laterality Date    AMPUTATION OF REPLICATED FINGERS       rt 2nd and 3rd fingers traumatically amputated at work    APPENDECTOMY      CORONARY ARTERY BYPASS GRAFT  07/2018    x 3    MANDIBLE FRACTURE SURGERY      upper and lower     ORBITAL FRACTURE SURGERY Right     MN CORONARY ARTERY BYPASS 4 CORONARY VENOUS GRAFTS N/A 7/24/2018    Procedure: CORONARY ARTERY BYPASS GRAFT (CABG) 3 VESSELS ; NATE to LAD, SVG--> OM and Distal right;  Surgeon: Moe Sorensen DO;  Location: [facility];  Service: Cardiac Surgery    MN ECHO TRANSESOPHAG MONTR CARDIAC PUMP FUNCTJ N/A 7/24/2018    Procedure: TRANSESOPHAGEAL ECHOCARDIOGRAM (HECTOR);  Surgeon: Moe Sorensen DO;  Location: [facility];  Service: Cardiac Surgery    MN TCAT IV STENT CRV CRTD ART EMBOLIC PROTECJ Left 7/9/2018    Procedure: TRANSCAROTID ARTERY REVASCULARIZATION;  Surgeon: Zack Corral MD;  Location: [facility];  Service: Vascular      Family History   Problem Relation Age of Onset    Cancer Mother     Heart attack Father     Stroke Father       Social History[1]   E-Cigarette/Vaping    E-Cigarette Use Never User       E-Cigarette/Vaping Substances    Nicotine No     THC No     CBD No       I have reviewed and agree with the history as documented.     Patient is a 70-year-old male history of CAD, SVT, previous three-vessel CABG, hypertension, hyperlipidemia presenting for evaluation of several weeks of fatigue, decreased exercise tolerance, exertional chest pain and dyspnea on exertion.  Patient states occasional symptoms at rest.  Patient denies nausea, vomiting, diaphoresis, syncope, does state some episodes of lightheadedness.  Patient denies lower extremity pain or swelling, recent immobilization or surgery, recent weight gain, orthopnea.  Patient states that he did not take his metoprolol this morning, otherwise states good medication compliance.        Review of Systems   Constitutional:  Positive for fatigue. Negative for chills and fever.   Respiratory:  Positive for shortness of breath. Negative for cough.    Cardiovascular:  Positive for chest pain.   Gastrointestinal:  Negative for diarrhea, nausea and vomiting.   Musculoskeletal:  Negative for arthralgias and myalgias.    Neurological:  Positive for light-headedness. Negative for weakness and headaches.   Psychiatric/Behavioral:  Negative for confusion.    All other systems reviewed and are negative.          Objective       ED Triage Vitals [05/15/25 1628]   Temperature Pulse Blood Pressure Respirations SpO2 Patient Position - Orthostatic VS   99.5 °F (37.5 °C) (!) 123 156/93 20 94 % Lying      Temp Source Heart Rate Source BP Location FiO2 (%) Pain Score    Oral Monitor Right arm -- 7      Vitals      Date and Time Temp Pulse SpO2 Resp BP Pain Score FACES Pain Rating User   05/15/25 1930 -- 73 96 % 20 137/77 -- -- DD   05/15/25 1900 -- 76 94 % 20 152/86 -- --    05/15/25 1815 -- 79 94 % 20 138/82 -- --    05/15/25 1703 -- 109 -- -- -- -- --    05/15/25 1700 -- 122 -- -- 120/75 -- --    05/15/25 1628 99.5 °F (37.5 °C) 123 94 % 20 156/93 7 -- CG            Physical Exam  Vitals and nursing note reviewed.   Constitutional:       General: He is not in acute distress.     Appearance: Normal appearance. He is not ill-appearing, toxic-appearing or diaphoretic.      Comments: Well-appearing, nontoxic, nondistressed   HENT:      Head: Normocephalic and atraumatic.      Comments: Moist mucous membranes     Right Ear: External ear normal.      Left Ear: External ear normal.     Eyes:      General:         Right eye: No discharge.         Left eye: No discharge.       Cardiovascular:      Comments: Sinus tachycardia rate of 110.  No murmurs rubs or gallops.  Extremities warm and well-perfused without mottling  Pulmonary:      Effort: No respiratory distress.      Comments: No increased work of breathing.  Speaking in complete sentences.  Lungs clear to auscultation bilaterally without wheezes, rales, rhonchi.  Satting 95% on room air indicating adequate oxygenation  Abdominal:      General: There is no distension.     Musculoskeletal:         General: No deformity.      Cervical back: Normal range of motion.      Comments: No lower  extremity swelling, erythema, or calf tenderness     Skin:     Findings: No lesion or rash.     Neurological:      Mental Status: He is alert and oriented to person, place, and time. Mental status is at baseline.      Comments: Awake, alert, pleasant, interactive   Psychiatric:         Mood and Affect: Mood and affect normal.         Results Reviewed       Procedure Component Value Units Date/Time    HS Troponin I 2hr [[id number]]  (Normal) Collected: 05/15/25 1854    Lab Status: Final result Specimen: Blood from Arm, Left Updated: 05/15/25 1926     hs TnI 2hr 10 ng/L      Delta 2hr hsTnI 2 ng/L     HS Troponin I 4hr [[id number]]     Lab Status: No result Specimen: Blood     B-Type Natriuretic Peptide(BNP) [[id number]]  (Normal) Collected: 05/15/25 1700    Lab Status: Final result Specimen: Blood from Arm, Left Updated: 05/15/25 1748     BNP 31 pg/mL     HS Troponin 0hr (reflex protocol) [[id number]]  (Normal) Collected: 05/15/25 1700    Lab Status: Final result Specimen: Blood from Arm, Left Updated: 05/15/25 1727     hs TnI 0hr 8 ng/L     Comprehensive metabolic panel [[id number]]  (Abnormal) Collected: 05/15/25 1700    Lab Status: Final result Specimen: Blood from Arm, Left Updated: 05/15/25 1721     Sodium 136 mmol/L      Potassium 3.8 mmol/L      Chloride 100 mmol/L      CO2 22 mmol/L      ANION GAP 14 mmol/L      BUN 15 mg/dL      Creatinine 0.98 mg/dL      Glucose 98 mg/dL      Calcium 9.5 mg/dL      AST 19 U/L      ALT 21 U/L      Alkaline Phosphatase 91 U/L      Total Protein 8.1 g/dL      Albumin 4.4 g/dL      Total Bilirubin 0.74 mg/dL      eGFR 78 ml/min/1.73sq m     Narrative:      [employer] Kid[employer] guidelines for Chronic Kidney Disease (CKD):     Stage 1 with normal or high GFR (GFR > 90 mL/min/1.73 square meters)    Stage 2 Mild CKD (GFR = 60-89 mL/min/1.73 square meters)    Stage 3A Moderate CKD (GFR = 45-59 mL/min/1.73 square meters)    Stage 3B Moderate CKD (GFR = 30-44 mL/min/1.73  square meters)    Stage 4 Severe CKD (GFR = 15-29 mL/min/1.73 square meters)    Stage 5 End Stage CKD (GFR <15 mL/min/1.73 square meters)  Note: GFR calculation is accurate only with a steady state creatinine    D-dimer, quantitative [[id number]]  (Normal) Collected: 05/15/25 1700    Lab Status: Final result Specimen: Blood from Arm, Left Updated: 05/15/25 1720     D-Dimer, Quant 0.42 ug/ml FEU     Narrative:      In the evaluation for possible pulmonary embolism, in the appropriate (Well's Score of 4 or less) patient, the age adjusted d-dimer cutoff for this patient can be calculated as:    Age x 0.01 (in ug/mL) for Age-adjusted D-dimer exclusion threshold for a patient over 50 years.    CBC and differential [[id number]] Collected: 05/15/25 1700    Lab Status: Final result Specimen: Blood from Arm, Left Updated: 05/15/25 1706     WBC 10.14 Thousand/uL      RBC 5.03 Million/uL      Hemoglobin 15.0 g/dL      Hematocrit 44.5 %      MCV 89 fL      MCH 29.8 pg      MCHC 33.7 g/dL      RDW 12.6 %      MPV 9.7 fL      Platelets 206 Thousands/uL      nRBC 0 /100 WBCs      Segmented % 73 %      Immature Grans % 0 %      Lymphocytes % 14 %      Monocytes % 10 %      Eosinophils Relative 2 %      Basophils Relative 1 %      Absolute Neutrophils 7.31 Thousands/µL      Absolute Immature Grans 0.03 Thousand/uL      Absolute Lymphocytes 1.45 Thousands/µL      Absolute Monocytes 1.01 Thousand/µL      Eosinophils Absolute 0.24 Thousand/µL      Basophils Absolute 0.10 Thousands/µL             XR chest 1 view portable   Final Interpretation by Elsa Gibson MD (05/15 1758)      No acute cardiopulmonary disease.            Workstation performed: [id number]             Procedures    ED Medication and Procedure Management   Prior to Admission Medications   Prescriptions Last Dose Informant Patient Reported? Taking?   Magnesium Citrate 200 MG TABS  Self Yes No   Sig: Take 400 mg by mouth in the morning   Multiple Vitamin (multivitamin)  tablet  Self Yes No   Sig: Take 1 tablet by mouth daily   atorvastatin (LIPITOR) 80 mg tablet   No No   Sig: TAKE 1 TABLET EVERY DAY WITH DINNER   clopidogrel (PLAVIX) 75 mg tablet   No No   Sig: TAKE 1 TABLET EVERY DAY   ezetimibe (ZETIA) 10 mg tablet   No No   Sig: TAKE 1 TABLET EVERY DAY   losartan (COZAAR) 50 mg tablet   No No   Sig: TAKE 1 TABLET EVERY DAY   metoprolol succinate (TOPROL-XL) 50 mg 24 hr tablet   No No   Sig: TAKE 1 TABLET EVERY DAY   triamcinolone (KENALOG) 0.5 % cream   No No   Sig: Apply topically 2 (two) times a day   vitamin B-12 (VITAMIN B-12) 1,000 mcg tablet  Self Yes No   Sig: Take 1,000 mcg by mouth daily      Facility-Administered Medications: None     Discharge Medication List as of 5/15/2025  7:39 PM        CONTINUE these medications which have NOT CHANGED    Details   atorvastatin (LIPITOR) 80 mg tablet TAKE 1 TABLET EVERY DAY WITH DINNER, Normal      clopidogrel (PLAVIX) 75 mg tablet TAKE 1 TABLET EVERY DAY, Starting Wed 1/15/2025, Normal      ezetimibe (ZETIA) 10 mg tablet TAKE 1 TABLET EVERY DAY, Starting Tue 4/1/2025, Normal      losartan (COZAAR) 50 mg tablet TAKE 1 TABLET EVERY DAY, Starting Wed 1/15/2025, Normal      Magnesium Citrate 200 MG TABS Take 400 mg by mouth in the morning, Historical Med      metoprolol succinate (TOPROL-XL) 50 mg 24 hr tablet TAKE 1 TABLET EVERY DAY, Starting Wed 1/15/2025, Normal      Multiple Vitamin (multivitamin) tablet Take 1 tablet by mouth daily, Historical Med      triamcinolone (KENALOG) 0.5 % cream Apply topically 2 (two) times a day, Starting Thu 9/5/2024, Normal      vitamin B-12 (VITAMIN B-12) 1,000 mcg tablet Take 1,000 mcg by mouth daily, Historical Med             ED SEPSIS DOCUMENTATION   Time reflects when diagnosis was documented in both MDM as applicable and the Disposition within this note       Time User Action Codes Description Comment    5/15/2025  7:36 PM Yuan Ly Add [R06.02] Shortness of breath     5/15/2025   7:36 PM Yuan Ly Add [R53.83] Fatigue                      [1]   Social History  Tobacco Use    Smoking status: Former     Current packs/day: 0.00     Types: Cigarettes     Start date:      Quit date: 2018     Years since quittin.0     Passive exposure: Current    Smokeless tobacco: Current     Types: Chew     Last attempt to quit: 5/3/2018   Vaping Use    Vaping status: Never Used   Substance Use Topics    Alcohol use: Not Currently    Drug use: No        Yuan Ly MD  05/15/25 2002

## 2025-05-16 ENCOUNTER — OFFICE VISIT (OUTPATIENT)
Dept: FAMILY MEDICINE CLINIC | Facility: CLINIC | Age: 70
End: 2025-05-16
Payer: COMMERCIAL

## 2025-05-16 ENCOUNTER — TELEPHONE (OUTPATIENT)
Age: 70
End: 2025-05-16

## 2025-05-16 VITALS
SYSTOLIC BLOOD PRESSURE: 116 MMHG | DIASTOLIC BLOOD PRESSURE: 72 MMHG | WEIGHT: 178.6 LBS | HEIGHT: 69 IN | RESPIRATION RATE: 16 BRPM | BODY MASS INDEX: 26.45 KG/M2 | TEMPERATURE: 97.1 F | HEART RATE: 82 BPM

## 2025-05-16 DIAGNOSIS — Z00.00 MEDICARE ANNUAL WELLNESS VISIT, SUBSEQUENT: Primary | ICD-10-CM

## 2025-05-16 DIAGNOSIS — I10 ESSENTIAL HYPERTENSION: ICD-10-CM

## 2025-05-16 DIAGNOSIS — I25.118 CORONARY ARTERY DISEASE OF NATIVE ARTERY OF NATIVE HEART WITH STABLE ANGINA PECTORIS (HCC): ICD-10-CM

## 2025-05-16 DIAGNOSIS — J06.9 ACUTE URI: ICD-10-CM

## 2025-05-16 DIAGNOSIS — Z12.5 SCREENING FOR PROSTATE CANCER: ICD-10-CM

## 2025-05-16 DIAGNOSIS — E78.5 DYSLIPIDEMIA: ICD-10-CM

## 2025-05-16 LAB
ATRIAL RATE: 120 BPM
P AXIS: 60 DEGREES
PR INTERVAL: 134 MS
QRS AXIS: 100 DEGREES
QRSD INTERVAL: 86 MS
QT INTERVAL: 326 MS
QTC INTERVAL: 460 MS
T WAVE AXIS: 57 DEGREES
VENTRICULAR RATE: 120 BPM

## 2025-05-16 PROCEDURE — 93010 ELECTROCARDIOGRAM REPORT: CPT | Performed by: INTERNAL MEDICINE

## 2025-05-16 PROCEDURE — G0439 PPPS, SUBSEQ VISIT: HCPCS | Performed by: FAMILY MEDICINE

## 2025-05-16 RX ORDER — AZITHROMYCIN 250 MG/1
TABLET, FILM COATED ORAL
Qty: 6 TABLET | Refills: 0 | Status: SHIPPED | OUTPATIENT
Start: 2025-05-16 | End: 2025-05-21

## 2025-05-16 NOTE — TELEPHONE ENCOUNTER
Patient's wife Odilia calling, asking to make appointment with . Per Odilia, patient has not been himself for the past year, fatigued and not as active as usual. Yesterday, patient was feeling congested, tachycardic and short of breath. Was seen in ED, discharged. He has an appointment with PCP today. Odilia asking if patient should have US Carotids done prior to seeing Dr. Martinez. Pt is not exhibiting any signs of stroke, just generalized weakness, which is ongoing.   Advised Odilia to schedule US Carotids. Follow up appointment scheduled for 7/11/25 with Dr. Martinez.     Please advise if any further recommendations.

## 2025-05-16 NOTE — PATIENT INSTRUCTIONS
Medicare Preventive Visit Patient Instructions  Thank you for completing your Welcome to Medicare Visit or Medicare Annual Wellness Visit today. Your next wellness visit will be due in one year (5/17/2026).  The screening/preventive services that you may require over the next 5-10 years are detailed below. Some tests may not apply to you based off risk factors and/or age. Screening tests ordered at today's visit but not completed yet may show as past due. Also, please note that scanned in results may not display below.  Preventive Screenings:  Service Recommendations Previous Testing/Comments   Colorectal Cancer Screening  Colonoscopy    Fecal Occult Blood Test (FOBT)/Fecal Immunochemical Test (FIT)  Fecal DNA/Cologuard Test  Flexible Sigmoidoscopy Age: 45-75 years old   Colonoscopy: every 10 years (May be performed more frequently if at higher risk)  OR  FOBT/FIT: every 1 year  OR  Cologuard: every 3 years  OR  Sigmoidoscopy: every 5 years  Screening may be recommended earlier than age 45 if at higher risk for colorectal cancer. Also, an individualized decision between you and your healthcare provider will decide whether screening between the ages of 76-85 would be appropriate. Colonoscopy: 11/13/2023  FOBT/FIT: Not on file  Cologuard: 08/28/2023  Sigmoidoscopy: Not on file    Screening Current     Prostate Cancer Screening Individualized decision between patient and health care provider in men between ages of 55-69   Medicare will cover every 12 months beginning on the day after your 50th birthday PSA: 2.86 ng/mL           Hepatitis C Screening Once for adults born between 1945 and 1965  More frequently in patients at high risk for Hepatitis C Hep C Antibody: 08/25/2023    Screening Current   Diabetes Screening 1-2 times per year if you're at risk for diabetes or have pre-diabetes Fasting glucose: 95 mg/dL (8/25/2023)  A1C: No results in last 5 years (No results in last 5 years)  Screening Current   Cholesterol  Screening Once every 5 years if you don't have a lipid disorder. May order more often based on risk factors. Lipid panel: 08/25/2023  Screening Current      Other Preventive Screenings Covered by Medicare:  Abdominal Aortic Aneurysm (AAA) Screening: covered once if your at risk. You're considered to be at risk if you have a family history of AAA or a male between the age of 65-75 who smoking at least 100 cigarettes in your lifetime.  Lung Cancer Screening: covers low dose CT scan once per year if you meet all of the following conditions: (1) Age 55-77; (2) No signs or symptoms of lung cancer; (3) Current smoker or have quit smoking within the last 15 years; (4) You have a tobacco smoking history of at least 20 pack years (packs per day x number of years you smoked); (5) You get a written order from a healthcare provider.  Glaucoma Screening: covered annually if you're considered high risk: (1) You have diabetes OR (2) Family history of glaucoma OR (3)  aged 50 and older OR (4)  American aged 65 and older  Osteoporosis Screening: covered every 2 years if you meet one of the following conditions: (1) Have a vertebral abnormality; (2) On glucocorticoid therapy for more than 3 months; (3) Have primary hyperparathyroidism; (4) On osteoporosis medications and need to assess response to drug therapy.  HIV Screening: covered annually if you're between the age of 15-65. Also covered annually if you are younger than 15 and older than 65 with risk factors for HIV infection. For pregnant patients, it is covered up to 3 times per pregnancy.    Immunizations:  Immunization Recommendations   Influenza Vaccine Annual influenza vaccination during flu season is recommended for all persons aged >= 6 months who do not have contraindications   Pneumococcal Vaccine   * Pneumococcal conjugate vaccine = PCV13 (Prevnar 13), PCV15 (Vaxneuvance), PCV20 (Prevnar 20)  * Pneumococcal polysaccharide vaccine = PPSV23  (Pneumovax) Adults 19-65 yo with certain risk factors or if 65+ yo  If never received any pneumonia vaccine: recommend Prevnar 20 (PCV20)  Give PCV20 if previously received 1 dose of PCV13 or PPSV23   Hepatitis B Vaccine 3 dose series if at intermediate or high risk (ex: diabetes, end stage renal disease, liver disease)   Respiratory syncytial virus (RSV) Vaccine - COVERED BY MEDICARE PART D  * RSVPreF3 (Arexvy) CDC recommends that adults 60 years of age and older may receive a single dose of RSV vaccine using shared clinical decision-making (SCDM)   Tetanus (Td) Vaccine - COST NOT COVERED BY MEDICARE PART B Following completion of primary series, a booster dose should be given every 10 years to maintain immunity against tetanus. Td may also be given as tetanus wound prophylaxis.   Tdap Vaccine - COST NOT COVERED BY MEDICARE PART B Recommended at least once for all adults. For pregnant patients, recommended with each pregnancy.   Shingles Vaccine (Shingrix) - COST NOT COVERED BY MEDICARE PART B  2 shot series recommended in those 19 years and older who have or will have weakened immune systems or those 50 years and older     Health Maintenance Due:      Topic Date Due   • Colorectal Cancer Screening  11/12/2026   • Hepatitis C Screening  Completed     Immunizations Due:      Topic Date Due   • COVID-19 Vaccine (2 - 2024-25 season) 09/01/2024     Advance Directives   What are advance directives?  Advance directives are legal documents that state your wishes and plans for medical care. These plans are made ahead of time in case you lose your ability to make decisions for yourself. Advance directives can apply to any medical decision, such as the treatments you want, and if you want to donate organs.   What are the types of advance directives?  There are many types of advance directives, and each state has rules about how to use them. You may choose a combination of any of the following:  Living will:  This is a  written record of the treatment you want. You can also choose which treatments you do not want, which to limit, and which to stop at a certain time. This includes surgery, medicine, IV fluid, and tube feedings.   Durable power of  for healthcare (DPAHC):  This is a written record that states who you want to make healthcare choices for you when you are unable to make them for yourself. This person, called a proxy, is usually a family member or a friend. You may choose more than 1 proxy.  Do not resuscitate (DNR) order:  A DNR order is used in case your heart stops beating or you stop breathing. It is a request not to have certain forms of treatment, such as CPR. A DNR order may be included in other types of advance directives.  Medical directive:  This covers the care that you want if you are in a coma, near death, or unable to make decisions for yourself. You can list the treatments you want for each condition. Treatment may include pain medicine, surgery, blood transfusions, dialysis, IV or tube feedings, and a ventilator (breathing machine).  Values history:  This document has questions about your views, beliefs, and how you feel and think about life. This information can help others choose the care that you would choose.  Why are advance directives important?  An advance directive helps you control your care. Although spoken wishes may be used, it is better to have your wishes written down. Spoken wishes can be misunderstood, or not followed. Treatments may be given even if you do not want them. An advance directive may make it easier for your family to make difficult choices about your care.   How to Quit Using Smokeless Tobacco   Why it is important to stop using smokeless tobacco:  Smokeless tobacco comes in many forms. Examples include chew, snuff, dip, dissolvable tobacco, and snus. All smokeless tobacco products contain nicotine and may contain as much nicotine as 3 cigarettes. You may be physically  dependent on nicotine. You may also be emotionally addicted to it. The cravings can be strong, but it is important to quit using smokeless tobacco. You will improve your health and decrease your cancer, stroke, and heart attack risk. Mouth sores and tooth problems will also improve when you quit. You can benefit from quitting no matter how long you have used smokeless tobacco.   Prepare to stop using smokeless tobacco:  Nicotine is a highly addictive drug. Withdrawal symptoms can happen when you stop and make it hard to quit. The following can help keep you on track:  Set a quit date.    Tell friends, family, and coworkers that you plan to quit.    Remove all smokeless tobacco products from your home, car, and workplace.    Manage weight gain after you quit:  Nicotine can affect your metabolism. You may gain a few pounds after you quit. The following can help you control your weight:  Eat healthy foods.    Drink water before, during, and between meals.    Exercise as directed.      Weight Management   Why it is important to manage your weight:  Being overweight increases your risk of health conditions such as heart disease, high blood pressure, type 2 diabetes, and certain types of cancer. It can also increase your risk for osteoarthritis, sleep apnea, and other respiratory problems. Aim for a slow, steady weight loss. Even a small amount of weight loss can lower your risk of health problems.  How to lose weight safely:  A safe and healthy way to lose weight is to eat fewer calories and get regular exercise. You can lose up about 1 pound a week by decreasing the number of calories you eat by 500 calories each day.   Healthy meal plan for weight management:  A healthy meal plan includes a variety of foods, contains fewer calories, and helps you stay healthy. A healthy meal plan includes the following:  Eat whole-grain foods more often.  A healthy meal plan should contain fiber. Fiber is the part of grains, fruits,  and vegetables that is not broken down by your body. Whole-grain foods are healthy and provide extra fiber in your diet. Some examples of whole-grain foods are whole-wheat breads and pastas, oatmeal, brown rice, and bulgur.  Eat a variety of vegetables every day.  Include dark, leafy greens such as spinach, kale, cari greens, and mustard greens. Eat yellow and orange vegetables such as carrots, sweet potatoes, and winter squash.   Eat a variety of fruits every day.  Choose fresh or canned fruit (canned in its own juice or light syrup) instead of juice. Fruit juice has very little or no fiber.  Eat low-fat dairy foods.  Drink fat-free (skim) milk or 1% milk. Eat fat-free yogurt and low-fat cottage cheese. Try low-fat cheeses such as mozzarella and other reduced-fat cheeses.  Choose meat and other protein foods that are low in fat.  Choose beans or other legumes such as split peas or lentils. Choose fish, skinless poultry (chicken or turkey), or lean cuts of red meat (beef or pork). Before you cook meat or poultry, cut off any visible fat.   Use less fat and oil.  Try baking foods instead of frying them. Add less fat, such as margarine, sour cream, regular salad dressing and mayonnaise to foods. Eat fewer high-fat foods. Some examples of high-fat foods include french fries, doughnuts, ice cream, and cakes.  Eat fewer sweets.  Limit foods and drinks that are high in sugar. This includes candy, cookies, regular soda, and sweetened drinks.  Exercise:  Exercise at least 30 minutes per day on most days of the week. Some examples of exercise include walking, biking, dancing, and swimming. You can also fit in more physical activity by taking the stairs instead of the elevator or parking farther away from stores. Ask your healthcare provider about the best exercise plan for you.    © Copyright "[employer] 2018 Information is for End User's use only and may not be sold, redistributed or otherwise used for commercial  purposes. All illustrations and images included in CareNotes® are the copyrighted property of [email or web address] or [employer]

## 2025-05-16 NOTE — PROGRESS NOTES
Name: Michael Austin Jr.      : 1955      MRN: [id number]  Encounter Provider: Lillian Nascimento MD  Encounter Date: 2025   Encounter department: [facility]  :  Assessment & Plan  Medicare annual wellness visit, subsequent         Essential hypertension  Controlled on losartan and metoprolol. Continue.          Coronary artery disease of native artery of native heart with stable angina pectoris (HCC)  Managed by cardiology.          Screening for prostate cancer    Orders:    PSA, Total Screen; Future    Dyslipidemia    Orders:    Lipid Panel with Direct LDL reflex; Future    Acute URI    Orders:    azithromycin (Zithromax) 250 mg tablet; Take 2 tablets (500 mg total) by mouth daily for 1 day, THEN 1 tablet (250 mg total) daily for 4 days.       Preventive health issues were discussed with patient, and age appropriate screening tests were ordered as noted in patient's After Visit Summary. Personalized health advice and appropriate referrals for health education or preventive services given if needed, as noted in patient's After Visit Summary.    History of Present Illness     HPI   Patient Care Team:  Lillian Nascimento MD as PCP - General (Family Medicine)  Ever Lozano MD (Neurology)  Balta Skinner MD (Ophthalmology)  Zack Corral MD (Vascular Surgery)  Pato Byrne DO (Cardiology)  Moe Sorensen DO (Cardiothoracic Surgery)  Elisa Phillips PA-C (Vascular Surgery)    Review of Systems   Constitutional: Negative.    HENT:  Positive for congestion.    Eyes: Negative.    Respiratory: Negative.     Cardiovascular: Negative.    Gastrointestinal: Negative.    Endocrine: Negative.    Genitourinary: Negative.    Musculoskeletal: Negative.    Neurological: Negative.    Hematological: Negative.    Psychiatric/Behavioral: Negative.       Medical History Reviewed by provider this encounter:  Tobacco  Allergies  Meds  Problems  Med Hx  Surg Hx  Fam Hx       Annual Wellness Visit  Questionnaire       Health Risk Assessment:   Patient rates overall health as good. Patient feels that their physical health rating is slightly worse. Patient is very satisfied with their life. Eyesight was rated as same. Hearing was rated as same. Patient feels that their emotional and mental health rating is same. Patients states they are never, rarely angry. Patient states they are sometimes unusually tired/fatigued. Pain experienced in the last 7 days has been none. Patient states that he has experienced no weight loss or gain in last 6 months.     Depression Screening:   PHQ-2 Score: 0      Fall Risk Screening:   In the past year, patient has experienced: no history of falling in past year      Home Safety:  Patient does not have trouble with stairs inside or outside of their home. Patient has working smoke alarms and has working carbon monoxide detector. Home safety hazards include: unfamiliar surroundings.     Medications:   Patient is currently taking over-the-counter supplements. OTC medications include: see medication list. Patient is able to manage medications.     Activities of Daily Living (ADLs)/Instrumental Activities of Daily Living (IADLs):   Walk and transfer into and out of bed and chair?: Yes  Dress and groom yourself?: Yes    Bathe or shower yourself?: Yes    Feed yourself? Yes  Do your laundry/housekeeping?: Yes  Manage your money, pay your bills and track your expenses?: Yes  Make your own meals?: Yes    Do your own shopping?: Yes    Advance Care Planning:   Living will: No      Preventive Screenings      Cardiovascular Screening:    General: Screening Current      Diabetes Screening:     General: Screening Current      Colorectal Cancer Screening:     General: Screening Current      Prostate Cancer Screening:    General: Risks and Benefits Discussed    Due for: PSA      Osteoporosis Screening:    General: Screening Not Indicated      Abdominal Aortic Aneurysm (AAA) Screening:    Risk factors  "include: age between 65-74 yo and tobacco use        General: Screening Not Indicated      Lung Cancer Screening:     General: Screening Not Indicated      Hepatitis C Screening:    General: Screening Current    Immunizations:  - Immunizations due: Zoster (Shingrix)    Screening, Brief Intervention, and Referral to Treatment (SBIRT)     Screening      AUDIT-C Screenin) How often did you have a drink containing alcohol in the past year? never  2) How many drinks did you have on a typical day when you were drinking in the past year? 0  3) How often did you have 6 or more drinks on one occasion in the past year? never    AUDIT-C Score: 0  Interpretation: Score 0-3 (male): Negative screen for alcohol misuse    Brief Intervention  Alcohol & drug use screenings were reviewed. No concerns regarding substance use disorder identified.     Social Drivers of Health     Financial Resource Strain: Low Risk  (2023)    Overall Financial Resource Strain (CARDIA)     Difficulty of Paying Living Expenses: Not hard at all   Food Insecurity: No Food Insecurity (2025)    Nursing - Inadequate Food Risk Classification     Worried About Running Out of Food in the Last Year: Never true     Ran Out of Food in the Last Year: Never true   Transportation Needs: No Transportation Needs (2025)    PRAPARE - Transportation     Lack of Transportation (Medical): No     Lack of Transportation (Non-Medical): No   Housing Stability: Low Risk  (2025)    Housing Stability Vital Sign     Unable to Pay for Housing in the Last Year: No     Number of Times Moved in the Last Year: 0     Homeless in the Last Year: No   Utilities: Not At Risk (2025)    [facility] Utilities     Threatened with loss of utilities: No     No results found.    Objective   /72   Pulse 82   Temp (!) 97.1 °F (36.2 °C)   Resp 16   Ht 5' 9\" (1.753 m)   Wt 81 kg (178 lb 9.6 oz)   BMI 26.37 kg/m²     Physical Exam  Vitals and nursing note reviewed. "   Constitutional:       General: He is not in acute distress.     Appearance: Normal appearance. He is well-developed and normal weight. He is not ill-appearing, toxic-appearing or diaphoretic.   HENT:      Head: Normocephalic and atraumatic.      Right Ear: Tympanic membrane, ear canal and external ear normal. There is no impacted cerumen.      Left Ear: Tympanic membrane, ear canal and external ear normal. There is no impacted cerumen.      Nose: Nose normal.      Mouth/Throat:      Mouth: Mucous membranes are moist.      Pharynx: Oropharynx is clear. No oropharyngeal exudate or posterior oropharyngeal erythema.     Eyes:      General: No scleral icterus.        Right eye: No discharge.         Left eye: No discharge.      Extraocular Movements: Extraocular movements intact.      Conjunctiva/sclera: Conjunctivae normal.      Pupils: Pupils are equal, round, and reactive to light.       Cardiovascular:      Rate and Rhythm: Normal rate and regular rhythm.      Pulses: Normal pulses.      Heart sounds: Normal heart sounds. No murmur heard.     No friction rub. No gallop.   Pulmonary:      Effort: Pulmonary effort is normal. No respiratory distress.      Breath sounds: Normal breath sounds. No stridor. No wheezing, rhonchi or rales.   Chest:      Chest wall: No tenderness.   Abdominal:      General: Abdomen is flat. Bowel sounds are normal. There is no distension.      Palpations: Abdomen is soft. There is no mass.      Tenderness: There is no abdominal tenderness. There is no guarding or rebound.      Hernia: No hernia is present.     Musculoskeletal:         General: No swelling. Normal range of motion.      Cervical back: Neck supple.     Skin:     General: Skin is warm and dry.      Capillary Refill: Capillary refill takes less than 2 seconds.     Neurological:      General: No focal deficit present.      Mental Status: He is alert and oriented to person, place, and time.     Psychiatric:         Mood and Affect:  Mood normal.         Behavior: Behavior normal.         Thought Content: Thought content normal.         Judgment: Judgment normal.

## 2025-05-27 ENCOUNTER — APPOINTMENT (OUTPATIENT)
Dept: LAB | Facility: HOSPITAL | Age: 70
End: 2025-05-27
Attending: INTERNAL MEDICINE
Payer: COMMERCIAL

## 2025-05-27 ENCOUNTER — OFFICE VISIT (OUTPATIENT)
Dept: CARDIOLOGY CLINIC | Facility: CLINIC | Age: 70
End: 2025-05-27
Payer: COMMERCIAL

## 2025-05-27 VITALS
HEART RATE: 75 BPM | DIASTOLIC BLOOD PRESSURE: 84 MMHG | OXYGEN SATURATION: 96 % | WEIGHT: 181 LBS | TEMPERATURE: 96.8 F | SYSTOLIC BLOOD PRESSURE: 140 MMHG | BODY MASS INDEX: 26.81 KG/M2 | HEIGHT: 69 IN

## 2025-05-27 DIAGNOSIS — I47.10 SVT (SUPRAVENTRICULAR TACHYCARDIA) (HCC): ICD-10-CM

## 2025-05-27 DIAGNOSIS — R07.89 CHEST PRESSURE: ICD-10-CM

## 2025-05-27 DIAGNOSIS — E78.5 DYSLIPIDEMIA: ICD-10-CM

## 2025-05-27 DIAGNOSIS — Z95.1 S/P CABG X 3: ICD-10-CM

## 2025-05-27 DIAGNOSIS — I25.118 CORONARY ARTERY DISEASE OF NATIVE ARTERY OF NATIVE HEART WITH STABLE ANGINA PECTORIS (HCC): ICD-10-CM

## 2025-05-27 DIAGNOSIS — I10 ESSENTIAL HYPERTENSION: ICD-10-CM

## 2025-05-27 DIAGNOSIS — Z12.5 SCREENING FOR PROSTATE CANCER: ICD-10-CM

## 2025-05-27 DIAGNOSIS — R06.02 SHORTNESS OF BREATH: ICD-10-CM

## 2025-05-27 DIAGNOSIS — I65.22 LEFT CAROTID ARTERY STENOSIS: Chronic | ICD-10-CM

## 2025-05-27 LAB
ALBUMIN SERPL BCG-MCNC: 4.5 G/DL (ref 3.5–5)
ALP SERPL-CCNC: 84 U/L (ref 34–104)
ALT SERPL W P-5'-P-CCNC: 26 U/L (ref 7–52)
ANION GAP SERPL CALCULATED.3IONS-SCNC: 6 MMOL/L (ref 4–13)
AST SERPL W P-5'-P-CCNC: 22 U/L (ref 13–39)
BILIRUB SERPL-MCNC: 0.82 MG/DL (ref 0.2–1)
BUN SERPL-MCNC: 24 MG/DL (ref 5–25)
CALCIUM SERPL-MCNC: 9.7 MG/DL (ref 8.4–10.2)
CHLORIDE SERPL-SCNC: 104 MMOL/L (ref 96–108)
CHOLEST SERPL-MCNC: 136 MG/DL (ref ?–200)
CO2 SERPL-SCNC: 29 MMOL/L (ref 21–32)
CREAT SERPL-MCNC: 0.95 MG/DL (ref 0.6–1.3)
GFR SERPL CREATININE-BSD FRML MDRD: 81 ML/MIN/1.73SQ M
GLUCOSE P FAST SERPL-MCNC: 105 MG/DL (ref 65–99)
HDLC SERPL-MCNC: 44 MG/DL
LDLC SERPL CALC-MCNC: 63 MG/DL (ref 0–100)
POTASSIUM SERPL-SCNC: 5 MMOL/L (ref 3.5–5.3)
PROT SERPL-MCNC: 8.1 G/DL (ref 6.4–8.4)
PSA SERPL-MCNC: 7.08 NG/ML (ref 0–4)
SODIUM SERPL-SCNC: 139 MMOL/L (ref 135–147)
TRIGL SERPL-MCNC: 147 MG/DL (ref ?–150)

## 2025-05-27 PROCEDURE — 80053 COMPREHEN METABOLIC PANEL: CPT

## 2025-05-27 PROCEDURE — 80061 LIPID PANEL: CPT

## 2025-05-27 PROCEDURE — 99214 OFFICE O/P EST MOD 30 MIN: CPT

## 2025-05-27 PROCEDURE — 36415 COLL VENOUS BLD VENIPUNCTURE: CPT

## 2025-05-27 PROCEDURE — 93000 ELECTROCARDIOGRAM COMPLETE: CPT

## 2025-05-27 PROCEDURE — G0103 PSA SCREENING: HCPCS

## 2025-05-27 NOTE — PROGRESS NOTES
Michael Austin Jr.  1955  [id number]  [facility] [address] [facility] [address] [phone]    1. S/P CABG x 3  NM myocardial perfusion spect (stress and/or rest)      2. SVT (supraventricular tachycardia) ([facility])  POCT ECG      3. Coronary artery disease of native artery of native heart with stable angina pectoris (HCC)  POCT ECG      4. Dyslipidemia        5. Left carotid artery stenosis        6. Essential hypertension        7. Shortness of breath  NM myocardial perfusion spect (stress and/or rest)      8. Chest pressure  NM myocardial perfusion spect (stress and/or rest)          Summary/Discussion:  Dyspnea on exertion  Chest pressure  - s/p ED visit on 5/15/2025 with reports of fatigue, decreased exercise tolerance, exertional chest pain and dyspnea on exertion   EKG demonstrated sinus tachycardia  Troponin negative x 2  BNP 31  labs unremarkable   CXR without acute cardiopulmonary disease   s/p home metoprolol and IV fluids with improvement in tachycardia   - no recurrent symptoms of chest pain however, he continues to have dyspnea on exertion, occasionally at rest   - still recovering from acute URI in which he was on oral abx  - due to his current symptoms, hx of CABG and no recent stress testing will plan for NM stress test for risk stratification   - continue present medication regimen  - counseled regarding high risk features for CAD and have emphasized need to monitor for same    Coronary artery disease:  - with prior CABG x 3 (LIMA-LAD, SVG-OM1, SVG- RCA) in 7/2018  - echo in 3/2020 reviewed   - EKG today demonstrating sinus rhythm without any acute ischemic changes   - plan for NM stress test as noted above   - continue clopidogrel, statin and beta blocker    Hypertension:  - mildly elevated today, 140/84  reports rushing/hitting traffic prior to our visit   - continue present medication regimen  - lifestyle modification   - close blood pressure  monitoring     Dyslipidemia:  - Lipid Profile:    Latest Reference Range & Units 05/27/25 11:36   Cholesterol See Comment mg/dL 136   Triglycerides See Comment mg/dL 147   HDL >=40 mg/dL 44   LDL Calculated 0 - 100 mg/dL 63   - continue atorvastatin 80 mg daily and zetia 10 mg daily   - encouraged low cholesterol diet and annual lipid follow up    SVT  - evaluated by EP in the past  - continue metoprolol 50 mg daily     Left carotid stenosis   - hx of carotid artery stent in 7/2018  - follows with vascular  - continue clopidogrel and statin      Interval History: Michael Austin Jr. is a 70 y.o. year old male with history mentioned in problem list who presents to the office today for a follow up s/p ED visit.     Since his recent ED visit he has not had any recurrent symptoms of chest pain however, he continues to have dyspnea on exertion, occasionally at rest. He is still recovering from acute URI in which he complete oral abx with slight improvement of the same. He states that he has had decreased exercise intolerance since prior office visit.  He denies lower extremity edema, orthopnea, and PND. He denies lightheadedness, dizziness, and syncope. He denies palpitations.        He will RTO in 6 months with Dr. Byrne or sooner if necessary. He will call with any concerns.         Medical Problems       Problem List       Left carotid artery stenosis (Chronic)    Essential hypertension    Dyslipidemia    Coronary artery disease of native artery of native heart with stable angina pectoris (HCC)    S/P CABG x 3    SVT (supraventricular tachycardia) (HCC)    Asymptomatic stenosis of right carotid artery    Stricture of artery (HCC)    Positive colorectal cancer screening using Cologuard test        Past Medical History[1]  Social History     Socioeconomic History    Marital status: /Civil Union     Spouse name: Not on file    Number of children: Not on file    Years of education: Not on file    Highest education  level: Not on file   Occupational History    Not on file   Tobacco Use    Smoking status: Former     Current packs/day: 0.00     Types: Cigarettes     Start date:      Quit date: 2018     Years since quittin.0     Passive exposure: Current    Smokeless tobacco: Current     Types: Chew     Last attempt to quit: 5/3/2018   Vaping Use    Vaping status: Never Used   Substance and Sexual Activity    Alcohol use: Not Currently    Drug use: No    Sexual activity: Not on file   Other Topics Concern    Not on file   Social History Narrative    Not on file     Social Drivers of Health     Financial Resource Strain: Low Risk  (2023)    Overall Financial Resource Strain (CARDIA)     Difficulty of Paying Living Expenses: Not hard at all   Food Insecurity: No Food Insecurity (2025)    Nursing - Inadequate Food Risk Classification     Worried About Running Out of Food in the Last Year: Never true     Ran Out of Food in the Last Year: Never true     Ran Out of Food in the Last Year: Not on file   Transportation Needs: No Transportation Needs (2025)    PRAPARE - Transportation     Lack of Transportation (Medical): No     Lack of Transportation (Non-Medical): No   Physical Activity: Not on file   Stress: Not on file   Social Connections: Not on file   Intimate Partner Violence: Not on file   Housing Stability: Low Risk  (2025)    Housing Stability Vital Sign     Unable to Pay for Housing in the Last Year: No     Number of Times Moved in the Last Year: 0     Homeless in the Last Year: No      Family History[2]  Past Surgical History[3]  Current Medications[4]  Allergies   Allergen Reactions    Percocet [Oxycodone-Acetaminophen] Itching     Trouble sleeping        Labs:     Chemistry        Component Value Date/Time    K 5.0 2025 1136     2025 1136    CO2 29 2025 1136    CO2 30 2018 1523    BUN 24 2025 1136    CREATININE 0.95 2025 1136        Component Value  "Date/Time    CALCIUM 9.7 05/27/2025 1136    ALKPHOS 84 05/27/2025 1136    AST 22 05/27/2025 1136    ALT 26 05/27/2025 1136            No results found for: \"CHOL\"  Lab Results   Component Value Date    HDL 44 05/27/2025    HDL 37 (L) 08/25/2023    HDL 49 03/30/2022     Lab Results   Component Value Date    LDLCALC 63 05/27/2025    LDLCALC 51 08/25/2023    LDLCALC 69 03/30/2022     Lab Results   Component Value Date    TRIG 147 05/27/2025    TRIG 150 (H) 08/25/2023    TRIG 112 03/30/2022     No results found for: \"CHOLHDL\"    Imaging: XR chest 1 view portable  Result Date: 5/15/2025  Narrative: XR CHEST PORTABLE INDICATION: shortness of breath. COMPARISON: 8/15/2018 FINDINGS: Atelectatic changes. No focal infiltrate or effusion.. No pneumothorax or pleural effusion. Post median sternotomy. Mild cardiomegaly. Bones are unremarkable for age. Normal upper abdomen.     Impression: No acute cardiopulmonary disease. Workstation performed: [id number]       ECG:  sinus rhythm, inferior infarct     Review of Systems   Cardiovascular:  Positive for chest pain (resolved) and dyspnea on exertion.   Respiratory:  Positive for shortness of breath.    All other systems reviewed and are negative.      Vitals:    05/27/25 1257   BP: 140/84   Pulse: 75   Temp: (!) 96.8 °F (36 °C)   SpO2: 96%     Vitals:    05/27/25 1257   Weight: 82.1 kg (181 lb)     Height: 5' 9\" (175.3 cm)   Body mass index is 26.73 kg/m².    Physical Exam  Vitals and nursing note reviewed.   Constitutional:       General: He is not in acute distress.     Appearance: Normal appearance.   HENT:      Head: Normocephalic.      Nose: Nose normal.      Mouth/Throat:      Mouth: Mucous membranes are moist.      Pharynx: Oropharynx is clear.     Cardiovascular:      Rate and Rhythm: Normal rate and regular rhythm.      Pulses: Normal pulses.      Heart sounds: Normal heart sounds. No murmur heard.  Pulmonary:      Breath sounds: Decreased breath sounds present. "     Musculoskeletal:         General: Normal range of motion.      Cervical back: Normal range of motion.      Right lower leg: No edema.      Left lower leg: No edema.     Skin:     General: Skin is warm and dry.     Neurological:      Mental Status: He is alert and oriented to person, place, and time.     Psychiatric:         Mood and Affect: Mood normal.         Behavior: Behavior normal.               [1]   Past Medical History:  Diagnosis Date    Acute blood loss as cause of postoperative anemia 7/26/2018    Carotid stenosis, left     s/p IR stent    Coronary artery disease     Former tobacco use     Hard of hearing     Hyperlipidemia     Hypertension     Leukocytosis 9/11/2019    Pancreatitis 9/11/2019    Parkinson disease (HCC)     SVT (supraventricular tachycardia) (HCC)    [2]   Family History  Problem Relation Name Age of Onset    Cancer Mother      Heart attack Father      Stroke Father     [3]   Past Surgical History:  Procedure Laterality Date    AMPUTATION OF REPLICATED FINGERS       rt 2nd and 3rd fingers traumatically amputated at work    APPENDECTOMY      CORONARY ARTERY BYPASS GRAFT  07/2018    x 3    MANDIBLE FRACTURE SURGERY      upper and lower    ORBITAL FRACTURE SURGERY Right     IN CORONARY ARTERY BYPASS 4 CORONARY VENOUS GRAFTS N/A 7/24/2018    Procedure: CORONARY ARTERY BYPASS GRAFT (CABG) 3 VESSELS ; NATE to LAD, SVG--> OM and Distal right;  Surgeon: Moe Sorensen DO;  [facility];  Service: Cardiac Surgery    IN ECHO TRANSESOPHAG MONTR CARDIAC PUMP FUNCTJ N/A 7/24/2018    Procedure: TRANSESOPHAGEAL ECHOCARDIOGRAM (HECTOR);  Surgeon: Moe Sorensen DO;  [facility];  Service: Cardiac Surgery    IN TCAT IV STENT CRV CRTD ART EMBOLIC PROTECJ Left 7/9/2018    Procedure: TRANSCAROTID ARTERY REVASCULARIZATION;  Surgeon: Zack Corral MD;  [facility];  Service: Vascular   [4]   Current Outpatient Medications:     atorvastatin (LIPITOR) 80 mg tablet, TAKE 1  TABLET EVERY DAY WITH DINNER, Disp: 90 tablet, Rfl: 3    clopidogrel (PLAVIX) 75 mg tablet, TAKE 1 TABLET EVERY DAY, Disp: 90 tablet, Rfl: 1    ezetimibe (ZETIA) 10 mg tablet, TAKE 1 TABLET EVERY DAY, Disp: 90 tablet, Rfl: 3    losartan (COZAAR) 50 mg tablet, TAKE 1 TABLET EVERY DAY, Disp: 90 tablet, Rfl: 1    Magnesium Citrate 200 MG TABS, Take 400 mg by mouth in the morning, Disp: , Rfl:     metoprolol succinate (TOPROL-XL) 50 mg 24 hr tablet, TAKE 1 TABLET EVERY DAY, Disp: 90 tablet, Rfl: 1    Multiple Vitamin (multivitamin) tablet, Take 1 tablet by mouth in the morning., Disp: , Rfl:     triamcinolone (KENALOG) 0.5 % cream, Apply topically 2 (two) times a day, Disp: 454 g, Rfl: 0    vitamin B-12 (VITAMIN B-12) 1,000 mcg tablet, Take 1,000 mcg by mouth in the morning., Disp: , Rfl:

## 2025-05-28 ENCOUNTER — RESULTS FOLLOW-UP (OUTPATIENT)
Dept: FAMILY MEDICINE CLINIC | Facility: CLINIC | Age: 70
End: 2025-05-28

## 2025-05-28 ENCOUNTER — OFFICE VISIT (OUTPATIENT)
Dept: PULMONOLOGY | Facility: MEDICAL CENTER | Age: 70
End: 2025-05-28
Payer: COMMERCIAL

## 2025-05-28 VITALS
HEART RATE: 68 BPM | WEIGHT: 177 LBS | DIASTOLIC BLOOD PRESSURE: 72 MMHG | SYSTOLIC BLOOD PRESSURE: 140 MMHG | BODY MASS INDEX: 26.14 KG/M2 | OXYGEN SATURATION: 95 % | TEMPERATURE: 97.5 F

## 2025-05-28 DIAGNOSIS — R97.20 ELEVATED PSA: Primary | ICD-10-CM

## 2025-05-28 DIAGNOSIS — F17.210 CIGARETTE NICOTINE DEPENDENCE WITHOUT COMPLICATION: ICD-10-CM

## 2025-05-28 DIAGNOSIS — J45.20 MILD INTERMITTENT ASTHMA WITHOUT COMPLICATION: Primary | ICD-10-CM

## 2025-05-28 DIAGNOSIS — J45.20 MILD INTERMITTENT ASTHMA WITHOUT COMPLICATION: ICD-10-CM

## 2025-05-28 PROCEDURE — 99203 OFFICE O/P NEW LOW 30 MIN: CPT | Performed by: STUDENT IN AN ORGANIZED HEALTH CARE EDUCATION/TRAINING PROGRAM

## 2025-05-28 RX ORDER — FLUTICASONE PROPIONATE AND SALMETEROL 250; 50 UG/1; UG/1
1 POWDER RESPIRATORY (INHALATION) 2 TIMES DAILY
Qty: 60 BLISTER | Refills: 2 | Status: SHIPPED | OUTPATIENT
Start: 2025-05-28 | End: 2025-05-28 | Stop reason: SDUPTHER

## 2025-05-28 NOTE — PROGRESS NOTES
Pulmonary Outpatient Consultation Note   Michael Austin Jr. 70 y.o. male MRN: [id number]  5/28/2025    Referring provider:   No referring provider defined for this encounter.     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  There is also could be seasonal allergies.  For now I will empirically start him on Advair 250, and obtain pulmonary function test  Nicotine dependence in remission smoked 1 pack/day for the past 3035 years quit 8 years ago, qualifies for low-dose lung cancer screening therefore we will enroll him in this program  History of CAD status post CABG currently on Lipitor Plavix Zetia and Toprol  Hyperlipidemia on Zetia and Lipitor  History of allergies, has farm animals, no birds    Plan:    Start empiric Advair 250  Obtain PFTs  Enroll in low-dose lung cancer screening program  If the above is all negative then we will obtain RAST panel, CBC  Follow-up in 3 to 4 months  I have spent a total time of 32 minutes in caring for this patient on the day of the visit/encounter including Diagnostic results, Prognosis, Risks and benefits of tx options, Instructions for management, Patient and family education, Importance of tx compliance, Risk factor reductions, Impressions, Counseling / Coordination of care, Documenting in the medical record, Reviewing/placing orders in the medical record (including tests, medications, and/or procedures), and Obtaining or reviewing history  .    History of Present Illness   HPI:    70-year-old gentleman here for evaluation of dyspnea.  Past medical history includes coronary artery disease status post CABG, nicotine dependence in remission, coronary artery stenosis.    Patient states over the past 1 or 2 months he has  been complaining of worsening dyspnea, he still able to do his activities but does not feel like himself over the past few months.  Does not have a rescue inhaler.  Has not been on prednisone or antibiotics.  His concern was he does not think this is age-related.  Based on his history he does have significant coronary dysfunction but would be odd for its to act up this fast.    Family history includes allergies.  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  Some coworkers may have had silicosis.  He quit smoking about 8 years ago      Review of Systems   Constitutional:  Positive for activity change. Negative for fatigue.   HENT: Negative.     Eyes: Negative.    Respiratory:  Positive for chest tightness, shortness of breath and wheezing.    Cardiovascular: Negative.    Gastrointestinal: Negative.    Endocrine: Negative.    Genitourinary: Negative.    Musculoskeletal: Negative.    Skin: Negative.    Allergic/Immunologic: Negative.    Neurological: Negative.    Hematological: Negative.    Psychiatric/Behavioral: Negative.           Historical Information   Past Medical History[1]  Past Surgical History[2]  Family History[3]    Occupational History: Retired quarry    Tobacco Use History[4]    Meds/Allergies   Current Medications[5]  Allergies[6]    Vitals: Blood pressure 140/72, pulse 68, temperature 97.5 °F (36.4 °C), weight 80.3 kg (177 lb), SpO2 95%., Body mass index is 26.14 kg/m². Oxygen Therapy  SpO2: 95 %    Physical Exam:    GEN: alert and oriented x 3, pleasant and cooperative   HEENT:  Normocephalic, atraumatic  NECK: No JVD   HEART: Rate, normal S1 and S2  LUNGS: Clear to auscultation bilaterally; no wheezes, rales, or rhonchi; respiration nonlabored   ABDOMEN:  Normoactive bowel sounds, soft, no tenderness, no distention  EXTREMITIES: no edema  NEURO: no gross focal findings  SKIN:  Dry, intact, warm to touch    Labs: I have personally reviewed pertinent lab results.    Imaging and other studies:  Results Review Statement: I personally reviewed the following image studies in PACS and associated radiology reports: chest xray. My interpretation of the radiology images/reports is: Chest x-ray normal.    Pulmonary function testing:  NA    Other Studies: Results Review Statement: No pertinent imaging studies reviewed.    Han Lopez MD  Pulmonary and Critical Care Medicine          [1]   Past Medical History:  Diagnosis Date    Acute blood loss as cause of postoperative anemia 7/26/2018    Carotid stenosis, left     s/p IR stent    Coronary artery disease     Former tobacco use     Hard of hearing     Hyperlipidemia     Hypertension     Leukocytosis 9/11/2019    Pancreatitis 9/11/2019    Parkinson disease (HCC)     SVT (supraventricular tachycardia) (HCC)    [2]   Past Surgical History:  Procedure Laterality Date    AMPUTATION OF REPLICATED FINGERS       rt 2nd and 3rd fingers traumatically amputated at work    APPENDECTOMY      CORONARY ARTERY BYPASS GRAFT  07/2018    x 3    MANDIBLE FRACTURE SURGERY      upper and lower    ORBITAL FRACTURE SURGERY Right     NE CORONARY ARTERY BYPASS 4 CORONARY VENOUS GRAFTS N/A 7/24/2018    Procedure: CORONARY ARTERY BYPASS GRAFT (CABG) 3 VESSELS ; NATE to LAD, SVG--> OM and Distal right;  Surgeon: Moe Sorensen DO;  [facility];  Service: Cardiac Surgery    NE ECHO TRANSESOPHAG MONTR CARDIAC PUMP FUNCTJ N/A 7/24/2018    Procedure: TRANSESOPHAGEAL ECHOCARDIOGRAM (HECTOR);  Surgeon: Moe Sorensen DO;  [facility];  Service: Cardiac Surgery    NE TCAT IV STENT CRV CRTD ART EMBOLIC PROTECJ Left 7/9/2018    Procedure: TRANSCAROTID ARTERY REVASCULARIZATION;  Surgeon: Zack Corral MD;  [facility];  Service: Vascular   [3]   Family History  Problem Relation Name Age of Onset    Cancer Mother      Heart attack Father      Stroke Father     [4]   Social History  Tobacco Use   Smoking Status Former    Current packs/day: 0.00    Types: Cigarettes     Start date:     Quit date: 2018    Years since quittin.0    Passive exposure: Current   Smokeless Tobacco Current    Types: Chew    Last attempt to quit: 5/3/2018   [5]   Current Outpatient Medications:     atorvastatin (LIPITOR) 80 mg tablet, TAKE 1 TABLET EVERY DAY WITH DINNER, Disp: 90 tablet, Rfl: 3    clopidogrel (PLAVIX) 75 mg tablet, TAKE 1 TABLET EVERY DAY, Disp: 90 tablet, Rfl: 1    ezetimibe (ZETIA) 10 mg tablet, TAKE 1 TABLET EVERY DAY, Disp: 90 tablet, Rfl: 3    Fluticasone-Salmeterol (Advair Diskus) 250-50 mcg/dose inhaler, Inhale 1 puff 2 (two) times a day Rinse mouth after use., Disp: 60 blister, Rfl: 2    losartan (COZAAR) 50 mg tablet, TAKE 1 TABLET EVERY DAY, Disp: 90 tablet, Rfl: 1    Magnesium Citrate 200 MG TABS, Take 400 mg by mouth in the morning, Disp: , Rfl:     metoprolol succinate (TOPROL-XL) 50 mg 24 hr tablet, TAKE 1 TABLET EVERY DAY, Disp: 90 tablet, Rfl: 1    Multiple Vitamin (multivitamin) tablet, Take 1 tablet by mouth in the morning., Disp: , Rfl:     triamcinolone (KENALOG) 0.5 % cream, Apply topically 2 (two) times a day, Disp: 454 g, Rfl: 0    vitamin B-12 (VITAMIN B-12) 1,000 mcg tablet, Take 1,000 mcg by mouth in the morning., Disp: , Rfl:   [6]   Allergies  Allergen Reactions    Percocet [Oxycodone-Acetaminophen] Itching     Trouble sleeping

## 2025-05-28 NOTE — TELEPHONE ENCOUNTER
Patient's wife called requesting for prescription to be sent to [facility] pharmacy in [place] due to lower cost for the patient. Please advise

## 2025-05-28 NOTE — PATIENT INSTRUCTIONS
It was a pleasure seeing you today!    Start Advair 1 puff twice a day rinse your mouth out afterwards  Obtain CT chest  Obtain PFT  Follow-up in 3 months     Han Lopez MD  Pulmonary and Critical Care Medicine

## 2025-05-29 ENCOUNTER — RESULTS FOLLOW-UP (OUTPATIENT)
Dept: CARDIOLOGY CLINIC | Facility: CLINIC | Age: 70
End: 2025-05-29

## 2025-05-29 RX ORDER — FLUTICASONE PROPIONATE AND SALMETEROL 250; 50 UG/1; UG/1
1 POWDER RESPIRATORY (INHALATION) 2 TIMES DAILY
Qty: 60 BLISTER | Refills: 2 | Status: SHIPPED | OUTPATIENT
Start: 2025-05-29 | End: 2025-08-27

## 2025-05-29 NOTE — TELEPHONE ENCOUNTER
----- Message from Pato Byrne DO sent at 5/29/2025  2:10 PM EDT -----  Can you please let the patient know blood work was normal  - Dr. Nascimento spoke with him about PSA  ----- Message -----  From: Lab, Background User  Sent: 5/27/2025  12:19 PM EDT  To: Pato Byrne DO

## 2025-05-30 ENCOUNTER — TELEPHONE (OUTPATIENT)
Age: 70
End: 2025-05-30

## 2025-05-30 NOTE — TELEPHONE ENCOUNTER
New Patient      Insurance   Current Insurance? Yes    Insurance E-verified? Yes     History   Reason for appointment/active symptoms?  NP- ref for elevated psa      Has the patient had any previous Urologist(s)? No      Was the patient seen in the ED? No      Labs/Imaging(Including Out Of Network)?     PSA resulted on 5/27/25: 7.078     Records Requested? No   Records Visible in EPIC?Yes       Personal history of cancer? No      Appointment   Office location preference:  [facility]   ?   Appointment Details   Date:  6/3/25  Time: 10:00 am    Location: [facility]    Provider:  Charlene   Does the appointment need further review? No

## 2025-06-03 ENCOUNTER — CONSULT (OUTPATIENT)
Dept: UROLOGY | Facility: CLINIC | Age: 70
End: 2025-06-03
Attending: FAMILY MEDICINE
Payer: COMMERCIAL

## 2025-06-03 VITALS
BODY MASS INDEX: 26.37 KG/M2 | SYSTOLIC BLOOD PRESSURE: 154 MMHG | HEART RATE: 72 BPM | DIASTOLIC BLOOD PRESSURE: 74 MMHG | WEIGHT: 178.6 LBS

## 2025-06-03 DIAGNOSIS — Z80.42 FAMILY HISTORY OF PROSTATE CANCER: ICD-10-CM

## 2025-06-03 DIAGNOSIS — R97.20 ELEVATED PSA: Primary | ICD-10-CM

## 2025-06-03 PROCEDURE — 99204 OFFICE O/P NEW MOD 45 MIN: CPT | Performed by: UROLOGY

## 2025-06-03 NOTE — PROGRESS NOTES
Michael Austin . is a(n) 70 y.o. male. , :  1955    Subjective     Assessment:  There were no encounter diagnoses.  70-year-old gentleman referred for elevated PSA his younger brother was recently diagnosed with prostate cancer and underwent prostatectomy at [facility].  Had complications of incontinence etc. after the procedure.  Patient not eager to have surgery but understands that might need a biopsy.  Digital rectal exam today reveals nodularity at the base of the prostate on the left side.  Asymmetric.  Currently on Plavix.  Has a history of open heart surgery.    Plan: Will check with cardiology whether we can hold his Plavix for couple days and get him in for prostate biopsy maybe on Thursday.  (Did not hear back in time.  Patient has stress test tomorrow.  Has a vascular study of his carotid stent in early July.)  I think we are just going to wait until after the carotid study and probably do him second week of July.  Patient is happy with that resolution.     Radiology  None    Labs   None    Past Medical History  Asthma  Heart disease with history of SVT  Carotid artery disease with stent on antiplatelet.    Past  History  Elevated PSA of 7 up from 2 within the last couple of years.  Digital rectal exam abnormal left base    Past  surgical history   None    Prior Visits  None    2025 OV Tk Chang  70-year-old gentleman referred for elevated PSA his younger brother was recently diagnosed with prostate cancer and underwent prostatectomy at [facility].  Had complications of incontinence etc. after the procedure.  Patient not eager to have surgery but understands that might need a biopsy.  Digital rectal exam today reveals nodularity at the base of the prostate on the left side.  Asymmetric.  Currently on Plavix.  Has a history of open heart surgery.    Plan: Will check with cardiology whether we can hold his Plavix for couple days and get him in for  "prostate biopsy maybe on Thursday.  Did not hear back in time.  Patient has stress test tomorrow.  Has a vascular study of his carotid stent in early July.  I think we are just going to wait until after the carotid study and probably do him second week of July.  Patient is happy with that resolution.    Review of Systems    Lab Results   Component Value Date    PSA 7.078 (H) 05/27/2025    PSA 2.86 08/25/2023     No results found for: \"TESTOSTERONE\"  No components found for: \"CR\"  No results found for: \"HBA1C\"    Objective     /74   Pulse 72   Wt 81 kg (178 lb 9.6 oz)   BMI 26.37 kg/m²     Physical Exam      Tk Charlene, [facility] [place]  "

## 2025-06-04 ENCOUNTER — HOSPITAL ENCOUNTER (OUTPATIENT)
Dept: RADIOLOGY | Facility: HOSPITAL | Age: 70
Discharge: HOME | End: 2025-06-04
Payer: COMMERCIAL

## 2025-06-04 ENCOUNTER — HOSPITAL ENCOUNTER (OUTPATIENT)
Dept: NON INVASIVE DIAGNOSTICS | Facility: HOSPITAL | Age: 70
Discharge: HOME | End: 2025-06-04
Payer: COMMERCIAL

## 2025-06-04 VITALS
HEART RATE: 74 BPM | DIASTOLIC BLOOD PRESSURE: 80 MMHG | RESPIRATION RATE: 20 BRPM | SYSTOLIC BLOOD PRESSURE: 124 MMHG | OXYGEN SATURATION: 99 %

## 2025-06-04 DIAGNOSIS — R06.02 SHORTNESS OF BREATH: ICD-10-CM

## 2025-06-04 DIAGNOSIS — Z95.1 S/P CABG X 3: ICD-10-CM

## 2025-06-04 DIAGNOSIS — R07.89 CHEST PRESSURE: ICD-10-CM

## 2025-06-04 LAB
CHEST PAIN STATEMENT: NORMAL
MAX DIASTOLIC BP: 84 MMHG
MAX PREDICTED HEART RATE: 151 BPM
PROTOCOL NAME: NORMAL
STRESS POST EXERCISE DUR MIN: 7 MIN
STRESS POST EXERCISE DUR SEC: 32 SEC
STRESS POST PEAK HR: 139 BPM
STRESS POST PEAK SYSTOLIC BP: 170 MMHG
TARGET HR FORMULA: NORMAL
TEST INDICATION: NORMAL

## 2025-06-04 PROCEDURE — 93017 CV STRESS TEST TRACING ONLY: CPT

## 2025-06-04 PROCEDURE — A9502 TC99M TETROFOSMIN: HCPCS

## 2025-06-04 PROCEDURE — 78452 HT MUSCLE IMAGE SPECT MULT: CPT | Performed by: INTERNAL MEDICINE

## 2025-06-04 PROCEDURE — 93018 CV STRESS TEST I&R ONLY: CPT | Performed by: INTERNAL MEDICINE

## 2025-06-04 PROCEDURE — 93016 CV STRESS TEST SUPVJ ONLY: CPT | Performed by: INTERNAL MEDICINE

## 2025-06-04 PROCEDURE — 78452 HT MUSCLE IMAGE SPECT MULT: CPT

## 2025-06-05 LAB
MAX HR PERCENT: 92 %
MAX HR: 139 BPM
RATE PRESSURE PRODUCT: NORMAL
SL CV REST NUCLEAR ISOTOPE DOSE: 10.09 MCI
SL CV STRESS NUCLEAR ISOTOPE DOSE: 30.9 MCI
SL CV STRESS RECOVERY BP: NORMAL MMHG
SL CV STRESS RECOVERY HR: 82 BPM
SL CV STRESS RECOVERY O2 SAT: 98 %
SL CV STRESS STAGE REACHED: 3
SPECT HRT GATED+EF W RNC IV: 61 %
STRESS ANGINA INDEX: 0
STRESS BASELINE BP: NORMAL MMHG
STRESS BASELINE HR: 74 BPM
STRESS O2 SAT REST: 99 %
STRESS PEAK HR: 139 BPM
STRESS POST ESTIMATED WORKLOAD: 10.1 METS
STRESS POST EXERCISE DUR MIN: 7 MIN
STRESS POST EXERCISE DUR SEC: 32 SEC
STRESS POST O2 SAT PEAK: 98 %
STRESS POST PEAK BP: 170 MMHG
STRESS/REST PERFUSION RATIO: 1.07

## 2025-06-09 ENCOUNTER — HOSPITAL ENCOUNTER (OUTPATIENT)
Dept: RADIOLOGY | Facility: HOSPITAL | Age: 70
Discharge: HOME | End: 2025-06-09
Attending: STUDENT IN AN ORGANIZED HEALTH CARE EDUCATION/TRAINING PROGRAM
Payer: COMMERCIAL

## 2025-06-09 ENCOUNTER — HOSPITAL ENCOUNTER (OUTPATIENT)
Dept: PULMONOLOGY | Facility: HOSPITAL | Age: 70
Discharge: HOME | End: 2025-06-09
Attending: STUDENT IN AN ORGANIZED HEALTH CARE EDUCATION/TRAINING PROGRAM
Payer: COMMERCIAL

## 2025-06-09 ENCOUNTER — RESULTS FOLLOW-UP (OUTPATIENT)
Dept: CARDIOLOGY CLINIC | Facility: CLINIC | Age: 70
End: 2025-06-09

## 2025-06-09 DIAGNOSIS — F17.210 CIGARETTE NICOTINE DEPENDENCE WITHOUT COMPLICATION: ICD-10-CM

## 2025-06-09 DIAGNOSIS — J45.20 MILD INTERMITTENT ASTHMA WITHOUT COMPLICATION: ICD-10-CM

## 2025-06-09 PROCEDURE — 94726 PLETHYSMOGRAPHY LUNG VOLUMES: CPT

## 2025-06-09 PROCEDURE — 94060 EVALUATION OF WHEEZING: CPT

## 2025-06-09 PROCEDURE — 94729 DIFFUSING CAPACITY: CPT

## 2025-06-09 PROCEDURE — 94760 N-INVAS EAR/PLS OXIMETRY 1: CPT

## 2025-06-09 PROCEDURE — 94726 PLETHYSMOGRAPHY LUNG VOLUMES: CPT | Performed by: INTERNAL MEDICINE

## 2025-06-09 PROCEDURE — 94729 DIFFUSING CAPACITY: CPT | Performed by: INTERNAL MEDICINE

## 2025-06-09 PROCEDURE — 94060 EVALUATION OF WHEEZING: CPT | Performed by: INTERNAL MEDICINE

## 2025-06-09 RX ORDER — ALBUTEROL SULFATE 0.83 MG/ML
2.5 SOLUTION RESPIRATORY (INHALATION) ONCE
Status: COMPLETED | OUTPATIENT
Start: 2025-06-09 | End: 2025-06-09

## 2025-06-09 RX ADMIN — ALBUTEROL SULFATE 2.5 MG: 2.5 SOLUTION RESPIRATORY (INHALATION) at 15:07

## 2025-06-12 ENCOUNTER — HOSPITAL ENCOUNTER (OUTPATIENT)
Dept: RADIOLOGY | Facility: HOSPITAL | Age: 70
Discharge: HOME | End: 2025-06-12
Attending: SURGERY
Payer: COMMERCIAL

## 2025-06-12 DIAGNOSIS — I65.21 ASYMPTOMATIC STENOSIS OF RIGHT CAROTID ARTERY: ICD-10-CM

## 2025-06-12 PROCEDURE — 93880 EXTRACRANIAL BILAT STUDY: CPT | Performed by: SURGERY

## 2025-06-12 PROCEDURE — 93880 EXTRACRANIAL BILAT STUDY: CPT

## 2025-06-16 DIAGNOSIS — I47.10 SVT (SUPRAVENTRICULAR TACHYCARDIA) (HCC): ICD-10-CM

## 2025-06-16 DIAGNOSIS — I65.22 LEFT CAROTID STENOSIS: ICD-10-CM

## 2025-06-16 DIAGNOSIS — I10 ESSENTIAL HYPERTENSION: ICD-10-CM

## 2025-06-16 RX ORDER — LOSARTAN POTASSIUM 50 MG/1
50 TABLET ORAL DAILY
Qty: 90 TABLET | Refills: 1 | Status: SHIPPED | OUTPATIENT
Start: 2025-06-16

## 2025-06-16 RX ORDER — METOPROLOL SUCCINATE 50 MG/1
50 TABLET, EXTENDED RELEASE ORAL DAILY
Qty: 90 TABLET | Refills: 1 | Status: SHIPPED | OUTPATIENT
Start: 2025-06-16

## 2025-06-16 RX ORDER — CLOPIDOGREL BISULFATE 75 MG/1
75 TABLET ORAL DAILY
Qty: 90 TABLET | Refills: 1 | Status: SHIPPED | OUTPATIENT
Start: 2025-06-16

## 2025-06-23 ENCOUNTER — RESULTS FOLLOW-UP (OUTPATIENT)
Dept: PULMONOLOGY | Facility: CLINIC | Age: 70
End: 2025-06-23

## 2025-07-01 ENCOUNTER — TELEPHONE (OUTPATIENT)
Dept: VASCULAR SURGERY | Facility: CLINIC | Age: 70
End: 2025-07-01

## 2025-07-01 NOTE — TELEPHONE ENCOUNTER
Called pt and lvm regarding an appt that was susana for 7/11/25 with Dr. Martinez was scheduled incorrectly and per Dr. Martinez's last note pt is to return to the office with AP. Asked that pt call back to let us know if the tentative date 9/26/25 Elisa Phillips @ 8am would be okay and if not our office can change the appt date and time to a day that works for him.

## 2025-07-15 ENCOUNTER — PROCEDURE VISIT (OUTPATIENT)
Dept: UROLOGY | Facility: CLINIC | Age: 70
End: 2025-07-15
Payer: COMMERCIAL

## 2025-07-15 ENCOUNTER — TELEPHONE (OUTPATIENT)
Dept: UROLOGY | Facility: CLINIC | Age: 70
End: 2025-07-15

## 2025-07-15 VITALS
OXYGEN SATURATION: 98 % | HEIGHT: 67 IN | DIASTOLIC BLOOD PRESSURE: 90 MMHG | BODY MASS INDEX: 28.09 KG/M2 | HEART RATE: 60 BPM | SYSTOLIC BLOOD PRESSURE: 148 MMHG | WEIGHT: 179 LBS

## 2025-07-15 DIAGNOSIS — R97.20 ELEVATED PSA: Primary | ICD-10-CM

## 2025-07-15 PROCEDURE — 55700 PR PROSTATE NEEDLE BIOPSY ANY APPROACH: CPT | Performed by: UROLOGY

## 2025-07-15 PROCEDURE — 76942 ECHO GUIDE FOR BIOPSY: CPT | Performed by: UROLOGY

## 2025-07-15 PROCEDURE — 88344 IMHCHEM/IMCYTCHM EA MLT ANTB: CPT | Performed by: STUDENT IN AN ORGANIZED HEALTH CARE EDUCATION/TRAINING PROGRAM

## 2025-07-15 PROCEDURE — 96372 THER/PROPH/DIAG INJ SC/IM: CPT

## 2025-07-15 PROCEDURE — G0416 PROSTATE BIOPSY, ANY MTHD: HCPCS | Performed by: STUDENT IN AN ORGANIZED HEALTH CARE EDUCATION/TRAINING PROGRAM

## 2025-07-15 RX ORDER — CEFTRIAXONE 1 G/1
1000 INJECTION, POWDER, FOR SOLUTION INTRAMUSCULAR; INTRAVENOUS ONCE
Status: COMPLETED | OUTPATIENT
Start: 2025-07-15 | End: 2025-07-15

## 2025-07-15 RX ADMIN — CEFTRIAXONE 1000 MG: 1 INJECTION, POWDER, FOR SOLUTION INTRAMUSCULAR; INTRAVENOUS at 08:24

## 2025-07-17 PROCEDURE — 88344 IMHCHEM/IMCYTCHM EA MLT ANTB: CPT | Performed by: STUDENT IN AN ORGANIZED HEALTH CARE EDUCATION/TRAINING PROGRAM

## 2025-07-17 PROCEDURE — 88305 TISSUE EXAM BY PATHOLOGIST: CPT | Performed by: STUDENT IN AN ORGANIZED HEALTH CARE EDUCATION/TRAINING PROGRAM

## 2025-07-18 NOTE — TELEPHONE ENCOUNTER
Patient's spouse called in requesting biopsy results are reviewed. States they would not like to wait until August for results. Please review and advise.

## 2025-07-22 ENCOUNTER — OFFICE VISIT (OUTPATIENT)
Dept: UROLOGY | Facility: CLINIC | Age: 70
End: 2025-07-22
Payer: COMMERCIAL

## 2025-07-22 VITALS
OXYGEN SATURATION: 96 % | SYSTOLIC BLOOD PRESSURE: 124 MMHG | HEIGHT: 67 IN | WEIGHT: 181 LBS | BODY MASS INDEX: 28.41 KG/M2 | DIASTOLIC BLOOD PRESSURE: 78 MMHG | HEART RATE: 75 BPM

## 2025-07-22 DIAGNOSIS — Z80.42 FAMILY HISTORY OF PROSTATE CANCER: ICD-10-CM

## 2025-07-22 DIAGNOSIS — R97.20 ELEVATED PSA: Primary | ICD-10-CM

## 2025-07-22 DIAGNOSIS — N42.30 DYSPLASIA OF PROSTATE: ICD-10-CM

## 2025-07-22 PROCEDURE — 99214 OFFICE O/P EST MOD 30 MIN: CPT | Performed by: UROLOGY

## 2025-07-22 NOTE — PROGRESS NOTES
Michael Austin . is a(n) 70 y.o. male. , :  1955    Subjective     Assessment:  The primary encounter diagnosis was Elevated PSA. Diagnoses of Family history of prostate cancer and Dysplasia of prostate were also pertinent to this visit.  70-year-old gentleman who underwent prostate biopsy for an elevated PSA.  ASAP was identified in one of the 3 cores obtained from the left apex.  Unfortunately he may have malignancy elsewhere in the prostate accounting for the high PSA.  His options are to wait and repeat the biopsy in 6 months or to just proceed over the next month or so where we can get him into the schedule as soon as possible to rebiopsy the entirety of the prostate off of blood thinners.  I think he would prefer to get in sooner than later and see if this is cancer.  Slow stream.    Plan: We will schedule prostate biopsy in the next month to 6 weeks off blood thinners.    Review of Systems     Radiology  None     Labs   None     Past Medical History  Asthma  Heart disease with history of SVT  Carotid artery disease with stent on antiplatelet.     Past  History  Elevated PSA of 7 up from 2 within the last couple of years.  Digital rectal exam abnormal left base.  Prostate biopsy left base no cancer. One of three cores with atypical small acinar proliferation.      Past  surgical history   None     Prior Visits  None     2025 OV Tk [facility]  70-year-old gentleman referred for elevated PSA his younger brother was recently diagnosed with prostate cancer and underwent prostatectomy at [facility].  Had complications of incontinence etc. after the procedure.  Patient not eager to have surgery but understands that might need a biopsy.  Digital rectal exam today reveals nodularity at the base of the prostate on the left side.  Asymmetric.  Currently on Plavix.  Has a history of open heart surgery.     Plan: Will check with cardiology whether we can hold his Plavix for couple  "days and get him in for prostate biopsy maybe on Thursday.  Did not hear back in time.  Patient has stress test tomorrow.  Has a vascular study of his carotid stent in early July.  I think we are just going to wait until after the carotid study and probably do him second week of July.  Patient is happy with that resolution.    07/22/25  70-year-old gentleman who underwent prostate biopsy for an elevated PSA.  ASAP was identified in one of the 3 cores obtained from the left apex.  Unfortunately he may have malignancy elsewhere in the prostate accounting for the high PSA.  His options are to wait and repeat the biopsy in 6 months or to just proceed over the next month or so where we can get him into the schedule as soon as possible to rebiopsy the entirety of the prostate off of blood thinners.  I think he would prefer to get in sooner than later and see if this is cancer.    Plan: We will schedule prostate biopsy in the next month to 6 weeks off blood thinners.  Review of Systems    Lab Results   Component Value Date    PSA 7.078 (H) 05/27/2025    PSA 2.86 08/25/2023     No results found for: \"TESTOSTERONE\"  No components found for: \"CR\"  No results found for: \"HBA1C\"    Objective     /78 (BP Location: Left arm, Patient Position: Sitting, Cuff Size: Adult)   Pulse 75   Ht 5' 7\" (1.702 m)   Wt 82.1 kg (181 lb)   SpO2 96%   BMI 28.35 kg/m²     Physical Exam      [facility]  "

## 2025-07-25 ENCOUNTER — TELEPHONE (OUTPATIENT)
Age: 70
End: 2025-07-25

## 2025-07-25 NOTE — TELEPHONE ENCOUNTER
Follow Up:     Pt under care of:  belén   Office Location: [facility]      Last Seen (include Follow Up recommendations of last visit- see Office Visit - Instructions):7/22/25Plan: We will schedule prostate biopsy in the next month to 6 weeks off blood thinners.   Instructions      Return in about 4 weeks (around 8/19/2025) for Cysto/TRUS.     Pt calling due to: schedule biopsy please clarify if this is in office or OR.  And timing.   And if pt also needs per instructions at the bottom of last note for cysto /trus    Pt can be reached at: please call wife [phone]

## 2025-08-22 ENCOUNTER — OFFICE VISIT (OUTPATIENT)
Dept: PULMONOLOGY | Facility: MEDICAL CENTER | Age: 70
End: 2025-08-22
Payer: COMMERCIAL

## 2025-08-22 VITALS
HEART RATE: 72 BPM | RESPIRATION RATE: 12 BRPM | SYSTOLIC BLOOD PRESSURE: 122 MMHG | WEIGHT: 181 LBS | OXYGEN SATURATION: 99 % | BODY MASS INDEX: 28.41 KG/M2 | HEIGHT: 67 IN | DIASTOLIC BLOOD PRESSURE: 84 MMHG

## 2025-08-22 DIAGNOSIS — J45.20 MILD INTERMITTENT ASTHMA WITHOUT COMPLICATION: Primary | ICD-10-CM

## 2025-08-22 PROCEDURE — 99213 OFFICE O/P EST LOW 20 MIN: CPT | Performed by: STUDENT IN AN ORGANIZED HEALTH CARE EDUCATION/TRAINING PROGRAM

## (undated) DEVICE — BAG DECANTER

## (undated) DEVICE — INTENDED FOR TISSUE SEPARATION, AND OTHER PROCEDURES THAT REQUIRE A SHARP SURGICAL BLADE TO PUNCTURE OR CUT.: Brand: BARD-PARKER ® CARBON RIB-BACK BLADES

## (undated) DEVICE — TOWEL SET X-RAY

## (undated) DEVICE — GLOVE INDICATOR PI UNDERGLOVE SZ 7 BLUE

## (undated) DEVICE — BEACON TIP TORCON NB ADVANTAGE CATHETER: Brand: BEACON TIP TORCON NB

## (undated) DEVICE — 32 FR STRAIGHT – SOFT PVC CATHETER: Brand: PVC THORACIC CATHETERS

## (undated) DEVICE — TRAY FOLEY 16FR SURESTEP TEMP SENS URIMETER STAT LOK

## (undated) DEVICE — PINNACLE R/O II INTRODUCER SHEATH WITH RADIOPAQUE MARKER: Brand: PINNACLE

## (undated) DEVICE — RECIP.STERNUM SAW BLADE 34/7.5/0.7MM: Brand: AESCULAP

## (undated) DEVICE — GAUZE SPONGES,16 PLY: Brand: CURITY

## (undated) DEVICE — ACE WRAP 6 IN UNSTERILE

## (undated) DEVICE — INFUSER BAG 500ML

## (undated) DEVICE — EVERGRIP INSERT SET 61MM: Brand: FOGARTY EVERGRIP

## (undated) DEVICE — ADHESIVE SKN CLSR HISTOACRYL FLEX 0.5ML LF

## (undated) DEVICE — HEMOCLIP CARTRIDGE LRG

## (undated) DEVICE — OASIS DRAIN, DUAL, IN-LINE, ATS COMPATIBLE: Brand: OASIS

## (undated) DEVICE — FILTER SMOKE EVAC VIROSAFE

## (undated) DEVICE — FLUID MANAGEMENT KIT - IR

## (undated) DEVICE — IDEAL SUTURE SHUTTLE, 90 DEGREES UP: Brand: IDEAL

## (undated) DEVICE — GLOVE SRG BIOGEL ORTHOPEDIC 7.5

## (undated) DEVICE — UMBILICAL TAPE: Brand: DEROYAL

## (undated) DEVICE — CATH STRAIGHT RED RUBBER 20 FR

## (undated) DEVICE — SUT PROLENE 4-0 BB 36 IN 8581H

## (undated) DEVICE — BLADE BEAVER MINI SZ 69

## (undated) DEVICE — CATH DIAG 5FR .035 90CM PIG

## (undated) DEVICE — 32 FR RIGHT ANGLE – SOFT PVC CATHETER: Brand: PVC THORACIC CATHETERS

## (undated) DEVICE — INFLATION DEVICE BASIX 30ATM

## (undated) DEVICE — BUTTON SWITCH PENCIL HOLSTER: Brand: VALLEYLAB

## (undated) DEVICE — SYRINGE KIT,PACKAGED,,150FT,MK 7(ANGIO-ARTERION, 150ML SYR KIT W/QFT,MC)(60729385): Brand: MEDRAD® MARK 7 ARTERION DISPOSABLE SYRINGE 150 ML WITH QUICK FILL TUBE

## (undated) DEVICE — GUIDEWIRE AMPLATZ SS 260CM .035

## (undated) DEVICE — AVIATOR PLUS .014 4.0X40 142CM: Brand: AVIATOR

## (undated) DEVICE — INTENDED FOR TISSUE SEPARATION, AND OTHER PROCEDURES THAT REQUIRE A SHARP SURGICAL BLADE TO PUNCTURE OR CUT.: Brand: BARD-PARKER SAFETY BLADES SIZE 15, STERILE

## (undated) DEVICE — Device

## (undated) DEVICE — COVER PROBE INTRAOPERATIVE 6 X 96 IN

## (undated) DEVICE — TUBING INSUFFLATION SET ISO CONNECTOR

## (undated) DEVICE — ULTRACLEAN ACCESSORY ELECTRODE 1" (2.54 CM) COATED BLADE: Brand: ULTRACLEAN

## (undated) DEVICE — SUT PROLENE 6-0 C-1/C-1 30 IN 8307H

## (undated) DEVICE — CATH ANGIO 4FR 70CM  2CM SEGMENT ACCU-VU

## (undated) DEVICE — SUT SILK 0 CT-1 30 IN 424H

## (undated) DEVICE — BLANKET HYPOTHERMIA ADULT GAYMAR

## (undated) DEVICE — STERILE ICS CARDIOVASCULAR PK: Brand: CARDINAL HEALTH

## (undated) DEVICE — LIGHT HANDLE COVER SLEEVE DISP BLUE STELLAR

## (undated) DEVICE — SUCTION CATH 18 FR

## (undated) DEVICE — SUT PDS PLUS 1 CTB 36 IN PDPB359T

## (undated) DEVICE — SILVER-COATED ANTIBACTERIAL BARRIER DRESSING: Brand: ACTICOAT SURGIC 10X12CM 5PK US

## (undated) DEVICE — ANTIBACTERIAL UNDYED BRAIDED (POLYGLACTIN 910), SYNTHETIC ABSORBABLE SUTURE: Brand: COATED VICRYL

## (undated) DEVICE — SUT ETHIBOND 2-0 SH-1/SH-1 30 IN X763H

## (undated) DEVICE — SYRINGE 50ML LL

## (undated) DEVICE — LIGACLIP MCA MULTIPLE CLIP APPLIERS, 20 SMALL CLIPS: Brand: LIGACLIP

## (undated) DEVICE — GLOVE SRG BIOGEL ECLIPSE 7

## (undated) DEVICE — VASOVIEW HEMOPRO 2: Brand: VASOVIEW HEMOPRO 2

## (undated) DEVICE — BLADE ELECTRODE: Brand: EDGE

## (undated) DEVICE — AORTIC PUNCH 5.2 MM DISP

## (undated) DEVICE — TUBING INJECTOR HIGH PRESSURE 91051482

## (undated) DEVICE — ALCON OPHTHALMIC KNIFE 15 °: Brand: ALCON

## (undated) DEVICE — IV SET 15 DROP STERILE 0/Y GRAVITY

## (undated) DEVICE — PLUMEPEN PRO 10FT

## (undated) DEVICE — PACK CABG PBDS

## (undated) DEVICE — SUT SILK 2 60 IN SA8H

## (undated) DEVICE — SUT PROLENE 7-0 BV175-8/BV175-8 24 IN EPM8747

## (undated) DEVICE — THERMOFLECT BLANKET, L, 25EA                               TS THERMOFLECT BLANKET, 48" X 84", SILVER, 5/BG, 5 BG/CS NW: Brand: THERMOFLECT

## (undated) DEVICE — STERNAL WIRE

## (undated) DEVICE — PLEDGET CARDIO PTFE 9.5 X 4.8 SOFT LF (6EA/PK)

## (undated) DEVICE — 3000CC GUARDIAN II: Brand: GUARDIAN

## (undated) DEVICE — SYRINGE 20ML LL

## (undated) DEVICE — STOPCOCK 4 WAY LL HIGH PRESSURE

## (undated) DEVICE — DRESSING ALLEVYN LIFE HEEL 25 X 25.2CM

## (undated) DEVICE — SNAP KOVER: Brand: UNBRANDED

## (undated) DEVICE — BONE WAX WHITE: Brand: BONE WAX WHITE

## (undated) DEVICE — DRESSING ALLEVYN LIFE SACRAL 6.75 X 6.5 IN

## (undated) DEVICE — MICROPUNCTURE 501

## (undated) DEVICE — SUT SILK 3-0 SH CR/8 18 IN C013D

## (undated) DEVICE — SILVER-COATED ANTIBACTERIAL BARRIER DRESSING: Brand: ACTICOAT SURGIC 10X25CM 5PK US

## (undated) DEVICE — NON-DEHP HIGH FLOW RATE EXTENSION SET, MALE LUER LOCK ADAPTER